# Patient Record
Sex: FEMALE | Race: WHITE | NOT HISPANIC OR LATINO | Employment: OTHER | URBAN - METROPOLITAN AREA
[De-identification: names, ages, dates, MRNs, and addresses within clinical notes are randomized per-mention and may not be internally consistent; named-entity substitution may affect disease eponyms.]

---

## 2017-01-10 RX ORDER — CLONIDINE HYDROCHLORIDE 0.3 MG/1
TABLET ORAL
Qty: 30 TABLET | Refills: 0 | OUTPATIENT
Start: 2017-01-10

## 2017-01-10 RX ORDER — LISINOPRIL AND HYDROCHLOROTHIAZIDE 25; 20 MG/1; MG/1
TABLET ORAL
Qty: 30 TABLET | Refills: 0 | OUTPATIENT
Start: 2017-01-10

## 2017-01-12 RX ORDER — GABAPENTIN 400 MG/1
CAPSULE ORAL
Qty: 30 CAPSULE | Refills: 0 | OUTPATIENT
Start: 2017-01-12

## 2017-01-12 RX ORDER — CLONIDINE HYDROCHLORIDE 0.3 MG/1
TABLET ORAL
Qty: 30 TABLET | Refills: 0 | OUTPATIENT
Start: 2017-01-12

## 2017-01-16 ENCOUNTER — OFFICE VISIT (OUTPATIENT)
Dept: FAMILY MEDICINE CLINIC | Facility: CLINIC | Age: 77
End: 2017-01-16

## 2017-01-16 VITALS
TEMPERATURE: 98.3 F | HEART RATE: 72 BPM | DIASTOLIC BLOOD PRESSURE: 62 MMHG | BODY MASS INDEX: 25.13 KG/M2 | SYSTOLIC BLOOD PRESSURE: 144 MMHG | OXYGEN SATURATION: 95 % | HEIGHT: 64 IN | WEIGHT: 147.2 LBS

## 2017-01-16 DIAGNOSIS — IMO0002 UNCONTROLLED TYPE 2 DIABETES MELLITUS WITH COMPLICATION, WITH LONG-TERM CURRENT USE OF INSULIN: Primary | ICD-10-CM

## 2017-01-16 DIAGNOSIS — E78.5 HYPERLIPIDEMIA, UNSPECIFIED HYPERLIPIDEMIA TYPE: ICD-10-CM

## 2017-01-16 DIAGNOSIS — E11.49 OTHER DIABETIC NEUROLOGICAL COMPLICATION ASSOCIATED WITH TYPE 2 DIABETES MELLITUS (HCC): ICD-10-CM

## 2017-01-16 DIAGNOSIS — I10 ESSENTIAL HYPERTENSION: ICD-10-CM

## 2017-01-16 DIAGNOSIS — K57.92 DIVERTICULITIS OF INTESTINE WITHOUT PERFORATION OR ABSCESS WITHOUT BLEEDING, UNSPECIFIED PART OF INTESTINAL TRACT: ICD-10-CM

## 2017-01-16 PROBLEM — I77.9 PERIPHERAL ARTERIAL OCCLUSIVE DISEASE (HCC): Status: ACTIVE | Noted: 2017-01-16

## 2017-01-16 PROBLEM — J18.9 PNEUMONIA: Status: RESOLVED | Noted: 2017-01-16 | Resolved: 2017-01-16

## 2017-01-16 PROBLEM — M54.6 THORACIC BACK PAIN: Status: ACTIVE | Noted: 2017-01-16

## 2017-01-16 PROBLEM — J18.9 PNEUMONIA: Status: ACTIVE | Noted: 2017-01-16

## 2017-01-16 PROBLEM — K57.32 DIVERTICULITIS OF COLON: Status: RESOLVED | Noted: 2017-01-16 | Resolved: 2017-01-16

## 2017-01-16 PROBLEM — H91.90 HEARING LOSS: Status: ACTIVE | Noted: 2017-01-16

## 2017-01-16 PROBLEM — K57.32 DIVERTICULITIS OF COLON: Status: ACTIVE | Noted: 2017-01-16

## 2017-01-16 PROBLEM — K21.9 GASTROESOPHAGEAL REFLUX DISEASE: Status: ACTIVE | Noted: 2017-01-16

## 2017-01-16 PROBLEM — K29.70 GASTRITIS: Status: ACTIVE | Noted: 2017-01-16

## 2017-01-16 PROBLEM — E11.40 DIABETIC NEUROPATHY (HCC): Status: ACTIVE | Noted: 2017-01-16

## 2017-01-16 PROBLEM — J30.2 SEASONAL ALLERGIC RHINITIS: Status: ACTIVE | Noted: 2017-01-16

## 2017-01-16 PROBLEM — E53.8 COBALAMIN DEFICIENCY: Status: ACTIVE | Noted: 2017-01-16

## 2017-01-16 LAB — HBA1C MFR BLD: 7.6 %

## 2017-01-16 PROCEDURE — 99214 OFFICE O/P EST MOD 30 MIN: CPT | Performed by: NURSE PRACTITIONER

## 2017-01-16 PROCEDURE — 83036 HEMOGLOBIN GLYCOSYLATED A1C: CPT | Performed by: NURSE PRACTITIONER

## 2017-01-16 RX ORDER — GABAPENTIN 400 MG/1
400 CAPSULE ORAL 2 TIMES DAILY
Qty: 180 CAPSULE | Refills: 3 | Status: SHIPPED | OUTPATIENT
Start: 2017-01-16 | End: 2018-03-15 | Stop reason: SDUPTHER

## 2017-01-16 RX ORDER — AMLODIPINE BESYLATE 10 MG/1
10 TABLET ORAL DAILY
Qty: 90 TABLET | Refills: 3 | Status: SHIPPED | OUTPATIENT
Start: 2017-01-16 | End: 2018-03-15 | Stop reason: SDUPTHER

## 2017-01-16 RX ORDER — CIPROFLOXACIN 500 MG/1
500 TABLET, FILM COATED ORAL 2 TIMES DAILY
Qty: 20 TABLET | Refills: 0 | Status: SHIPPED | OUTPATIENT
Start: 2017-01-16 | End: 2017-02-22

## 2017-01-16 RX ORDER — CLONIDINE HYDROCHLORIDE 0.3 MG/1
0.3 TABLET ORAL 2 TIMES DAILY
Qty: 180 TABLET | Refills: 3 | Status: SHIPPED | OUTPATIENT
Start: 2017-01-16 | End: 2018-01-03 | Stop reason: SDUPTHER

## 2017-01-16 RX ORDER — ASPIRIN 325 MG
325 TABLET ORAL DAILY
COMMUNITY
End: 2019-02-15

## 2017-01-16 RX ORDER — METRONIDAZOLE 500 MG/1
500 TABLET ORAL 3 TIMES DAILY
Qty: 14 TABLET | Refills: 0 | Status: SHIPPED | OUTPATIENT
Start: 2017-01-16 | End: 2017-02-28 | Stop reason: SDUPTHER

## 2017-01-16 RX ORDER — LISINOPRIL AND HYDROCHLOROTHIAZIDE 25; 20 MG/1; MG/1
1 TABLET ORAL DAILY
Qty: 90 TABLET | Refills: 3 | Status: SHIPPED | OUTPATIENT
Start: 2017-01-16 | End: 2018-02-01 | Stop reason: SDUPTHER

## 2017-01-16 NOTE — MR AVS SNAPSHOT
Mar Burgos   1/16/2017 2:00 PM   Office Visit    Dept Phone:  [phone]   Encounter #:  [id number]    Provider:  JEFFREY Rushing   Department:  [facility]                Your Full Care Plan              Today's Medication Changes          These changes are accurate as of: 1/16/17  2:23 PM.  If you have any questions, ask your nurse or doctor.               New Medication(s)Ordered:     ciprofloxacin 500 MG tablet   Commonly known as:  CIPRO   Take 1 tablet by mouth 2 (Two) Times a Day.   Started by:  JEFFREY Rushing       metroNIDAZOLE 500 MG tablet   Commonly known as:  FLAGYL   Take 1 tablet by mouth 3 (Three) Times a Day.   Started by:  JEFFREY Rushing         Medication(s)that have changed:     amLODIPine 10 MG tablet   Commonly known as:  NORVASC   Take 1 tablet by mouth Daily.   What changed:  See the new instructions.   Changed by:  JEFFREY Rushing       CloNIDine 0.3 MG tablet   Commonly known as:  CATAPRES   Take 1 tablet by mouth 2 (Two) Times a Day.   What changed:  See the new instructions.   Changed by:  JEFFREY Rushing       gabapentin 400 MG capsule   Commonly known as:  NEURONTIN   Take 1 capsule by mouth 2 (Two) Times a Day.   What changed:  See the new instructions.   Changed by:  JEFFREY Rushing       lisinopril-hydrochlorothiazide 20-25 MG per tablet   Commonly known as:  PRINZIDE,ZESTORETIC   Take 1 tablet by mouth Daily.   What changed:  See the new instructions.   Changed by:  JEFFREY Rushing       metFORMIN 1000 MG tablet   Commonly known as:  GLUCOPHAGE   Take 1 tablet by mouth 2 (Two) Times a Day With Meals.   What changed:  See the new instructions.   Changed by:  JEFFREY Rushing       metoprolol succinate  MG 24 hr tablet   Commonly known as:  TOPROL-XL   TAKE 1 TABLET BY MOUTH EVERY DAY   What changed:  Another medication with the same name was removed. Continue taking this medication, and  follow the directions you see here.   Changed by:  JEFFREY Rushing         Stop taking medication(s)listed here:     doxycycline 100 MG capsule   Commonly known as:  MONODOX   Stopped by:  JEFFREY Rushing                Where to Get Your Medications      These medications were sent to [id number] - [place], [place] - [address] AT [facility] of [address] - [phone]  - [phone] FX  [address], [place] [address]     Phone:  [phone]     amLODIPine 10 MG tablet    ciprofloxacin 500 MG tablet    CloNIDine 0.3 MG tablet    gabapentin 400 MG capsule    lisinopril-hydrochlorothiazide 20-25 MG per tablet    metFORMIN 1000 MG tablet    metroNIDAZOLE 500 MG tablet                  Your Updated Medication List          This list is accurate as of: 1/16/17  2:23 PM.  Always use your most recent med list.                amLODIPine 10 MG tablet   Commonly known as:  NORVASC   Take 1 tablet by mouth Daily.       aspirin 325 MG tablet       ciprofloxacin 500 MG tablet   Commonly known as:  CIPRO   Take 1 tablet by mouth 2 (Two) Times a Day.       CloNIDine 0.3 MG tablet   Commonly known as:  CATAPRES   Take 1 tablet by mouth 2 (Two) Times a Day.       gabapentin 400 MG capsule   Commonly known as:  NEURONTIN   Take 1 capsule by mouth 2 (Two) Times a Day.       lisinopril-hydrochlorothiazide 20-25 MG per tablet   Commonly known as:  PRINZIDE,ZESTORETIC   Take 1 tablet by mouth Daily.       metFORMIN 1000 MG tablet   Commonly known as:  GLUCOPHAGE   Take 1 tablet by mouth 2 (Two) Times a Day With Meals.       metoprolol succinate  MG 24 hr tablet   Commonly known as:  TOPROL-XL   TAKE 1 TABLET BY MOUTH EVERY DAY       metroNIDAZOLE 500 MG tablet   Commonly known as:  FLAGYL   Take 1 tablet by mouth 3 (Three) Times a Day.               We Performed the Following     CBC & Differential     Comprehensive Metabolic Panel     Lipid Panel With LDL / HDL Ratio     MicroAlbumin, Urine,  Random     POC Glycosylated Hemoglobin (Hb A1C)       You Were Diagnosed With        Codes Comments    Uncontrolled type 2 diabetes mellitus with complication, with long-term current use of insulin    -  Primary ICD-10-CM: E11.8, E11.65, Z79.4  ICD-9-CM: 250.82, V58.67     Essential hypertension     ICD-10-CM: I10  ICD-9-CM: 401.9     Hyperlipidemia, unspecified hyperlipidemia type     ICD-10-CM: E78.5  ICD-9-CM: 272.4     Other diabetic neurological complication associated with type 2 diabetes mellitus     ICD-10-CM: E11.49     Diverticulitis of intestine without perforation or abscess without bleeding, unspecified part of intestinal tract     ICD-10-CM: K57.92  ICD-9-CM: 562.11       Instructions     None    Patient Instructions History      Upcoming Appointments     Visit Type Date Time Department    OFFICE VISIT 2017  2:00 PM [facility]    INIT MEDICARE WELLNESS VISIT 2017  9:00 AM [facility]      [employer] Signup     [employer] allows you to send messages to your doctor, view your test results, renew your prescriptions, schedule appointments, and more. To sign up, go to [employer] and click on the Sign Up Now link in the New User? box. Enter your [employer] Activation Code exactly as it appears below along with the last four digits of your Social Security Number and your Date of Birth () to complete the sign-up process. If you do not sign up before the expiration date, you must request a new code.    [employer] Activation Code: [email or web address]  Expires: 2017  5:38 AM    If you have questions, you can email [email or web address] or call [phone] to talk to our [employer] staff. Remember, [employer] is NOT to be used for urgent needs. For medical emergencies, dial 911.               Other Info from Your Visit           Your Appointments     2017  9:00 AM EST   Initial Medicare Wellness Visit with JEFFREY Rushing   [facility]  [facility] (--)    [address]  [facility] [address] [phone]              Allergies     Lipitor [Atorvastatin]        Reason for Visit     Med Refill Needing clonidine, amlodipine, gabapentin, metformin, and lisinopril refilled     Immunizations Patient is unsure of last tdap vaccine. Patient has not had a shingles vaccine       Vital Signs     Blood Pressure Pulse Temperature Height Weight Oxygen Saturation    144/62 (BP Location: Right arm, Patient Position: Sitting) 72 98.3 °F (36.8 °C) 64\" (162.6 cm) 147 lb 3.2 oz (66.8 kg) 95%    Body Mass Index Smoking Status                25.27 kg/m2 Former Smoker          Problems and Diagnoses Noted     Anemia    Vitamin B12 deficiency    Diabetes    Diabetic nerve disorder    Inflammation of the stomach lining    Acid reflux disease    Hearing loss    History of malignant neoplasm of breast    History of malignant neoplasm of skin    History of tobacco use    High cholesterol or triglycerides    High blood pressure    Peripheral arterial occlusive disease    Seasonal allergic reaction    Back pain    Type II diabetes mellitus without control    Diverticulitis of intestine without perforation or abscess without bleeding, unspecified part of intestinal tract          No Longer an Issue     Diverticulitis of colon    Pneumonia    Vitamin B-complex deficiency      Results     POC Glycosylated Hemoglobin (Hb A1C)      Component Value Standard Range & Units    Hemoglobin A1C 7.6 %                    "

## 2017-01-16 NOTE — PROGRESS NOTES
"Subjective   Mar Burgos is a 76 y.o. female.     History of Present Illness   Mar Burgos 76 y.o. female who presents today for routine follow up check and medication refills.  she has a history of   Patient Active Problem List   Diagnosis   • Anemia   • Uncontrolled type 2 diabetes mellitus   • Diabetic neuropathy   • Gastroesophageal reflux disease   • Gastritis   • Hearing loss   • Hyperlipidemia   • Hypertension   • Thoracic back pain   • Peripheral arterial occlusive disease   • History of malignant neoplasm of breast   • History of malignant neoplasm of skin   • History of tobacco use   • Seasonal allergic rhinitis   • Diabetes mellitus   • Cobalamin deficiency   .  Since the last visit, she has overall felt well.  She has Hypertenision and is well controlled on medication, DMII and is well controlled on medication and neuropathy is well controlled .  she has been compliant with current medications have reviewed them.  The patient denies medication side effects.    Needing several meds refilled. Doing well with all.  Would like Cipro and Flagyl for a possible future diverticulitis flare.  Ok today without issue     The following portions of the patient's history were reviewed and updated as appropriate: allergies, current medications, past social history and problem list.    Review of Systems   All other systems reviewed and are negative.      Objective   Visit Vitals   • /62 (BP Location: Right arm, Patient Position: Sitting)   • Pulse 72   • Temp 98.3 °F (36.8 °C)   • Ht 64\" (162.6 cm)   • Wt 147 lb 3.2 oz (66.8 kg)   • SpO2 95%   • BMI 25.27 kg/m2     Physical Exam   Constitutional: She is oriented to person, place, and time. Vital signs are normal. She appears well-developed and well-nourished. No distress.   HENT:   Head: Normocephalic.   Cardiovascular: Normal rate, regular rhythm and normal heart sounds.    Pulmonary/Chest: Effort normal and breath sounds normal.    Mar had a " diabetic foot exam performed today.  Neurological: She is alert and oriented to person, place, and time. Gait normal.   Psychiatric: She has a normal mood and affect. Her behavior is normal. Judgment and thought content normal.   Vitals reviewed.      Assessment/Plan   Problem List Items Addressed This Visit        Cardiovascular and Mediastinum    Hyperlipidemia    Relevant Orders    Comprehensive Metabolic Panel    CBC & Differential    Lipid Panel With LDL / HDL Ratio    Hypertension    Relevant Medications    CloNIDine (CATAPRES) 0.3 MG tablet    amLODIPine (NORVASC) 10 MG tablet    lisinopril-hydrochlorothiazide (PRINZIDE,ZESTORETIC) 20-25 MG per tablet    Other Relevant Orders    MicroAlbumin, Urine, Random       Endocrine    Uncontrolled type 2 diabetes mellitus - Primary    Relevant Medications    metFORMIN (GLUCOPHAGE) 1000 MG tablet    Other Relevant Orders    POC Glycosylated Hemoglobin (Hb A1C)       Nervous and Auditory    Diabetic neuropathy    Relevant Medications    gabapentin (NEURONTIN) 400 MG capsule      Other Visit Diagnoses     Diverticulitis of intestine without perforation or abscess without bleeding, unspecified part of intestinal tract        Relevant Medications    ciprofloxacin (CIPRO) 500 MG tablet    metroNIDAZOLE (FLAGYL) 500 MG tablet           rto in 6 mons

## 2017-01-17 LAB
ALBUMIN SERPL-MCNC: 4.6 G/DL (ref 3.5–4.8)
ALBUMIN/GLOB SERPL: 1.4 {RATIO} (ref 1.1–2.5)
ALP SERPL-CCNC: 138 IU/L (ref 39–117)
ALT SERPL-CCNC: 14 IU/L (ref 0–32)
AST SERPL-CCNC: 18 IU/L (ref 0–40)
BASOPHILS # BLD AUTO: 0.1 X10E3/UL (ref 0–0.2)
BASOPHILS NFR BLD AUTO: 1 %
BILIRUB SERPL-MCNC: <0.2 MG/DL (ref 0–1.2)
BUN SERPL-MCNC: 23 MG/DL (ref 8–27)
BUN/CREAT SERPL: 17 (ref 11–26)
CALCIUM SERPL-MCNC: 10 MG/DL (ref 8.7–10.3)
CHLORIDE SERPL-SCNC: 102 MMOL/L (ref 96–106)
CHOLEST SERPL-MCNC: 234 MG/DL (ref 100–199)
CO2 SERPL-SCNC: 19 MMOL/L (ref 18–29)
CREAT SERPL-MCNC: 1.39 MG/DL (ref 0.57–1)
EOSINOPHIL # BLD AUTO: 0.2 X10E3/UL (ref 0–0.4)
EOSINOPHIL NFR BLD AUTO: 3 %
ERYTHROCYTE [DISTWIDTH] IN BLOOD BY AUTOMATED COUNT: 14.7 % (ref 12.3–15.4)
GLOBULIN SER CALC-MCNC: 3.2 G/DL (ref 1.5–4.5)
GLUCOSE SERPL-MCNC: 172 MG/DL (ref 65–99)
GLUCOSE UR QL: NORMAL
HCT VFR BLD AUTO: 37.8 % (ref 34–46.6)
HDLC SERPL-MCNC: 37 MG/DL
HGB BLD-MCNC: 12.4 G/DL (ref 11.1–15.9)
IMM GRANULOCYTES # BLD: 0 X10E3/UL (ref 0–0.1)
IMM GRANULOCYTES NFR BLD: 0 %
KETONES UR QL STRIP: NORMAL
LDLC SERPL CALC-MCNC: 140 MG/DL (ref 0–99)
LDLC/HDLC SERPL: 3.8 RATIO UNITS (ref 0–3.2)
LYMPHOCYTES # BLD AUTO: 1.6 X10E3/UL (ref 0.7–3.1)
LYMPHOCYTES NFR BLD AUTO: 21 %
MCH RBC QN AUTO: 28.1 PG (ref 26.6–33)
MCHC RBC AUTO-ENTMCNC: 32.8 G/DL (ref 31.5–35.7)
MCV RBC AUTO: 86 FL (ref 79–97)
MONOCYTES # BLD AUTO: 0.9 X10E3/UL (ref 0.1–0.9)
MONOCYTES NFR BLD AUTO: 12 %
NEUTROPHILS # BLD AUTO: 4.7 X10E3/UL (ref 1.4–7)
NEUTROPHILS NFR BLD AUTO: 63 %
PH UR STRIP: NORMAL [PH]
PLATELET # BLD AUTO: 342 X10E3/UL (ref 150–379)
POTASSIUM SERPL-SCNC: 4.5 MMOL/L (ref 3.5–5.2)
PROT SERPL-MCNC: 7.8 G/DL (ref 6–8.5)
PROT UR QL STRIP: NORMAL
RBC # BLD AUTO: 4.42 X10E6/UL (ref 3.77–5.28)
SODIUM SERPL-SCNC: 139 MMOL/L (ref 134–144)
SP GR UR: NORMAL
SPECIMEN STATUS: NORMAL
TRIGL SERPL-MCNC: 287 MG/DL (ref 0–149)
UNABLE TO VOID: NORMAL
VLDLC SERPL CALC-MCNC: 57 MG/DL (ref 5–40)
WBC # BLD AUTO: 7.5 X10E3/UL (ref 3.4–10.8)

## 2017-01-18 RX ORDER — EZETIMIBE 10 MG/1
10 TABLET ORAL DAILY
Qty: 30 TABLET | Refills: 3 | Status: SHIPPED | OUTPATIENT
Start: 2017-01-18 | End: 2018-03-15

## 2017-02-22 ENCOUNTER — OFFICE VISIT (OUTPATIENT)
Dept: FAMILY MEDICINE CLINIC | Facility: CLINIC | Age: 77
End: 2017-02-22

## 2017-02-22 VITALS
HEART RATE: 65 BPM | HEIGHT: 64 IN | SYSTOLIC BLOOD PRESSURE: 122 MMHG | OXYGEN SATURATION: 98 % | BODY MASS INDEX: 25.3 KG/M2 | DIASTOLIC BLOOD PRESSURE: 58 MMHG | WEIGHT: 148.2 LBS | TEMPERATURE: 98.4 F

## 2017-02-22 DIAGNOSIS — Z12.31 ENCOUNTER FOR SCREENING MAMMOGRAM FOR MALIGNANT NEOPLASM OF BREAST: ICD-10-CM

## 2017-02-22 DIAGNOSIS — Z13.820 OSTEOPOROSIS SCREENING: Primary | ICD-10-CM

## 2017-02-22 DIAGNOSIS — Z12.39 BREAST CANCER SCREENING: ICD-10-CM

## 2017-02-22 PROCEDURE — G0438 PPPS, INITIAL VISIT: HCPCS | Performed by: NURSE PRACTITIONER

## 2017-02-22 NOTE — PROGRESS NOTES
QUICK REFERENCE INFORMATION:  The ABCs of the Annual Wellness Visit    Initial Medicare Wellness Visit    HEALTH RISK ASSESSMENT    Recent Hospitalizations:  No recent hospitalization(s)..        Current Medical Providers:  Patient Care Team:  JEFFREY Rushing as PCP - General  JEFFREY Rushing as PCP - Family Medicine        Smoking Status:  History   Smoking Status   • Former Smoker   • Years: 10.00   Smokeless Tobacco   • Former User       Alcohol Consumption:  History   Alcohol Use No       Depression Screen:   No flowsheet data found.    Health Habits and Functional and Cognitive Screening:  No flowsheet data found.               Does the patient have evidence of cognitive impairment? No    Asprin use counseling:yes    Finger Rub Hearing Test (right ear):passed  Finger Rub Hearing Test (left ear):passed    Recent Lab Results:    Visual Acuity:  No exam data present    Age-appropriate Screening Schedule:  Refer to the list below for future screening recommendations based on patient's age, sex and/or medical conditions. Orders for these recommended tests are listed in the plan section. The patient has been provided with a written plan.    Health Maintenance   Topic Date Due   • TDAP/TD VACCINES (1 - Tdap) 09/25/1959   • DIABETIC EYE EXAM  01/16/2017   • URINE MICROALBUMIN  01/16/2017   • ZOSTER VACCINE  01/16/2017   • HEMOGLOBIN A1C  07/16/2017   • PNEUMOCOCCAL VACCINES (65+ LOW/MEDIUM RISK) (2 of 2 - PPSV23) 10/24/2017   • DIABETIC FOOT EXAM  01/16/2018   • LIPID PANEL  01/16/2018   • INFLUENZA VACCINE  Completed        Subjective   History of Present Illness    Mar Burgos is a 76 y.o. female who presents for an Annual Wellness Visit. In addition, we addressed the following health issues:none    The following portions of the patient's history were reviewed and updated as appropriate: allergies, current medications, past family history, past medical history, past social history, past surgical history  and problem list.    Outpatient Medications Prior to Visit   Medication Sig Dispense Refill   • amLODIPine (NORVASC) 10 MG tablet Take 1 tablet by mouth Daily. 90 tablet 3   • aspirin 325 MG tablet Take 325 mg by mouth Daily.     • CloNIDine (CATAPRES) 0.3 MG tablet Take 1 tablet by mouth 2 (Two) Times a Day. 180 tablet 3   • ezetimibe (ZETIA) 10 MG tablet Take 1 tablet by mouth Daily. 30 tablet 3   • gabapentin (NEURONTIN) 400 MG capsule Take 1 capsule by mouth 2 (Two) Times a Day. 180 capsule 3   • lisinopril-hydrochlorothiazide (PRINZIDE,ZESTORETIC) 20-25 MG per tablet Take 1 tablet by mouth Daily. 90 tablet 3   • metFORMIN (GLUCOPHAGE) 1000 MG tablet Take 1 tablet by mouth 2 (Two) Times a Day With Meals. 180 tablet 3   • metoprolol succinate XL (TOPROL-XL) 200 MG 24 hr tablet TAKE 1 TABLET BY MOUTH EVERY DAY 30 tablet 0   • metroNIDAZOLE (FLAGYL) 500 MG tablet Take 1 tablet by mouth 3 (Three) Times a Day. 14 tablet 0   • ciprofloxacin (CIPRO) 500 MG tablet Take 1 tablet by mouth 2 (Two) Times a Day. 20 tablet 0     No facility-administered medications prior to visit.        Patient Active Problem List   Diagnosis   • Anemia   • Uncontrolled type 2 diabetes mellitus   • Diabetic neuropathy   • Gastroesophageal reflux disease   • Gastritis   • Hearing loss   • Hyperlipidemia   • Hypertension   • Thoracic back pain   • Peripheral arterial occlusive disease   • History of malignant neoplasm of breast   • History of malignant neoplasm of skin   • History of tobacco use   • Seasonal allergic rhinitis   • Diabetes mellitus   • Cobalamin deficiency   • Osteoporosis screening   • Breast cancer screening       Advanced Care Planning:  has NO advanced directive - not interested in additional information    Identification of Risk Factors:  Risk factors include: increased fall risk.    Review of Systems   All other systems reviewed and are negative.      Compared to one year ago, the patient feels her physical health is the  "same.  Compared to one year ago, the patient feels her mental health is the same.    Objective     Physical Exam   Constitutional: She is oriented to person, place, and time. Vital signs are normal. She appears well-developed and well-nourished. No distress.   HENT:   Head: Normocephalic.   Cardiovascular: Normal rate, regular rhythm and normal heart sounds.    Pulmonary/Chest: Effort normal and breath sounds normal.   Neurological: She is alert and oriented to person, place, and time. Gait normal.   Psychiatric: She has a normal mood and affect. Her behavior is normal. Judgment and thought content normal.   Vitals reviewed.      Vitals:    02/22/17 0853   BP: 122/58   BP Location: Left arm   Patient Position: Sitting   Pulse: 65   Temp: 98.4 °F (36.9 °C)   SpO2: 98%   Weight: 148 lb 3.2 oz (67.2 kg)   Height: 64\" (162.6 cm)       Body mass index is 25.44 kg/(m^2).  Discussed the patient's BMI with her. The BMI is in the acceptable range.    Assessment/Plan   Patient Self-Management and Personalized Health Advice  The patient has been provided with information about: exercise and fall prevention and preventive services including:   · Bone densitometry screening, Fall Risk assessment done, Glaucoma screening recommended, Screening mammography, referral placed.    Visit Diagnoses:    ICD-10-CM ICD-9-CM   1. Osteoporosis screening Z13.820 V82.81   2. Breast cancer screening Z12.39 V76.10   3. Encounter for screening mammogram for malignant neoplasm of breast  Z12.31 V76.12       Orders Placed This Encounter   Procedures   • DEXA Bone Density Axial     Standing Status:   Future     Standing Expiration Date:   2/22/2018     Order Specific Question:   Reason for Exam:     Answer:   screening   • Mammo Screening Bilateral With CAD     Standing Status:   Future     Standing Expiration Date:   2/23/2018     Order Specific Question:   Reason for Exam:     Answer:   screening       Outpatient Encounter Prescriptions as of " 2/22/2017   Medication Sig Dispense Refill   • amLODIPine (NORVASC) 10 MG tablet Take 1 tablet by mouth Daily. 90 tablet 3   • aspirin 325 MG tablet Take 325 mg by mouth Daily.     • CloNIDine (CATAPRES) 0.3 MG tablet Take 1 tablet by mouth 2 (Two) Times a Day. 180 tablet 3   • ezetimibe (ZETIA) 10 MG tablet Take 1 tablet by mouth Daily. 30 tablet 3   • gabapentin (NEURONTIN) 400 MG capsule Take 1 capsule by mouth 2 (Two) Times a Day. 180 capsule 3   • lisinopril-hydrochlorothiazide (PRINZIDE,ZESTORETIC) 20-25 MG per tablet Take 1 tablet by mouth Daily. 90 tablet 3   • metFORMIN (GLUCOPHAGE) 1000 MG tablet Take 1 tablet by mouth 2 (Two) Times a Day With Meals. 180 tablet 3   • metoprolol succinate XL (TOPROL-XL) 200 MG 24 hr tablet TAKE 1 TABLET BY MOUTH EVERY DAY 30 tablet 0   • metroNIDAZOLE (FLAGYL) 500 MG tablet Take 1 tablet by mouth 3 (Three) Times a Day. 14 tablet 0   • [DISCONTINUED] ciprofloxacin (CIPRO) 500 MG tablet Take 1 tablet by mouth 2 (Two) Times a Day. 20 tablet 0     No facility-administered encounter medications on file as of 2/22/2017.        Reviewed use of high risk medication in the elderly: yes  Reviewed for potential of harmful drug interactions in the elderly: yes    Follow Up:  Return in about 3 months (around 5/22/2017) for a1c.     An After Visit Summary and PPPS with all of these plans were given to the patient.

## 2017-02-28 DIAGNOSIS — K57.92 DIVERTICULITIS OF INTESTINE WITHOUT PERFORATION OR ABSCESS WITHOUT BLEEDING, UNSPECIFIED PART OF INTESTINAL TRACT: ICD-10-CM

## 2017-02-28 RX ORDER — CIPROFLOXACIN 500 MG/1
TABLET, FILM COATED ORAL
Qty: 20 TABLET | Refills: 0 | Status: SHIPPED | OUTPATIENT
Start: 2017-02-28 | End: 2018-03-15

## 2017-02-28 RX ORDER — METRONIDAZOLE 500 MG/1
TABLET ORAL
Qty: 14 TABLET | Refills: 0 | Status: SHIPPED | OUTPATIENT
Start: 2017-02-28 | End: 2018-03-15

## 2017-04-26 RX ORDER — METOPROLOL SUCCINATE 200 MG/1
TABLET, EXTENDED RELEASE ORAL
Qty: 30 TABLET | Refills: 0 | Status: SHIPPED | OUTPATIENT
Start: 2017-04-26 | End: 2017-06-09 | Stop reason: SDUPTHER

## 2017-06-09 RX ORDER — METOPROLOL SUCCINATE 200 MG/1
TABLET, EXTENDED RELEASE ORAL
Qty: 30 TABLET | Refills: 0 | Status: SHIPPED | OUTPATIENT
Start: 2017-06-09 | End: 2017-07-16 | Stop reason: SDUPTHER

## 2017-07-17 RX ORDER — METOPROLOL SUCCINATE 200 MG/1
TABLET, EXTENDED RELEASE ORAL
Qty: 30 TABLET | Refills: 0 | Status: SHIPPED | OUTPATIENT
Start: 2017-07-17 | End: 2017-08-20 | Stop reason: SDUPTHER

## 2017-07-25 RX ORDER — GABAPENTIN 400 MG/1
CAPSULE ORAL
Qty: 30 CAPSULE | Refills: 0 | OUTPATIENT
Start: 2017-07-25

## 2017-08-09 ENCOUNTER — TELEPHONE (OUTPATIENT)
Dept: URGENT CARE | Facility: CLINIC | Age: 77
End: 2017-08-09

## 2017-08-09 NOTE — TELEPHONE ENCOUNTER
----- Message from Kobe Smith MD sent at 8/9/2017  1:40 PM EDT -----  Urine culture was positive with an organism resistant to the antibiotic patient is using. We will send in a new antibiotic to their pharmacy. If they have continued symptoms, they need to follow up with their PMD.

## 2017-08-21 RX ORDER — METOPROLOL SUCCINATE 200 MG/1
TABLET, EXTENDED RELEASE ORAL
Qty: 30 TABLET | Refills: 0 | Status: SHIPPED | OUTPATIENT
Start: 2017-08-21 | End: 2017-09-19 | Stop reason: SDUPTHER

## 2017-09-19 RX ORDER — METOPROLOL SUCCINATE 200 MG/1
TABLET, EXTENDED RELEASE ORAL
Qty: 30 TABLET | Refills: 0 | Status: SHIPPED | OUTPATIENT
Start: 2017-09-19 | End: 2017-10-17 | Stop reason: SDUPTHER

## 2017-10-17 RX ORDER — METOPROLOL SUCCINATE 200 MG/1
TABLET, EXTENDED RELEASE ORAL
Qty: 30 TABLET | Refills: 3 | Status: SHIPPED | OUTPATIENT
Start: 2017-10-17 | End: 2018-03-12 | Stop reason: SDUPTHER

## 2018-01-03 DIAGNOSIS — IMO0002 UNCONTROLLED TYPE 2 DIABETES MELLITUS WITH COMPLICATION, WITH LONG-TERM CURRENT USE OF INSULIN: ICD-10-CM

## 2018-01-03 DIAGNOSIS — I10 ESSENTIAL HYPERTENSION: ICD-10-CM

## 2018-01-03 RX ORDER — CLONIDINE HYDROCHLORIDE 0.3 MG/1
TABLET ORAL
Qty: 90 TABLET | Refills: 0 | Status: SHIPPED | OUTPATIENT
Start: 2018-01-03 | End: 2018-03-15 | Stop reason: SDUPTHER

## 2018-02-01 DIAGNOSIS — I10 ESSENTIAL HYPERTENSION: ICD-10-CM

## 2018-02-01 RX ORDER — LISINOPRIL AND HYDROCHLOROTHIAZIDE 25; 20 MG/1; MG/1
TABLET ORAL
Qty: 90 TABLET | Refills: 0 | Status: SHIPPED | OUTPATIENT
Start: 2018-02-01 | End: 2018-03-15 | Stop reason: SDUPTHER

## 2018-02-15 RX ORDER — METOPROLOL SUCCINATE 200 MG/1
TABLET, EXTENDED RELEASE ORAL
Qty: 30 TABLET | Refills: 0 | OUTPATIENT
Start: 2018-02-15

## 2018-03-09 RX ORDER — METOPROLOL SUCCINATE 200 MG/1
TABLET, EXTENDED RELEASE ORAL
Qty: 30 TABLET | Refills: 0 | OUTPATIENT
Start: 2018-03-09

## 2018-03-12 RX ORDER — METOPROLOL SUCCINATE 200 MG/1
200 TABLET, EXTENDED RELEASE ORAL DAILY
Qty: 30 TABLET | Refills: 3 | Status: SHIPPED | OUTPATIENT
Start: 2018-03-12 | End: 2018-06-24 | Stop reason: SDUPTHER

## 2018-03-15 ENCOUNTER — OFFICE VISIT (OUTPATIENT)
Dept: FAMILY MEDICINE CLINIC | Facility: CLINIC | Age: 78
End: 2018-03-15

## 2018-03-15 VITALS
SYSTOLIC BLOOD PRESSURE: 158 MMHG | DIASTOLIC BLOOD PRESSURE: 66 MMHG | HEIGHT: 64 IN | WEIGHT: 146 LBS | HEART RATE: 76 BPM | OXYGEN SATURATION: 98 % | TEMPERATURE: 98.2 F | BODY MASS INDEX: 24.92 KG/M2

## 2018-03-15 DIAGNOSIS — I10 ESSENTIAL HYPERTENSION: ICD-10-CM

## 2018-03-15 DIAGNOSIS — IMO0002 UNCONTROLLED TYPE 2 DIABETES MELLITUS WITH COMPLICATION, WITH LONG-TERM CURRENT USE OF INSULIN: Primary | ICD-10-CM

## 2018-03-15 DIAGNOSIS — J32.9 SINUSITIS, UNSPECIFIED CHRONICITY, UNSPECIFIED LOCATION: ICD-10-CM

## 2018-03-15 DIAGNOSIS — E11.49 OTHER DIABETIC NEUROLOGICAL COMPLICATION ASSOCIATED WITH TYPE 2 DIABETES MELLITUS (HCC): ICD-10-CM

## 2018-03-15 LAB — HBA1C MFR BLD: 7.5 %

## 2018-03-15 PROCEDURE — 83036 HEMOGLOBIN GLYCOSYLATED A1C: CPT | Performed by: NURSE PRACTITIONER

## 2018-03-15 PROCEDURE — 99214 OFFICE O/P EST MOD 30 MIN: CPT | Performed by: NURSE PRACTITIONER

## 2018-03-15 RX ORDER — LISINOPRIL AND HYDROCHLOROTHIAZIDE 25; 20 MG/1; MG/1
1 TABLET ORAL DAILY
Qty: 90 TABLET | Refills: 3 | Status: SHIPPED | OUTPATIENT
Start: 2018-03-15 | End: 2019-02-18 | Stop reason: HOSPADM

## 2018-03-15 RX ORDER — CLONIDINE HYDROCHLORIDE 0.3 MG/1
0.3 TABLET ORAL DAILY
Qty: 90 TABLET | Refills: 3 | Status: SHIPPED | OUTPATIENT
Start: 2018-03-15 | End: 2019-02-18 | Stop reason: HOSPADM

## 2018-03-15 RX ORDER — GABAPENTIN 400 MG/1
400 CAPSULE ORAL DAILY
Qty: 90 CAPSULE | Refills: 0 | Status: SHIPPED | OUTPATIENT
Start: 2018-03-15 | End: 2019-02-15

## 2018-03-15 RX ORDER — AMOXICILLIN 875 MG/1
875 TABLET, COATED ORAL 2 TIMES DAILY
Qty: 20 TABLET | Refills: 0 | Status: SHIPPED | OUTPATIENT
Start: 2018-03-15 | End: 2019-02-15

## 2018-03-15 RX ORDER — AMLODIPINE BESYLATE 10 MG/1
10 TABLET ORAL DAILY
Qty: 90 TABLET | Refills: 3 | Status: SHIPPED | OUTPATIENT
Start: 2018-03-15 | End: 2019-02-22 | Stop reason: SDUPTHER

## 2018-03-15 NOTE — PROGRESS NOTES
Subjective   Mar Burgos is a 77 y.o. female.     History of Present Illness   Mar Burgos 77 y.o. female who presents today for routine follow up check and medication refills.  she has a history of   Patient Active Problem List   Diagnosis   • Anemia   • Uncontrolled type 2 diabetes mellitus   • Diabetic neuropathy   • Gastroesophageal reflux disease   • Gastritis   • Hearing loss   • Hyperlipidemia   • Hypertension   • Thoracic back pain   • Peripheral arterial occlusive disease   • History of malignant neoplasm of breast   • History of malignant neoplasm of skin   • History of tobacco use   • Seasonal allergic rhinitis   • Diabetes mellitus   • Cobalamin deficiency   • Osteoporosis screening   • Breast cancer screening   • Sinusitis   .  Since the last visit, she has overall felt tired.  She has Hypertenision and is well controlled on medication, DMII and is well controlled on medication and neuropathy is well controlled, .  she has been compliant with current medications have reviewed them.  The patient denies medication side effects.    Upper Respiratory Infection: Patient complains of symptoms of a URI, possible sinusitis. Symptoms include congestion, plugged sensation in the left ear, sore throat and swollen glands. Onset of symptoms was 6 days ago, unchanged since that time. She also c/o facial pain for the past 3 days .  She is drinking plenty of fluids. Evaluation to date: none. Treatment to date: none.        Results for orders placed or performed in visit on 03/15/18   POC Glycosylated Hemoglobin (Hb A1C)   Result Value Ref Range    Hemoglobin A1C 7.5 %         The following portions of the patient's history were reviewed and updated as appropriate: allergies, current medications, past social history and problem list.    Review of Systems   HENT: Positive for congestion and sinus pressure.    All other systems reviewed and are negative.      Objective   /66 (BP Location: Right arm,  "Patient Position: Sitting)   Pulse 76   Temp 98.2 °F (36.8 °C)   Ht 162.6 cm (64.02\")   Wt 66.2 kg (146 lb)   SpO2 98%   BMI 25.05 kg/m²   Physical Exam   Constitutional: She is oriented to person, place, and time. Vital signs are normal. She appears well-developed and well-nourished. No distress.   HENT:   Head: Normocephalic.   Cardiovascular: Normal rate, regular rhythm and normal heart sounds.    Pulmonary/Chest: Effort normal and breath sounds normal.   Neurological: She is alert and oriented to person, place, and time. Gait normal.   Psychiatric: She has a normal mood and affect. Her behavior is normal. Judgment and thought content normal.   Vitals reviewed.    The patient has read and signed the [facility] Controlled Substance Contract.  I will continue to see patient for regular follow up appointments.  They are well controlled on their medication.  CHRIS has been reviewed by me and is updated every 3 months. The patient is aware of the potential for addiction and dependence.    Assessment/Plan   Problem List Items Addressed This Visit        Cardiovascular and Mediastinum    Hypertension    Relevant Medications    CloNIDine (CATAPRES) 0.3 MG tablet    lisinopril-hydrochlorothiazide (PRINZIDE,ZESTORETIC) 20-25 MG per tablet    amLODIPine (NORVASC) 10 MG tablet       Respiratory    Sinusitis    Relevant Medications    amoxicillin (AMOXIL) 875 MG tablet       Endocrine    Uncontrolled type 2 diabetes mellitus - Primary    Relevant Medications    metFORMIN (GLUCOPHAGE) 1000 MG tablet    Other Relevant Orders    POC Glycosylated Hemoglobin (Hb A1C) (Completed)    Diabetic neuropathy    Relevant Medications    metFORMIN (GLUCOPHAGE) 1000 MG tablet    gabapentin (NEURONTIN) 400 MG capsule      Other Visit Diagnoses    None.       rto in 3 mons        "

## 2018-03-19 DIAGNOSIS — IMO0002 UNCONTROLLED TYPE 2 DIABETES MELLITUS WITH COMPLICATION, WITH LONG-TERM CURRENT USE OF INSULIN: ICD-10-CM

## 2018-04-17 DIAGNOSIS — I10 ESSENTIAL HYPERTENSION: ICD-10-CM

## 2018-04-17 RX ORDER — AMLODIPINE BESYLATE 10 MG/1
10 TABLET ORAL DAILY
Qty: 90 TABLET | Refills: 0 | Status: SHIPPED | OUTPATIENT
Start: 2018-04-17 | End: 2019-02-15

## 2018-05-04 DIAGNOSIS — I10 ESSENTIAL HYPERTENSION: ICD-10-CM

## 2018-05-04 RX ORDER — LISINOPRIL AND HYDROCHLOROTHIAZIDE 25; 20 MG/1; MG/1
TABLET ORAL
Qty: 90 TABLET | Refills: 0 | Status: SHIPPED | OUTPATIENT
Start: 2018-05-04 | End: 2019-02-15

## 2018-06-25 RX ORDER — METOPROLOL SUCCINATE 200 MG/1
200 TABLET, EXTENDED RELEASE ORAL DAILY
Qty: 90 TABLET | Refills: 0 | Status: SHIPPED | OUTPATIENT
Start: 2018-06-25 | End: 2018-09-27 | Stop reason: SDUPTHER

## 2018-09-27 RX ORDER — METOPROLOL SUCCINATE 200 MG/1
200 TABLET, EXTENDED RELEASE ORAL DAILY
Qty: 90 TABLET | Refills: 0 | Status: SHIPPED | OUTPATIENT
Start: 2018-09-27 | End: 2019-01-26 | Stop reason: SDUPTHER

## 2019-01-28 RX ORDER — METOPROLOL SUCCINATE 200 MG/1
200 TABLET, EXTENDED RELEASE ORAL DAILY
Qty: 90 TABLET | Refills: 0 | Status: SHIPPED | OUTPATIENT
Start: 2019-01-28 | End: 2019-02-15

## 2019-02-15 ENCOUNTER — APPOINTMENT (OUTPATIENT)
Dept: CT IMAGING | Facility: HOSPITAL | Age: 79
End: 2019-02-15

## 2019-02-15 ENCOUNTER — APPOINTMENT (OUTPATIENT)
Dept: GENERAL RADIOLOGY | Facility: HOSPITAL | Age: 79
End: 2019-02-15

## 2019-02-15 ENCOUNTER — APPOINTMENT (OUTPATIENT)
Dept: MRI IMAGING | Facility: HOSPITAL | Age: 79
End: 2019-02-15

## 2019-02-15 ENCOUNTER — HOSPITAL ENCOUNTER (INPATIENT)
Facility: HOSPITAL | Age: 79
LOS: 3 days | Discharge: HOME HEALTH | End: 2019-02-18
Attending: EMERGENCY MEDICINE | Admitting: HOSPITALIST

## 2019-02-15 DIAGNOSIS — Z74.09 DECREASED MOBILITY AND ENDURANCE: ICD-10-CM

## 2019-02-15 DIAGNOSIS — I63.9 ACUTE CVA (CEREBROVASCULAR ACCIDENT) (HCC): ICD-10-CM

## 2019-02-15 DIAGNOSIS — R13.12 OROPHARYNGEAL DYSPHAGIA: ICD-10-CM

## 2019-02-15 DIAGNOSIS — I63.9 CEREBROVASCULAR ACCIDENT (CVA), UNSPECIFIED MECHANISM (HCC): Primary | ICD-10-CM

## 2019-02-15 PROBLEM — R53.1 LEFT-SIDED WEAKNESS: Status: ACTIVE | Noted: 2019-02-15

## 2019-02-15 LAB
ABO GROUP BLD: NORMAL
ALBUMIN SERPL-MCNC: 4.2 G/DL (ref 3.5–5.2)
ALBUMIN/GLOB SERPL: 1.3 G/DL
ALP SERPL-CCNC: 81 U/L (ref 39–117)
ALT SERPL W P-5'-P-CCNC: 11 U/L (ref 1–33)
ANION GAP SERPL CALCULATED.3IONS-SCNC: 15.9 MMOL/L
APTT PPP: 30.3 SECONDS (ref 22.7–35.4)
AST SERPL-CCNC: 14 U/L (ref 1–32)
BASOPHILS # BLD AUTO: 0.04 10*3/MM3 (ref 0–0.2)
BASOPHILS NFR BLD AUTO: 0.7 % (ref 0–1.5)
BILIRUB SERPL-MCNC: 0.2 MG/DL (ref 0.1–1.2)
BLD GP AB SCN SERPL QL: NEGATIVE
BUN BLD-MCNC: 25 MG/DL (ref 8–23)
BUN/CREAT SERPL: 19.2 (ref 7–25)
CALCIUM SPEC-SCNC: 9.5 MG/DL (ref 8.6–10.5)
CHLORIDE SERPL-SCNC: 101 MMOL/L (ref 98–107)
CO2 SERPL-SCNC: 22.1 MMOL/L (ref 22–29)
CREAT BLD-MCNC: 1.3 MG/DL (ref 0.57–1)
DEPRECATED RDW RBC AUTO: 47.9 FL (ref 37–54)
EOSINOPHIL # BLD AUTO: 0.11 10*3/MM3 (ref 0–0.4)
EOSINOPHIL NFR BLD AUTO: 1.9 % (ref 0.3–6.2)
ERYTHROCYTE [DISTWIDTH] IN BLOOD BY AUTOMATED COUNT: 14.1 % (ref 12.3–15.4)
GFR SERPL CREATININE-BSD FRML MDRD: 39 ML/MIN/1.73
GLOBULIN UR ELPH-MCNC: 3.2 GM/DL
GLUCOSE BLD-MCNC: 257 MG/DL (ref 65–99)
GLUCOSE BLDC GLUCOMTR-MCNC: 107 MG/DL (ref 70–130)
HCT VFR BLD AUTO: 36.5 % (ref 34–46.6)
HGB BLD-MCNC: 11.4 G/DL (ref 12–15.9)
HOLD SPECIMEN: NORMAL
IMM GRANULOCYTES # BLD AUTO: 0.02 10*3/MM3 (ref 0–0.05)
IMM GRANULOCYTES NFR BLD AUTO: 0.3 % (ref 0–0.5)
INR PPP: 0.98 (ref 0.9–1.1)
LYMPHOCYTES # BLD AUTO: 1.64 10*3/MM3 (ref 0.7–3.1)
LYMPHOCYTES NFR BLD AUTO: 27.7 % (ref 19.6–45.3)
MCH RBC QN AUTO: 29.3 PG (ref 26.6–33)
MCHC RBC AUTO-ENTMCNC: 31.2 G/DL (ref 31.5–35.7)
MCV RBC AUTO: 93.8 FL (ref 79–97)
MONOCYTES # BLD AUTO: 0.66 10*3/MM3 (ref 0.1–0.9)
MONOCYTES NFR BLD AUTO: 11.2 % (ref 5–12)
NEUTROPHILS # BLD AUTO: 3.44 10*3/MM3 (ref 1.4–7)
NEUTROPHILS NFR BLD AUTO: 58.2 % (ref 42.7–76)
NRBC BLD AUTO-RTO: 0 /100 WBC (ref 0–0)
PLATELET # BLD AUTO: 285 10*3/MM3 (ref 140–450)
PMV BLD AUTO: 10.1 FL (ref 6–12)
POTASSIUM BLD-SCNC: 3.8 MMOL/L (ref 3.5–5.2)
PROT SERPL-MCNC: 7.4 G/DL (ref 6–8.5)
PROTHROMBIN TIME: 12.8 SECONDS (ref 11.7–14.2)
RBC # BLD AUTO: 3.89 10*6/MM3 (ref 3.77–5.28)
RH BLD: POSITIVE
SODIUM BLD-SCNC: 139 MMOL/L (ref 136–145)
T&S EXPIRATION DATE: NORMAL
TROPONIN T SERPL-MCNC: <0.01 NG/ML (ref 0–0.03)
WBC NRBC COR # BLD: 5.91 10*3/MM3 (ref 3.4–10.8)
WHOLE BLOOD HOLD SPECIMEN: NORMAL

## 2019-02-15 PROCEDURE — 86901 BLOOD TYPING SEROLOGIC RH(D): CPT | Performed by: EMERGENCY MEDICINE

## 2019-02-15 PROCEDURE — 84484 ASSAY OF TROPONIN QUANT: CPT | Performed by: EMERGENCY MEDICINE

## 2019-02-15 PROCEDURE — 70450 CT HEAD/BRAIN W/O DYE: CPT

## 2019-02-15 PROCEDURE — 71045 X-RAY EXAM CHEST 1 VIEW: CPT

## 2019-02-15 PROCEDURE — 0 GADOBENATE DIMEGLUMINE 529 MG/ML SOLUTION: Performed by: INTERNAL MEDICINE

## 2019-02-15 PROCEDURE — 82962 GLUCOSE BLOOD TEST: CPT

## 2019-02-15 PROCEDURE — 86850 RBC ANTIBODY SCREEN: CPT | Performed by: EMERGENCY MEDICINE

## 2019-02-15 PROCEDURE — 70544 MR ANGIOGRAPHY HEAD W/O DYE: CPT

## 2019-02-15 PROCEDURE — 36415 COLL VENOUS BLD VENIPUNCTURE: CPT | Performed by: EMERGENCY MEDICINE

## 2019-02-15 PROCEDURE — A9577 INJ MULTIHANCE: HCPCS | Performed by: INTERNAL MEDICINE

## 2019-02-15 PROCEDURE — 93010 ELECTROCARDIOGRAM REPORT: CPT | Performed by: INTERNAL MEDICINE

## 2019-02-15 PROCEDURE — 99285 EMERGENCY DEPT VISIT HI MDM: CPT

## 2019-02-15 PROCEDURE — 70549 MR ANGIOGRAPH NECK W/O&W/DYE: CPT

## 2019-02-15 PROCEDURE — 70553 MRI BRAIN STEM W/O & W/DYE: CPT

## 2019-02-15 PROCEDURE — 93005 ELECTROCARDIOGRAM TRACING: CPT | Performed by: EMERGENCY MEDICINE

## 2019-02-15 PROCEDURE — 80053 COMPREHEN METABOLIC PANEL: CPT | Performed by: EMERGENCY MEDICINE

## 2019-02-15 PROCEDURE — 85025 COMPLETE CBC W/AUTO DIFF WBC: CPT | Performed by: EMERGENCY MEDICINE

## 2019-02-15 PROCEDURE — 86900 BLOOD TYPING SEROLOGIC ABO: CPT | Performed by: EMERGENCY MEDICINE

## 2019-02-15 PROCEDURE — 85610 PROTHROMBIN TIME: CPT | Performed by: EMERGENCY MEDICINE

## 2019-02-15 PROCEDURE — 85730 THROMBOPLASTIN TIME PARTIAL: CPT | Performed by: EMERGENCY MEDICINE

## 2019-02-15 RX ORDER — ONDANSETRON 2 MG/ML
4 INJECTION INTRAMUSCULAR; INTRAVENOUS EVERY 6 HOURS PRN
Status: DISCONTINUED | OUTPATIENT
Start: 2019-02-15 | End: 2019-02-18 | Stop reason: HOSPADM

## 2019-02-15 RX ORDER — ASPIRIN 325 MG
325 TABLET ORAL DAILY
Status: DISCONTINUED | OUTPATIENT
Start: 2019-02-15 | End: 2019-02-15

## 2019-02-15 RX ORDER — SODIUM CHLORIDE 0.9 % (FLUSH) 0.9 %
3 SYRINGE (ML) INJECTION EVERY 12 HOURS SCHEDULED
Status: DISCONTINUED | OUTPATIENT
Start: 2019-02-15 | End: 2019-02-15

## 2019-02-15 RX ORDER — ACETAMINOPHEN 325 MG/1
650 TABLET ORAL EVERY 4 HOURS PRN
Status: DISCONTINUED | OUTPATIENT
Start: 2019-02-15 | End: 2019-02-18 | Stop reason: HOSPADM

## 2019-02-15 RX ORDER — ASPIRIN 325 MG
325 TABLET ORAL DAILY
Status: DISCONTINUED | OUTPATIENT
Start: 2019-02-16 | End: 2019-02-17

## 2019-02-15 RX ORDER — METOPROLOL SUCCINATE 100 MG/1
200 TABLET, EXTENDED RELEASE ORAL DAILY
Status: DISCONTINUED | OUTPATIENT
Start: 2019-02-15 | End: 2019-02-15

## 2019-02-15 RX ORDER — AMLODIPINE BESYLATE 5 MG/1
10 TABLET ORAL DAILY
Status: DISCONTINUED | OUTPATIENT
Start: 2019-02-15 | End: 2019-02-15

## 2019-02-15 RX ORDER — NITROGLYCERIN 0.4 MG/1
0.4 TABLET SUBLINGUAL
Status: DISCONTINUED | OUTPATIENT
Start: 2019-02-15 | End: 2019-02-18 | Stop reason: HOSPADM

## 2019-02-15 RX ORDER — SODIUM CHLORIDE 9 MG/ML
75 INJECTION, SOLUTION INTRAVENOUS CONTINUOUS
Status: DISCONTINUED | OUTPATIENT
Start: 2019-02-15 | End: 2019-02-16

## 2019-02-15 RX ORDER — METOPROLOL SUCCINATE 100 MG/1
100 TABLET, EXTENDED RELEASE ORAL DAILY
Status: DISCONTINUED | OUTPATIENT
Start: 2019-02-15 | End: 2019-02-15

## 2019-02-15 RX ORDER — SODIUM CHLORIDE 9 MG/ML
100 INJECTION, SOLUTION INTRAVENOUS CONTINUOUS
Status: DISCONTINUED | OUTPATIENT
Start: 2019-02-15 | End: 2019-02-15

## 2019-02-15 RX ORDER — NICOTINE POLACRILEX 4 MG
15 LOZENGE BUCCAL
Status: DISCONTINUED | OUTPATIENT
Start: 2019-02-15 | End: 2019-02-18 | Stop reason: HOSPADM

## 2019-02-15 RX ORDER — ASPIRIN 300 MG/1
300 SUPPOSITORY RECTAL ONCE
Status: COMPLETED | OUTPATIENT
Start: 2019-02-15 | End: 2019-02-15

## 2019-02-15 RX ORDER — SODIUM CHLORIDE 0.9 % (FLUSH) 0.9 %
3-10 SYRINGE (ML) INJECTION AS NEEDED
Status: DISCONTINUED | OUTPATIENT
Start: 2019-02-15 | End: 2019-02-18 | Stop reason: HOSPADM

## 2019-02-15 RX ORDER — SODIUM CHLORIDE 0.9 % (FLUSH) 0.9 %
10 SYRINGE (ML) INJECTION AS NEEDED
Status: DISCONTINUED | OUTPATIENT
Start: 2019-02-15 | End: 2019-02-18 | Stop reason: HOSPADM

## 2019-02-15 RX ORDER — METOPROLOL SUCCINATE 200 MG/1
200 TABLET, EXTENDED RELEASE ORAL DAILY
COMMUNITY
End: 2019-02-18 | Stop reason: HOSPADM

## 2019-02-15 RX ORDER — ASPIRIN 81 MG/1
81 TABLET ORAL DAILY
Status: ON HOLD | COMMUNITY
End: 2019-09-29 | Stop reason: SDUPTHER

## 2019-02-15 RX ORDER — DEXTROSE MONOHYDRATE 25 G/50ML
25 INJECTION, SOLUTION INTRAVENOUS
Status: DISCONTINUED | OUTPATIENT
Start: 2019-02-15 | End: 2019-02-18 | Stop reason: HOSPADM

## 2019-02-15 RX ORDER — SODIUM CHLORIDE 0.9 % (FLUSH) 0.9 %
3 SYRINGE (ML) INJECTION EVERY 12 HOURS SCHEDULED
Status: DISCONTINUED | OUTPATIENT
Start: 2019-02-15 | End: 2019-02-18 | Stop reason: HOSPADM

## 2019-02-15 RX ORDER — ASPIRIN 300 MG/1
300 SUPPOSITORY RECTAL DAILY
Status: DISCONTINUED | OUTPATIENT
Start: 2019-02-16 | End: 2019-02-17

## 2019-02-15 RX ORDER — CLONIDINE 0.1 MG/24H
1 PATCH, EXTENDED RELEASE TRANSDERMAL WEEKLY
Status: DISCONTINUED | OUTPATIENT
Start: 2019-02-15 | End: 2019-02-16

## 2019-02-15 RX ORDER — ASPIRIN 300 MG/1
300 SUPPOSITORY RECTAL DAILY
Status: DISCONTINUED | OUTPATIENT
Start: 2019-02-15 | End: 2019-02-15

## 2019-02-15 RX ORDER — CETIRIZINE HYDROCHLORIDE 10 MG/1
10 TABLET ORAL NIGHTLY
COMMUNITY

## 2019-02-15 RX ORDER — PANTOPRAZOLE SODIUM 40 MG/1
40 TABLET, DELAYED RELEASE ORAL
Status: DISCONTINUED | OUTPATIENT
Start: 2019-02-16 | End: 2019-02-18 | Stop reason: HOSPADM

## 2019-02-15 RX ORDER — FLUTICASONE PROPIONATE 50 MCG
2 SPRAY, SUSPENSION (ML) NASAL NIGHTLY
COMMUNITY

## 2019-02-15 RX ORDER — ACETAMINOPHEN 650 MG/1
650 SUPPOSITORY RECTAL EVERY 4 HOURS PRN
Status: DISCONTINUED | OUTPATIENT
Start: 2019-02-15 | End: 2019-02-18 | Stop reason: HOSPADM

## 2019-02-15 RX ADMIN — SODIUM CHLORIDE, PRESERVATIVE FREE 3 ML: 5 INJECTION INTRAVENOUS at 21:00

## 2019-02-15 RX ADMIN — SODIUM CHLORIDE 125 ML/HR: 9 INJECTION, SOLUTION INTRAVENOUS at 13:27

## 2019-02-15 RX ADMIN — GADOBENATE DIMEGLUMINE 11 ML: 529 INJECTION, SOLUTION INTRAVENOUS at 21:45

## 2019-02-15 RX ADMIN — ASPIRIN 300 MG: 300 SUPPOSITORY RECTAL at 14:13

## 2019-02-15 RX ADMIN — SODIUM CHLORIDE 500 ML: 9 INJECTION, SOLUTION INTRAVENOUS at 12:16

## 2019-02-15 NOTE — PROGRESS NOTES
Clinical Pharmacy Services: Medication History    Mar Burgos is a 78 y.o. female presenting to [facility] for   Chief Complaint   Patient presents with   • Altered Mental Status     per daughter, pt developed garbled speech and confused conversation last night.        She  has a past medical history of Arthritis, Cancer (CMS/HCC), Diabetes mellitus (CMS/HCC), History of malignant neoplasm of breast (7/1/2013), History of malignant neoplasm of skin (7/1/2013), History of tobacco use (7/1/2013), Hyperlipidemia, and Hypertension.    Allergies as of 02/15/2019 - Reviewed 02/15/2019   Allergen Reaction Noted   • Lipitor [atorvastatin] Other (See Comments) 08/15/2014       Medication information was obtained from: Family, patient, pharmacy  Pharmacy and Phone Number: [facility] [phone]    Prior to Admission Medications     Prescriptions Last Dose Informant Patient Reported? Taking?    amLODIPine (NORVASC) 10 MG tablet  Family Member No Yes    Take 1 tablet by mouth Daily.    aspirin 81 MG EC tablet  Family Member Yes Yes    Take 81 mg by mouth Daily.    CloNIDine (CATAPRES) 0.3 MG tablet  Family Member No Yes    Take 1 tablet by mouth Daily.    lisinopril-hydrochlorothiazide (PRINZIDE,ZESTORETIC) 20-25 MG per tablet  Family Member No Yes    Take 1 tablet by mouth Daily.    metFORMIN (GLUCOPHAGE) 1000 MG tablet  Family Member No Yes    Take 1 tablet by mouth 2 (Two) Times a Day With Meals.    Patient taking differently:  Take 1,000 mg by mouth Daily.    metoprolol succinate XL (TOPROL XL) 200 MG 24 hr tablet  Family Member Yes Yes    Take 200 mg by mouth Daily.            Medication notes: Amoxicillin and Gabapentin removed per patient, therapy complete.    This medication list is complete to the best of my knowledge as of 2/15/2019    Please call if questions.    Clarissa Estrella, Medication History Technician  2/15/2019 2:58 PM

## 2019-02-15 NOTE — ED PROVIDER NOTES
EMERGENCY DEPARTMENT ENCOUNTER    CHIEF COMPLAINT  Chief Complaint: trouble speaking  History given by: patient, pt's daughter  History limited by: nothing  Room Number: 22/22  PMD: Vladimir Hernandez APRN      HPI:  Pt is a 78 y.o. female who presents complaining of trouble speaking. The last time the speech sounded normal per the grandson was about 2100 last night. Pt's daughter states that she noticed the slurred speech over te phone this morning. Pt denies CP, abd pain, and headaches but reports weakness in the left leg and left arm and difficulty swallowing. Pt is currently able to ambulate. Pt has no other complaints at this time.    Duration:  1 night  Onset: gradual  Timing: consant  Location: n/a  Radiation: n/a  Quality: slurred  Intensity/Severity: moderate  Progression: impoved  Associated Symptoms: tremors, weakness in L leg, weakness in L arm, difficulty swallowing  Aggravating Factors: none  Alleviating Factors: none  Previous Episodes: none  Treatment before arrival: none    PAST MEDICAL HISTORY  Active Ambulatory Problems     Diagnosis Date Noted   • Anemia 07/01/2013   • Uncontrolled type 2 diabetes mellitus (CMS/[facility]) 01/16/2017   • Diabetic neuropathy (CMS/[facility]) 01/16/2017   • Gastroesophageal reflux disease 01/16/2017   • Gastritis 01/16/2017   • Hearing loss 01/16/2017   • Hyperlipidemia 01/16/2017   • Hypertension 01/16/2017   • Thoracic back pain 01/16/2017   • Peripheral arterial occlusive disease (CMS/[facility]) 01/16/2017   • History of malignant neoplasm of breast 07/01/2013   • History of malignant neoplasm of skin 07/01/2013   • History of tobacco use 07/01/2013   • Seasonal allergic rhinitis 01/16/2017   • Diabetes mellitus (CMS/HCC) 07/01/2013   • Cobalamin deficiency 01/16/2017   • Osteoporosis screening 02/22/2017   • Breast cancer screening 02/22/2017   • Sinusitis 03/15/2018     Resolved Ambulatory Problems     Diagnosis Date Noted   • Diverticulitis of colon 01/16/2017   • Vitamin B-complex  deficiency 07/01/2013   • Pneumonia 01/16/2017     Past Medical History:   Diagnosis Date   • Arthritis    • Cancer (CMS/HCC)    • Diabetes mellitus (CMS/HCC)    • History of malignant neoplasm of breast 7/1/2013   • History of malignant neoplasm of skin 7/1/2013   • History of tobacco use 7/1/2013   • Hyperlipidemia    • Hypertension        PAST SURGICAL HISTORY  Past Surgical History:   Procedure Laterality Date   • MASTECTOMY         FAMILY HISTORY  No family history on file.    SOCIAL HISTORY  Social History     Socioeconomic History   • Marital status:      Spouse name: Not on file   • Number of children: Not on file   • Years of education: Not on file   • Highest education level: Not on file   Social Needs   • Financial resource strain: Not on file   • Food insecurity - worry: Not on file   • Food insecurity - inability: Not on file   • Transportation needs - medical: Not on file   • Transportation needs - non-medical: Not on file   Occupational History   • Not on file   Tobacco Use   • Smoking status: Former Smoker     Years: 10.00   • Smokeless tobacco: Former User   Substance and Sexual Activity   • Alcohol use: No   • Drug use: Not on file   • Sexual activity: Not on file   Other Topics Concern   • Not on file   Social History Narrative   • Not on file       ALLERGIES  Lipitor [atorvastatin]    REVIEW OF SYSTEMS  Review of Systems   Constitutional: Negative for fever.   HENT: Negative for sore throat.    Eyes: Negative.    Respiratory: Negative for cough and shortness of breath.    Cardiovascular: Negative for chest pain.   Gastrointestinal: Negative for abdominal pain, diarrhea and vomiting.   Genitourinary: Negative for dysuria.   Musculoskeletal: Negative for neck pain.   Skin: Negative for rash.   Neurological: Positive for weakness (left leg and left arm). Negative for numbness and headaches.   Hematological: Negative.    Psychiatric/Behavioral: Negative.    All other systems reviewed and are  negative.      PHYSICAL EXAM  ED Triage Vitals [02/15/19 1042]   Temp Heart Rate Resp BP SpO2   97.6 °F (36.4 °C) 76 18 159/76 99 %      Temp src Heart Rate Source Patient Position BP Location FiO2 (%)   Tympanic Monitor -- -- --       Physical Exam   Constitutional: She is oriented to person, place, and time. No distress.   HENT:   Head: Normocephalic and atraumatic.   Eyes: EOM are normal. Pupils are equal, round, and reactive to light.   Neck: Normal range of motion. Neck supple.   Cardiovascular: Normal rate and regular rhythm.   Murmur heard.   Systolic murmur is present with a grade of 2/6.  Pulmonary/Chest: Effort normal and breath sounds normal. No respiratory distress.   Abdominal: Soft. There is no tenderness. There is no rebound and no guarding.   Musculoskeletal: Normal range of motion. She exhibits no edema.   Neurological: She is alert and oriented to person, place, and time. She has normal sensation and normal strength. She displays weakness (left arm and left leg), facial asymmetry (left) and abnormal speech (no aphasia but mild dysarthria).   Skin: Skin is warm and dry. No rash noted.   Psychiatric: Mood and affect normal.   Nursing note and vitals reviewed.      LAB RESULTS  Lab Results (last 24 hours)     Procedure Component Value Units Date/Time    CBC & Differential [[id number]] Collected:  02/15/19 1059    Specimen:  Blood Updated:  02/15/19 1710    Narrative:       The following orders were created for panel order CBC & Differential.  Procedure                               Abnormality         Status                     ---------                               -----------         ------                     CBC Auto Differential[[id number]]        Abnormal            Final result                 Please view results for these tests on the individual orders.    Comprehensive Metabolic Panel [[id number]]  (Abnormal) Collected:  02/15/19 1059    Specimen:  Blood Updated:  02/15/19 1151     Glucose 257  mg/dL      BUN 25 mg/dL      Creatinine 1.30 mg/dL      Sodium 139 mmol/L      Potassium 3.8 mmol/L      Chloride 101 mmol/L      CO2 22.1 mmol/L      Calcium 9.5 mg/dL      Total Protein 7.4 g/dL      Albumin 4.20 g/dL      ALT (SGPT) 11 U/L      AST (SGOT) 14 U/L      Alkaline Phosphatase 81 U/L      Total Bilirubin 0.2 mg/dL      eGFR Non African Amer 39 mL/min/1.73      Globulin 3.2 gm/dL      A/G Ratio 1.3 g/dL      BUN/Creatinine Ratio 19.2     Anion Gap 15.9 mmol/L     Narrative:       The MDRD GFR formula is only valid for adults with stable renal function between ages 18 and 70.    Troponin [[id number]]  (Normal) Collected:  02/15/19 1059    Specimen:  Blood Updated:  02/15/19 1154     Troponin T <0.010 ng/mL     Narrative:       Troponin T Reference Range:  <= 0.03 ng/mL-   Negative for AMI  >0.03 ng/mL-     Abnormal for myocardial necrosis.  Clinicians would have to utilize clinical acumen, EKG, Troponin and serial changes to determine if it is an Acute Myocardial Infarction or myocardial injury due to an underlying chronic condition.     CBC Auto Differential [[id number]]  (Abnormal) Collected:  02/15/19 1059    Specimen:  Blood Updated:  02/15/19 1158     WBC 5.91 10*3/mm3      RBC 3.89 10*6/mm3      Hemoglobin 11.4 g/dL      Hematocrit 36.5 %      MCV 93.8 fL      MCH 29.3 pg      MCHC 31.2 g/dL      RDW 14.1 %      RDW-SD 47.9 fl      MPV 10.1 fL      Platelets 285 10*3/mm3      Neutrophil % 58.2 %      Lymphocyte % 27.7 %      Monocyte % 11.2 %      Eosinophil % 1.9 %      Basophil % 0.7 %      Immature Grans % 0.3 %      Neutrophils, Absolute 3.44 10*3/mm3      Lymphocytes, Absolute 1.64 10*3/mm3      Monocytes, Absolute 0.66 10*3/mm3      Eosinophils, Absolute 0.11 10*3/mm3      Basophils, Absolute 0.04 10*3/mm3      Immature Grans, Absolute 0.02 10*3/mm3      nRBC 0.0 /100 WBC           I ordered the above labs and reviewed the results    RADIOLOGY  XR Chest 1 View   Final Result   No focal  pulmonary consolidation. Follow-up as clinical   indications persist.       This report was finalized on 2/15/2019 12:32 PM by Dr. Demarco Malik M.D.          CT Head Without Contrast Stroke Protocol    (Results Pending)    Reviewed CT Head which shows acute infart in the right internal capsule. Independently viewed by me. Interpreted by radiologist. Discussed with Dr. Samano.     I ordered the above noted radiological studies. Interpreted by radiologist. Discussed with radiologist (Dr. Samano). Reviewed by me in PACS.       PROCEDURES  Procedures  EKG          EKG time: 1208  Rhythm/Rate: 69  P waves and ND: snr pv   QRS, axis: nml   ST and T waves: diffuse nonspecific, normal qt     Interpreted Contemporaneously by me, independently viewed  unchanged compared to prior to 11/18        Interval: baseline  1a. Level of Consciousness: 0-->Alert, keenly responsive  1b. LOC Questions: 0-->Answers both questions correctly  1c. LOC Commands: 0-->Performs both tasks correctly  2. Best Gaze: 0-->Normal  3. Visual: 0-->No visual loss  4. Facial Palsy: 1-->Minor paralysis (flattened nasolabial fold, asymmetry on smiling)  5a. Motor Arm, Left: 1-->Drift, limb holds 90 (or 45) degrees, but drifts down before full 10 seconds, does not hit bed or other support  5b. Motor Arm, Right: 0-->No drift, limb holds 90 (or 45) degrees for full 10 secs  6a. Motor Leg, Left: 1-->Drift, leg falls by the end of the 5-sec period but does not hit bed  6b. Motor Leg, Right: 0-->No drift, leg holds 30 degree position for full 5 secs  7. Limb Ataxia: 0-->Absent  8. Sensory: 0-->Normal, no sensory loss  9. Best Language: 0-->No aphasia, normal  10. Dysarthria: 1-->Mild-to-moderate dysarthria, patient slurs at least some words and, at worst, can be understood with some difficulty  11. Extinction and Inattention (formerly Neglect): 0-->No abnormality    Total (NIH Stroke Scale): 4  PROGRESS AND CONSULTS    1139 Ordered Labs, EKG, CXR and CT Head  for further evaluation. Ordered IV fluids for hydration. Ordered ASA for stroked protocol.    1140 Discussed with Dr. Quiroz, stroke neurology, who agrees with plan to order a CT of the head. He does not want a further workup at this time. Pt is not a tPA candidate because the onset of her symptoms was greater than four hours.    1259 Placed call to [facility] for admission.     1308 Rechecked with patient who is resting comfortably. Informed pt the results of the  imaging and plan to admit. Pt understands and agrees with the plan, all questions answered.    1318 Discussed patient's case with Dr. Sullivan [facility] who agrees to admit pt to telemetry for further evaluation and management.        MEDICAL DECISION MAKING  Results were reviewed/discussed with the patient and they were also made aware of online access. Pt also made aware that some labs, such as cultures, will not be resulted during ER visit and follow up with PMD is necessary.     MDM  Number of Diagnoses or Management Options  Cerebrovascular accident (CVA), unspecified mechanism (CMS/HCC):      Amount and/or Complexity of Data Reviewed  Clinical lab tests: ordered and reviewed (Hemoglobin 11.4)  Tests in the radiology section of CPT®: ordered and reviewed (CXR, no focal pulmonary consolidtion  CT Head, acute infarct in the right internal capsule)  Tests in the medicine section of CPT®: reviewed and ordered (See EKG note)  Discussion of test results with the performing providers: yes  Obtain history from someone other than the patient: yes (Patient's daughter)  Discuss the patient with other providers: (Dr. Quiroz, stroke neurology  Dr. Sullivan, [facility])           DIAGNOSIS  Final diagnoses:   Cerebrovascular accident (CVA), unspecified mechanism (CMS/HCC)       DISPOSITION  ADMISSION    Discussed treatment plan and reason for admission with pt/family and admitting physician.  Pt/family voiced understanding of the plan for admission for further testing/treatment as  needed.         Latest Documented Vital Signs:  As of 1:03 PM  BP- 159/76 HR- 76 Temp- 97.6 °F (36.4 °C) (Tympanic) O2 sat- 99%    --  Documentation assistance provided by mayda Drake  for Dr. Melendez.  Information recorded by the scribe was done at my direction and has been verified and validated by me.          Osmin Drake  02/15/19 1448       Petty Thomas  02/15/19 1452       Rufino Melendez MD  02/15/19 2610

## 2019-02-15 NOTE — H&P
History and Physical    Patient Name: Mar Burgos  Age/Sex: 78 y.o. female  : 1940  MRN: [id number]    Date of Admission: 2/15/2019  Date of Encounter Visit: 02/15/19  Encounter Provider: Girish Sullivan MD  Place of Service: [facility]  Patient Care Team:  Vladimir Hernandez APRN as PCP - General  Vladimir Hernandez APRN as PCP - Family Medicine  Martita Simpson APRN as PCP - Claims Attributed    Subjective:     Chief Complaint:   Chief Complaint   Patient presents with   • Altered Mental Status     per daughter, pt developed garbled speech and confused conversation last night.        History of Present Illness  Mar Burgos is a 78 y.o. female with a history of  COPD, DM, PAD, HTN, HLD, thrombophlebitis (4 years ago treated with aspirin), former tobacco use and breast cancer s/p mastectomy. Patient presented with complaints of altered mental status with associated garbled speech, left-sided arm and leg weakness as well as left facial droop that started this morning.  She was last known to have normal speech around 2200 last night per reports of family. She reports that she started to have difficulty with left arm weakness and changes in speech. She took her aspirin and went to bed. When she woke up this morning she had worsening of left sided weakness     Denies any associated confusion, chest pain, shortness of breath, palpitations, urinary incontinence, visual changes, dizziness, seizure, sensory loss or hyperreflexia.    Patient is right handed former smoker.   Stroke risk factors: diabetes mellitus, hypertension and smoking.    Prior stroke history: none.   The patient is not on chronic anticoagulation, except aspirin.     Review of Systems   Constitutional: Negative for chills, fatigue and fever.   HENT: Positive for hearing loss (chronic not on hearing aids). Negative for congestion.    Eyes: Negative for photophobia and visual disturbance.   Respiratory: Negative for cough,  chest tightness and shortness of breath.    Cardiovascular: Negative for chest pain, palpitations and leg swelling.        Hx of tachycardia, but denies any recent palpitaitons   Gastrointestinal: Negative for blood in stool, constipation, diarrhea, nausea and vomiting.   Genitourinary: Negative for difficulty urinating.   Musculoskeletal: Positive for back pain and myalgias.   Skin: Negative for pallor.   Neurological: Positive for dizziness, speech difficulty and weakness (left arm and leg). Negative for seizures and headaches.   Psychiatric/Behavioral: Negative for agitation and confusion. The patient is not nervous/anxious.        Past Medical and Surgical History:  Past Medical History:   Diagnosis Date   • Anemia     required prior blood transfusions   • Arthritis    • Cancer (CMS/HCC)     breast cancer    • Clostridium difficile colitis    • Diabetes mellitus (CMS/HCC)    • Diverticulitis    • History of malignant neoplasm of breast 07/01/2013    denies any chemo or radiation   • History of malignant neoplasm of skin 07/01/2013    possibly basal   • History of tobacco use 7/1/2013   • Hyperlipidemia    • Hypertension        Past Surgical History:   Procedure Laterality Date   • CATARACT EXTRACTION, BILATERAL     • EAR TUBES     • MASTECTOMY Bilateral     radical   • TUMOR REMOVAL Left     large lipoma removed from hip       Home Medications:     (Not in a hospital admission)  Home meds reviewed and documented in [facility]  Inpatient Medications:  Scheduled Meds:    [START ON 2/16/2019] aspirin 325 mg Oral Daily   Or      [START ON 2/16/2019] aspirin 300 mg Rectal Daily   CloNIDine 1 patch Transdermal Weekly   insulin lispro 0-7 Units Subcutaneous 4x Daily With Meals & Nightly   [START ON 2/16/2019] pantoprazole 40 mg Oral Q AM   sodium chloride 3 mL Intravenous Q12H     Continuous Infusions:    sodium chloride 75 mL/hr     PRN Meds:.•  acetaminophen **OR** acetaminophen  •  dextrose  •  dextrose  •  glucagon  (human recombinant)  •  nitroglycerin  •  ondansetron  •  sodium chloride  •  sodium chloride  •  sodium chloride    Allergies:  Allergies   Allergen Reactions   • Lipitor [Atorvastatin] Other (See Comments)     Leg weakness        Past Social History:  Social History     Socioeconomic History   • Marital status:      Spouse name: Not on file   • Number of children: Not on file   • Years of education: Not on file   • Highest education level: Not on file   Tobacco Use   • Smoking status: Current Every Day Smoker     Packs/day: 0.25     Years: 10.00     Pack years: 2.50     Types: Cigarettes   • Smokeless tobacco: Former User   • Tobacco comment: she quit for 8 years and started back about a year ago   Substance and Sexual Activity   • Alcohol use: No   • Drug use: No       Past Family History:  Family History   Problem Relation Age of Onset   • Stroke Mother 76   • Diabetes Mother    • Hypertension Mother    • Cancer Father         metastatic unknown cause   • Heart attack Father 70   • Cancer Brother         bladder       Objective:   Temp:  [97.6 °F (36.4 °C)] 97.6 °F (36.4 °C)  Heart Rate:  [56-76] 61  Resp:  [18] 18  BP: (126-159)/(55-83) 155/81   SpO2:  [93 %-99 %] 93 %  on         Intake/Output Summary (Last 24 hours) at 2/15/2019 1701  Last data filed at 2/15/2019 1327  Gross per 24 hour   Intake 500 ml   Output --   Net 500 ml     Body mass index is 20.6 kg/m².      02/15/19  1042   Weight: 54.4 kg (120 lb)     Weight change:   Ventilator/Non-Invasive Ventilation Settings (From admission, onward)    None          Physical Exam   Constitutional: She is oriented to person, place, and time. She appears well-developed and well-nourished. No distress.   HENT:   Head: Normocephalic.   Right Ear: External ear normal.   Left Ear: External ear normal.   Eyes: Conjunctivae and EOM are normal. Pupils are equal, round, and reactive to light. Right eye exhibits no discharge. Left eye exhibits no discharge. No  scleral icterus.   Neck: Neck supple. No JVD present. No tracheal deviation present. No thyromegaly present.   Cardiovascular: Normal rate, regular rhythm and normal heart sounds. Exam reveals no friction rub.   No murmur heard.  Pulmonary/Chest: Effort normal and breath sounds normal. No respiratory distress. She has no wheezes. She has no rales.   Abdominal: Soft. Bowel sounds are normal.   Musculoskeletal: She exhibits no edema or tenderness.   Neurological: She is alert and oriented to person, place, and time. No sensory deficit.   Left arm and leg weakness 4/5  Slight facial droop and slurred speech   Skin: Skin is warm and dry. Capillary refill takes 2 to 3 seconds. No rash noted. She is not diaphoretic. There is pallor.   Psychiatric: She has a normal mood and affect.        Lab Review:     Results from last 7 days   Lab Units 02/15/19  1059   SODIUM mmol/L 139   POTASSIUM mmol/L 3.8   CHLORIDE mmol/L 101   CO2 mmol/L 22.1   BUN mg/dL 25*   CREATININE mg/dL 1.30*   GLUCOSE mg/dL 257*   CALCIUM mg/dL 9.5   AST (SGOT) U/L 14   ALT (SGPT) U/L 11     Estimated Creatinine Clearance: 29.6 mL/min (A) (by C-G formula based on SCr of 1.3 mg/dL (H)).  Results from last 7 days   Lab Units 02/15/19  1059   TROPONIN T ng/mL <0.010     Results from last 7 days   Lab Units 02/15/19  1059   WBC 10*3/mm3 5.91   HEMOGLOBIN g/dL 11.4*   HEMATOCRIT % 36.5   PLATELETS 10*3/mm3 285   MCV fL 93.8   MCH pg 29.3   MCHC g/dL 31.2*   RDW % 14.1   RDW-SD fl 47.9   MPV fL 10.1   NEUTROPHIL % % 58.2   LYMPHOCYTE % % 27.7   MONOCYTES % % 11.2   EOSINOPHIL % % 1.9   BASOPHIL % % 0.7   IMM GRAN % % 0.3   NEUTROS ABS 10*3/mm3 3.44   LYMPHS ABS 10*3/mm3 1.64   MONOS ABS 10*3/mm3 0.66   EOS ABS 10*3/mm3 0.11   BASOS ABS 10*3/mm3 0.04   IMMATURE GRANS (ABS) 10*3/mm3 0.02   NRBC /100 WBC 0.0     Results from last 7 days   Lab Units 02/15/19  1059   INR  0.98   APTT seconds 30.3   Results for GUNNAR OSEI (MRN [id number]) as of 2/15/2019  13:59   Ref. Range 12/17/2015 03:58 12/18/2015 05:40 11/18/2016 18:56 1/16/2017 14:22   Hemoglobin Latest Ref Range: 11.1 - 15.9 g/dL 9.1 (L) 9.5 (L) 11.5 (L) 12.4               Invalid input(s): LDLCALC                                            Imaging:  Imaging Results (last 24 hours)     Procedure Component Value Units Date/Time    CT Head Without Contrast Stroke Protocol [[id number]] Collected:  02/15/19 1414     Updated:  02/15/19 1414    Narrative:       EMERGENCY NONCONTRAST HEAD CT 02/15/2019     CLINICAL HISTORY: Slurred speech, left arm and leg weakness.      TECHNIQUE: Spiral CT images were obtained from the base of the skull to  the vertex without intravenous contrast and images were reformatted and  submitted in 3 mm thick axial CT sections with brain algorithm and 2 mm  thick sagittal and coronal reconstructions were performed.     There are no prior studies from [facility] for  comparison.     FINDINGS: There are patchy and confluent areas of low density throughout  the periventricular and subcortical white matter of the cerebral  hemispheres consistent with moderate-to-severe small vessel disease.  There is an ovoid area of low density extending through the genu of the  right internal capsule into the central to posterior right putamen.  It  measures 22 x 12 x 24 mm in size and is most consistent with an acute  infarct. The ventricles are normal in size.  I see no mass effect and no  midline shift and no extra-axial fluid collections are identified.   There is no evidence of acute intracranial hemorrhage. There is nodular  mucosal thickening in the posterior medial right sphenoid sinus.  The  remainder of the paranasal sinus, mastoid air cells and middle ear  cavities are clear.        Impression:       1. Extensive confluent low density throughout the cerebral white matter  consistent with moderate-to-severe small vessel disease.   2. There is a 22 x 12 x 24 mm ovoid area of low  density extending from  the mid right corona radiata region through the genu of the right  internal capsule and into the central to posterior right putamen most  consistent with an acute infarct, may be a large acute lacunar type  infarct.  I recommend an MRI of the head to further evaluate.  The  results were communicated to Rufino Melendez MD in the emergency room by  telephone on 02/15/2019 at 12:45 PM.      Radiation dose reduction techniques were utilized, including automated  exposure control and exposure modulation based on body size.          XR Chest 1 View [[id number]] Collected:  02/15/19 1230     Updated:  02/15/19 1235    Narrative:       XR CHEST 1 VW-     HISTORY: Female who is 78 years-old,  stroke     TECHNIQUE: Frontal view of the chest     COMPARISON: 11/18/2016     FINDINGS: Heart size is normal. Pulmonary vasculature is unremarkable.  Mediastinal contour appears stable. No focal pulmonary consolidation,  pleural effusion, or pneumothorax. No acute osseous process.       Impression:       No focal pulmonary consolidation. Follow-up as clinical  indications persist.     This report was finalized on 2/15/2019 12:32 PM by Dr. Demarco Malik M.D.             EKG:  ECG 12 Lead   Preliminary Result   HEART RATE= 69  bpm   RR Interval= 868  ms   RI Interval= 186  ms   P Horizontal Axis= 8  deg   P Front Axis= 55  deg   QRSD Interval= 79  ms   QT Interval= 397  ms   QRS Axis= 69  deg   T Wave Axis= 59  deg   - OTHERWISE NORMAL ECG -   Sinus rhythm   Ventricular premature complex   Electronically Signed By:    Date and Time of Study: 2019-02-15 12:08:33          I reviewed the patient's new clinical results.  I reviewed the patient's new imaging results and agree with the interpretation.  I reviewed the patient's other test results and agree with the interpretation.  I personally viewed and interpreted the patient's EKG/Telemetry data.    Problem List:       Acute CVA (cerebrovascular accident)  (CMS/[facility])    Anemia    Hearing loss    Hyperlipidemia    Hypertension    Personal history of breast cancer    History of tobacco use    Type 2 diabetes mellitus, without long-term current use of insulin (CMS/[facility])      Active Hospital Problems    Diagnosis Date Noted   • **Acute CVA (cerebrovascular accident) (CMS/[facility]) [I63.9] 02/15/2019   • Hearing loss [H91.90] 01/16/2017   • Hyperlipidemia [E78.5] 01/16/2017   • Hypertension [I10] 01/16/2017   • History of tobacco use [Z87.891] 07/01/2013   • Type 2 diabetes mellitus, without long-term current use of insulin (CMS/[facility]) [E11.9] 07/01/2013   • Personal history of breast cancer [Z85.3] 07/01/2013   • Anemia [D64.9] 07/01/2013      Resolved Hospital Problems   No resolved problems to display.       Assessment and Plan:     1. Acute right sided CVA. Possible large lacunar infarct noted on CT of head measuring 22 x 12 x 24 in central to posterior right internal capsule region without evidence of hemorrhage. Not a candidate for TPa due to timing and no intervention needed.     · Admit to a neuromonitored bed.  · Neuro checks per stroke protocol. NIHSS every shift or as otherwise specified by neurology/neurosurgery.  · Medications: Aspirin 325 mg daily, Plavix 75 mg daily if ok with neurology and consider statin if able to tolerate  · Consider adding PRN BP medications if SBP goes above 160 or as otherwise indicated by neurology- will hold for now.   · Hydrate with IVF:   · Consult neurology, ST, OT and PT.  · Check Lipid panel, Hgb A1C, CBC, BMP, B12 and TSH in am   · Non-pharmacological DVT prophylaxis with SCD's/JAZMYN hose  · Stress ulcer prophylaxis- protonix 20  · Diagnostics: EKG, (MRI/MRA per neurology) and 2D echocardiogram w/ bubble study. Consider prolonged holter monitoring at discharge for further evaluation of any arrhthymias.   · NPO unless able to pass bedside swallow.   · If passes beside swallow can resume home diet.   · If fails, speech therapy to evaluate  and consider cortrak if fails evaluation  · Home medications addressed  · Stroke Education  · Monitor for any seizure activity    2. Anemia. Acute on chronic normocytic. Required transfusions in the past and denies any recent bleeding.  - monitor CBC     3. Left sided weakness  -PT/OT    4. DM2 with neuropathy  - ACCU check ACHS  - low dose SS insulin  - hold metformin incase any dye studies needed  - hold neurontin for now    5. HTN  -clonidine patch to avoid rebound hypertension while NPO as failed swallow  - consider starting amlodipine, lisinopril-hctz if able to take orals on 2/16      6. HLD. Previously intolerant to atorvastatin with muscle pain  - check lipids and consider other statin if able to tolerate    7. Hx of breast cancer s/p bilateral radical mastectomy    8. Former tobacco user  - encouraged to refrain from smoking.     9. Dysphagia. Had issues previously, but now worse  - eval with swallow study  - ST    10. PVC with hx of tachycardia.   - continue home metoprolol  - consider ZIO at d/c to eval for any arrhythmia     Goals  · SBP >140 but <160, DBP <100   · LDL <70   · Serum glucose < 140    I discussed the patients findings and my recommendations with patient and family.    Lela Patterson APRNNurse Practitioner  2/15/19  01:55PM    I supervised care provided by the APRN. We have discussed the case. I have reviewed  the note and agree with the plan of treatment. I personally interviewed the patient and examined the patient.      Girish Sullivan MD  [facility]  02/15/19  5:01 PM    Dictated utilizing Dragon dictation

## 2019-02-15 NOTE — PLAN OF CARE
Problem: Fall Risk (Adult)  Goal: Identify Related Risk Factors and Signs and Symptoms  Outcome: Ongoing (interventions implemented as appropriate)   02/15/19 1833   Fall Risk (Adult)   Related Risk Factors (Fall Risk) history of falls;sensory deficits;environment unfamiliar   Signs and Symptoms (Fall Risk) presence of risk factors     Goal: Absence of Fall  Outcome: Ongoing (interventions implemented as appropriate)   02/15/19 1833   Fall Risk (Adult)   Absence of Fall making progress toward outcome       Problem: Stroke (Ischemic) (Adult)  Goal: Signs and Symptoms of Listed Potential Problems Will be Absent, Minimized or Managed (Stroke)  Outcome: Ongoing (interventions implemented as appropriate)   02/15/19 1833   Goal/Outcome Evaluation   Problems Assessed (Stroke (Ischemic)) all   Problems Assessed (Stroke (Ischemic)) communication impairment;eating/swallowing impairment       Problem: Patient Care Overview  Goal: Plan of Care Review  Outcome: Ongoing (interventions implemented as appropriate)   02/15/19 1833   Coping/Psychosocial   Plan of Care Reviewed With patient   Plan of Care Review   Progress improving   OTHER   Outcome Summary Patient admit from ER, VSS, NIH 4, neuro checks q2 hours, a/o, ct shows acute infarct, mri pending, echo ordered, medication reviewed, npo for speech eval, will monitor.      Goal: Individualization and Mutuality  Outcome: Ongoing (interventions implemented as appropriate)

## 2019-02-16 ENCOUNTER — APPOINTMENT (OUTPATIENT)
Dept: CARDIOLOGY | Facility: HOSPITAL | Age: 79
End: 2019-02-16

## 2019-02-16 PROBLEM — N17.9 AKI (ACUTE KIDNEY INJURY) (HCC): Status: ACTIVE | Noted: 2019-02-16

## 2019-02-16 PROBLEM — I69.391 DYSPHAGIA DUE TO RECENT CEREBRAL INFARCTION: Status: ACTIVE | Noted: 2019-02-16

## 2019-02-16 LAB
ANION GAP SERPL CALCULATED.3IONS-SCNC: 16.6 MMOL/L
AORTIC DIMENSIONLESS INDEX: 0.8 (DI)
BH CV ECHO MEAS - ACS: 1.4 CM
BH CV ECHO MEAS - AO MAX PG (FULL): 5.5 MMHG
BH CV ECHO MEAS - AO MAX PG: 12 MMHG
BH CV ECHO MEAS - AO MEAN PG (FULL): 2 MMHG
BH CV ECHO MEAS - AO MEAN PG: 5 MMHG
BH CV ECHO MEAS - AO ROOT AREA (BSA CORRECTED): 1.8
BH CV ECHO MEAS - AO ROOT AREA: 6.6 CM^2
BH CV ECHO MEAS - AO ROOT DIAM: 2.9 CM
BH CV ECHO MEAS - AO V2 MAX: 173 CM/SEC
BH CV ECHO MEAS - AO V2 MEAN: 108 CM/SEC
BH CV ECHO MEAS - AO V2 VTI: 34.8 CM
BH CV ECHO MEAS - AVA(I,A): 1.8 CM^2
BH CV ECHO MEAS - AVA(I,D): 1.8 CM^2
BH CV ECHO MEAS - AVA(V,A): 1.7 CM^2
BH CV ECHO MEAS - AVA(V,D): 1.7 CM^2
BH CV ECHO MEAS - BSA(HAYCOCK): 1.6 M^2
BH CV ECHO MEAS - BSA: 1.6 M^2
BH CV ECHO MEAS - BZI_BMI: 21.6 KILOGRAMS/M^2
BH CV ECHO MEAS - BZI_METRIC_HEIGHT: 165.1 CM
BH CV ECHO MEAS - BZI_METRIC_WEIGHT: 59 KG
BH CV ECHO MEAS - EDV(CUBED): 85.2 ML
BH CV ECHO MEAS - EDV(MOD-SP2): 59 ML
BH CV ECHO MEAS - EDV(MOD-SP4): 77 ML
BH CV ECHO MEAS - EDV(TEICH): 87.7 ML
BH CV ECHO MEAS - EF(CUBED): 76.9 %
BH CV ECHO MEAS - EF(MOD-BP): 57 %
BH CV ECHO MEAS - EF(MOD-SP2): 57.6 %
BH CV ECHO MEAS - EF(MOD-SP4): 57.1 %
BH CV ECHO MEAS - EF(TEICH): 69.2 %
BH CV ECHO MEAS - ESV(CUBED): 19.7 ML
BH CV ECHO MEAS - ESV(MOD-SP2): 25 ML
BH CV ECHO MEAS - ESV(MOD-SP4): 33 ML
BH CV ECHO MEAS - ESV(TEICH): 27 ML
BH CV ECHO MEAS - FS: 38.6 %
BH CV ECHO MEAS - IVS/LVPW: 1
BH CV ECHO MEAS - IVSD: 1 CM
BH CV ECHO MEAS - LAT PEAK E' VEL: 8 CM/SEC
BH CV ECHO MEAS - LV DIASTOLIC VOL/BSA (35-75): 46.7 ML/M^2
BH CV ECHO MEAS - LV MASS(C)D: 147.8 GRAMS
BH CV ECHO MEAS - LV MASS(C)DI: 89.7 GRAMS/M^2
BH CV ECHO MEAS - LV MAX PG: 6.5 MMHG
BH CV ECHO MEAS - LV MEAN PG: 3 MMHG
BH CV ECHO MEAS - LV SYSTOLIC VOL/BSA (12-30): 20 ML/M^2
BH CV ECHO MEAS - LV V1 MAX: 127 CM/SEC
BH CV ECHO MEAS - LV V1 MEAN: 78.6 CM/SEC
BH CV ECHO MEAS - LV V1 VTI: 27.8 CM
BH CV ECHO MEAS - LVIDD: 4.4 CM
BH CV ECHO MEAS - LVIDS: 2.7 CM
BH CV ECHO MEAS - LVLD AP2: 7.5 CM
BH CV ECHO MEAS - LVLD AP4: 7.8 CM
BH CV ECHO MEAS - LVLS AP2: 6.4 CM
BH CV ECHO MEAS - LVLS AP4: 6.7 CM
BH CV ECHO MEAS - LVOT AREA (M): 2.3 CM^2
BH CV ECHO MEAS - LVOT AREA: 2.3 CM^2
BH CV ECHO MEAS - LVOT DIAM: 1.7 CM
BH CV ECHO MEAS - LVPWD: 1 CM
BH CV ECHO MEAS - MED PEAK E' VEL: 7 CM/SEC
BH CV ECHO MEAS - MV A DUR: 0.17 SEC
BH CV ECHO MEAS - MV A MAX VEL: 144 CM/SEC
BH CV ECHO MEAS - MV DEC SLOPE: 426 CM/SEC^2
BH CV ECHO MEAS - MV DEC TIME: 0.2 SEC
BH CV ECHO MEAS - MV E MAX VEL: 88.8 CM/SEC
BH CV ECHO MEAS - MV E/A: 0.62
BH CV ECHO MEAS - MV MAX PG: 8.3 MMHG
BH CV ECHO MEAS - MV MEAN PG: 3 MMHG
BH CV ECHO MEAS - MV P1/2T MAX VEL: 98.9 CM/SEC
BH CV ECHO MEAS - MV P1/2T: 68 MSEC
BH CV ECHO MEAS - MV V2 MAX: 144 CM/SEC
BH CV ECHO MEAS - MV V2 MEAN: 77.7 CM/SEC
BH CV ECHO MEAS - MV V2 VTI: 38.7 CM
BH CV ECHO MEAS - MVA P1/2T LCG: 2.2 CM^2
BH CV ECHO MEAS - MVA(P1/2T): 3.2 CM^2
BH CV ECHO MEAS - MVA(VTI): 1.6 CM^2
BH CV ECHO MEAS - PA ACC TIME: 0.07 SEC
BH CV ECHO MEAS - PA MAX PG (FULL): 2.9 MMHG
BH CV ECHO MEAS - PA MAX PG: 5 MMHG
BH CV ECHO MEAS - PA PR(ACCEL): 45.7 MMHG
BH CV ECHO MEAS - PA V2 MAX: 112 CM/SEC
BH CV ECHO MEAS - PULM A REVS DUR: 0.16 SEC
BH CV ECHO MEAS - PULM A REVS VEL: 41.6 CM/SEC
BH CV ECHO MEAS - PULM DIAS VEL: 46.3 CM/SEC
BH CV ECHO MEAS - PULM S/D: 0.68
BH CV ECHO MEAS - PULM SYS VEL: 31.7 CM/SEC
BH CV ECHO MEAS - PVA(V,A): 1.7 CM^2
BH CV ECHO MEAS - PVA(V,D): 1.7 CM^2
BH CV ECHO MEAS - QP/QS: 0.65
BH CV ECHO MEAS - RV MAX PG: 2.1 MMHG
BH CV ECHO MEAS - RV MEAN PG: 1 MMHG
BH CV ECHO MEAS - RV V1 MAX: 73.2 CM/SEC
BH CV ECHO MEAS - RV V1 MEAN: 46.5 CM/SEC
BH CV ECHO MEAS - RV V1 VTI: 16 CM
BH CV ECHO MEAS - RVOT AREA: 2.5 CM^2
BH CV ECHO MEAS - RVOT DIAM: 1.8 CM
BH CV ECHO MEAS - SI(AO): 139.5 ML/M^2
BH CV ECHO MEAS - SI(CUBED): 39.8 ML/M^2
BH CV ECHO MEAS - SI(LVOT): 38.3 ML/M^2
BH CV ECHO MEAS - SI(MOD-SP2): 20.6 ML/M^2
BH CV ECHO MEAS - SI(MOD-SP4): 26.7 ML/M^2
BH CV ECHO MEAS - SI(TEICH): 36.8 ML/M^2
BH CV ECHO MEAS - SV(AO): 229.9 ML
BH CV ECHO MEAS - SV(CUBED): 65.5 ML
BH CV ECHO MEAS - SV(LVOT): 63.1 ML
BH CV ECHO MEAS - SV(MOD-SP2): 34 ML
BH CV ECHO MEAS - SV(MOD-SP4): 44 ML
BH CV ECHO MEAS - SV(RVOT): 40.7 ML
BH CV ECHO MEAS - SV(TEICH): 60.7 ML
BH CV ECHO MEAS - TAPSE (>1.6): 2.2 CM2
BH CV ECHO MEASUREMENTS AVERAGE E/E' RATIO: 11.84
BH CV VAS BP RIGHT ARM: NORMAL MMHG
BH CV XLRA - RV BASE: 3.3 CM
BH CV XLRA - TDI S': 17 CM/SEC
BUN BLD-MCNC: 16 MG/DL (ref 8–23)
BUN/CREAT SERPL: 17.2 (ref 7–25)
CALCIUM SPEC-SCNC: 9.5 MG/DL (ref 8.6–10.5)
CHLORIDE SERPL-SCNC: 107 MMOL/L (ref 98–107)
CHOLEST SERPL-MCNC: 226 MG/DL (ref 0–200)
CO2 SERPL-SCNC: 20.4 MMOL/L (ref 22–29)
CREAT BLD-MCNC: 0.93 MG/DL (ref 0.57–1)
DEPRECATED RDW RBC AUTO: 47.8 FL (ref 37–54)
ERYTHROCYTE [DISTWIDTH] IN BLOOD BY AUTOMATED COUNT: 14 % (ref 12.3–15.4)
GFR SERPL CREATININE-BSD FRML MDRD: 58 ML/MIN/1.73
GLUCOSE BLD-MCNC: 134 MG/DL (ref 65–99)
GLUCOSE BLDC GLUCOMTR-MCNC: 120 MG/DL (ref 70–130)
GLUCOSE BLDC GLUCOMTR-MCNC: 132 MG/DL (ref 70–130)
HBA1C MFR BLD: 6.8 % (ref 4.8–5.6)
HCT VFR BLD AUTO: 36.3 % (ref 34–46.6)
HDLC SERPL-MCNC: 40 MG/DL (ref 40–60)
HGB BLD-MCNC: 11.8 G/DL (ref 12–15.9)
LDLC SERPL CALC-MCNC: 137 MG/DL (ref 0–100)
LDLC/HDLC SERPL: 3.42 {RATIO}
LEFT ATRIUM VOLUME INDEX: 18 ML/M2
LEFT ATRIUM VOLUME: 30 CM3
LV EF 2D ECHO EST: 57 %
MAXIMAL PREDICTED HEART RATE: 140 BPM
MCH RBC QN AUTO: 30 PG (ref 26.6–33)
MCHC RBC AUTO-ENTMCNC: 32.5 G/DL (ref 31.5–35.7)
MCV RBC AUTO: 92.4 FL (ref 79–97)
PLATELET # BLD AUTO: 264 10*3/MM3 (ref 140–450)
PMV BLD AUTO: 9.2 FL (ref 6–12)
POTASSIUM BLD-SCNC: 3.8 MMOL/L (ref 3.5–5.2)
RBC # BLD AUTO: 3.93 10*6/MM3 (ref 3.77–5.28)
SODIUM BLD-SCNC: 144 MMOL/L (ref 136–145)
STRESS TARGET HR: 119 BPM
TRIGL SERPL-MCNC: 247 MG/DL (ref 0–150)
TSH SERPL DL<=0.05 MIU/L-ACNC: 2.72 MIU/ML (ref 0.27–4.2)
VLDLC SERPL-MCNC: 49.4 MG/DL (ref 5–40)
WBC NRBC COR # BLD: 5.61 10*3/MM3 (ref 3.4–10.8)

## 2019-02-16 PROCEDURE — 80061 LIPID PANEL: CPT | Performed by: INTERNAL MEDICINE

## 2019-02-16 PROCEDURE — 82962 GLUCOSE BLOOD TEST: CPT

## 2019-02-16 PROCEDURE — 80048 BASIC METABOLIC PNL TOTAL CA: CPT | Performed by: NURSE PRACTITIONER

## 2019-02-16 PROCEDURE — 85027 COMPLETE CBC AUTOMATED: CPT | Performed by: NURSE PRACTITIONER

## 2019-02-16 PROCEDURE — 83036 HEMOGLOBIN GLYCOSYLATED A1C: CPT | Performed by: INTERNAL MEDICINE

## 2019-02-16 PROCEDURE — 93306 TTE W/DOPPLER COMPLETE: CPT | Performed by: INTERNAL MEDICINE

## 2019-02-16 PROCEDURE — 84443 ASSAY THYROID STIM HORMONE: CPT | Performed by: NURSE PRACTITIONER

## 2019-02-16 PROCEDURE — 99221 1ST HOSP IP/OBS SF/LOW 40: CPT | Performed by: PSYCHIATRY & NEUROLOGY

## 2019-02-16 PROCEDURE — 97161 PT EVAL LOW COMPLEX 20 MIN: CPT

## 2019-02-16 PROCEDURE — 97165 OT EVAL LOW COMPLEX 30 MIN: CPT

## 2019-02-16 PROCEDURE — 93306 TTE W/DOPPLER COMPLETE: CPT

## 2019-02-16 PROCEDURE — 92610 EVALUATE SWALLOWING FUNCTION: CPT

## 2019-02-16 RX ORDER — AMLODIPINE BESYLATE 10 MG/1
10 TABLET ORAL DAILY
Status: DISCONTINUED | OUTPATIENT
Start: 2019-02-16 | End: 2019-02-18 | Stop reason: HOSPADM

## 2019-02-16 RX ORDER — CETIRIZINE HYDROCHLORIDE 10 MG/1
5 TABLET ORAL DAILY
Status: DISCONTINUED | OUTPATIENT
Start: 2019-02-16 | End: 2019-02-18 | Stop reason: HOSPADM

## 2019-02-16 RX ORDER — CLONIDINE HYDROCHLORIDE 0.1 MG/1
0.2 TABLET ORAL DAILY
Status: DISCONTINUED | OUTPATIENT
Start: 2019-02-17 | End: 2019-02-18

## 2019-02-16 RX ORDER — CLOPIDOGREL BISULFATE 75 MG/1
75 TABLET ORAL DAILY
Status: DISCONTINUED | OUTPATIENT
Start: 2019-02-16 | End: 2019-02-18 | Stop reason: HOSPADM

## 2019-02-16 RX ORDER — PRAVASTATIN SODIUM 10 MG
20 TABLET ORAL NIGHTLY
Status: DISCONTINUED | OUTPATIENT
Start: 2019-02-16 | End: 2019-02-18 | Stop reason: HOSPADM

## 2019-02-16 RX ORDER — FLUTICASONE PROPIONATE 50 MCG
2 SPRAY, SUSPENSION (ML) NASAL NIGHTLY
Status: DISCONTINUED | OUTPATIENT
Start: 2019-02-16 | End: 2019-02-18 | Stop reason: HOSPADM

## 2019-02-16 RX ADMIN — PRAVASTATIN SODIUM 20 MG: 10 TABLET ORAL at 21:59

## 2019-02-16 RX ADMIN — AMLODIPINE BESYLATE 10 MG: 10 TABLET ORAL at 18:24

## 2019-02-16 RX ADMIN — CLOPIDOGREL 75 MG: 75 TABLET, FILM COATED ORAL at 18:24

## 2019-02-16 RX ADMIN — SODIUM CHLORIDE, PRESERVATIVE FREE 3 ML: 5 INJECTION INTRAVENOUS at 22:01

## 2019-02-16 RX ADMIN — SODIUM CHLORIDE, PRESERVATIVE FREE 3 ML: 5 INJECTION INTRAVENOUS at 10:28

## 2019-02-16 RX ADMIN — FLUTICASONE PROPIONATE 2 SPRAY: 50 SPRAY, METERED NASAL at 21:59

## 2019-02-16 RX ADMIN — ASPIRIN 325 MG: 325 TABLET ORAL at 10:34

## 2019-02-16 RX ADMIN — CETIRIZINE HYDROCHLORIDE 5 MG: 10 TABLET, FILM COATED ORAL at 18:26

## 2019-02-16 RX ADMIN — SODIUM CHLORIDE 75 ML/HR: 9 INJECTION, SOLUTION INTRAVENOUS at 10:28

## 2019-02-16 NOTE — PLAN OF CARE
Problem: Patient Care Overview  Goal: Plan of Care Review   02/16/19 1018   Coping/Psychosocial   Plan of Care Reviewed With patient;daughter   OTHER   Outcome Summary Pt exhibits aan oropharyngeal dysphagia characterized by decreased rotary chew with regular consistency, cough and throat clearing with thin and nectar thick liquids respectively. SLP recommends a mechanical soft diet with no mixed consistencies and honey thick liquids.

## 2019-02-16 NOTE — THERAPY EVALUATION
Acute Care - Speech Language Pathology   Swallow Initial Evaluation [facility]     Patient Name: Mar Burgos  : 1940  MRN: [id number]  Today's Date: 2019               Admit Date: 2/15/2019    Visit Dx:     ICD-10-CM ICD-9-CM   1. Cerebrovascular accident (CVA), unspecified mechanism (CMS/HCC) I63.9 434.91   2. Oropharyngeal dysphagia R13.12 787.22     Patient Active Problem List   Diagnosis   • Anemia   • Uncontrolled type 2 diabetes mellitus (CMS/HCC)   • Diabetic neuropathy (CMS/HCC)   • Gastroesophageal reflux disease   • Gastritis   • Hearing loss   • Hyperlipidemia   • Hypertension   • Thoracic back pain   • Peripheral arterial occlusive disease (CMS/HCC)   • Personal history of breast cancer   • Personal history of other malignant neoplasm of skin   • History of tobacco use   • Seasonal allergic rhinitis   • Type 2 diabetes mellitus, without long-term current use of insulin (CMS/HCC)   • Cobalamin deficiency   • Osteoporosis screening   • Breast cancer screening   • Sinusitis   • Acute CVA (cerebrovascular accident) (CMS/HCC)   • Left-sided weakness     Past Medical History:   Diagnosis Date   • Anemia     required prior blood transfusions   • Arthritis    • Cancer (CMS/HCC)     breast cancer    • Clostridium difficile colitis    • Diabetes mellitus (CMS/HCC)    • Diverticulitis    • History of malignant neoplasm of breast 2013    denies any chemo or radiation   • History of malignant neoplasm of skin 2013    possibly basal   • History of tobacco use 2013   • Hyperlipidemia    • Hypertension      Past Surgical History:   Procedure Laterality Date   • CATARACT EXTRACTION, BILATERAL     • EAR TUBES     • MASTECTOMY Bilateral     radical   • TUMOR REMOVAL Left     large lipoma removed from hip        SWALLOW EVALUATION (last 72 hours)      SLP Adult Swallow Evaluation     Row Name 19 0900                   Rehab Evaluation    Document Type  evaluation  -         Subjective Information  no complaints  -LS        Patient Effort  good  -LS           General Information    Patient Profile Reviewed  yes  -LS        Pertinent History Of Current Problem  78 y.o. with hx of COPD, DM, PAD, HTN, HLD thrombophlebitis .PMH also includes tobacco use, breast cancer, s/p mastectomy. Pt presented to ER 2/15 with complaints of altered mental status with associated garbled speech, left sided arm and leg weakness as well as left facial droo.  Pt reported she experienced difficulty with left sided weakness the night before as well changes in  her speech. She reported she took an asprin and went to bed. Upon awakening 2/15/19  left side weakness was worse.  -LS        Current Method of Nutrition  NPO  -LS        Precautions/Limitations, Vision  WFL;for purposes of eval  -LS        Precautions/Limitations, Hearing  WFL;for purposes of eval  -LS        Prior Level of Function-Communication  WFL  -LS        Prior Level of Function-Swallowing  no diet consistency restrictions  -LS        Plans/Goals Discussed with  patient;family  -LS        Barriers to Rehab  medically complex  -LS           Oral Musculature and Cranial Nerve Assessment    Oral Motor General Assessment  generalized oral motor weakness  -LS           General Eating/Swallowing Observations    Respiratory Support Currently in Use  room air  -LS        Eating/Swallowing Skills  fed by SLP  -LS        Positioning During Eating  upright 90 degree;upright in bed  -LS        Utensils Used  spoon;cup  -LS        Consistencies Trialed  regular textures;chopped;pureed;thin liquids;nectar/syrup-thick liquids;honey-thick liquids  -LS           Respiratory    Respiratory Status  room air  -LS           Clinical Swallow Eval    Oral Prep Phase  impaired  -LS        Oral Transit  WFL  -LS        Oral Residue  impaired  -LS        Pharyngeal Phase  suspected pharyngeal impairment  -LS        Clinical Swallow Evaluation Summary  Pt exhibited  decreased rotary chew with regular consistency. Coughing and audible swallow noted with thin and nectar thick liquids respectively. SLP recommends a mechanical soft diet with honey thick liquids.  -LS           Oral Prep Concerns    Oral Prep Concerns  prolonged mastication  -LS        Prolonged Mastication  regular consistencies  -LS        Oral Prep Concerns, Comment  -- pt does not have molars difficult to break down food.  -LS           Oral Residue Concerns    Oral Residue Concerns  -- lingual residue with reg. consistency.  -LS           Pharyngeal Phase Concerns    Pharyngeal Phase Concerns  cough;throat clear  -LS        Cough  thin  -LS        Throat Clear  nectar  -LS           Recommendations    Therapy Frequency (Swallow)  PRN  -LS        Predicted Duration Therapy Intervention (Days)  until discharge  -LS        SLP Diet Recommendation  mechanical soft with no mixed consistencies;honey thick liquids  -LS        Recommended Diagnostics  VFSS (MBS)  -LS        Recommended Precautions and Strategies  upright posture during/after eating;small bites of food and sips of liquid  -LS        SLP Rec. for Method of Medication Administration  meds crushed;with pudding or applesauce  -LS        Monitor for Signs of Aspiration  yes;no;cough;gurgly voice;throat clearing;fever  -LS        Anticipated Dischage Disposition  home with assist  -LS          User Key  (r) = Recorded By, (t) = Taken By, (c) = Cosigned By    Initials Name Effective Dates    Hope Mcconnell, MS [facility]-SLP 06/08/18 -           EDUCATION  The patient has been educated in the following areas:   Dysphagia (Swallowing Impairment).    SLP Recommendation and Plan     SLP Diet Recommendation: mechanical soft with no mixed consistencies, honey thick liquids  Recommended Precautions and Strategies: upright posture during/after eating, small bites of food and sips of liquid     Monitor for Signs of Aspiration: yes, no, cough, gurgly voice, throat  clearing, fever  Recommended Diagnostics: VFSS (MBS)     Anticipated Dischage Disposition: home with assist     Therapy Frequency (Swallow): PRN  Predicted Duration Therapy Intervention (Days): until discharge       Plan of Care Reviewed With: patient, daughter  Plan of Care Review  Plan of Care Reviewed With: patient, daughter  Outcome Summary: Pt exhibits aan oropharyngeal dysphagia characterized by decreased rotary chew with regular consistency, cough and throat clearing with thin and nectar thick liquids respectively. SLP recommends a mechanical soft diet with no mixed consistencies and honey thick liquids.         SLP Outcome Measures (last 72 hours)      SLP Outcome Measures     Row Name 02/16/19 0900             SLP Outcome Measures    Outcome Measure Used?  Adult NOMS  -LS         Adult FCM Scores    FCM Chosen  Swallowing  -      Swallowing FCM Score  3  -        User Key  (r) = Recorded By, (t) = Taken By, (c) = Cosigned By    Initials Name Effective Dates    Hope Mcconnell MS [facility] 06/08/18 -            Time Calculation:   Time Calculation- SLP     Row Name 02/16/19 1023             Time Calculation- SLP    SLP Received On  02/15/19  -        User Key  (r) = Recorded By, (t) = Taken By, (c) = Cosigned By    Initials Name Provider Type    Hope Mcconnell MS CCC-SLP Speech and Language Pathologist          Therapy Charges for Today     Code Description Service Date Service Provider Modifiers Qty    98252854814  ST EVAL ORAL PHARYNG SWALLOW 5 2/16/2019 Hope Garcia MS [facility] GN 1               MS JULITA Nguyen  2/16/2019

## 2019-02-16 NOTE — THERAPY EVALUATION
Acute Care - Physical Therapy Initial Evaluation  [facility]     Patient Name: Mar Burgos  : 1940  MRN: [id number]  Today's Date: 2019   Onset of Illness/Injury or Date of Surgery: 02/15/19  Date of Referral to PT: 02/15/19  Referring Physician: RAMON      Admit Date: 2/15/2019    Visit Dx:     ICD-10-CM ICD-9-CM   1. Cerebrovascular accident (CVA), unspecified mechanism (CMS/HCC) I63.9 434.91   2. Oropharyngeal dysphagia R13.12 787.22   3. Decreased mobility and endurance Z74.09 780.99     Patient Active Problem List   Diagnosis   • Anemia   • Uncontrolled type 2 diabetes mellitus (CMS/HCC)   • Diabetic neuropathy (CMS/HCC)   • Gastroesophageal reflux disease   • Gastritis   • Hearing loss   • Hyperlipidemia   • Hypertension   • Thoracic back pain   • Peripheral arterial occlusive disease (CMS/HCC)   • Personal history of breast cancer   • Personal history of other malignant neoplasm of skin   • History of tobacco use   • Seasonal allergic rhinitis   • Type 2 diabetes mellitus, without long-term current use of insulin (CMS/HCC)   • Cobalamin deficiency   • Osteoporosis screening   • Breast cancer screening   • Sinusitis   • Acute CVA (cerebrovascular accident) (CMS/HCC)   • Left-sided weakness     Past Medical History:   Diagnosis Date   • Anemia     required prior blood transfusions   • Arthritis    • Cancer (CMS/HCC)     breast cancer    • Clostridium difficile colitis    • Diabetes mellitus (CMS/HCC)    • Diverticulitis    • History of malignant neoplasm of breast 2013    denies any chemo or radiation   • History of malignant neoplasm of skin 2013    possibly basal   • History of tobacco use 2013   • Hyperlipidemia    • Hypertension      Past Surgical History:   Procedure Laterality Date   • CATARACT EXTRACTION, BILATERAL     • EAR TUBES     • MASTECTOMY Bilateral     radical   • TUMOR REMOVAL Left     large lipoma removed from hip        PT ASSESSMENT (last 12 hours)       Physical Therapy Evaluation     Row Name 02/16/19 1328          PT Evaluation Time/Intention    Subjective Information  no complaints  -JR     Document Type  evaluation  -JR     Mode of Treatment  physical therapy  -JR     Total Evaluation Minutes, Physical Therapy  25  -JR     Patient Effort  good  -JR     Symptoms Noted During/After Treatment  fatigue  -JR     Row Name 02/16/19 1328          General Information    Patient Profile Reviewed?  yes  -JR     Onset of Illness/Injury or Date of Surgery  02/15/19  -JR     Referring Physician  RAMON  -JR     Patient Observations  alert;cooperative;agree to therapy  -JR     Patient/Family Observations  SUPINE IN BED IN NO ACUTE DISTRESS.  DAUGHTER IS PRESENT.   -JR     Prior Level of Function  independent:;gait;community mobility;ADL's;bed mobility WAS WORKING AT A [employer] 16 HOURS PER WEEK.    -JR     Equipment Currently Used at Home  none  -JR     Pertinent History of Current Functional Problem  CVA.  LIVES WITH GRANDSON.    -JR     Existing Precautions/Restrictions  no known precautions/restrictions  -JR     Limitations/Impairments  other (see comments) HAS SPEECH EVAL ORDERED.  SOME DYSARTHRIA IS PRESENT.   -JR     Equipment Ordered for Patient  gait belt  -JR     Risks Reviewed  patient and family:;nausea/vomiting;LOB;dizziness;increased discomfort;change in vital signs;increased drainage;lines disloged  -JR     Benefits Reviewed  patient and family:;improve function;increase independence;increase strength;increase balance;decrease pain;decrease risk of DVT;improve skin integrity;increase knowledge  -JR     Barriers to Rehab  none identified  -JR     Row Name 02/16/19 1328          Relationship/Environment    Primary Source of Support/Comfort  child(morro)  -JR     Lives With  child(morro), adult;other (see comments) GRANDSON LIVES WITH HER.    -JR     Row Name 02/16/19 1323          Cognitive Assessment/Intervention- PT/OT    Orientation Status (Cognition)   oriented x 4  -JR     Follows Commands (Cognition)  WNL  -     Row Name 02/16/19 1328          Bed Mobility Assessment/Treatment    Bed Mobility Assessment/Treatment  bed mobility (all) activities;scooting/bridging  -     [place] Level (Bed Mobility)  contact guard assist;nonverbal cues (demo/gesture);verbal cues;1 person assist  -JR     Scooting/Bridging [place] (Bed Mobility)  contact guard;verbal cues;1 person assist  -JR     Bed Mobility, Safety Issues  decreased use of arms for pushing/pulling;decreased use of legs for bridging/pushing  -     Comment (Bed Mobility)  Pt ABLE TO POSITION HERSELF IN BED THROUGH BRIDGING AND USING BED RAILS.    -     Row Name 02/16/19 1328          Transfer Assessment/Treatment    Transfer Assessment/Treatment  sit-stand transfer;stand-sit transfer  -     Sit-Stand [place] (Transfers)  contact guard;verbal cues;nonverbal cues (demo/gesture);1 person assist  -     Stand-Sit [place] (Transfers)  nonverbal cues (demo/gesture);verbal cues;contact guard;1 person assist  -     Row Name 02/16/19 1328          Sit-Stand Transfer    Assistive Device (Sit-Stand Transfers)  walker, front-wheeled  -     Row Name 02/16/19 1328          Stand-Sit Transfer    Assistive Device (Stand-Sit Transfers)  walker, front-wheeled  -[facility] Name 02/16/19 1328          Gait/Stairs Assessment/Training    Gait/Stairs Assessment/Training  gait/ambulation independence;gait/ambulation assistive device;distance ambulated;gait pattern  -     [place] Level (Gait)  verbal cues;nonverbal cues (demo/gesture);contact guard  -     Distance in Feet (Gait)  170  -     Deviations/Abnormal Patterns (Gait)  left sided deviations;gait speed decreased;stride length decreased  -     Row Name 02/16/19 1328          General ROM    GENERAL ROM COMMENTS  WFLs.    -     Row Name 02/16/19 1328          MMT (Manual Muscle Testing)    General MMT Comments  L UE/LE IS 3/5.  R LE/UE IS  5/5.   -JR     Row Name 02/16/19 1328          Dynamic Standing Balance    Level of A[place], Reaches Outside Midline (Standing, Dynamic Balance)  contact guard assist  -JR     Time Able to Maintain Position, Reaches Outside Midline (Standing, Dynamic Balance)  more than 5 minutes  -JR     Assistive Device Utilized (Supported Standing, Dynamic Balance)  other (see comments) SINK.    -JR     Comment, Reaches Outside Midline (Standing, Dynamic Balance)  PERFORMED 15 MINISQUATS AND 15 HIP ABUDUCTION EACH LEG.    -JR     Row Name 02/16/19 1328          Plan of Care Review    Plan of Care Reviewed With  patient;daughter  -JR     Row Name 02/16/19 1328          Physical Therapy Clinical Impression    Date of Referral to PT  02/15/19  -JR     PT Diagnosis (PT Clinical Impression)  DECREASED MOBILITY AND ENDURANCE.    -JR     Prognosis (PT Clinical Impression)  GOOD  -JR     Functional Level at Time of Evaluation (PT Clinical Impression)  CGA.    -JR     Patient/Family Goals Statement (PT Clinical Impression)  GO HOME.   -JR     Criteria for Skilled Interventions Met (PT Clinical Impression)  yes;treatment indicated  -JR     Pathology/Pathophysiology Noted (Describe Specifically for Each System)  musculoskeletal;neuromuscular  -JR     Impairments Found (describe specific impairments)  aerobic capacity/endurance;gait, locomotion, and balance;muscle performance  -JR     Functional Limitations in Following Categories (Describe Specific Limitations)  self-care;home management  -JR     Rehab Potential (PT Clinical Summary)  good, to achieve stated therapy goals  -JR     Predicted Duration of Therapy (PT)  1-2 MONTHS.    -JR     Care Plan Review (PT)  evaluation/treatment results reviewed;care plan/treatment goals reviewed;risks/benefits reviewed;current/potential barriers reviewed;patient/other agree to care plan  -     Row Name 02/16/19 1328          Physical Therapy Goals    Bed Mobility Goal Selection (PT)  bed  mobility, PT goal 1  -JR     Transfer Goal Selection (PT)  transfer, PT goal 1  -JR     Gait Training Goal Selection (PT)  gait training, PT goal 1  -     Row Name 02/16/19 1328          Bed Mobility Goal 1 (PT)    Activity/Assistive Device (Bed Mobility Goal 1, PT)  bed mobility activities, all  -JR     [place] Level/Cues Needed (Bed Mobility Goal 1, PT)  independent  -JR     Time Frame (Bed Mobility Goal 1, PT)  2 weeks  -JR     Row Name 02/16/19 1328          Transfer Goal 1 (PT)    Activity/Assistive Device (Transfer Goal 1, PT)  transfers, all  -JR     [place] Level/Cues Needed (Transfer Goal 1, PT)  independent  -JR     Time Frame (Transfer Goal 1, PT)  2 weeks  -JR     Row Name 02/16/19 1328          Gait Training Goal 1 (PT)    Activity/Assistive Device (Gait Training Goal 1, PT)  gait (walking locomotion);improve balance and speed;increase endurance/gait distance;increase energy conservation  -JR     [place] Level (Gait Training Goal 1, PT)  independent  -JR     Distance (Gait Goal 1, PT)  400  -JR     Time Frame (Gait Training Goal 1, PT)  2 weeks  -JR     Row Name 02/16/19 1328          Positioning and Restraints    Pre-Treatment Position  in bed  -JR     Post Treatment Position  bed  -JR     In Bed  notified nsg;supine;call light within reach;encouraged to call for assist;with family/caregiver  -JR       User Key  (r) = Recorded By, (t) = Taken By, (c) = Cosigned By    Initials Name Provider Type    Osmin Quiroz PT Physical Therapist        Physical Therapy Education     Title: PT OT SLP Therapies (Done)     Topic: Physical Therapy (Done)     Point: Mobility training (Done)     Learning Progress Summary           Patient Acceptance, E,LIV, GINA ROLAND by  at 2/16/2019  1:42 PM                   Point: Home exercise program (Done)     Learning Progress Summary           Patient Acceptance, E,LIV, GINA ROLAND by  at 2/16/2019  1:42 PM                   Point: Body mechanics (Done)     Learning  Progress Summary           Patient Acceptance, LIV RODRIGUEZ, GINA ROLAND by  at 2/16/2019  1:42 PM                   Point: Precautions (Done)     Learning Progress Summary           Patient Acceptance, MICHAEL,LIV, GINA ROLAND by  at 2/16/2019  1:42 PM                               User Key     Initials Effective Dates Name Provider Type Melyssa ROSARIO 04/03/18 -  Osmin Rodrigez, PT Physical Therapist PT              PT Recommendation and Plan  Anticipated Discharge Disposition (PT): home with home health  Planned Therapy Interventions (PT Eval): balance training, bed mobility training, gait training, home exercise program, strengthening  Therapy Frequency (PT Clinical Impression): daily  Outcome Summary/Treatment Plan (PT)  Anticipated Discharge Disposition (PT): home with home health  Plan of Care Reviewed With: patient, daughter  Progress: improving  Outcome Measures     Row Name 02/16/19 1344             How much help from another person do you currently need...    Turning from your back to your side while in flat bed without using bedrails?  4  -JR      Moving from lying on back to sitting on the side of a flat bed without bedrails?  3  -JR      Moving to and from a bed to a chair (including a wheelchair)?  3  -JR      Standing up from a chair using your arms (e.g., wheelchair, bedside chair)?  3  -JR      Climbing 3-5 steps with a railing?  2  -JR      To walk in hospital room?  3  -JR      AM-PAC 6 Clicks Score  18  -         Functional Assessment    Outcome Measure Options  AM-PAC 6 Clicks Basic Mobility (PT)  (Significant)   -        User Key  (r) = Recorded By, (t) = Taken By, (c) = Cosigned By    Initials Name Provider Type    Osmin Quiroz, PT Physical Therapist         Time Calculation:   PT Charges     Row Name 02/16/19 1344             Time Calculation    Start Time  1300  -      Stop Time  1344  -      Time Calculation (min)  44 min  -      PT Received On  02/16/19  -      PT - Next Appointment   02/17/19  -      PT Goal Re-Cert Due Date  03/02/19  -        User Key  (r) = Recorded By, (t) = Taken By, (c) = Cosigned By    Initials Name Provider Type    Osmin Quiroz, PT Physical Therapist        Therapy Suggested Charges     Code   Minutes Charges    None           Therapy Charges for Today     Code Description Service Date Service Provider Modifiers Qty    76334028444 HC PT EVAL LOW COMPLEXITY 2 2/16/2019 Osmin Rodrigez, PT GP 1    69166031209 HC PT THER SUPP EA 15 MIN 2/16/2019 Osmin Rodrigez, PT GP 1          PT G-Codes  Outcome Measure Options: (S) AM-PAC 6 Clicks Basic Mobility (PT)  AM-PAC 6 Clicks Score: 18      Osmin Rodrigez PT  2/16/2019

## 2019-02-16 NOTE — PLAN OF CARE
Problem: Patient Care Overview  Goal: Plan of Care Review   02/16/19 1845   Coping/Psychosocial   Plan of Care Reviewed With patient;daughter   Plan of Care Review   Progress improving   Pt WILL NEED PT TO ADDRESS HER STRENGTH AND ENDURANCE SO THAT SHE CAN RETURN TO SAFE AND INDEPENDENT MOBILITY.

## 2019-02-16 NOTE — THERAPY EVALUATION
Acute Care - Occupational Therapy Initial Evaluation  [facility]     Patient Name: Mar Burgos  : 1940  MRN: [id number]  Today's Date: 2019  Onset of Illness/Injury or Date of Surgery: 02/15/19     Referring Physician: RAMON    Admit Date: 2/15/2019       ICD-10-CM ICD-9-CM   1. Cerebrovascular accident (CVA), unspecified mechanism (CMS/HCC) I63.9 434.91   2. Oropharyngeal dysphagia R13.12 787.22   3. Decreased mobility and endurance Z74.09 780.99     Patient Active Problem List   Diagnosis   • Anemia   • Uncontrolled type 2 diabetes mellitus (CMS/HCC)   • Diabetic neuropathy (CMS/HCC)   • Gastroesophageal reflux disease   • Gastritis   • Hearing loss   • Hyperlipidemia   • Hypertension   • Thoracic back pain   • Peripheral arterial occlusive disease (CMS/HCC)   • Personal history of breast cancer   • Personal history of other malignant neoplasm of skin   • History of tobacco use   • Seasonal allergic rhinitis   • Type 2 diabetes mellitus, without long-term current use of insulin (CMS/HCC)   • Cobalamin deficiency   • Osteoporosis screening   • Breast cancer screening   • Sinusitis   • Acute CVA (cerebrovascular accident) (CMS/HCC)   • Left-sided weakness     Past Medical History:   Diagnosis Date   • Anemia     required prior blood transfusions   • Arthritis    • Cancer (CMS/HCC)     breast cancer    • Clostridium difficile colitis    • Diabetes mellitus (CMS/HCC)    • Diverticulitis    • History of malignant neoplasm of breast 2013    denies any chemo or radiation   • History of malignant neoplasm of skin 2013    possibly basal   • History of tobacco use 2013   • Hyperlipidemia    • Hypertension      Past Surgical History:   Procedure Laterality Date   • CATARACT EXTRACTION, BILATERAL     • EAR TUBES     • MASTECTOMY Bilateral     radical   • TUMOR REMOVAL Left     large lipoma removed from hip          OT ASSESSMENT FLOWSHEET (last 72 hours)      Occupational Therapy  Evaluation     Row Name 02/16/19 1351                   OT Evaluation Time/Intention    Subjective Information  complains of;weakness  -SG        Document Type  evaluation  -SG        Mode of Treatment  occupational therapy  -SG        Patient Effort  good  -SG           General Information    Patient Profile Reviewed?  yes  -SG        Patient Observations  alert;cooperative;agree to therapy  -SG        Prior Level of Function  independent:;ADL's  -SG        Existing Precautions/Restrictions  fall  -SG           Cognitive Assessment/Intervention- PT/OT    Orientation Status (Cognition)  oriented x 4  -SG        Follows Commands (Cognition)  WFL  -SG           ADL Assessment/Intervention    BADL Assessment/Intervention  feeding  -SG           Self-Feeding Assessment/Training    Comment (Feeding)  pt able to hold cup with LUE , difficulty with placement and manipulation using LUE  -SG           BADL Safety/Performance    Impairments, BADL Safety/Performance  grasp/prehension;coordination;range of motion  -SG           General ROM    GENERAL ROM COMMENTS  LUE shld 1/2 AROM  -SG           Sensory Assessment/Intervention    Sensory General Assessment  no sensation deficits identified BUE's   -SG           Positioning and Restraints    Pre-Treatment Position  in bed  -SG        Post Treatment Position  bed  -SG        In Bed  call light within reach;encouraged to call for assist;exit alarm on;with family/caregiver  -           Clinical Impression (OT)    OT Diagnosis  need for assist with personal care  -        Criteria for Skilled Therapeutic Interventions Met (OT Eval)  yes;treatment indicated  -SG        Therapy Frequency (OT Eval)  5 times/wk  -SG           Planned OT Interventions    Planned Therapy Interventions (OT Eval)  BADL retraining;neuromuscular control/coordination retraining  -SG           OT Goals    Dressing Goal Selection (OT)  dressing, OT goal 1  -SG        Coordination Goal Selection (OT)   coordination, OT goal 1  -SG        Additional Documentation  Grooming Goal Selection (OT) (Row);Coordination Goal Selection (OT) (Row)  -SG           Dressing Goal 1 (OT)    Activity/Assistive Device (Dressing Goal 1, OT)  upper body dressing  -SG        [place]/Cues Needed (Dressing Goal 1, OT)  minimum assist (75% or more patient effort)  -SG        Time Frame (Dressing Goal 1, OT)  1 week  -SG        Progress/Outcome (Dressing Goal 1, OT)  goal ongoing  -SG           Coordination Goal 1 (OT)    Activity/Assistive Device (Coordination Goal 1, OT)  GM task;FM task  -SG        [place] Level/Cues Needed (Coordination Goal 1, OT)  minimum assist (75% or more patient effort);moderate assist (50-74% patient effort)  -SG        Time Frame (Coordination Goal 1, OT)  1 week  -SG          User Key  (r) = Recorded By, (t) = Taken By, (c) = Cosigned By    Initials Name Effective Dates     Millicent Carson OTR 06/08/18 -          Occupational Therapy Education     Title: PT OT SLP Therapies (In Progress)     Topic: Occupational Therapy (In Progress)     Point: ADL training (Done)     Description: Instruct learner(s) on proper safety adaptation and remediation techniques during self care or transfers.   Instruct in proper use of assistive devices.    Learning Progress Summary           Patient Acceptance, E,TB, VU,NR by  at 2/16/2019  2:03 PM    Comment:  Role of OT and POC.   Family Acceptance, E,TB, VU,NR by  at 2/16/2019  2:03 PM    Comment:  Role of OT and POC.                               User Key     Initials Effective Dates Name Provider Type Discipline     06/08/18 -  Millicent Carson OTR Occupational Therapist OT                  OT Recommendation and Plan  Planned Therapy Interventions (OT Eval): BADL retraining, neuromuscular control/coordination retraining  Therapy Frequency (OT Eval): 5 times/wk  Plan of Care Review  Plan of Care Reviewed With: patient  Plan of Care Reviewed With:  patient  Outcome Summary: Pt will continue to benefit from OT for LUE function to assist with increasing coordination and strength for adl tasks    Outcome Measures     Row Name 02/16/19 1405 02/16/19 1344          How much help from another person do you currently need...    Turning from your back to your side while in flat bed without using bedrails?  --  4  -JR     Moving from lying on back to sitting on the side of a flat bed without bedrails?  --  3  -JR     Moving to and from a bed to a chair (including a wheelchair)?  --  3  -JR     Standing up from a chair using your arms (e.g., wheelchair, bedside chair)?  --  3  -JR     Climbing 3-5 steps with a railing?  --  2  -JR     To walk in hospital room?  --  3  -JR     AM-[facility] 6 Clicks Score  --  18  -JR        How much help from another is currently needed...    Putting on and taking off regular lower body clothing?  2  -SG  --     Bathing (including washing, rinsing, and drying)  2  -SG  --     Toileting (which includes using toilet bed pan or urinal)  2  -SG  --     Putting on and taking off regular upper body clothing  2  -SG  --     Taking care of personal grooming (such as brushing teeth)  2  -SG  --     Eating meals  3  -SG  --     Score  13  -SG  --        Modified [place] Scale    Modified [place] Scale  4 - Moderately severe disability.  Unable to walk without assistance, and unable to attend to own bodily needs without assistance.  -SG  --        Functional Assessment    Outcome Measure Options  AM-PAC 6 Clicks Daily Activity (OT);[place]  -SG  AM-PAC 6 Clicks Basic Mobility (PT)  (Significant)   -JR       User Key  (r) = Recorded By, (t) = Taken By, (c) = Cosigned By    Initials Name Provider Type    Millicent Brantley OTR Occupational Therapist    Osmin Quiroz PT Physical Therapist          Time Calculation:   Time Calculation- OT     Row Name 02/16/19 1406 02/16/19 1056          Time Calculation- OT    OT Start Time  1334  -SG  --      OT Stop Time  1344  -  --     OT Time Calculation (min)  10 min  -  --     OT Received On  02/16/19  -  --     OT - Next Appointment  --  02/18/19 -     OT Goal Re-Cert Due Date  02/22/19  -  --       User Key  (r) = Recorded By, (t) = Taken By, (c) = Cosigned By    Initials Name Provider Type     Millicent Carson OTR Occupational Therapist        Therapy Suggested Charges     Code   Minutes Charges    None           Therapy Charges for Today     Code Description Service Date Service Provider Modifiers Qty    [id number]  OT EVAL LOW COMPLEXITY 2 2/16/2019 Millicent Carson OTR GO 1               REBECCA Kuo  2/16/2019

## 2019-02-16 NOTE — PROGRESS NOTES
Name: Mar Burgos ADMIT: 2/15/2019   : 1940  PCP: Vladimir Hernandez APRN    MRN: [id number] LOS: 1 days   AGE/SEX: 78 y.o. female  ROOM: [facility]   Subjective     Left leg is getting stronger.  Left arm is also a bit stronger and daughter at the bedside reports better speech and facial droop.  She denies any shortness of breath or cough.  No chest pain or palpitations.  No nausea or vomiting.  She reports smoking 5-6 cigarettes/day and does want to quit.  Objective   Vital Signs  Temp:  [97.8 °F (36.6 °C)-98.2 °F (36.8 °C)] 97.8 °F (36.6 °C)  Heart Rate:  [61-68] 67  Resp:  [18] 18  BP: (141-172)/(74-99) 165/84  SpO2:  [92 %-96 %] 92 %  on   ;   Device (Oxygen Therapy): room air  Body mass index is 21.63 kg/m².    Physical Exam   Constitutional: She is oriented to person, place, and time. No distress.   HENT:   Head: Normocephalic and atraumatic.   Mouth/Throat: No oropharyngeal exudate.   Neck: Normal range of motion. Neck supple.   Cardiovascular: Normal rate and regular rhythm.   No murmur heard.  Pulmonary/Chest: Effort normal and breath sounds normal. No stridor. No respiratory distress. She has no wheezes. She has no rales.   Abdominal: Soft. Bowel sounds are normal. She exhibits no distension. There is no tenderness.   Musculoskeletal: She exhibits no edema or tenderness.   Neurological: She is alert and oriented to person, place, and time. She is not disoriented. A cranial nerve deficit is present. GCS eye subscore is 4. GCS verbal subscore is 5. GCS motor subscore is 6.   4/5 left upper extremity strength  5 out of 5 upper and lower extremity strength on the right  Left sided facial droop  Mild dysarthria   Skin: She is not diaphoretic.   Psychiatric: She has a normal mood and affect. Her behavior is normal.   Vitals reviewed.      Results Review:       I reviewed the patient's new clinical results.  Results from last 7 days   Lab Units 19  0529 02/15/19  1059   WBC 10*3/mm3 5.61 5.91    HEMOGLOBIN g/dL 11.8* 11.4*   PLATELETS 10*3/mm3 264 285     Results from last 7 days   Lab Units 02/16/19  0529 02/15/19  1059   SODIUM mmol/L 144 139   POTASSIUM mmol/L 3.8 3.8   CHLORIDE mmol/L 107 101   CO2 mmol/L 20.4* 22.1   BUN mg/dL 16 25*   CREATININE mg/dL 0.93 1.30*   GLUCOSE mg/dL 134* 257*   Estimated Creatinine Clearance: 44.9 mL/min (by C-G formula based on SCr of 0.93 mg/dL).  Results from last 7 days   Lab Units 02/15/19  1059   ALBUMIN g/dL 4.20   BILIRUBIN mg/dL 0.2   ALK PHOS U/L 81   AST (SGOT) U/L 14   ALT (SGPT) U/L 11     Results from last 7 days   Lab Units 02/16/19  0529 02/15/19  1059   CALCIUM mg/dL 9.5 9.5   ALBUMIN g/dL  --  4.20       Lab Results   Component Value Date    HGBA1C 6.80 (H) 02/16/2019    HGBA1C 7.5 03/15/2018    HGBA1C 7.6 01/16/2017     Glucose   Date/Time Value Ref Range Status   02/16/2019 0624 132 (H) 70 - 130 mg/dL Final   02/15/2019 1857 107 70 - 130 mg/dL Final         aspirin 325 mg Oral Daily   Or      aspirin 300 mg Rectal Daily   CloNIDine 1 patch Transdermal Weekly   clopidogrel 75 mg Oral Daily   insulin lispro 0-7 Units Subcutaneous 4x Daily With Meals & Nightly   pantoprazole 40 mg Oral Q AM   pravastatin 20 mg Oral Nightly   sodium chloride 3 mL Intravenous Q12H      Diet Dysphagia; IV - Mechanical Soft No Mixed Consistencies; Honey Thick      Assessment/Plan      Active Hospital Problems    Diagnosis Date Noted   • **Acute CVA (cerebrovascular accident) (CMS/[facility]) [I63.9] 02/15/2019   • Hearing loss [H91.90] 01/16/2017   • Hyperlipidemia [E78.5] 01/16/2017   • Hypertension [I10] 01/16/2017   • History of tobacco use [Z87.891] 07/01/2013   • Type 2 diabetes mellitus, without long-term current use of insulin (CMS/[facility]) [E11.9] 07/01/2013   • Personal history of breast cancer [Z85.3] 07/01/2013   • Anemia [D64.9] 07/01/2013      Resolved Hospital Problems   No resolved problems to display.       Acute right sided CVA.    Not a candidate for TPa due to timing  and no intervention needed.   · ASA 325mg, add plavix. Was on ASA 81mg PTA. Needs DAPT x 3 months  · Neuro checks per stroke protocol.   · Didn't tolerate lipitor or simvastatin in the past, I'll try pravastatin  · Consulted neurology, ST, OT and PT.  · Non-pharmacological DVT prophylaxis with SCD's/JAZMYN hose  · 2D ECHO pending  · Cleared for dysphagia IV diet  · Home medications addressed  · Stroke Education  · Monitor for any seizure activity     Anemia. Acute on chronic normocytic. Required transfusions in the past and denies any recent bleeding.  - monitor CBC         DM2 with neuropathy  - ACCU check ACHS  - low dose SS insulin  - hold metformin   - hold neurontin for now     HTN  -DC clonidine patch since she now has a diet  -start back clonidine but lower dose at 0.2mg  - restart  amlodipine  -consider restarting lisinopril-hctz tomorrow if renal function ok.  Had mild СВЕТЛАНА on admission        HLD. Previously intolerant to atorvastatin and simvastatin with muscle pain  -trying pravastatin     Hx of breast cancer s/p bilateral radical mastectomy     tobacco user  -5-6 cigarettes/day  - encouraged to refrain from smoking.      PVC with hx of tachycardia.   - continue home metoprolol  - consider ZIO at d/c to eval for any arrhythmia      Goals  · SBP >140 but <160, DBP <100   · LDL <70            · Serum glucose < 140    · DISPO: likely home Monday        Derrek Elizondo MD  [facility]  02/16/19  3:15 PM

## 2019-02-16 NOTE — PLAN OF CARE
Problem: Patient Care Overview  Goal: Plan of Care Review  Outcome: Ongoing (interventions implemented as appropriate)   02/16/19 5608   Coping/Psychosocial   Plan of Care Reviewed With patient   OTHER   Outcome Summary Pt will continue to benefit from OT for LUE function to assist with increasing coordination and strength for adl tasks

## 2019-02-16 NOTE — CONSULTS
Neurology Note    Patient:  Mar Burgos    YOB: 1940    REFERRING PHYSICIAN:  Girish Sullivan MD    CHIEF COMPLAINT:    Left sided weakness    HISTORY OF PRESENT ILLNESS:   The patient is a 78 y.o. female with DM, HTN, HLP on low dose aspirin, presented to ED last morning with slurred speech and left sided weakness, NIHSS 4, last well the night before, CTH with evolving right BG stroke, no bleed, /76, NSR, received pr ASA. Doing better this am, passed dysphagia, has gotten up to the BR with help.    Past Medical History:  Past Medical History:   Diagnosis Date   • Anemia     required prior blood transfusions   • Arthritis    • Cancer (CMS/HCC)     breast cancer    • Clostridium difficile colitis    • Diabetes mellitus (CMS/HCC)    • Diverticulitis    • History of malignant neoplasm of breast 07/01/2013    denies any chemo or radiation   • History of malignant neoplasm of skin 07/01/2013    possibly basal   • History of tobacco use 7/1/2013   • Hyperlipidemia    • Hypertension        Past Surgical History:  Past Surgical History:   Procedure Laterality Date   • CATARACT EXTRACTION, BILATERAL     • EAR TUBES     • MASTECTOMY Bilateral     radical   • TUMOR REMOVAL Left     large lipoma removed from hip       Social History:   Social History     Socioeconomic History   • Marital status:      Spouse name: Not on file   • Number of children: Not on file   • Years of education: Not on file   • Highest education level: Not on file   Tobacco Use   • Smoking status: Current Every Day Smoker     Packs/day: 0.25     Years: 10.00     Pack years: 2.50     Types: Cigarettes   • Smokeless tobacco: Former User   • Tobacco comment: she quit for 8 years and started back about a year ago   Substance and Sexual Activity   • Alcohol use: No   • Drug use: No        Family History:   Family History   Problem Relation Age of Onset   • Stroke Mother 76   • Diabetes Mother    • Hypertension Mother     • Cancer Father         metastatic unknown cause   • Heart attack Father 70   • Cancer Brother         bladder       Medications Prior to Admission:    Prior to Admission medications    Medication Sig Start Date End Date Taking? Authorizing Provider   amLODIPine (NORVASC) 10 MG tablet Take 1 tablet by mouth Daily. 3/15/18  Yes Vladimir Hernandez APRN   aspirin 81 MG EC tablet Take 81 mg by mouth Daily.   Yes Barbara Jason MD   cetirizine (zyrTEC) 10 MG tablet Take 10 mg by mouth Daily.   Yes Barbara Jason MD   CloNIDine (CATAPRES) 0.3 MG tablet Take 1 tablet by mouth Daily. 3/15/18  Yes Vladimir Hernandez APRN   fluticasone (FLONASE) 50 MCG/ACT nasal spray 2 sprays into the nostril(s) as directed by provider Every Night.   Yes Barbara Jason MD   lisinopril-hydrochlorothiazide (PRINZIDE,ZESTORETIC) 20-25 MG per tablet Take 1 tablet by mouth Daily. 3/15/18  Yes Vladimir Hernandez APRN   metFORMIN (GLUCOPHAGE) 1000 MG tablet Take 1 tablet by mouth 2 (Two) Times a Day With Meals.  Patient taking differently: Take 1,000 mg by mouth Daily. 3/15/18  Yes Vladimir Hernandez APRN   metoprolol succinate XL (TOPROL XL) 200 MG 24 hr tablet Take 200 mg by mouth Daily.   Yes Barbara Jason MD       Allergies:  Lipitor [atorvastatin]      Review of system  Review of Systems   Neurological: Positive for facial asymmetry, speech difficulty and weakness.   All other systems reviewed and are negative.      Vitals:    02/16/19 0739   BP: 165/84   Pulse:    Resp: 18   Temp: 97.8 °F (36.6 °C)   SpO2:        Physical exam  Physical Exam   Constitutional: She is oriented to person, place, and time. She appears well-developed and well-nourished.   Eyes: EOM are normal. Pupils are equal, round, and reactive to light.   Cardiovascular: Normal rate and regular rhythm.   Pulmonary/Chest: Effort normal.   Neurological: She is alert and oriented to person, place, and time. She displays abnormal reflex. A cranial nerve deficit and  sensory deficit is present. She exhibits normal muscle tone. Coordination abnormal.   Moderate left facial droop and mild dysarthria, moderate left arm drift, left hand  weaker, mild left leg weakness to resistance. DTRs hypoactive, positive left Babinski, sensation equal to cold, question of decreased vibration in left leg.   Psychiatric: She has a normal mood and affect. Her behavior is normal. Thought content normal.         Lab Results   Component Value Date    WBC 5.61 02/16/2019    HGB 11.8 (L) 02/16/2019    HCT 36.3 02/16/2019    MCV 92.4 02/16/2019     02/16/2019     Lab Results   Component Value Date    GLUCOSE 134 (H) 02/16/2019    BUN 16 02/16/2019    CREATININE 0.93 02/16/2019    EGFRIFNONA 58 (L) 02/16/2019    EGFRIFAFRI 42 (L) 01/16/2017    BCR 17.2 02/16/2019    CO2 20.4 (L) 02/16/2019    CALCIUM 9.5 02/16/2019    PROTENTOTREF 7.8 01/16/2017    ALBUMIN 4.20 02/15/2019    LABIL2 1.4 01/16/2017    AST 14 02/15/2019    ALT 11 02/15/2019     ECG 12 Lead   Order: [id number]   Status:  Preliminary result   Visible to patient:  No (Not Released)   Next appt:  None      Narrative     HEART RATE= 69  bpm  RR Interval= 868  ms  OH Interval= 186  ms  P Horizontal Axis= 8  deg  P Front Axis= 55  deg  QRSD Interval= 79  ms  QT Interval= 397  ms  QRS Axis= 69  deg  T Wave Axis= 59  deg  - OTHERWISE NORMAL ECG -  Sinus rhythm  Ventricular premature complex  Electronically Signed By:   Date and Time of Study: 2019-02-15 12:08:33      Specimen Collected: 02/15/19 12:08               Radiological Studies:    Mri Brain With & Without Contrast    Result Date: 2/15/2019  BRAIN MRI WITH AND WITHOUT CONTRAST; BRAIN MRA; MRA NECK WITH AND WITHOUT CONTRAST  HISTORY: Stroke; I63.9-Cerebral infarction, unspecified  COMPARISON: None.  Brain MRI: FINDINGS:  Multiplanar images of the head were obtained without and with gadolinium. The study confirms the presence of an acute infarct within the right corona radiata and  item were provided. The patient was educated and fit by a healthcare professional with expert knowledge and specialization in brace application while under the direct supervision of the treating physician. They were instructed to contact the office immediately should the equipment result in increased pain, decreased sensation, increased swelling or worsening of the condition. Thank you very much for the kind consultation and allowing me to participate in this patient's care. I will continue to keep you apprised of his progress. Dai Dyson MD        If you have questions, please do not hesitate to call me. I look forward to following Yaa Rivera along with you.     Sincerely,        Dai Dyson MD right basal ganglia. I suspect it is probably not significantly changed in size when compared with the earlier study. It is associated with some mass effect upon the right lateral ventricle. However, there is no midline shift. The area measures up to 2.3 x 1.2 cm. No additional areas of restricted diffusion are identified. There is diffuse cerebral atrophy, with compensatory ventricular dilatation, and kidneys with the age of 78. Extensive confluent periventricular and deep white matter FLAIR and T2 signal hyperintensity is noted, characteristic of microangiopathic disease. I see no convincing evidence of hemorrhagic transformation on the susceptibility weighted imaging. Postcontrast imaging does not show any evidence of venous occlusion or abnormal enhancement. Mucous retention cyst is seen within the right sphenoid sinus.  Brain MRA: FINDINGS: Axial 3-D time-of-flight images of the intracranial arterial circulation centered at the level of the [place] were obtained without contrast. Reconstruction images were supplemented. There may be some mild atherosclerotic involvement of the right M1. However, I see no evidence of stenosis or occlusion. Both middle cerebral arteries appear to be patent, as are the anterior cerebral arteries bilaterally. Anterior communicating artery is patent. There is a well-developed right posterior indicating artery. No posterior indicating artery is seen on the left. No aneurysm of the basilar artery is seen. The posterior cerebral arteries are patent.  Neck MRA: FINDINGS: Axial and coronal source imaging centered about the cervical carotid bifurcation regions performed with and without gadolinium. Multiprojection 3-D MIP reformatted images were supplemented and reviewed.  The innominate artery bifurcation is unremarkable. . The patient does have a separate origin of the left vertebral artery from the aortic arch.  The proximal cervical right common carotid artery is widely  patent.    The right internal carotid artery shows no significant plaque or narrowing about the carotid bulb or bifurcation region.  The left common carotid artery is widely patent throughout its course.  The left internal carotid artery shows mild narrowing involving the proximal artery, in the range of about 20-30% by NASCET criteria. There is some moderate narrowing of the patient's left subclavian artery, in the range of about 40%.   The vertebral arteries are patent, the right  is dominant in caliber. There is a narrowing of the proximal right vertebral artery. This is in the range of 35-40% by NASCET criteria.  NASCET criteria was utilized.        1.  Study confirms the presence of an acute infarct involving the right corona radiata and basal ganglia. There is no evidence of hemorrhagic transformation. 2. No intracranial stenosis or occlusion. 3. Mild stenosis of the proximal left internal carotid artery, in the range of 20-30% by NASCET criteria. 4. Mild to moderate stenosis of the occipital right vertebral artery, in the range of 35-40%.  This report was finalized on 2/15/2019 11:11 PM by Dr. Zora Alvarez M.D.      Xr Chest 1 View    Result Date: 2/15/2019  XR CHEST 1 VW-  HISTORY: Female who is 78 years-old,  stroke  TECHNIQUE: Frontal view of the chest  COMPARISON: 11/18/2016  FINDINGS: Heart size is normal. Pulmonary vasculature is unremarkable. Mediastinal contour appears stable. No focal pulmonary consolidation, pleural effusion, or pneumothorax. No acute osseous process.      No focal pulmonary consolidation. Follow-up as clinical indications persist.  This report was finalized on 2/15/2019 12:32 PM by Dr. Demarco Malik M.D.      Ct Head Without Contrast Stroke Protocol    Result Date: 2/15/2019  EMERGENCY NONCONTRAST HEAD CT 02/15/2019  CLINICAL HISTORY: Slurred speech, left arm and leg weakness.  TECHNIQUE: Spiral CT images were obtained from the base of the skull to the vertex without  intravenous contrast and images were reformatted and submitted in 3 mm thick axial CT sections with brain algorithm and 2 mm thick sagittal and coronal reconstructions were performed.  There are no prior studies from [facility] for comparison.  FINDINGS: There are patchy and confluent areas of low density throughout the periventricular and subcortical white matter of the cerebral hemispheres consistent with moderate-to-severe small vessel disease. There is an ovoid area of low density extending through the genu of the right internal capsule into the central to posterior right putamen.  It measures 22 x 12 x 24 mm in size and is most consistent with an acute infarct. The ventricles are normal in size.  I see no mass effect and no midline shift and no extra-axial fluid collections are identified. There is no evidence of acute intracranial hemorrhage. There is nodular mucosal thickening in the posterior medial right sphenoid sinus.  The remainder of the paranasal sinus, mastoid air cells and middle ear cavities are clear.      1. Extensive confluent low density throughout the cerebral white matter consistent with moderate-to-severe small vessel disease. 2. There is a 22 x 12 x 24 mm ovoid area of low density extending from the mid right corona radiata region through the genu of the right internal capsule and into the central to posterior right putamen most consistent with an acute infarct, may be a large acute lacunar type infarct.  I recommend an MRI of the head to further evaluate.  The results were communicated to Rufino Melendez MD in the emergency room by telephone on 02/15/2019 at 12:45 PM.  Radiation dose reduction techniques were utilized, including automated exposure control and exposure modulation based on body size.           During this visit the following were done:  Labs Reviewed [x]    Labs Ordered []    Radiology Reports Reviewed [x]    Radiology Ordered []    EKG, echo, and/or stress test  reviewed [x]    EEG results reviewed  []    EEG reviewed and interpreted per myself   []    Discussed case with neurointerventionalist or neuroradiologist []    Referring Provider Records Reviewed []    ER Records Reviewed [x]    Hospital Records Reviewed [x]    History Obtained From Family []    Radiological images view and Interpreted per myself [x]    Case Discussed with referring provider [x]     Decision to obtain and request outside records  []        Assessment and Plan     Moderate sized right BG ischemic stroke, suspect MCA branch thrombosis, r/o embolic source.      - Observation on telemetry for 24-48 hours.    - Add Plavix to aspirin for 3 months.    - F/U TTE results.    - Cardiac rhythm monitor on discharge.    - Fall precautions.    - ST/OT/PT.    - Maintain -180, DBP<110.    Thanks,        Electronically signed by Keegan Quiroz MD on 2/16/2019 at 10:00 AM

## 2019-02-17 LAB
ANION GAP SERPL CALCULATED.3IONS-SCNC: 17.9 MMOL/L
BUN BLD-MCNC: 13 MG/DL (ref 8–23)
BUN/CREAT SERPL: 14.6 (ref 7–25)
CALCIUM SPEC-SCNC: 9.5 MG/DL (ref 8.6–10.5)
CHLORIDE SERPL-SCNC: 107 MMOL/L (ref 98–107)
CO2 SERPL-SCNC: 19.1 MMOL/L (ref 22–29)
CREAT BLD-MCNC: 0.89 MG/DL (ref 0.57–1)
GFR SERPL CREATININE-BSD FRML MDRD: 61 ML/MIN/1.73
GLUCOSE BLD-MCNC: 148 MG/DL (ref 65–99)
GLUCOSE BLDC GLUCOMTR-MCNC: 126 MG/DL (ref 70–130)
GLUCOSE BLDC GLUCOMTR-MCNC: 152 MG/DL (ref 70–130)
GLUCOSE BLDC GLUCOMTR-MCNC: 164 MG/DL (ref 70–130)
GLUCOSE BLDC GLUCOMTR-MCNC: 178 MG/DL (ref 70–130)
POTASSIUM BLD-SCNC: 3.4 MMOL/L (ref 3.5–5.2)
SODIUM BLD-SCNC: 144 MMOL/L (ref 136–145)

## 2019-02-17 PROCEDURE — 80048 BASIC METABOLIC PNL TOTAL CA: CPT | Performed by: INTERNAL MEDICINE

## 2019-02-17 PROCEDURE — 82962 GLUCOSE BLOOD TEST: CPT

## 2019-02-17 PROCEDURE — 99231 SBSQ HOSP IP/OBS SF/LOW 25: CPT | Performed by: PSYCHIATRY & NEUROLOGY

## 2019-02-17 PROCEDURE — 25010000002 ONDANSETRON PER 1 MG: Performed by: INTERNAL MEDICINE

## 2019-02-17 RX ORDER — ASPIRIN 81 MG/1
81 TABLET, CHEWABLE ORAL DAILY
Status: DISCONTINUED | OUTPATIENT
Start: 2019-02-17 | End: 2019-02-18 | Stop reason: HOSPADM

## 2019-02-17 RX ORDER — ASPIRIN 81 MG/1
81 TABLET, CHEWABLE ORAL DAILY
Status: DISCONTINUED | OUTPATIENT
Start: 2019-02-17 | End: 2019-02-17

## 2019-02-17 RX ORDER — SODIUM CHLORIDE 0.9 % (FLUSH) 0.9 %
3 SYRINGE (ML) INJECTION EVERY 12 HOURS SCHEDULED
Status: DISCONTINUED | OUTPATIENT
Start: 2019-02-17 | End: 2019-02-18 | Stop reason: HOSPADM

## 2019-02-17 RX ORDER — ATORVASTATIN CALCIUM 80 MG/1
80 TABLET, FILM COATED ORAL NIGHTLY
Status: DISCONTINUED | OUTPATIENT
Start: 2019-02-17 | End: 2019-02-17

## 2019-02-17 RX ORDER — ASPIRIN 300 MG/1
300 SUPPOSITORY RECTAL DAILY
Status: DISCONTINUED | OUTPATIENT
Start: 2019-02-17 | End: 2019-02-17

## 2019-02-17 RX ORDER — SODIUM CHLORIDE 0.9 % (FLUSH) 0.9 %
3-10 SYRINGE (ML) INJECTION AS NEEDED
Status: DISCONTINUED | OUTPATIENT
Start: 2019-02-17 | End: 2019-02-18 | Stop reason: HOSPADM

## 2019-02-17 RX ORDER — LISINOPRIL 10 MG/1
10 TABLET ORAL
Status: DISCONTINUED | OUTPATIENT
Start: 2019-02-18 | End: 2019-02-18 | Stop reason: HOSPADM

## 2019-02-17 RX ADMIN — SODIUM CHLORIDE, PRESERVATIVE FREE 3 ML: 5 INJECTION INTRAVENOUS at 21:31

## 2019-02-17 RX ADMIN — ASPIRIN 81 MG: 81 TABLET, CHEWABLE ORAL at 15:31

## 2019-02-17 RX ADMIN — SODIUM CHLORIDE, PRESERVATIVE FREE 3 ML: 5 INJECTION INTRAVENOUS at 09:29

## 2019-02-17 RX ADMIN — Medication 3 ML: at 12:42

## 2019-02-17 RX ADMIN — FLUTICASONE PROPIONATE 2 SPRAY: 50 SPRAY, METERED NASAL at 21:30

## 2019-02-17 RX ADMIN — ACETAMINOPHEN 650 MG: 325 TABLET, FILM COATED ORAL at 06:29

## 2019-02-17 RX ADMIN — PANTOPRAZOLE SODIUM 40 MG: 40 TABLET, DELAYED RELEASE ORAL at 06:29

## 2019-02-17 RX ADMIN — Medication 3 ML: at 21:31

## 2019-02-17 RX ADMIN — AMLODIPINE BESYLATE 10 MG: 10 TABLET ORAL at 09:28

## 2019-02-17 RX ADMIN — CETIRIZINE HYDROCHLORIDE 5 MG: 10 TABLET, FILM COATED ORAL at 09:27

## 2019-02-17 RX ADMIN — ONDANSETRON HYDROCHLORIDE 4 MG: 2 SOLUTION INTRAMUSCULAR; INTRAVENOUS at 06:29

## 2019-02-17 RX ADMIN — ASPIRIN 325 MG: 325 TABLET ORAL at 09:28

## 2019-02-17 RX ADMIN — CLOPIDOGREL 75 MG: 75 TABLET, FILM COATED ORAL at 09:28

## 2019-02-17 RX ADMIN — CLONIDINE HYDROCHLORIDE 0.2 MG: 0.1 TABLET ORAL at 09:28

## 2019-02-17 RX ADMIN — PRAVASTATIN SODIUM 20 MG: 10 TABLET ORAL at 21:29

## 2019-02-17 NOTE — PROGRESS NOTES
Neurology Note    Patient:  Mar Burgos    YOB: 1940    REFERRING PHYSICIAN:  Girsih Sullivan MD    CHIEF COMPLAINT:    Left sided weakness    HISTORY OF PRESENT ILLNESS:   The patient reports that she is feeling better, has been cleared for po diet, has gotten up and walked with help.    Past Medical History:  Past Medical History:   Diagnosis Date   • Anemia     required prior blood transfusions   • Arthritis    • Cancer (CMS/HCC)     breast cancer    • Clostridium difficile colitis    • Diabetes mellitus (CMS/HCC)    • Diverticulitis    • History of malignant neoplasm of breast 07/01/2013    denies any chemo or radiation   • History of malignant neoplasm of skin 07/01/2013    possibly basal   • History of tobacco use 7/1/2013   • Hyperlipidemia    • Hypertension        Past Surgical History:  Past Surgical History:   Procedure Laterality Date   • CATARACT EXTRACTION, BILATERAL     • EAR TUBES     • MASTECTOMY Bilateral     radical   • TUMOR REMOVAL Left     large lipoma removed from hip       Social History:   Social History     Socioeconomic History   • Marital status:      Spouse name: Not on file   • Number of children: Not on file   • Years of education: Not on file   • Highest education level: Not on file   Tobacco Use   • Smoking status: Current Every Day Smoker     Packs/day: 0.25     Years: 10.00     Pack years: 2.50     Types: Cigarettes   • Smokeless tobacco: Former User   • Tobacco comment: she quit for 8 years and started back about a year ago   Substance and Sexual Activity   • Alcohol use: No   • Drug use: No        Family History:   Family History   Problem Relation Age of Onset   • Stroke Mother 76   • Diabetes Mother    • Hypertension Mother    • Cancer Father         metastatic unknown cause   • Heart attack Father 70   • Cancer Brother         bladder       Medications Prior to Admission:    Prior to Admission medications    Medication Sig Start Date End Date  Taking? Authorizing Provider   amLODIPine (NORVASC) 10 MG tablet Take 1 tablet by mouth Daily. 3/15/18  Yes Vladimir Hernandez APRN   aspirin 81 MG EC tablet Take 81 mg by mouth Daily.   Yes Barbara Jason MD   cetirizine (zyrTEC) 10 MG tablet Take 10 mg by mouth Daily.   Yes Barbara Jason MD   CloNIDine (CATAPRES) 0.3 MG tablet Take 1 tablet by mouth Daily. 3/15/18  Yes Vladimir Hernandez APRN   fluticasone (FLONASE) 50 MCG/ACT nasal spray 2 sprays into the nostril(s) as directed by provider Every Night.   Yes ProviderBarbara MD   lisinopril-hydrochlorothiazide (PRINZIDE,ZESTORETIC) 20-25 MG per tablet Take 1 tablet by mouth Daily. 3/15/18  Yes Vladimir Hernandez APRN   metFORMIN (GLUCOPHAGE) 1000 MG tablet Take 1 tablet by mouth 2 (Two) Times a Day With Meals.  Patient taking differently: Take 1,000 mg by mouth Daily. 3/15/18  Yes Vladimir Hernandez APRN   metoprolol succinate XL (TOPROL XL) 200 MG 24 hr tablet Take 200 mg by mouth Daily.   Yes Provider, MD Barbara       Allergies:  Lipitor [atorvastatin]      Review of system  Review of Systems   Neurological: Positive for facial asymmetry, speech difficulty and weakness.       Vitals:    02/17/19 0750   BP: 177/86   Pulse:    Resp: 18   Temp: 98.2 °F (36.8 °C)   SpO2: 92%       Physical exam  Physical Exam   Constitutional: She is oriented to person, place, and time. She appears well-developed and well-nourished.   Cardiovascular: Normal rate and regular rhythm.   Pulmonary/Chest: Effort normal.   Neurological: She is alert and oriented to person, place, and time. She has normal reflexes. A cranial nerve deficit is present.   Left facial droop less pronounced, moving left side better against gravity.   Psychiatric: She has a normal mood and affect. Her behavior is normal. Thought content normal.         Lab Results   Component Value Date    WBC 5.61 02/16/2019    HGB 11.8 (L) 02/16/2019    HCT 36.3 02/16/2019    MCV 92.4 02/16/2019     02/16/2019      Lab Results   Component Value Date    GLUCOSE 148 (H) 2019    BUN 13 2019    CREATININE 0.89 2019    EGFRIFNONA 61 2019    EGFRIFAFRI 42 (L) 2017    BCR 14.6 2019    CO2 19.1 (L) 2019    CALCIUM 9.5 2019    PROTENTOTREF 7.8 2017    ALBUMIN 4.20 02/15/2019    LABIL2 1.4 2017    AST 14 02/15/2019    ALT 11 02/15/2019     Echocardiogram   Order# [id number]   Reading physician: Erickson Matthews III, MD Ordering physician: Girish Sullivan MD Study date: 19   Patient Information     Patient Name  Mar Burgos MRN  [id number] Sex  Female  (Age)  1940 (78 y.o.)   Admission Information     Admission Date/Time Discharge Date/Time Room/Bed   02/15/19  1042  N542/1   Sedation Narrator Report     Sedation Narrator Report   Interpretation Summary     · Estimated EF = 57%.  · Left ventricular systolic function is normal.  · Left ventricular diastolic dysfunction (grade I) consistent with impaired relaxation.         Radiological Studies:    Mri Brain With & Without Contrast    Result Date: 2/15/2019  BRAIN MRI WITH AND WITHOUT CONTRAST; BRAIN MRA; MRA NECK WITH AND WITHOUT CONTRAST  HISTORY: Stroke; I63.9-Cerebral infarction, unspecified  COMPARISON: None.  Brain MRI: FINDINGS:  Multiplanar images of the head were obtained without and with gadolinium. The study confirms the presence of an acute infarct within the right corona radiata and right basal ganglia. I suspect it is probably not significantly changed in size when compared with the earlier study. It is associated with some mass effect upon the right lateral ventricle. However, there is no midline shift. The area measures up to 2.3 x 1.2 cm. No additional areas of restricted diffusion are identified. There is diffuse cerebral atrophy, with compensatory ventricular dilatation, and kidneys with the age of 78. Extensive confluent periventricular and deep white matter FLAIR and T2 signal  hyperintensity is noted, characteristic of microangiopathic disease. I see no convincing evidence of hemorrhagic transformation on the susceptibility weighted imaging. Postcontrast imaging does not show any evidence of venous occlusion or abnormal enhancement. Mucous retention cyst is seen within the right sphenoid sinus.  Brain MRA: FINDINGS: Axial 3-D time-of-flight images of the intracranial arterial circulation centered at the level of the [place] were obtained without contrast. Reconstruction images were supplemented. There may be some mild atherosclerotic involvement of the right M1. However, I see no evidence of stenosis or occlusion. Both middle cerebral arteries appear to be patent, as are the anterior cerebral arteries bilaterally. Anterior communicating artery is patent. There is a well-developed right posterior indicating artery. No posterior indicating artery is seen on the left. No aneurysm of the basilar artery is seen. The posterior cerebral arteries are patent.  Neck MRA: FINDINGS: Axial and coronal source imaging centered about the cervical carotid bifurcation regions performed with and without gadolinium. Multiprojection 3-D MIP reformatted images were supplemented and reviewed.  The innominate artery bifurcation is unremarkable. . The patient does have a separate origin of the left vertebral artery from the aortic arch.  The proximal cervical right common carotid artery is widely patent.    The right internal carotid artery shows no significant plaque or narrowing about the carotid bulb or bifurcation region.  The left common carotid artery is widely patent throughout its course.  The left internal carotid artery shows mild narrowing involving the proximal artery, in the range of about 20-30% by NASCET criteria. There is some moderate narrowing of the patient's left subclavian artery, in the range of about 40%.   The vertebral arteries are patent, the right  is dominant in caliber. There  is a narrowing of the proximal right vertebral artery. This is in the range of 35-40% by NASCET criteria.  NASCET criteria was utilized.        1.  Study confirms the presence of an acute infarct involving the right corona radiata and basal ganglia. There is no evidence of hemorrhagic transformation. 2. No intracranial stenosis or occlusion. 3. Mild stenosis of the proximal left internal carotid artery, in the range of 20-30% by NASCET criteria. 4. Mild to moderate stenosis of the occipital right vertebral artery, in the range of 35-40%.  This report was finalized on 2/15/2019 11:11 PM by Dr. Zora Alvarez M.D.      Xr Chest 1 View    Result Date: 2/15/2019  XR CHEST 1 VW-  HISTORY: Female who is 78 years-old,  stroke  TECHNIQUE: Frontal view of the chest  COMPARISON: 11/18/2016  FINDINGS: Heart size is normal. Pulmonary vasculature is unremarkable. Mediastinal contour appears stable. No focal pulmonary consolidation, pleural effusion, or pneumothorax. No acute osseous process.      No focal pulmonary consolidation. Follow-up as clinical indications persist.  This report was finalized on 2/15/2019 12:32 PM by Dr. Demarco Malik M.D.      Ct Head Without Contrast Stroke Protocol    Result Date: 2/16/2019  EMERGENCY NONCONTRAST HEAD CT 02/15/2019  CLINICAL HISTORY: Slurred speech, left arm and leg weakness.  TECHNIQUE: Spiral CT images were obtained from the base of the skull to the vertex without intravenous contrast and images were reformatted and submitted in 3 mm thick axial CT sections with brain algorithm and 2 mm thick sagittal and coronal reconstructions were performed.  There are no prior studies from [facility] for comparison.  FINDINGS: There are patchy and confluent areas of low density throughout the periventricular and subcortical white matter of the cerebral hemispheres consistent with moderate-to-severe small vessel disease. There is an ovoid area of low density extending through  the genu of the right internal capsule into the central to posterior right putamen.  It measures 22 x 12 x 24 mm in size and is most consistent with an acute infarct. The ventricles are normal in size.  I see no mass effect and no midline shift and no extra-axial fluid collections are identified. There is no evidence of acute intracranial hemorrhage. There is nodular mucosal thickening in the posterior medial right sphenoid sinus.  The remainder of the paranasal sinus, mastoid air cells and middle ear cavities are clear.      1. Extensive confluent low density throughout the cerebral white matter consistent with moderate-to-severe small vessel disease. 2. There is a 22 x 12 x 24 mm ovoid area of low density extending from the mid right corona radiata region through the genu of the right internal capsule and into the central to posterior right putamen most consistent with an acute infarct, may be a large acute lacunar type infarct.  I recommend an MRI of the head to further evaluate.  The results were communicated to Rufino Melendez MD in the emergency room by telephone on 02/15/2019 at 12:45 PM.  Radiation dose reduction techniques were utilized, including automated exposure control and exposure modulation based on body size.  This report was finalized on 2/16/2019 5:02 PM by Dr. Yusef Samano M.D.          During this visit the following were done:  Labs Reviewed [x]    Labs Ordered []    Radiology Reports Reviewed [x]    Radiology Ordered []    EKG, echo, and/or stress test reviewed [x]    EEG results reviewed  []    EEG reviewed and interpreted per myself   []    Discussed case with neurointerventionalist or neuroradiologist []    Referring Provider Records Reviewed []    ER Records Reviewed []    Hospital Records Reviewed []    History Obtained From Family []    Radiological images view and Interpreted per myself [x]    Case Discussed with referring provider []     Decision to obtain and request outside records   []        Assessment and Plan     Right basoganglia stroke, favor small vessel thrombosis.      - Continue aspirin, Plavix, statin.    - May benefit from inpatient rehab.    - F/U in outpatient neurology clinic in 3 months, may stop Plavix at that point.     Call for problems, questions, will see prn.    Thanks,      Electronically signed by Keegan Quiroz MD on 2/17/2019 at 12:06 PM

## 2019-02-17 NOTE — PLAN OF CARE
Problem: Fall Risk (Adult)  Goal: Identify Related Risk Factors and Signs and Symptoms  Outcome: Ongoing (interventions implemented as appropriate)    Goal: Absence of Fall  Outcome: Ongoing (interventions implemented as appropriate)      Problem: Stroke (Ischemic) (Adult)  Goal: Signs and Symptoms of Listed Potential Problems Will be Absent, Minimized or Managed (Stroke)  Outcome: Ongoing (interventions implemented as appropriate)      Problem: Patient Care Overview  Goal: Plan of Care Review  Outcome: Ongoing (interventions implemented as appropriate)   02/16/19 1921   Coping/Psychosocial   Plan of Care Reviewed With patient   Plan of Care Review   Progress improving   OTHER   Outcome Summary VSS. Went for echo. Worked with therapies. No complaints. Ambulated to restroom multiple times. Will CTM.

## 2019-02-17 NOTE — PLAN OF CARE
Problem: Stroke (Ischemic) (Adult)  Goal: Signs and Symptoms of Listed Potential Problems Will be Absent, Minimized or Managed (Stroke)  Outcome: Ongoing (interventions implemented as appropriate)   02/17/19 0606   Goal/Outcome Evaluation   Problems Assessed (Stroke (Ischemic)) acute neurologic deterioration   Problems Assessed (Stroke (Ischemic)) communication impairment;acute neurologic deterioration       Problem: Patient Care Overview  Goal: Plan of Care Review  Outcome: Ongoing (interventions implemented as appropriate)

## 2019-02-17 NOTE — PROGRESS NOTES
"DAILY PROGRESS NOTE  [facility]    Patient Identification:  Name: Mar Burgos  Age: 78 y.o.  Sex: female  :  1940  MRN: [id number]         Primary Care Physician: Vladimir Hernandez APRN      Subjective  No new c/o.  Believes she is feeling stronger.     Objective:  General Appearance:  Comfortable, well-appearing, in no acute distress and not in pain.    Vital signs: (most recent): Blood pressure 162/74, pulse 89, temperature 97.4 °F (36.3 °C), temperature source Oral, resp. rate 18, height 165.1 cm (65\"), weight 56.8 kg (125 lb 4.8 oz), SpO2 90 %.    Lungs:  Normal effort and normal respiratory rate.  Breath sounds clear to auscultation.    Heart: Normal rate.  Regular rhythm.    Extremities: There is no dependent edema.    Neurological: Patient is alert and oriented to person, place and time.  (Lt sided weakness.  Mild lt facial droop. ).    Skin:  Warm and dry.                Vital signs in last 24 hours:  Temp:  [97.4 °F (36.3 °C)-98.4 °F (36.9 °C)] 97.4 °F (36.3 °C)  Heart Rate:  [89-99] 89  Resp:  [18] 18  BP: (162-179)/(74-86) 162/74    Intake/Output:    Intake/Output Summary (Last 24 hours) at 2019 1506  Last data filed at 2019 1700  Gross per 24 hour   Intake 120 ml   Output --   Net 120 ml         Results from last 7 days   Lab Units 19  0529 02/15/19  1059   WBC 10*3/mm3 5.61 5.91   HEMOGLOBIN g/dL 11.8* 11.4*   PLATELETS 10*3/mm3 264 285     Results from last 7 days   Lab Units 19  0512 19  0529 02/15/19  1059   SODIUM mmol/L 144 144 139   POTASSIUM mmol/L 3.4* 3.8 3.8   CHLORIDE mmol/L 107 107 101   CO2 mmol/L 19.1* 20.4* 22.1   BUN mg/dL 13 16 25*   CREATININE mg/dL 0.89 0.93 1.30*   GLUCOSE mg/dL 148* 134* 257*   Estimated Creatinine Clearance: 45.2 mL/min (by C-G formula based on SCr of 0.89 mg/dL).  Results from last 7 days   Lab Units 19  0512 19  0529 02/15/19  1059   CALCIUM mg/dL 9.5 9.5 9.5   ALBUMIN g/dL  --   --  4.20 "     Results from last 7 days   Lab Units 02/15/19  1059   ALBUMIN g/dL 4.20   BILIRUBIN mg/dL 0.2   ALK PHOS U/L 81   AST (SGOT) U/L 14   ALT (SGPT) U/L 11       Assessment:    Acute CVA (cerebrovascular accident)    Anemia - mild, stable    CKD - СВЕТЛАНА?    Hyperlipidemia    Hypertension:  Resume lisinopril I AM.     Personal history of breast cancer    History of tobacco use    Type 2 diabetes mellitus, without long-term current use of insulin     Dysphagia due to recent cerebral infarction      Plan:  Please see above.  Probable d/c in AM.     Sean Condon MD  2/17/2019  3:06 PM

## 2019-02-18 ENCOUNTER — APPOINTMENT (OUTPATIENT)
Dept: GENERAL RADIOLOGY | Facility: HOSPITAL | Age: 79
End: 2019-02-18

## 2019-02-18 VITALS
WEIGHT: 125.3 LBS | SYSTOLIC BLOOD PRESSURE: 123 MMHG | BODY MASS INDEX: 20.88 KG/M2 | HEIGHT: 65 IN | HEART RATE: 80 BPM | RESPIRATION RATE: 18 BRPM | OXYGEN SATURATION: 94 % | TEMPERATURE: 98.1 F | DIASTOLIC BLOOD PRESSURE: 73 MMHG

## 2019-02-18 LAB
ANION GAP SERPL CALCULATED.3IONS-SCNC: 13.3 MMOL/L
BUN BLD-MCNC: 17 MG/DL (ref 8–23)
BUN/CREAT SERPL: 15.5 (ref 7–25)
CALCIUM SPEC-SCNC: 9 MG/DL (ref 8.6–10.5)
CHLORIDE SERPL-SCNC: 109 MMOL/L (ref 98–107)
CO2 SERPL-SCNC: 21.7 MMOL/L (ref 22–29)
CREAT BLD-MCNC: 1.1 MG/DL (ref 0.57–1)
GFR SERPL CREATININE-BSD FRML MDRD: 48 ML/MIN/1.73
GLUCOSE BLD-MCNC: 120 MG/DL (ref 65–99)
GLUCOSE BLDC GLUCOMTR-MCNC: 118 MG/DL (ref 70–130)
GLUCOSE BLDC GLUCOMTR-MCNC: 208 MG/DL (ref 70–130)
POTASSIUM BLD-SCNC: 3.9 MMOL/L (ref 3.5–5.2)
SODIUM BLD-SCNC: 144 MMOL/L (ref 136–145)

## 2019-02-18 PROCEDURE — 92611 MOTION FLUOROSCOPY/SWALLOW: CPT

## 2019-02-18 PROCEDURE — 97535 SELF CARE MNGMENT TRAINING: CPT

## 2019-02-18 PROCEDURE — 82962 GLUCOSE BLOOD TEST: CPT

## 2019-02-18 PROCEDURE — 74230 X-RAY XM SWLNG FUNCJ C+: CPT

## 2019-02-18 PROCEDURE — 63710000001 INSULIN LISPRO (HUMAN) PER 5 UNITS: Performed by: NURSE PRACTITIONER

## 2019-02-18 PROCEDURE — 80048 BASIC METABOLIC PNL TOTAL CA: CPT | Performed by: HOSPITALIST

## 2019-02-18 RX ORDER — CLONIDINE HYDROCHLORIDE 0.1 MG/1
0.2 TABLET ORAL EVERY 12 HOURS SCHEDULED
Status: DISCONTINUED | OUTPATIENT
Start: 2019-02-18 | End: 2019-02-18 | Stop reason: HOSPADM

## 2019-02-18 RX ORDER — METOPROLOL SUCCINATE 50 MG/1
50 TABLET, EXTENDED RELEASE ORAL DAILY
Qty: 30 TABLET | Refills: 0 | Status: CANCELLED | OUTPATIENT
Start: 2019-02-18

## 2019-02-18 RX ORDER — METOPROLOL SUCCINATE 50 MG/1
50 TABLET, EXTENDED RELEASE ORAL
Status: DISCONTINUED | OUTPATIENT
Start: 2019-02-18 | End: 2019-02-18 | Stop reason: HOSPADM

## 2019-02-18 RX ORDER — CLOPIDOGREL BISULFATE 75 MG/1
75 TABLET ORAL DAILY
Qty: 30 TABLET | Refills: 0 | Status: SHIPPED | OUTPATIENT
Start: 2019-02-18 | End: 2019-02-25 | Stop reason: SDUPTHER

## 2019-02-18 RX ORDER — PRAVASTATIN SODIUM 20 MG
20 TABLET ORAL NIGHTLY
Qty: 30 TABLET | Refills: 0 | Status: SHIPPED | OUTPATIENT
Start: 2019-02-18 | End: 2019-02-25 | Stop reason: SDUPTHER

## 2019-02-18 RX ORDER — METOPROLOL SUCCINATE 50 MG/1
50 TABLET, EXTENDED RELEASE ORAL
Qty: 30 TABLET | Refills: 0 | Status: SHIPPED | OUTPATIENT
Start: 2019-02-18 | End: 2019-02-25 | Stop reason: SDUPTHER

## 2019-02-18 RX ORDER — CLONIDINE HYDROCHLORIDE 0.2 MG/1
0.2 TABLET ORAL EVERY 12 HOURS SCHEDULED
Qty: 60 TABLET | Refills: 0 | Status: SHIPPED | OUTPATIENT
Start: 2019-02-18 | End: 2019-02-25 | Stop reason: SDUPTHER

## 2019-02-18 RX ORDER — LISINOPRIL 10 MG/1
10 TABLET ORAL
Qty: 30 TABLET | Refills: 0 | Status: SHIPPED | OUTPATIENT
Start: 2019-02-18 | End: 2019-02-25 | Stop reason: SDUPTHER

## 2019-02-18 RX ADMIN — CLONIDINE HYDROCHLORIDE 0.2 MG: 0.1 TABLET ORAL at 09:56

## 2019-02-18 RX ADMIN — BARIUM SULFATE 50 ML: 400 SUSPENSION ORAL at 08:35

## 2019-02-18 RX ADMIN — METOPROLOL SUCCINATE 50 MG: 50 TABLET, FILM COATED, EXTENDED RELEASE ORAL at 12:04

## 2019-02-18 RX ADMIN — ASPIRIN 81 MG: 81 TABLET, CHEWABLE ORAL at 09:56

## 2019-02-18 RX ADMIN — LISINOPRIL 10 MG: 10 TABLET ORAL at 09:55

## 2019-02-18 RX ADMIN — CLOPIDOGREL 75 MG: 75 TABLET, FILM COATED ORAL at 09:56

## 2019-02-18 RX ADMIN — AMLODIPINE BESYLATE 10 MG: 10 TABLET ORAL at 09:56

## 2019-02-18 RX ADMIN — PANTOPRAZOLE SODIUM 40 MG: 40 TABLET, DELAYED RELEASE ORAL at 06:25

## 2019-02-18 RX ADMIN — BARIUM SULFATE 50 ML: 400 SUSPENSION ORAL at 08:36

## 2019-02-18 RX ADMIN — BARIUM SULFATE 4 ML: 980 POWDER, FOR SUSPENSION ORAL at 08:35

## 2019-02-18 RX ADMIN — INSULIN LISPRO 3 UNITS: 100 INJECTION, SOLUTION INTRAVENOUS; SUBCUTANEOUS at 12:05

## 2019-02-18 RX ADMIN — BARIUM SULFATE 65 ML: 960 POWDER, FOR SUSPENSION ORAL at 08:35

## 2019-02-18 RX ADMIN — CETIRIZINE HYDROCHLORIDE 5 MG: 10 TABLET, FILM COATED ORAL at 09:56

## 2019-02-18 NOTE — DISCHARGE SUMMARY
PHYSICIAN DISCHARGE SUMMARY                                                                        [facility]    Patient Identification:  Name: Mar Burgos  Age: 78 y.o.  Sex: female  :  1940  MRN: [id number]  Primary Care Physician: Vladimir Hernandez APRN    Admit date: 2/15/2019  Discharge date and time: 2019     Discharged Condition: good    Discharge Diagnoses:    Acute CVA (cerebrovascular accident)    Anemia - Chronic, mild, stable    CKD III    Hyperlipidemia    Hypertension:      Personal history of breast cancer    History of tobacco use    Type 2 diabetes mellitus, without long-term current use of insulin     Dysphagia due to recent cerebral infarction        Hospital Course:  Pleasant 78-year-old female presented with reported mental status change and speech change.  Please see H&P for full details.  Workup revealed left-sided weakness including upper and lower extremity as well as left facial droop.  Patient was admitted and CVA protocol initiated.  CT scan revealed significant widespread small vessel disease as well as:  22 x 12 x 24 mm ovoid area of low density extending from  the mid right corona radiata region through the genu of the right  internal capsule and into the central to posterior right putamen most  consistent with an acute infarct,  MRI confirmed the presence of a CVA.  MRA revealed:  Mild stenosis of the proximal left internal carotid artery, in the  range of 20-30%    Mild to moderate stenosis of the occipital right vertebral artery, in  the range of 35-40%.  Moderate narrowing of the patient's left subclavian artery, in  the range of about 40%.  Echocardiogram is unremarkable for any source of embolic phenomenon.  The patient remained afebrile vital signs stable throughout hospitalization.  She has been re-gaining strength nicely and has been working very well with physical therapy.  She  does have dysphasia secondary to CVA and her diet was modified.  She has been intolerant to statins in the past but appears to be tolerating Pravachol at this point.  She has been counseled in the extreme importance of discontinuing smoking and shows good motivation in this respect.        Consults:     Consults     Date and Time Order Name Status Description    2/16/2019 0754 Inpatient Neurology Consult Stroke Completed     2/15/2019 1657 Inpatient Neurology Consult Stroke      2/15/2019 1259 [facility] (on-call MD unless specified) Completed     2/15/2019 1139 Inpatient Neurology Consult Stroke Completed             Discharge Exam:  Physical Exam  Afebrile vital signs stable.  Well-developed, well-nourished female in no apparent distress.  Lungs clear to auscultation good air movement.  Heart regular rate and rhythm.  Extremities with no clubbing cyanosis edema.  Alert oriented conversant cooperative and pleasant.  She remains with left-sided weakness approximately 4 out of 5 upper and lower extremity both.  There is still a mild left facial droop present.      Disposition:  Home    Patient Instructions:      Discharge Medications      New Medications      Instructions Start Date   clopidogrel 75 MG tablet  Commonly known as:  PLAVIX   75 mg, Oral, Daily      lisinopril 10 MG tablet  Commonly known as:  PRINIVIL,ZESTRIL   10 mg, Oral, Every 24 Hours Scheduled      pravastatin 20 MG tablet  Commonly known as:  PRAVACHOL   20 mg, Oral, Nightly         Changes to Medications      Instructions Start Date   CloNIDine 0.2 MG tablet  Commonly known as:  CATAPRES  What changed:    · medication strength  · how much to take  · when to take this   0.2 mg, Oral, Every 12 Hours Scheduled      metFORMIN 1000 MG tablet  Commonly known as:  GLUCOPHAGE  What changed:  when to take this   1,000 mg, Oral, 2 Times Daily With Meals      metoprolol succinate XL 50 MG 24 hr tablet  Commonly known as:  TOPROL-XL  What changed:    · medication  strength  · how much to take  · when to take this   50 mg, Oral, Every 24 Hours Scheduled         Continue These Medications      Instructions Start Date   amLODIPine 10 MG tablet  Commonly known as:  NORVASC   10 mg, Oral, Daily      aspirin 81 MG EC tablet   81 mg, Oral, Daily      cetirizine 10 MG tablet  Commonly known as:  zyrTEC   10 mg, Oral, Daily      fluticasone 50 MCG/ACT nasal spray  Commonly known as:  FLONASE   2 sprays, Nasal, Nightly         Stop These Medications    lisinopril-hydrochlorothiazide 20-25 MG per tablet  Commonly known as:  PRINZIDE,ZESTORETIC          Diet Instructions     Diet: Consistent Carbohydrate, Cardiac, Dysphagia; Pudding Thick Liquids; Mechanical Soft      Discharge Diet:   Consistent Carbohydrate  Cardiac  Dysphagia       Fluid Consistency:  Pudding Thick Liquids    Pureed Options:  Mechanical Soft        No future appointments.  Additional Instructions for the Follow-ups that You Need to Schedule     Ambulatory Referral to Home Health   As directed      Face to Face Visit Date:  2/18/2019    Follow-up Provider for Plan of Care?:  I treated the patient in an acute care facility and will not continue treatment after discharge.    Follow-up Provider:  DHIRAJ BYRNES [[id number]]    Reason/Clinical Findings:  CVA, lt sided weakness    Describe mobility limitations that make leaving home difficult:  See above.    Nursing/Therapeutic Services Requested:  Physical Therapy Occupational Therapy    PT orders:  Gait Training Transfer training Therapeutic exercise Strengthening Home safety assessment    Weight Bearing Status:  As Tolerated    Frequency:  1 Week 1         Discharge Follow-up with PCP   As directed       Currently Documented PCP:    Dhiraj Byrnes APRN    PCP Phone Number:    [phone]     Follow Up Details:  1 week         Discharge Follow-up with Specialty: Neurology; 3 Months   As directed      Specialty:  Neurology    Follow Up:  3 Months           Follow-up Information      Vladimir Hrenandez, APRN .    Specialties:  Family Medicine, Urgent Care  Why:  1 week  Contact information:  [address] [facility] [id number] [phone]                 Discharge Order (From admission, onward)    Start     Ordered    02/18/19 0952  Discharge patient  Once     Expected Discharge Date:  02/18/19    Discharge Disposition:  Home-Health Care [facility]    Physician of Record for Attribution - Please select from Treatment Team:  SEAN CONDON [[id number]]    Review needed by CMO to determine Physician of Record:  No       Question Answer Comment   Physician of Record for Attribution - Please select from Treatment Team SEAN CONDON    Review needed by CMO to determine Physician of Record No        02/18/19 0957            Total time spent discharging patient including evaluation,post hospitalization follow up,  medication and post hospitalization instructions and education total time exceeds 30 minutes.    Signed:  Sean Condon MD  2/18/2019  9:58 AM    EMR Dragon/Transcription disclaimer:   Much of this encounter note is an electronic transcription/translation of spoken language to printed text. The electronic translation of spoken language may permit erroneous, or at times, nonsensical words or phrases to be inadvertently transcribed; Although I have reviewed the note for such errors, some may still exist.

## 2019-02-18 NOTE — NURSING NOTE
Call received earlier from SHAINA Ware ([facility]) re: if patient could qualify for acute rehab.  Reviewed and discussed with Dr. Aguilera.  Ambulating 170 ft with CGA and cues.  Too high level for inpatient acute rehab but Dr. Aguilera recommends subacute rehab stay.  Discussed again with SHAINA Ware ([facility]).  Thank you--Cherelle Pineda,  Rehab Adm Nurse

## 2019-02-18 NOTE — PROGRESS NOTES
Discharge Planning Assessment  [facility]     Patient Name: Mar Burgos  MRN: [id number]  Today's Date: 2/18/2019    Admit Date: 2/15/2019    Discharge Needs Assessment     Row Name 02/18/19 0956       Living Environment    Lives With  grandchild(morro)    Name(s) of Who Lives With Patient  Grandson Andrey 18 yo.    Current Living Arrangements  home/apartment/condo    Primary Care Provided by  self    Provides Primary Care For  grandchild(morro)    Family Caregiver if Needed  child(morro), adult    Quality of Family Relationships  helpful;involved;supportive    Able to Return to Prior Arrangements  yes       Resource/Environmental Concerns    Resource/Environmental Concerns  none    Transportation Concerns  car, none       Transition Planning    Patient/Family Anticipates Transition to  home with family;home with help/services    Patient/Family Anticipated Services at Transition  home health care    Transportation Anticipated  family or friend will provide;car, drives self       Discharge Needs Assessment    Readmission Within the Last 30 Days  no previous admission in last 30 days    Concerns to be Addressed  discharge planning    Equipment Currently Used at Home  none    Anticipated Changes Related to Illness  inability to care for someone else    Equipment Needed After Discharge  none    Discharge Facility/Level of Care Needs  home with [facility]    Offered/Gave Vendor List  yes    Patient's Choice of Community Agency(s)  [facility] referral        Discharge Plan     Row Name 02/18/19 0949       Plan    Plan  Home [facility] and family support    Patient/Family in Agreement with Plan  yes    Plan Comments  Spoke with pt at bedside, introduced self and explained CCP role. Verified facesheet and [facility] [place]. Pt denies problems with medication costs. Pt lives in a s/s home with her 18 yo grandson Andrey and her daughter Maria R Burgos [phone]) stays  sometimes. Prior to admission pt was IADL at home, denies any HH, SNF or DME. CCP reviewed HH vs SNF service and pt states plan is home, but agreeable to HH for Nursing, PT and ST. CCP reviewed HH provider and pt chose [facility]. She states she has good family support with her daughters. Updated Dr. Condon on dc plan. CCP left  for referral for [facility]. Bianca RN/CCP         Destination      No service coordination in this encounter.      Durable Medical Equipment      No service coordination in this encounter.      Dialysis/Infusion      No service coordination in this encounter.      Home Medical Care      No service coordination in this encounter.      Community Resources      No service coordination in this encounter.        Expected Discharge Date and Time     Expected Discharge Date Expected Discharge Time    Feb 18, 2019         Demographic Summary     Row Name 02/18/19 0955       General Information    Admission Type  inpatient    Referral Source  admission list    Reason for Consult  discharge planning    Preferred Language  English     Used During This Interaction  no       Contact Information    Permission Granted to Share Info With  facility ;family/designee        Functional Status     Row Name 02/18/19 0956       Functional Status    Usual Activity Tolerance  good    Current Activity Tolerance  moderate       Functional Status, IADL    Medications  independent    Meal Preparation  independent    Housekeeping  independent    Laundry  independent    Shopping  independent       Mental Status    General Appearance WDL  WDL       Mental Status Summary    Recent Changes in Mental Status/Cognitive Functioning  no changes       Employment/    Employment Status  retired        Psychosocial    No documentation.       Abuse/Neglect    No documentation.       Legal     Row Name 02/18/19 0956       Financial/Legal    Finance Comments  Pt denies advance directive/poa..  Provided Informed decisions malvin. Bianca SANDERS/CCP        Substance Abuse    No documentation.       Patient Forms     Row Name 02/18/19 0958       Patient Forms    Provider Choice List  Delivered    Delivered to  Patient    Method of delivery  In person            Sharon Ware, RN

## 2019-02-18 NOTE — PLAN OF CARE
Problem: Fall Risk (Adult)  Goal: Identify Related Risk Factors and Signs and Symptoms  Outcome: Ongoing (interventions implemented as appropriate)    Goal: Absence of Fall  Outcome: Ongoing (interventions implemented as appropriate)      Problem: Stroke (Ischemic) (Adult)  Goal: Signs and Symptoms of Listed Potential Problems Will be Absent, Minimized or Managed (Stroke)  Outcome: Ongoing (interventions implemented as appropriate)      Problem: Patient Care Overview  Goal: Plan of Care Review  Outcome: Ongoing (interventions implemented as appropriate)   02/18/19 0612   Coping/Psychosocial   Plan of Care Reviewed With patient   Plan of Care Review   Progress improving   OTHER   Outcome Summary pt slept well throughout night. Ambulating with standby assist to the bathroom. Still issues with facial droop and left sided weakness. Would definitely beneift from inpatient therapy. Will continue to monitor patient closely and await further orders

## 2019-02-18 NOTE — PLAN OF CARE
Problem: Patient Care Overview  Goal: Plan of Care Review   02/18/19 0530   Coping/Psychosocial   Plan of Care Reviewed With patient   Plan of Care Review   Progress declining   OTHER   Outcome Summary VFSS completed and revealed non transient, deep penetration intermittently with honey via spoon and cup with fatigue. SLP recs downgrade to pudding thick liquids, continue mech soft. Chin tuck not attempted on this study, will train patient on strategy and attempt on repeat VFSS, if yovani. Will initiate dysphagia therapy. Pt may benefit from acute inpatient rehab placement d/t motivation. Allow free water protocol with ice chips only.

## 2019-02-18 NOTE — MBS/VFSS/FEES
Acute Care - Speech Language Pathology   Swallow Initial Evaluation [facility]     Patient Name: Mar Burgos  : 1940  MRN: [id number]  Today's Date: 2019  Onset of Illness/Injury or Date of Surgery: 02/15/19     Referring Physician: RAMON      Admit Date: 2/15/2019    Visit Dx:     ICD-10-CM ICD-9-CM   1. Cerebrovascular accident (CVA), unspecified mechanism (CMS/HCC) I63.9 434.91   2. Oropharyngeal dysphagia R13.12 787.22   3. Decreased mobility and endurance Z74.09 780.99     Patient Active Problem List   Diagnosis   • Anemia   • Uncontrolled type 2 diabetes mellitus (CMS/HCC)   • Diabetic neuropathy (CMS/HCC)   • Gastroesophageal reflux disease   • Gastritis   • Hearing loss   • Hyperlipidemia   • Hypertension   • Thoracic back pain   • Peripheral arterial occlusive disease (CMS/HCC)   • Personal history of breast cancer   • Personal history of other malignant neoplasm of skin   • History of tobacco use   • Seasonal allergic rhinitis   • Type 2 diabetes mellitus, without long-term current use of insulin (CMS/HCC)   • Cobalamin deficiency   • Osteoporosis screening   • Breast cancer screening   • Sinusitis   • Acute CVA (cerebrovascular accident) (CMS/HCC)   • Left-sided weakness   • СВЕТЛАНА (acute kidney injury) (CMS/HCC)   • Dysphagia due to recent cerebral infarction     Past Medical History:   Diagnosis Date   • Anemia     required prior blood transfusions   • Arthritis    • Cancer (CMS/HCC)     breast cancer    • Clostridium difficile colitis    • Diabetes mellitus (CMS/HCC)    • Diverticulitis    • History of malignant neoplasm of breast 2013    denies any chemo or radiation   • History of malignant neoplasm of skin 2013    possibly basal   • History of tobacco use 2013   • Hyperlipidemia    • Hypertension      Past Surgical History:   Procedure Laterality Date   • CATARACT EXTRACTION, BILATERAL     • EAR TUBES     • MASTECTOMY Bilateral     radical   • TUMOR REMOVAL Left      large lipoma removed from hip        SWALLOW EVALUATION (last 72 hours)      SLP Adult Swallow Evaluation     Row Name 02/18/19 0800 02/16/19 0900          Document Type  evaluation  -  evaluation  -LS    Subjective Information  no complaints  -SH  no complaints  -LS    Patient Observations  alert;cooperative  -  --    Patient Effort  good  -SH  good  -LS          Patient Profile Reviewed  yes  -SH  yes  -LS    Pertinent History Of Current Problem  Acute infarct R corona radiata and basal ganglia  -  78 y.o. with hx of COPD, DM, PAD, HTN, HLD thrombophlebitis .PMH also includes tobacco use, breast cancer, s/p mastectomy. Pt presented to ER 2/15 with complaints of altered mental status with associated garbled speech, left sided arm and leg weakness as well as left facial droo.  Pt reported she experienced difficulty with left sided weakness the night before as well changes in  her speech. She reported she took an asprin and went to bed. Upon awakening 2/15/19  left side weakness was worse.  -LS    Current Method of Nutrition  mechanical soft, no mixed consistencies;honey-thick liquids  -  NPO  -LS    Precautions/Limitations, Vision  WFL;for purposes of eval  -SH  WFL;for purposes of eval  -LS    Precautions/Limitations, Hearing  WFL;for purposes of eval  -SH  WFL;for purposes of eval  -LS    Prior Level of Function-Communication  WFL  -SH  WFL  -LS    Prior Level of Function-Swallowing  no diet consistency restrictions  -  no diet consistency restrictions  -    Plans/Goals Discussed with  patient;agreed upon  -  patient;family  -    Barriers to Rehab  medically complex  -  medically complex  -    Patient's Goals for Discharge  return to regular diet  -  --          Additional Documentation  Pain Scale: Numbers Pre/Post-Treatment (Group)  -  --          Pain Scale: Numbers, Pretreatment  0/10 - no pain  -  --    Pain Scale: Numbers, Post-Treatment  0/10 - no pain  -  --           Dentition Assessment  upper dentures/partial in place;missing teeth  -SH  --          Oral Motor General Assessment  oral labial or buccal impairment;lingual impairment;mandibular impairment  -  generalized oral motor weakness  -LS    Mandibular Impairment Detail, Cranial Nerve V (Trigeminal)  reduced strength on left  -SH  --    Oral Labial or Buccal Impairment, Detail, Cranial Nerve VII (Facial):  left labial droop  -SH  --    Lingual Impairment, Detail. Cranial Nerves IX, XII (Glossopharyngeal and Hypoglossal)  reduced strength left  -SH  --          Respiratory Support Currently in Use  --  room air  -LS    Eating/Swallowing Skills  --  fed by SLP  -LS    Positioning During Eating  --  upright 90 degree;upright in bed  -    Utensils Used  --  spoon;cup  -LS    Consistencies Trialed  --  regular textures;chopped;pureed;thin liquids;nectar/syrup-thick liquids;honey-thick liquids  -          Respiratory Status  --  room air  -LS          Oral Prep Phase  --  impaired  -LS    Oral Transit  --  WFL  -LS    Oral Residue  --  impaired  -LS    Pharyngeal Phase  --  suspected pharyngeal impairment  -LS    Clinical Swallow Evaluation Summary  --  Pt exhibited decreased rotary chew with regular consistency. Coughing and audible swallow noted with thin and nectar thick liquids respectively. SLP recommends a mechanical soft diet with honey thick liquids.  -LS          Oral Prep Concerns  --  prolonged mastication  -LS    Prolonged Mastication  --  regular consistencies  -LS    Oral Prep Concerns, Comment  --  -- pt does not have molars difficult to break down food.  -LS          Oral Residue Concerns  --  -- lingual residue with reg. consistency.  -LS          Pharyngeal Phase Concerns  --  cough;throat clear  -LS    Cough  --  thin  -LS    Throat Clear  --  nectar  -LS          VFSS Summary  Pt presents with moderate oropharyngeal dysphagia characterized by swallow delay and lingual weakness. Hyolaryngeal  elevation/excursion WFLs. Silent penetration/aspiration during the swallow with nectar via spoon x2. Stated below were the following results with honey at the beginning of the assessment. No penetration with honey via spoon. Deep transient penetration honey via cup 1/2 trials. Silent, deep non transient penetration with honey via straw. Stated below were the following results with honey at the end of the assessment, likely 2/2 fatigue and mistiming. Silent, deep penetration before the swallow with honey via spoon. Deep non transient and silent penetration with honey via cup during the swallow, despite cues for small drinks. No penetration with puree, mild base of tongue/valleculae residue post swallow. Pt cleared partially with spontaneous second swallow. Trace, deep penetration to vocal cords with thin portion of mech soft. Mild oral residue post swallow. Increased bolus preparation with regular with piece meal swallow. After three spontaneous swallows, mild oral residue persisted. SLP recs downgrade to pudding thick liquids, continue mech soft. Chin tuck not attempted on this study, will train patient on strategy and attempt on repeat VFSS, if yovani. Oral care following meals. Allow free water protocol with ice chips only.   -SH  --          Summary Statement  Pt presents with moderate oropharyngeal dysphagia characterized by swallow delay and lingual weakness. Hyolaryngeal elevation/excursion WFLs. Silent aspiration nectar. Non transient penetration honey and thin portion of mech soft. No penetration puree. Oral residue and piece meal swallow with regular.   -SH  --          SLP Swallowing Diagnosis  moderate;oral dysfunction;pharyngeal dysfunction  -SH  --    Functional Impact  risk of aspiration/pneumonia  -SH  --    Rehab Potential/Prognosis, Swallowing  good, to achieve stated therapy goals  -SH  --    Swallow Criteria for Skilled Therapeutic Interventions Met  demonstrates skilled criteria  -SH  --           Therapy Frequency (Swallow)  PRN  -  PRN  -    Predicted Duration Therapy Intervention (Days)  until discharge  -  until discharge  -    SLP Diet Recommendation  mechanical soft with no mixed consistencies;pudding thick liquids;ice chips between meals after oral care, with supervision  -  mechanical soft with no mixed consistencies;honey thick liquids  -    Recommended Diagnostics  reassess via VFSS (MBS)  -  VFSS ([facility])  -LS    Recommended Precautions and Strategies  upright posture during/after eating;small bites of food and sips of liquid;multiple swallows per bite of food;multiple swallows per sip of liquid;no straw;other (see comments) oral care following meals  -  upright posture during/after eating;small bites of food and sips of liquid  -    SLP Rec. for Method of Medication Administration  meds whole;meds crushed;with pudding or applesauce;as tolerated  -  meds crushed;with pudding or applesauce  -    Monitor for Signs of Aspiration  yes;notify SLP if any concerns  -  yes;no;cough;gurgly voice;throat clearing;fever  -    Anticipated Dischage Disposition  inpatient rehabilitation facility  -  home with assist  -          Labial Strengthening Goal Selection (SLP)  labial strengthening, SLP goal 1  -SH  --    Lingual Strengthening Goal Selection (SLP)  lingual strengthening, SLP goal 1  -SH  --    Additional Documentation  lingual strengthening goal selection (SLP);labial strengthening goal selection (SLP)  -  --          Activity (Labial Strengthening Goal 1, SLP)  increase labial tone  -  --    Increase Labial Tone  labial resistance exercises  -  --    [place]/Accuracy (Labial Strengthening Goal 1, SLP)  with minimal cues (75-90% accuracy)  -  --    Time Frame (Labial Strengthening Goal 1, SLP)  by discharge  -  --          Activity (Lingual Strengthening Goal 1, SLP)  increase tongue back strength  -  --    Increase Tongue Back Strength  lingual movement  exercises;lingual resistance exercises Laura  -  --    [place]/Accuracy (Lingual Strengthening Goal 1, SLP)  with minimal cues (75-90% accuracy)  -  --    Time Frame (Lingual Strengthening Goal 1, SLP)  by discharge  -  --      User Key  (r) = Recorded By, (t) = Taken By, (c) = Cosigned By    Initials Name Effective Dates    Hope Mcconnell, MS [facility]-SLP 06/08/18 -     SH Millie Padmini ALPHONSO, MS CCC-SLP 03/07/18 -           EDUCATION  The patient has been educated in the following areas:   Dysphagia (Swallowing Impairment).  SLP returned to patient's room for education pending possible D/C this date. Pt's daughter present. SLP contacted dietary and can of thickener was brought to the patient's room in preparation for possible D/C. SLP reviewed the results from VFSS (pudding thick and mech soft, no mixed) and discussed diet recs. Handouts provided on diet modification, food list, no mixed, information on thickening liquids, and free water protocol. SLP recs therapy at next level of care and discussed with case management regarding concern for discharge home on strict diet. [phone].    SLP Recommendation and Plan  SLP Swallowing Diagnosis: moderate, oral dysfunction, pharyngeal dysfunction  SLP Diet Recommendation: mechanical soft with no mixed consistencies, pudding thick liquids, ice chips between meals after oral care, with supervision  Recommended Precautions and Strategies: upright posture during/after eating, small bites of food and sips of liquid, multiple swallows per bite of food, multiple swallows per sip of liquid, no straw, other (see comments)(oral care following meals)     Monitor for Signs of Aspiration: yes, notify SLP if any concerns  Recommended Diagnostics: reassess via VFSS ([facility])  Swallow Criteria for Skilled Therapeutic Interventions Met: demonstrates skilled criteria  Anticipated Dischage Disposition: inpatient rehabilitation facility  Rehab Potential/Prognosis, Swallowing: good, to  achieve stated therapy goals  Therapy Frequency (Swallow): PRN  Predicted Duration Therapy Intervention (Days): until discharge       Plan of Care Reviewed With: patient  Plan of Care Review  Plan of Care Reviewed With: patient  Progress: declining  Outcome Summary: VFSS completed and revealed non transient, deep penetration intermittently with honey via spoon and cup with fatigue. SLP recs downgrade to pudding thick liquids, continue mech soft. Chin tuck not attempted on this study, will train patient on strategy and attempt on repeat VFSS 2/25 if yovani. Allow free water protocol with ice chips only.     SLP GOALS     Row Name 02/18/19 0800             Labial Strengthening Goal 1 (SLP)    Activity (Labial Strengthening Goal 1, SLP)  increase labial tone  -      Increase Labial Tone  labial resistance exercises  -      [place]/Accuracy (Labial Strengthening Goal 1, SLP)  with minimal cues (75-90% accuracy)  -      Time Frame (Labial Strengthening Goal 1, SLP)  by discharge  -         Lingual Strengthening Goal 1 (SLP)    Activity (Lingual Strengthening Goal 1, SLP)  increase tongue back strength  -      Increase Tongue Back Strength  lingual movement exercises;lingual resistance exercises Laura  -      [place]/Accuracy (Lingual Strengthening Goal 1, SLP)  with minimal cues (75-90% accuracy)  -      Time Frame (Lingual Strengthening Goal 1, SLP)  by discharge  -        User Key  (r) = Recorded By, (t) = Taken By, (c) = Cosigned By    Initials Name Provider Type    Padmini Mayfield MS CCC-SLP Speech and Language Pathologist           SLP Outcome Measures (last 72 hours)      SLP Outcome Measures     Row Name 02/18/19 0900 02/16/19 0900          SLP Outcome Measures    Outcome Measure Used?  Adult NOMS  -SH  Adult NOMS  -LS        Adult FCM Scores    FCM Chosen  Swallowing  -SH  Swallowing  -LS     Swallowing FCM Score  3  -SH  3  -LS       User Key  (r) = Recorded By, (t) = Taken By, (c) =  Cosigned By    Initials Name Effective Dates     Marielos GarciaMS magan CCC-SLP 06/08/18 -      Padmini Pinto MS CCC-SLP 03/07/18 -            Time Calculation:   Time Calculation- SLP     Row Name 02/18/19 0900             Time Calculation- SLP    SLP Start Time  0730  -      SLP Received On  02/18/19  -        User Key  (r) = Recorded By, (t) = Taken By, (c) = Cosigned By    Initials Name Provider Type     Padmini Pinto MS CCC-SLP Speech and Language Pathologist        Therapy Charges for Today     Code Description Service Date Service Provider Modifiers Qty    [id number] HC ST MOTION FLUORO EVAL SWALLOW 8 2/18/2019 Padmini Pinto MS CCC-SLP GN 1                 Padmini Pinto MS CCC-SLP  2/18/2019

## 2019-02-18 NOTE — PLAN OF CARE
Problem: Patient Care Overview  Goal: Plan of Care Review  Outcome: Ongoing (interventions implemented as appropriate)   02/18/19 1151   Coping/Psychosocial   Plan of Care Reviewed With patient;daughter   Plan of Care Review   Progress improving   OTHER   Outcome Summary Pt with improving L UE function (still with 2/3 AROM and weakness). Pt is close SBA for ambulation to bathroom, toileting and shower xfer. Daughter present during OT.

## 2019-02-18 NOTE — PROGRESS NOTES
Continued Stay Note  [facility]     Patient Name: Mar Burgos  MRN: [id number]  Today's Date: 2/18/2019    Admit Date: 2/15/2019    Discharge Plan     Row Name 02/18/19 1245       Plan    Plan  Home with Ingrid MARTINO and 3 in 1 commode from [employer] and family support    Patient/Family in Agreement with Plan  yes    Plan Comments  Spoke with Padmini CORTES, Recommended Acute for speech therapy. Spoke with Cherelle corona for Acute Rehab referral, she reviewed with Dr. Aguilera and pt to [facility] for acute and recommended SNF. CCP spoke with pt and daughter Antoinette at bedside, provided update, explained SNF vs HH and 30 day window for SNF if HH unsuccessful. Pt and daughter wish to go home with  services and request 3 in 1 commode. CCP listed [employer] and had no preference. CCP explained would ask MD for order and call [employer] who would deliver to her home. Daughter verbalized understanding. CCP spoke with Agnes SANDERS and spoke with Zoe/Ingrid MARTINO who is waiting on orders from PCP. Bianca RNCCP        Discharge Codes    No documentation.       Expected Discharge Date and Time     Expected Discharge Date Expected Discharge Time    Feb 18, 2019             Sharon Ware, RN

## 2019-02-18 NOTE — SIGNIFICANT NOTE
02/18/19 0823   Rehab Treatment   Discipline physical therapist   Reason Treatment Not Performed unavailable for treatment  (Off floor to swallow study; will follow up as time allows. )   Recommendation   PT - Next Appointment 02/18/19

## 2019-02-18 NOTE — PLAN OF CARE
Problem: Patient Care Overview  Goal: Plan of Care Review   02/18/19 1044   Coping/Psychosocial   Plan of Care Reviewed With patient   Plan of Care Review   Progress no change   OTHER   Outcome Summary SLP returned to patient's room for education pending possible D/C this date. Pt's daughter present. SLP contacted dietary and can of thickener was brought to the patient's room in preparation for possible D/C. SLP reviewed the results from VFSS (pudding thick and mech soft, no mixed) and discussed diet recs. Handouts provided on diet modification, food list, no mixed, information on thickening liquids, and free water protocol. SLP recs therapy at next level of care and discussed with case management regarding concern for discharge home on strict diet.

## 2019-02-18 NOTE — THERAPY TREATMENT NOTE
Acute Care - Occupational Therapy Progress Note  [facility]     Patient Name: Mar Burgos  : 1940  MRN: [id number]  Today's Date: 2019  Onset of Illness/Injury or Date of Surgery: 02/15/19     Referring Physician: RAMON    Admit Date: 2/15/2019       ICD-10-CM ICD-9-CM   1. Cerebrovascular accident (CVA), unspecified mechanism (CMS/HCC) I63.9 434.91   2. Oropharyngeal dysphagia R13.12 787.22   3. Decreased mobility and endurance Z74.09 780.99   4. Acute CVA (cerebrovascular accident) (CMS/HCC) I63.9 434.91     Patient Active Problem List   Diagnosis   • Anemia   • Uncontrolled type 2 diabetes mellitus (CMS/HCC)   • Diabetic neuropathy (CMS/HCC)   • Gastroesophageal reflux disease   • Gastritis   • Hearing loss   • Hyperlipidemia   • Hypertension   • Thoracic back pain   • Peripheral arterial occlusive disease (CMS/HCC)   • Personal history of breast cancer   • Personal history of other malignant neoplasm of skin   • History of tobacco use   • Seasonal allergic rhinitis   • Type 2 diabetes mellitus, without long-term current use of insulin (CMS/HCC)   • Cobalamin deficiency   • Osteoporosis screening   • Breast cancer screening   • Sinusitis   • Acute CVA (cerebrovascular accident) (CMS/HCC)   • Left-sided weakness   • Dysphagia due to recent cerebral infarction     Past Medical History:   Diagnosis Date   • Anemia     required prior blood transfusions   • Arthritis    • Cancer (CMS/HCC)     breast cancer    • Clostridium difficile colitis    • Diabetes mellitus (CMS/HCC)    • Diverticulitis    • History of malignant neoplasm of breast 2013    denies any chemo or radiation   • History of malignant neoplasm of skin 2013    possibly basal   • History of tobacco use 2013   • Hyperlipidemia    • Hypertension      Past Surgical History:   Procedure Laterality Date   • CATARACT EXTRACTION, BILATERAL     • EAR TUBES     • MASTECTOMY Bilateral     radical   • TUMOR REMOVAL Left      large lipoma removed from hip       Therapy Treatment    Rehabilitation Treatment Summary     Row Name 02/18/19 1155             Treatment Time/Intention    Discipline  occupational therapist  -LE      Document Type  therapy note (daily note)  -LE      Mode of Treatment  individual therapy;occupational therapy  -LE      Patient/Family Observations  supine in bed. dght present  -LE      Patient Effort  excellent  -LE      Comment  SLP recommending acute rehab referral.  If d/c home recommend close SBA for mobility and assist with showering as well as HH OT. (CCP aware).   -LE      Existing Precautions/Restrictions  fall  -LE      Treatment Considerations/Comments  review home safety and increasing neuro input to L side with pt and dght  -LE      Recorded by [LE] Kasie Neely OTR 02/18/19 1220      Row Name 02/18/19 1155             Vital Signs    O2 Delivery Pre Treatment  room air  -LE      O2 Delivery Intra Treatment  room air  -LE      Post SpO2 (%)  96  -LE      O2 Delivery Post Treatment  room air  -LE      Pre Patient Position  Supine  -LE      Intra Patient Position  Standing  -LE      Post Patient Position  Supine  -LE      Activity Duration  -- no overt SOA with walk to BR  -LE      Recorded by [LE] Kasie Neely OTR 02/18/19 1220      Row Name 02/18/19 1155             Cognitive Assessment/Intervention- PT/OT    Orientation Status (Cognition)  oriented x 4  -LE      Follows Commands (Cognition)  WFL  -LE      Recorded by [LE] Kasie Neely OTR 02/18/19 1220      Row Name 02/18/19 1155             Bed Mobility Assessment/Treatment    Bed Mobility Assessment/Treatment  supine-sit;sit-supine  -LE      Gray Level (Bed Mobility)  --  -LE      Supine-Sit Gray (Bed Mobility)  supervision  -LE      Sit-Supine Gray (Bed Mobility)  supervision  -LE      Recorded by [LE] Kasie Neely OTR 02/18/19 1220      Row Name 02/18/19 1155             Functional Mobility    Functional Mobility- Ind.  Level  supervision required  -LE      Functional Mobility-Distance (Feet)  -- 20 feet to/from bathroom  -LE      Functional Mobility- Safety Issues  -- slower than normal, mild hesitancy  -LE      Recorded by [LE] Kasie Neely, OTR 02/18/19 1220      Row Name 02/18/19 1155             Transfer Assessment/Treatment    Transfer Assessment/Treatment  shower transfer;toilet transfer  -LE      Recorded by [LE] Kasie Neely, OTR 02/18/19 1220      Row Name 02/18/19 1155             Sit-Stand Transfer    Sit-Stand [place] (Transfers)  supervision  -LE      Recorded by [LE] Kasie Neely, OTR 02/18/19 1220      Row Name 02/18/19 1155             Stand-Sit Transfer    Stand-Sit [place] (Transfers)  supervision  -LE      Recorded by [LE] Kasie Neely, OTR 02/18/19 1220      Row Name 02/18/19 1155             Toilet Transfer    Type (Toilet Transfer)  stand pivot/stand step  -LE      [place] Level (Toilet Transfer)  supervision  -LE      Recorded by [LE] Kasie Neely OTR 02/18/19 1220      Row Name 02/18/19 1155             Shower Transfer    Type (Shower Transfer)  lateral  -LE      [place] Level (Shower Transfer)  supervision  -LE      Assistive Device (Shower Transfer)  grab bars/tub rail has shower chair at home.   -LE      Recorded by [LE] Kasie Neely, OTR 02/18/19 1220      Row Name 02/18/19 1155             ADL Assessment/Intervention    BADL Assessment/Intervention  lower body dressing;upper body dressing;bathing;grooming;feeding;toileting  -LE      Recorded by [LE] Kasie Neely OTR 02/18/19 1220      Row Name 02/18/19 1155             Lower Body Dressing Assessment/Training    Lower Body Dressing [place] Level  doff;don;socks;contact guard assist A 2nd sock (donning over another sock). decrease L   -LE      Lower Body Dressing Position  edge of bed sitting  -LE      Recorded by [LE] Kasie Neely, OTR 02/18/19 1220      Row Name 02/18/19 1155             Grooming Assessment/Training     [place] Level (Grooming)  supervision;set up;wash face, hands assist only to Keep O2 sensor dry  -LE      Grooming Position  sink side;unsupported standing  -LE      Recorded by [LE] Kasie Neely, OTR 02/18/19 1220      Row Name 02/18/19 1155             Self-Feeding Assessment/Training    Comment (Feeding)  pt reports using L hand to help with set up.   -LE      Recorded by [LE] Kasie Neely, OTR 02/18/19 1220      Row Name 02/18/19 1155             Toileting Assessment/Training    [place] Level (Toileting)  supervision;adjust/manage clothing;perform perineal hygiene  -LE      Toileting Position  unsupported sitting;unsupported standing  -LE      Recorded by [LE] Kasie Neely, OTR 02/18/19 1220      Row Name 02/18/19 1155             BADL Safety/Performance    Impairments, BADL Safety/Performance  grasp/prehension  -LE      Recorded by [LE] Kasie Neely OTR 02/18/19 1220      Row Name 02/18/19 1155             General ROM    LT Upper Ext  Lt Shoulder Flexion;Lt Elbow Extension/Flexion;Lt Wrist Flexion;Lt Wrist Extension;Lt Elbow Supination;Lt Elbow Pronation  -LE      Recorded by [LE] Kasie Neely, OTR 02/18/19 1220      Row Name 02/18/19 1155             Left Upper Ext    Lt Shoulder Flexion AROM  -- 2/3 AROM  -LE      Lt Elbow Extension/Flexion AROM  2/3  -LE      Lt Elbow Supination AROM  2/3  -LE      Lt Elbow Pronation AROM  2/3  -LE      Lt Wrist Flexion AROM  2/3  -LE      Lt Wrist Extension AROM  2/3  -LE      Recorded by [LE] Kasie Neely, OTR 02/18/19 1220      Row Name 02/18/19 1155             Motor Skills Assessment/Interventions    Additional Documentation  Neuromuscular Re-education (Group)  -LE      Recorded by [LE] Kasie Neely, OTR 02/18/19 1220      Row Name 02/18/19 1155             Neuromuscular Re-education    Comment, Neuromuscular Re-education  ed looking at L UE while using, taking care w/ hot/sharp/breakable items.  ed using L hand incorporated in ADL tasks.   -LE      Recorded  by [LE] Kasie Neely, OTR 02/18/19 1220      Row Name 02/18/19 1155             Positioning and Restraints    Pre-Treatment Position  in bed  -LE      Post Treatment Position  bed  -LE      In Bed  fowlers;call light within reach;encouraged to call for assist;with family/caregiver  -LE      Recorded by [LE] ChinKonstantina, OTR 02/18/19 1220      Row Name 02/18/19 1155             Pain Scale: Numbers Pre/Post-Treatment    Pain Scale: Numbers, Pretreatment  0/10 - no pain  -LE      Recorded by [LE] Chin Kasie, OTR 02/18/19 1220      Row Name 02/18/19 1155             Coping    Observed Emotional State  accepting  -LE      Recorded by [LE] Kasie Neely, OTR 02/18/19 1220      Row Name 02/18/19 1155             Plan of Care Review    Plan of Care Reviewed With  patient;daughter  -LE      Recorded by [LE] Chin Kasie, OTR 02/18/19 1220        User Key  (r) = Recorded By, (t) = Taken By, (c) = Cosigned By    Initials Name Effective Dates Discipline    LE Kasie Neely, OTR 06/08/18 -  OT           Rehab Goal Summary     Row Name 02/18/19 0800             Swallow Goals (SLP)    Labial Strengthening Goal Selection (SLP)  labial strengthening, SLP goal 1  -SH      Lingual Strengthening Goal Selection (SLP)  lingual strengthening, SLP goal 1  -      Additional Documentation  lingual strengthening goal selection (SLP);labial strengthening goal selection (SLP)  -         Labial Strengthening Goal 1 (SLP)    Activity (Labial Strengthening Goal 1, SLP)  increase labial tone  -      Increase Labial Tone  labial resistance exercises  -      [place]/Accuracy (Labial Strengthening Goal 1, SLP)  with minimal cues (75-90% accuracy)  -      Time Frame (Labial Strengthening Goal 1, SLP)  by discharge  -         Lingual Strengthening Goal 1 (SLP)    Activity (Lingual Strengthening Goal 1, SLP)  increase tongue back strength  -      Increase Tongue Back Strength  lingual movement exercises;lingual resistance exercises  Laura  -      [place]/Accuracy (Lingual Strengthening Goal 1, SLP)  with minimal cues (75-90% accuracy)  -      Time Frame (Lingual Strengthening Goal 1, SLP)  by discharge  -        User Key  (r) = Recorded By, (t) = Taken By, (c) = Cosigned By    Initials Name Provider Type Discipline     Padmini Pinto MS CCC-SLP Speech and Language Pathologist SLP        Occupational Therapy Education     Title: PT OT SLP Therapies (In Progress)     Topic: Occupational Therapy (In Progress)     Point: ADL training (Done)     Description: Instruct learner(s) on proper safety adaptation and remediation techniques during self care or transfers.   Instruct in proper use of assistive devices.    Learning Progress Summary           Patient Acceptance, E,TB, VU,NR by  at 2/16/2019  2:03 PM    Comment:  Role of OT and POC.   Family Acceptance, E,TB, VU,NR by  at 2/16/2019  2:03 PM    Comment:  Role of OT and POC.                               User Key     Initials Effective Dates Name Provider Type Discipline     06/08/18 -  Millicent Carson OTR Occupational Therapist OT                OT Recommendation and Plan     Plan of Care Review  Plan of Care Reviewed With: patient, daughter  Plan of Care Reviewed With: patient, daughter  Outcome Summary: Pt with improving L UE function (still with 2/3 AROM and weakness).  Pt is close SBA for ambulation to bathroom, toileting and shower xfer.  Daughter present during OT.   Outcome Measures     Row Name 02/18/19 1151 02/18/19 1100 02/16/19 1405       How much help from another is currently needed...    Putting on and taking off regular lower body clothing?  3  -LE  --  2  -SG    Bathing (including washing, rinsing, and drying)  3  -LE  --  2  -SG    Toileting (which includes using toilet bed pan or urinal)  3  -LE  --  2  -SG    Putting on and taking off regular upper body clothing  3  -LE  --  2  -SG    Taking care of personal grooming (such as brushing teeth)  3  -LE  --  2   -SG    Eating meals  3  -LE  --  3  -SG    Score  18  -LE  --  13  -SG       Modified [place] Scale    Modified [place] Scale  --  --  4 - Moderately severe disability.  Unable to walk without assistance, and unable to attend to own bodily needs without assistance.  -SG       Functional Assessment    Outcome Measure Options  --  AM-PAC 6 Clicks Daily Activity (OT)  -LE  AM-PAC 6 Clicks Daily Activity (OT);Modified [place]  -SG    Row Name 02/16/19 1344             How much help from another person do you currently need...    Turning from your back to your side while in flat bed without using bedrails?  4  -JR      Moving from lying on back to sitting on the side of a flat bed without bedrails?  3  -JR      Moving to and from a bed to a chair (including a wheelchair)?  3  -JR      Standing up from a chair using your arms (e.g., wheelchair, bedside chair)?  3  -JR      Climbing 3-5 steps with a railing?  2  -JR      To walk in hospital room?  3  -JR      AM-PAC 6 Clicks Score  18  -JR         Functional Assessment    Outcome Measure Options  AM-PAC 6 Clicks Basic Mobility (PT)  (Significant)   -JR        User Key  (r) = Recorded By, (t) = Taken By, (c) = Cosigned By    Initials Name Provider Type    Millicent Brantley OTR Occupational Therapist    Kasie Goldsmith OTR Occupational Therapist    Osmin Quiroz, PT Physical Therapist           Time Calculation:   Time Calculation- OT     Row Name 02/18/19 1220             Time Calculation- OT    OT Start Time  1118  -LE      OT Stop Time  1137  -LE      OT Time Calculation (min)  19 min  -LE      Total Timed Code Minutes- OT  19 minute(s)  -LE      OT Received On  02/18/19  -LE        User Key  (r) = Recorded By, (t) = Taken By, (c) = Cosigned By    Initials Name Provider Type    Kasie Goldsmith OTR Occupational Therapist           Therapy Suggested Charges     Code   Minutes Charges    None           Therapy Charges for Today     Code Description Service Date  Service Provider Modifiers Qty    [id number] HC OT SELF CARE/MGMT/TRAIN EA 15 MIN 2/18/2019 Kasie Neely, OTR GO 1               REBECCA Mercado  2/18/2019

## 2019-02-19 ENCOUNTER — READMISSION MANAGEMENT (OUTPATIENT)
Dept: CALL CENTER | Facility: HOSPITAL | Age: 79
End: 2019-02-19

## 2019-02-19 ENCOUNTER — TELEPHONE (OUTPATIENT)
Dept: FAMILY MEDICINE CLINIC | Facility: CLINIC | Age: 79
End: 2019-02-19

## 2019-02-20 ENCOUNTER — READMISSION MANAGEMENT (OUTPATIENT)
Dept: CALL CENTER | Facility: HOSPITAL | Age: 79
End: 2019-02-20

## 2019-02-20 NOTE — PROGRESS NOTES
Case Management Discharge Note    Final Note: Home with [facility] and family, dtr courtney carlisle RN/CCP    Destination      No service has been selected for the patient.      Durable Medical Equipment      Service Provider Request Status Selected Services Address Phone Number Fax Number    [facility] [place] Selected Durable Medical Equipment [address], [facility] [address] [phone]      Dialysis/Infusion      No service has been selected for the patient.      Home Medical Care - Selection Complete      Service Provider Request Status Selected Services Address Phone Number Fax Number    [facility] [place] Selected Home Health Services [address], [facility] [address] [phone]      Community Resources      No service has been selected for the patient.             Final Discharge Disposition Code: 06 - home with home health care

## 2019-02-20 NOTE — OUTREACH NOTE
Prep Survey      Responses   Facility patient discharged from?  [facility]   Is patient eligible?  Yes   Discharge diagnosis  Acute CVA    Does the patient have one of the following disease processes/diagnoses(primary or secondary)?  Stroke (TIA)   Does the patient have Home health ordered?  Yes   What is the Home health agency?   [facility]     Is there a DME ordered?  Yes   What DME was ordered?  3 in 1 [facility]   Prep survey completed?  Yes          Yasmeen Perez RN

## 2019-02-21 ENCOUNTER — READMISSION MANAGEMENT (OUTPATIENT)
Dept: CALL CENTER | Facility: HOSPITAL | Age: 79
End: 2019-02-21

## 2019-02-21 NOTE — OUTREACH NOTE
Stroke Week 1 Survey      Responses   Facility patient discharged from?  [facility]   Does the patient have one of the following disease processes/diagnoses(primary or secondary)?  Stroke (TIA)   Is there a successful TCM telephone encounter documented?  No   Week 1 attempt successful?  Yes   Call start time  1301   Call end time  1308   Discharge diagnosis  Acute CVA    Is patient permission given to speak with other caregiver?  Yes   List who call center can speak with  dtr   Person spoke with today (if not patient) and relationship  dtr   Meds reviewed with patient/caregiver?  Yes   Is the patient having any side effects they believe may be caused by any medication additions or changes?  No   Does the patient have all medications ordered at discharge?  Yes   Is the patient taking all medications as directed (includes completed medication regime)?  Yes   Does the patient have a primary care provider?   Yes   What is the Home health agency?   [facility]    Has home health visited the patient within 72 hours of discharge?  Yes   Home health comments  Nursing, PT/OT/Speech   What DME was ordered?  3 in 1 [facility]   Has all DME been delivered?  Yes   Psychosocial issues?  No   Does the patient require any assistance with activities of daily living such as eating, bathing, dressing, walking, etc.?  No   Does the patient have any residual symptoms from stroke/TIA?  Yes   Residual symptoms comments  left mouth is drooping slightly, speech is slightly slurred still    Does the patient understand the diet ordered at discharge?  Yes   Did the patient receive a copy of their discharge instructions?  Yes   Nursing interventions  Reviewed instructions with patient   What is the patient's perception of their health status since discharge?  Improving   Is the patient able to teach back FAST for Stroke?  Yes   Is the patient/caregiver able to teach back the risk factors for a stroke?  High blood pressure-goal below  120/80, Diabetes, History of TIAs, HIgh red blood cell count, High Cholesterol, Carotid or other artery disease, Sleep apnea   Is the patient/caregiver able to teach back signs and symptoms related to disease process for when to call PCP?  Yes   Is the patient/caregiver able to teach back signs and symptoms related to disease process for when to call 911?  Yes   Is the patient/caregiver able to teach back the hierarchy of who to call/visit for symptoms/problems? PCP, Specialist, Home health nurse, Urgent Care, ED, 911  Yes   Week 1 call completed?  Yes          Yumiko Capellan RN

## 2019-02-21 NOTE — OUTREACH NOTE
Stroke Week 1 Survey      Responses   Facility patient discharged from?  [facility]   Does the patient have one of the following disease processes/diagnoses(primary or secondary)?  Stroke (TIA)   Is there a successful TCM telephone encounter documented?  No   Week 1 attempt successful?  No   Unsuccessful attempts  Attempt 1          Millicent Lopez, RN

## 2019-02-22 ENCOUNTER — OFFICE VISIT (OUTPATIENT)
Dept: FAMILY MEDICINE CLINIC | Facility: CLINIC | Age: 79
End: 2019-02-22

## 2019-02-22 VITALS
TEMPERATURE: 97.9 F | WEIGHT: 125.4 LBS | BODY MASS INDEX: 20.89 KG/M2 | HEIGHT: 65 IN | HEART RATE: 52 BPM | OXYGEN SATURATION: 94 % | SYSTOLIC BLOOD PRESSURE: 126 MMHG | DIASTOLIC BLOOD PRESSURE: 62 MMHG

## 2019-02-22 DIAGNOSIS — E78.5 HYPERLIPIDEMIA, UNSPECIFIED HYPERLIPIDEMIA TYPE: ICD-10-CM

## 2019-02-22 DIAGNOSIS — F17.200 SMOKER: ICD-10-CM

## 2019-02-22 DIAGNOSIS — I63.9 CEREBROVASCULAR ACCIDENT (CVA), UNSPECIFIED MECHANISM (HCC): Primary | ICD-10-CM

## 2019-02-22 DIAGNOSIS — I10 ESSENTIAL HYPERTENSION: ICD-10-CM

## 2019-02-22 DIAGNOSIS — E11.65 UNCONTROLLED TYPE 2 DIABETES MELLITUS WITH HYPERGLYCEMIA (HCC): ICD-10-CM

## 2019-02-22 PROCEDURE — 99496 TRANSJ CARE MGMT HIGH F2F 7D: CPT | Performed by: NURSE PRACTITIONER

## 2019-02-22 RX ORDER — AMLODIPINE BESYLATE 10 MG/1
10 TABLET ORAL DAILY
Qty: 90 TABLET | Refills: 3 | Status: SHIPPED | OUTPATIENT
Start: 2019-02-22 | End: 2019-03-14

## 2019-02-25 ENCOUNTER — TELEPHONE (OUTPATIENT)
Dept: FAMILY MEDICINE CLINIC | Facility: CLINIC | Age: 79
End: 2019-02-25

## 2019-02-25 RX ORDER — METOPROLOL SUCCINATE 50 MG/1
50 TABLET, EXTENDED RELEASE ORAL
Qty: 30 TABLET | Refills: 0 | Status: SHIPPED | OUTPATIENT
Start: 2019-02-25 | End: 2019-04-14 | Stop reason: SDUPTHER

## 2019-02-25 RX ORDER — PRAVASTATIN SODIUM 20 MG
20 TABLET ORAL NIGHTLY
Qty: 30 TABLET | Refills: 0 | Status: SHIPPED | OUTPATIENT
Start: 2019-02-25 | End: 2019-04-12 | Stop reason: SDUPTHER

## 2019-02-25 RX ORDER — CLONIDINE HYDROCHLORIDE 0.2 MG/1
0.2 TABLET ORAL EVERY 12 HOURS SCHEDULED
Qty: 60 TABLET | Refills: 0 | Status: SHIPPED | OUTPATIENT
Start: 2019-02-25 | End: 2019-04-08 | Stop reason: SDUPTHER

## 2019-02-25 RX ORDER — CLOPIDOGREL BISULFATE 75 MG/1
75 TABLET ORAL DAILY
Qty: 30 TABLET | Refills: 0 | Status: SHIPPED | OUTPATIENT
Start: 2019-02-25 | End: 2019-04-14 | Stop reason: SDUPTHER

## 2019-02-25 RX ORDER — LISINOPRIL 10 MG/1
10 TABLET ORAL
Qty: 30 TABLET | Refills: 0 | Status: SHIPPED | OUTPATIENT
Start: 2019-02-25 | End: 2019-04-14 | Stop reason: SDUPTHER

## 2019-02-25 NOTE — TELEPHONE ENCOUNTER
Patient left message stating she is needing the medications that she was discharged from the hospital with refilled. She said charisse went over these when she was seen. Is it ok to refill if so which ones does she need refilled?

## 2019-02-28 ENCOUNTER — READMISSION MANAGEMENT (OUTPATIENT)
Dept: CALL CENTER | Facility: HOSPITAL | Age: 79
End: 2019-02-28

## 2019-02-28 NOTE — OUTREACH NOTE
Stroke Week 2 Survey      Responses   Facility patient discharged from?  [facility]   Does the patient have one of the following disease processes/diagnoses(primary or secondary)?  Stroke (TIA)   Week 2 attempt successful?  Yes   Call start time  1647   Call end time  1652   Discharge diagnosis  Acute CVA    Meds reviewed with patient/caregiver?  Yes   Is the patient taking all medications as directed (includes completed medication regime)?  Yes   Has the patient kept scheduled appointments due by today?  Yes   Comments  Jennifer Mei last week   What is the Home health agency?   [facility]     Home health comments  Nursing, PT/OT/Speech   Psychosocial issues?  No   Does the patient require any assistance with activities of daily living such as eating, bathing, dressing, walking, etc.?  No   Does the patient have any residual symptoms from stroke/TIA?  Yes   Residual symptoms comments  Puree diet pudding diet   Does the patient understand the diet ordered at discharge?  Yes   Nursing interventions  Educated on MyChart [Doesn't use the computer]   What is the patient's perception of their health status since discharge?  Improving   Is the patient able to teach back FAST for Stroke?  Yes   Is the patient/caregiver able to teach back the risk factors for a stroke?  High blood pressure-goal below 120/80, Diabetes, Physical inactivity and obesity, History of TIAs, High Cholesterol   Week 2 call completed?  Yes          Daniela Lynch RN

## 2019-03-06 ENCOUNTER — TELEPHONE (OUTPATIENT)
Dept: FAMILY MEDICINE CLINIC | Facility: CLINIC | Age: 79
End: 2019-03-06

## 2019-03-06 ENCOUNTER — TELEPHONE (OUTPATIENT)
Dept: NEUROLOGY | Facility: CLINIC | Age: 79
End: 2019-03-06

## 2019-03-06 DIAGNOSIS — R13.10 DYSPHAGIA, UNSPECIFIED TYPE: Primary | ICD-10-CM

## 2019-03-06 NOTE — TELEPHONE ENCOUNTER
[facility] health called and requested swallowing study with speech language pathology .  She said pt needs to have new however she is making progracess after her stroke.  Order is in Epic pt is going to have procedure done on Monday ,

## 2019-03-07 NOTE — TELEPHONE ENCOUNTER
Two Week Stroke Phone Call  Spoke with the patient's daughter  · Admission Date: 2/15/2019  · Discharge Date: 2/18/2019  · Discharge Destination: Home  · Meds reviewed with patient/caregiver?    [x]Yes [] No   o Antiplatelet: Aspirin, Plavix  - Understands purpose     [x]  Yes     []  No     - Understands how to take      [x]  Yes     []  No    o Cholesterol Reducing: Pravastatin  - Understands purpose     [x]  Yes     []  No    - Understands how to take      [x]  Yes     []  No   · Is the patient taking all medication as directed?   [x]  Yes  []  No  · Discussed personal risk factors   [x]  Yes []  No    o High blood pressure   - Has been monitoring BP [x]  Yes     []  No  - Bp goal <130/80  o Diabetes    o High cholesterol   - Review desired LDL goal <70  • Discussed signs and symptoms of stroke and when patient to call 911?   [x]  Yes []  No  o Sudden weakness or numbness of the face, arm, or leg especially on one side of the body  o Sudden confusion, trouble speaking or understanding  o Sudden trouble seeing in one or both eyes   o Sudden trouble walking, dizziness, loss of balance or coordination  o Sudden severe headaches with no known cause    Notified Patient's daughter that if any of these symptoms occur to call 911  · Does the patient have any new signs or symptoms of a stroke?   []  Yes     [x]  No  · Does the patietnt have an appointment with PCP?  [x]  Yes     []  No  · Does the patient have 3 month Stroke Clinic appointment?  Schedule to follow up with Kasie MURPHY 5/22/19 at 3pm  · Is the patient currently in therapy, outpatient, or home health?  [x]  Yes     []  No    Needs a referral?      []  Yes     [x]  No  Patient Satisfaction   · How often were you kept up to date with your plan of care?    []Never  [x] Sometimes  [] Usually  [] Always  · When test were ordered how often did someone explain to you the results?    []  Never  [] Sometimes  [x] Usually  [] Always  · What suggestions does  the patient have of ways to improve the care to stroke patients?  Speech therapy communication during the hospital and evaluation   · Overall how satisfied were you with the stroke care you received at [facility]?   []  Very Dissatisfied [] Dissatisfied   [x] Satisfied [] Very Satisfied

## 2019-03-08 ENCOUNTER — READMISSION MANAGEMENT (OUTPATIENT)
Dept: CALL CENTER | Facility: HOSPITAL | Age: 79
End: 2019-03-08

## 2019-03-08 NOTE — OUTREACH NOTE
Stroke Week 3 Survey      Responses   Facility patient discharged from?  [facility]   Does the patient have one of the following disease processes/diagnoses(primary or secondary)?  Stroke (TIA)   Week 3 attempt successful?  No   Unsuccessful attempts  Attempt 1          Agnes Cat RN

## 2019-03-11 ENCOUNTER — TELEPHONE (OUTPATIENT)
Dept: FAMILY MEDICINE CLINIC | Facility: CLINIC | Age: 79
End: 2019-03-11

## 2019-03-11 ENCOUNTER — HOSPITAL ENCOUNTER (OUTPATIENT)
Dept: GENERAL RADIOLOGY | Facility: HOSPITAL | Age: 79
Discharge: HOME | End: 2019-03-11
Admitting: NURSE PRACTITIONER

## 2019-03-11 ENCOUNTER — READMISSION MANAGEMENT (OUTPATIENT)
Dept: CALL CENTER | Facility: HOSPITAL | Age: 79
End: 2019-03-11

## 2019-03-11 DIAGNOSIS — R13.10 DYSPHAGIA, UNSPECIFIED TYPE: ICD-10-CM

## 2019-03-11 PROCEDURE — 74230 X-RAY XM SWLNG FUNCJ C+: CPT

## 2019-03-11 PROCEDURE — 92611 MOTION FLUOROSCOPY/SWALLOW: CPT | Performed by: SPEECH-LANGUAGE PATHOLOGIST

## 2019-03-11 PROCEDURE — A9270 NON-COVERED ITEM OR SERVICE: HCPCS | Performed by: NURSE PRACTITIONER

## 2019-03-11 PROCEDURE — 63710000001 BARIUM SULFATE 98 % RECONSTITUTED SUSPENSION: Performed by: NURSE PRACTITIONER

## 2019-03-11 PROCEDURE — 63710000001 BARIUM SULFATE 96 % RECONSTITUTED SUSPENSION: Performed by: NURSE PRACTITIONER

## 2019-03-11 PROCEDURE — 63710000001 BARIUM SULFATE 40 % SUSPENSION: Performed by: NURSE PRACTITIONER

## 2019-03-11 RX ADMIN — BARIUM SULFATE 65 ML: 960 POWDER, FOR SUSPENSION ORAL at 09:56

## 2019-03-11 RX ADMIN — BARIUM SULFATE 50 ML: 400 SUSPENSION ORAL at 09:56

## 2019-03-11 RX ADMIN — BARIUM SULFATE 4 ML: 980 POWDER, FOR SUSPENSION ORAL at 09:56

## 2019-03-11 NOTE — MBS/VFSS/FEES
Outpatient Speech Language Pathology   Adult Swallow Initial Evaluation-VFSS  Psychiatric     Patient Name: Mar Burgos  : 1940  MRN: [id number]  Today's Date: 3/11/2019         Visit Date: 2019     SPEECH-LANGUAGE PATHOLOGY EVALUTION - VFSS  Subjective: The patient was seen on this date for a VFSS(Videofluoroscopic Swallowing Study).  Patient was alert and cooperative.    Significant history:   Objective: Risks/benefits were reviewed with the patient and family, and consent was obtained. The study was completed with SLP present and Radiologist review. The patient was seen in lateral view(s). Textures given included nectar thick liquid, honey thick liquid, puree consistency, mechanical soft consistency and regular consistency.  Assessment: Difficulties were noted with mixed consistencies and nectar thick liquid, characterized by premature spillage to valleculae and pyriforms.  Reduced bolus formation of dry solid with residue on back of tongue.   The patient demonstrated penetration and aspiration before the swallow due to premature spillage and mistiming/swallow delay.  Inconsistent sensation of penetration and aspiration.  Cued throat clear and cough was more productive in clearing than spontaneous cough when produced.  Residue was mild with puree and dry solids.  SLP Findings: Patient presents with moderate oropharyngeal dysphagia.   Comments: Small bolus size did not eliminate penetration of nectar thick liquids.  Chin tuck was effective initially; however, after multiple trials and fatigue pt began with deep penetration of nectar thick.  Chin tuck noted to become less pronounced without direct verbal cues.  Recommendations: Diet Textures: honey thick liquid, mechanical soft consistency with no mixed textures food. Medications should be taken whole or crushed with puree. May have Ice between meals after oral care, under staff or family supervision and with the recommended strategies for  safe swallowing.  Recommended Strategies: Upright for PO and small bites and sips. Oral care before breakfast, after all meals and PRN.  Other Recommended Evaluations: VFSS    Continued dysphagia therapy is recommended. Rationale: Continue current plan with addition of trials of nectar thick w/ chin tuck to train posture, trials of dry, sticky textures (ie. peanut butter/crackers, meats).        Patient Active Problem List   Diagnosis   • Anemia   • Uncontrolled type 2 diabetes mellitus (CMS/HCC)   • Diabetic neuropathy (CMS/HCC)   • Gastroesophageal reflux disease   • Gastritis   • Hearing loss   • Hyperlipidemia   • Hypertension   • Thoracic back pain   • Peripheral arterial occlusive disease (CMS/HCC)   • Personal history of breast cancer   • Personal history of other malignant neoplasm of skin   • History of tobacco use   • Seasonal allergic rhinitis   • Type 2 diabetes mellitus, without long-term current use of insulin (CMS/HCC)   • Cobalamin deficiency   • Osteoporosis screening   • Breast cancer screening   • Sinusitis   • Cerebrovascular accident (CVA) (CMS/HCC)   • Left-sided weakness   • Dysphagia due to recent cerebral infarction   • Smoker        Past Medical History:   Diagnosis Date   • Anemia     required prior blood transfusions   • Arthritis    • Cancer (CMS/HCC)     breast cancer    • Clostridium difficile colitis    • Diabetes mellitus (CMS/HCC)    • Diverticulitis    • History of malignant neoplasm of breast 07/01/2013    denies any chemo or radiation   • History of malignant neoplasm of skin 07/01/2013    possibly basal   • History of tobacco use 7/1/2013   • Hyperlipidemia    • Hypertension         Past Surgical History:   Procedure Laterality Date   • CATARACT EXTRACTION, BILATERAL     • EAR TUBES     • MASTECTOMY Bilateral     radical   • TUMOR REMOVAL Left     large lipoma removed from hip         Visit Dx:     ICD-10-CM ICD-9-CM   1. Dysphagia, unspecified type R13.10 787.20           SLP  Adult Swallow Evaluation - 03/11/19 1000        Rehab Evaluation    Document Type  evaluation   -SA    Subjective Information  no complaints   -SA    Patient Observations  alert;cooperative   -SA    Patient/Family Observations  daughter present for review of study and recommendations   -SA    Patient Effort  good   -SA    Symptoms Noted During/After Treatment  none   -SA       General Information    Pertinent History Of Current Problem  CVA    -SA    Current Method of Nutrition  soft textures;pudding thick   -SA    Prior Level of Function-Communication  WFL   -SA    Prior Level of Function-Swallowing  other (see comments) VFSS 2/2019-silent asp nectar, penetration honey and mixed   -SA    Plans/Goals Discussed with  patient and family   -    Barriers to Rehab  none identified   -SA    Patient's Goals for Discharge  return to regular diet   -SA    Family Goals for Discharge  patient able to return to regular diet   -SA       Pain Assessment    Additional Documentation  Pain Scale: Numbers Pre/Post-Treatment (Group)   -SA       Pain Scale: Numbers Pre/Post-Treatment    Pain Scale: Numbers, Pretreatment  0/10 - no pain   -SA    Pain Scale: Numbers, Post-Treatment  0/10 - no pain   -SA       MBS/VFSS    Utensils Used  spoon;cup;straw   -SA    Consistencies Trialed  regular textures;soft textures;pureed;nectar/syrup-thick liquids;honey-thick liquids   -SA       MBS/VFSS Interpretation    Oral Prep Phase  impaired oral phase of swallowing   -SA    Oral Transit Phase  impaired   -SA    Oral Residue  impaired   -SA       Oral Preparatory Phase    Oral Preparatory Phase  prolonged manipulation;other (see comments) reduced bolus formation w/ dry solids   -SA    Prolonged Manipulation  regular textures   -SA       Oral Transit Phase    Impaired Oral Transit Phase  premature spillage of liquids into pharynx;piecemeal oral transit   -SA    Piecemeal Oral Transit  mechanical soft;regular textures;mixed consistency   -SA     Premature Spillage of Liquids into Pharynx  all consistencies tested;other (see comments) to valleculae and pyriforms w/ nectar and mixed   -SA       Oral Residue    Impaired Oral Residue  lingual residue   -SA    Lingual Residue  regular textures   -SA    Response to Oral Residue  with cued swallow;other (see comments) reduced   -SA       Initiation of Pharyngeal Swallow    Pharyngeal Phase  impaired pharyngeal phase of swallowing   -SA    Penetration Before the Swallow  nectar-thick liquids;mixed consistency   -SA    Aspiration Before the Swallow  mixed consistency;nectar-thick liquids   -SA    Response to Penetration  deep;inconsistent response   -SA    Response to Aspiration  inconsistent response;weak cough/throat clear;other (see comments) cued cough/throat clear stronger w/increased ability to aga   -SA    Attempted Compensatory Maneuvers  bolus size;chin tuck   -SA    Response to Attempted Compensatory Maneuvers  other (see comments) chin tuck inconsistent in eliminating pen/asp   -SA       SLP Communication to Radiology    Summary Statement  Pt demonstrates a moderate oropharyngeal dysphagia.  Penetration and aspiration noted with nectar thick liquids and mixed consistencies d/t premature spillage and swallow delay/mistiming.  Chin tuck w/ nectar thick eliminated aspiration inconsistently.     -SA       Clinical Impression    SLP Swallowing Diagnosis  moderate   -SA    Functional Impact  risk of aspiration/pneumonia   -SA    Rehab Potential/Prognosis, Swallowing  good, to achieve stated therapy goals   -SA    Swallow Criteria for Skilled Therapeutic Interventions Met  demonstrates skilled criteria   -SA       Recommendations    Therapy Frequency (Swallow)  other (see comments) TBD by treating SLP   -SA    Predicted Duration Therapy Intervention (Days)  until discharge   -SA    SLP Diet Recommendation  chopped;mechanical soft with no mixed consistencies;honey thick liquids;ice chips between meals after oral  care, with supervision   -SA    Recommended Diagnostics  VFSS (MBS);other (see comments) prior to further diet advancement   -SA    Recommended Precautions and Strategies  upright posture during/after eating;small bites of food and sips of liquid   -SA    SLP Rec. for Method of Medication Administration  with pudding or applesauce   -SA      User Key  (r) = Recorded By, (t) = Taken By, (c) = Cosigned By    Initials Name Provider Type    Millicent Quezada MS CCC-SLP Speech and Language Pathologist                        OP SLP Education     Row Name 03/11/19 1112       Education    Barriers to Learning  No barriers identified  -    Education Provided  Described results of evaluation;Patient expressed understanding of evaluation;Family/caregivers expressed understanding of evaluation  -SA    Assessed  Learning needs;Learning motivation  -    Learning Motivation  Strong  -    Learning Method  Explanation  -    Teaching Response  Verbalized understanding  -      User Key  (r) = Recorded By, (t) = Taken By, (c) = Cosigned By    Initials Name Effective Dates    Millicent Quezada MS CCC-SLP 03/07/18 -               OP SLP Assessment/Plan - 03/11/19 1111        SLP Assessment    Functional Problems  Swallowing   -SA    Impact on Function: Swallowing  Risk of aspiration   -SA    Clinical Impression: Swallowing  Moderate:;oropharyngeal phase dysphagia   -SA    SLP Diagnosis  moderate oropharyngeal dysphagia   -SA    Prognosis  Good (comment)   -SA    Patient/caregiver participated in establishment of treatment plan and goals  Yes   -SA    Patient would benefit from skilled therapy intervention  Yes   -SA       SLP Plan    Frequency  TBD by treating SLP   -SA    Duration  TBD by treating SLP   -SA    Planned CPT's?  SLP MBS: 50116   -      User Key  (r) = Recorded By, (t) = Taken By, (c) = Cosigned By    Initials Name Provider Type    Millicent Quezada MS CCC-SLP Speech and Language Pathologist               SLP Outcome Measures (last 72 hours)      SLP Outcome Measures     Row Name 03/11/19 1100             SLP Outcome Measures    Outcome Measure Used?  Adult NOMS  -SA         Adult FCM Scores    FCM Chosen  Swallowing  -SA      Swallowing FCM Score  3  -SA        User Key  (r) = Recorded By, (t) = Taken By, (c) = Cosigned By    Initials Name Effective Dates     Millicent Palm MS CCC-SLP 03/07/18 -                Time Calculation:   SLP Start Time: 0930  SLP Stop Time: 1045  SLP Time Calculation (min): 75 min    Therapy Charges for Today     Code Description Service Date Service Provider Modifiers Qty    38022674241 HC ST MOTION FLUORO EVAL SWALLOW 5 3/11/2019 Millicent Palm MS CCC-SLP GN 1                   Millicnet Palm MS CCC-SLP  3/11/2019

## 2019-03-11 NOTE — TELEPHONE ENCOUNTER
Spoke with pt's daughter, Maria R. Mar is two to three weeks post-stroke. Mar is experiencing swelling her ankles that may be cause of the current blood pressure medications that she is taking?     Mar and Maria R spoke to the doctor on call over the weekend, . Maria R states that  changed all of Mar's medications and told Mar that she needed to make an appointment with him, as soon as possible?     I informed Maria R that  is on vacation. It appears that Vladimir authorized/changed pt's medications today?    Maria R made Mar an appointment to see Vladimir Wednesday morning.

## 2019-03-11 NOTE — TELEPHONE ENCOUNTER
Jason,    Will you contact the pharmacy to see if medications were called in? No information showing in the system for new medications.

## 2019-03-14 ENCOUNTER — OFFICE VISIT (OUTPATIENT)
Dept: FAMILY MEDICINE CLINIC | Facility: CLINIC | Age: 79
End: 2019-03-14

## 2019-03-14 VITALS
DIASTOLIC BLOOD PRESSURE: 62 MMHG | SYSTOLIC BLOOD PRESSURE: 136 MMHG | WEIGHT: 127.8 LBS | OXYGEN SATURATION: 99 % | HEART RATE: 58 BPM | BODY MASS INDEX: 21.29 KG/M2 | HEIGHT: 65 IN | TEMPERATURE: 97.8 F

## 2019-03-14 DIAGNOSIS — R22.32 LOCALIZED SWELLING ON LEFT HAND: ICD-10-CM

## 2019-03-14 DIAGNOSIS — M25.471 RIGHT ANKLE SWELLING: Primary | ICD-10-CM

## 2019-03-14 DIAGNOSIS — I63.9 CEREBROVASCULAR ACCIDENT (CVA), UNSPECIFIED MECHANISM (HCC): ICD-10-CM

## 2019-03-14 PROCEDURE — 99214 OFFICE O/P EST MOD 30 MIN: CPT | Performed by: NURSE PRACTITIONER

## 2019-03-14 NOTE — PROGRESS NOTES
"Subjective   Mar Burgos is a 78 y.o. female.     History of Present Illness   Patient presenting to the office today after following up from having a stroke.  This is a second time I have seen her after she has been discharged from the hospital.  She is no longer seeing physical therapy they just discharged her.  She is still seeing speech therapy her recent swallow study showed an improvement in her goal is to get back to work by May 1.  She is here with some concerns over mild swelling of the left hand the left side is what is been affected by the stroke and it is weak.  Also she has had some mild swelling of the right ankle.  She has no cough or shortness of breath the ankle swelling is always better in the morning.  The left ankle is not affected at all.  The following portions of the patient's history were reviewed and updated as appropriate: allergies, current medications, past social history and problem list.    Review of Systems   All other systems reviewed and are negative.      Objective   /62 (BP Location: Right arm, Patient Position: Sitting)   Pulse 58   Temp 97.8 °F (36.6 °C)   Ht 165.1 cm (65\")   Wt 58 kg (127 lb 12.8 oz)   SpO2 99%   BMI 21.27 kg/m²   Physical Exam   Constitutional: She is oriented to person, place, and time. Vital signs are normal. She appears well-developed and well-nourished. No distress.   HENT:   Head: Normocephalic.   Cardiovascular: Normal rate, regular rhythm and normal heart sounds.   Pulmonary/Chest: Effort normal and breath sounds normal.   Neurological: She is alert and oriented to person, place, and time. Gait normal.   Psychiatric: She has a normal mood and affect. Her behavior is normal. Judgment and thought content normal.   Vitals reviewed.      Assessment/Plan      Diagnosis Plan   1. Right ankle swelling     2. Localized swelling on left hand     3. Cerebrovascular accident (CVA), unspecified mechanism (CMS/HCC)       Discussed angle at length " and I think that she is probably compensating for the left-sided weakness that she is using her right ankle more and putting more weight on it therefore is reacting with slight swelling.  We will keep an eye on at this time if it worsens she is to return to the office.  We also discussed the fact that her right hand may swell off and on due to the stroke and also because she has had a mastectomy on that side it is not very swollen in the office today Zaroxolyn keep an eye on that as well.    RTO in 3 sridhar Hernandez, APRN  3/14/2019

## 2019-03-31 DIAGNOSIS — IMO0002 UNCONTROLLED TYPE 2 DIABETES MELLITUS WITH COMPLICATION, WITH LONG-TERM CURRENT USE OF INSULIN: ICD-10-CM

## 2019-04-02 ENCOUNTER — TELEPHONE (OUTPATIENT)
Dept: FAMILY MEDICINE CLINIC | Facility: CLINIC | Age: 79
End: 2019-04-02

## 2019-04-02 DIAGNOSIS — R13.10 DYSPHAGIA, UNSPECIFIED TYPE: Primary | ICD-10-CM

## 2019-04-02 NOTE — TELEPHONE ENCOUNTER
[facility] called and said pt's daughter needing another swallow procedure again, if you approve this they need it to go to [facility], preferable doing it Thursday or Friday. Please advise?

## 2019-04-03 NOTE — TELEPHONE ENCOUNTER
Made appt for pt to go to [facility], first available is 4/17. I spoke with pt and she states that is too long of a wait. I sent order to [facility] and informed pt they will contact her to schedule. Appt at [facility] cancelled.

## 2019-04-08 ENCOUNTER — HOSPITAL ENCOUNTER (OUTPATIENT)
Dept: GENERAL RADIOLOGY | Facility: HOSPITAL | Age: 79
Discharge: HOME | End: 2019-04-08
Admitting: NURSE PRACTITIONER

## 2019-04-08 DIAGNOSIS — R13.12 OROPHARYNGEAL DYSPHAGIA: Primary | ICD-10-CM

## 2019-04-08 DIAGNOSIS — R13.10 DYSPHAGIA, UNSPECIFIED TYPE: ICD-10-CM

## 2019-04-08 PROCEDURE — 63710000001 BARIUM SULFATE 40 % SUSPENSION: Performed by: NURSE PRACTITIONER

## 2019-04-08 PROCEDURE — 74230 X-RAY XM SWLNG FUNCJ C+: CPT

## 2019-04-08 PROCEDURE — A9270 NON-COVERED ITEM OR SERVICE: HCPCS | Performed by: NURSE PRACTITIONER

## 2019-04-08 PROCEDURE — 63710000001 BARIUM SULFATE 98 % RECONSTITUTED SUSPENSION: Performed by: NURSE PRACTITIONER

## 2019-04-08 PROCEDURE — 92611 MOTION FLUOROSCOPY/SWALLOW: CPT

## 2019-04-08 PROCEDURE — 63710000001 BARIUM SULFATE 96 % RECONSTITUTED SUSPENSION: Performed by: NURSE PRACTITIONER

## 2019-04-08 RX ORDER — CLONIDINE HYDROCHLORIDE 0.2 MG/1
TABLET ORAL
Qty: 180 TABLET | Refills: 0 | Status: SHIPPED | OUTPATIENT
Start: 2019-04-08 | End: 2019-07-11 | Stop reason: SDUPTHER

## 2019-04-08 RX ORDER — CLONIDINE HYDROCHLORIDE 0.2 MG/1
TABLET ORAL
Qty: 60 TABLET | Refills: 0 | Status: SHIPPED | OUTPATIENT
Start: 2019-04-08 | End: 2019-04-08 | Stop reason: SDUPTHER

## 2019-04-08 RX ADMIN — BARIUM SULFATE 50 ML: 400 SUSPENSION ORAL at 13:49

## 2019-04-08 RX ADMIN — BARIUM SULFATE 65 ML: 960 POWDER, FOR SUSPENSION ORAL at 13:49

## 2019-04-08 RX ADMIN — BARIUM SULFATE 4 ML: 980 POWDER, FOR SUSPENSION ORAL at 13:49

## 2019-04-10 NOTE — MBS/VFSS/FEES
Outpatient Speech Language Pathology   Adult Swallow Initial Evaluation  [facility]     Patient Name: Mar Burgos  : 1940  MRN: [id number]  Today's Date: 2019         Visit Date: 2019   Patient Active Problem List   Diagnosis   • Anemia   • Uncontrolled type 2 diabetes mellitus (CMS/HCC)   • Diabetic neuropathy (CMS/HCC)   • Gastroesophageal reflux disease   • Gastritis   • Hearing loss   • Hyperlipidemia   • Hypertension   • Thoracic back pain   • Peripheral arterial occlusive disease (CMS/HCC)   • Personal history of breast cancer   • Personal history of other malignant neoplasm of skin   • History of tobacco use   • Seasonal allergic rhinitis   • Type 2 diabetes mellitus, without long-term current use of insulin (CMS/HCC)   • Cobalamin deficiency   • Osteoporosis screening   • Breast cancer screening   • Sinusitis   • Cerebrovascular accident (CVA) (CMS/HCC)   • Left-sided weakness   • Dysphagia due to recent cerebral infarction   • Smoker   • Right ankle swelling   • Localized swelling on left hand        Past Medical History:   Diagnosis Date   • Anemia     required prior blood transfusions   • Arthritis    • Cancer (CMS/HCC)     breast cancer    • Clostridium difficile colitis    • Diabetes mellitus (CMS/HCC)    • Diverticulitis    • History of malignant neoplasm of breast 2013    denies any chemo or radiation   • History of malignant neoplasm of skin 2013    possibly basal   • History of tobacco use 2013   • Hyperlipidemia    • Hypertension         Past Surgical History:   Procedure Laterality Date   • CATARACT EXTRACTION, BILATERAL     • EAR TUBES     • MASTECTOMY Bilateral     radical   • TUMOR REMOVAL Left     large lipoma removed from hip         Visit Dx:     ICD-10-CM ICD-9-CM   1. Oropharyngeal dysphagia R13.12 787.22   2. Dysphagia, unspecified type R13.10 787.20           SLP Adult Swallow Evaluation - 19 1430        Rehab Evaluation    Document Type   evaluation   -AW    Subjective Information  no complaints   -AW    Patient Observations  alert;cooperative;agree to therapy   -AW    Patient/Family Observations  Pt accompanied by her daughter who was present for results and education.   -AW    Patient Effort  good   -AW    Symptoms Noted During/After Treatment  none   -AW       General Information    Patient Profile Reviewed  yes   -AW    Pertinent History Of Current Problem  Pt s/p CVA (R basal ganglia) in 2/1019 and has been undergoing home health Speech Therapy. She reported she does her exercises multiple times a day. Pt's last VFSS was 3/11/19 and recommended [facility] soft no mixed and HTL. Pt continues this diet at home along with the free water protocol.   -AW    Current Method of Nutrition  mechanical soft, no mixed consistencies;honey-thick liquids   -AW    Precautions/Limitations, Hearing  WFL;for purposes of eval   -AW    Prior Level of Function-Communication  WFL   -AW    Prior Level of Function-Swallowing  no diet consistency restrictions;other (see comments) prior to CVA   -AW    Plans/Goals Discussed with  patient;family;agreed upon   -AW    Barriers to Rehab  none identified   -AW    Patient's Goals for Discharge  return to regular diet   -AW    Family Goals for Discharge  patient able to return to regular diet   -AW       Pain Assessment    Additional Documentation  Pain Scale: Numbers Pre/Post-Treatment (Group)   -AW       Pain Scale: Numbers Pre/Post-Treatment    Pain Scale: Numbers, Pretreatment  0/10 - no pain   -AW    Pain Scale: Numbers, Post-Treatment  0/10 - no pain   -AW       Oral Motor and Function    Dentition Assessment  natural, present and adequate   -AW    Secretion Management  WNL/WFL   -AW    Mucosal Quality  moist, healthy   -AW       Oral Musculature and Cranial Nerve Assessment    Oral Labial or Buccal Impairment, Detail, Cranial Nerve VII (Facial):  left labial droop   -AW       General Eating/Swallowing Observations     Respiratory Support Currently in Use  room air   -AW    Eating/Swallowing Skills  fed by SLP;self-fed   -AW    Positioning During Eating  upright in chair   -AW       MBS/VFSS    Utensils Used  spoon;cup   -AW    Consistencies Trialed  regular textures;soft textures;thin liquids;nectar/syrup-thick liquids;honey-thick liquids mixed   -AW       MBS/VFSS Interpretation    Oral Prep Phase  WFL   -AW    Oral Transit Phase  impaired   -AW    Oral Residue  WFL   -AW    VFSS Summary  Pt presented with moderate oropharyngeal dysphagia characterized by mistiming, decreased tongue base and pharyngeal strength. Pt had silent penetration with HTL via chin tuck which she has been using at times. HTL tolerated via cup with chin in neutral position. Pt had silent penetration and trace aspiration during the swallow with NTL via cup. Thin liquids spilled to the pyriforms with deep silent penetration and aspiration before and during the swallow. Mastication was functional for soft and regular solids. With juicy/mixed solids, pt had premature spillage to the pyriforms with silent penetration/aspiration noted before and during the swallow. Bolus propulsion was difficulty with dry solids with moderate pharyngeal residuals noted needing a HTL wash to clear. Throughout study, pharyngeal residuals were mild and were assisted in clearing with a spontaneous or cued swallow. Long discussion with pt and daughter re: therapy after the study. Recommend pt continue on same diet of mech soft no mixed and HTL. Written info provided re: safe swallow strategies. Both demonstrated understanding. Discussed outpatient ST when pt is discharged from home health and trying NMES while continuing current strengthening exercises.   -AW       Initiation of Pharyngeal Swallow    Pharyngeal Phase  impaired pharyngeal phase of swallowing   -AW       SLP Communication to Radiology    Summary Statement  Pt presented with moderate oropharyngeal dysphagia  characterized by mistiming, decreased tongue base and pharyngeal strength. Pt had silent penetration with HTL via chin tuck which she has been using at times. HTL tolerated via cup with chin in neutral position. Pt had silent penetration and trace aspiration during the swallow with NTL via cup. Thin liquids spilled to the pyriforms with deep silent penetration and aspiration before and during the swallow. Mastication was functional for soft and regular solids. With juicy/mixed solids, pt had premature spillage to the pyriforms with silent penetration/aspiration noted before and during the swallow. Bolus propulsion was difficulty with dry solids with moderate pharyngeal residuals noted needing a HTL wash to clear. Throughout study, pharyngeal residuals were mild and were assisted in clearing with a spontaneous or cued swallow.    -AW       Clinical Impression    SLP Swallowing Diagnosis  moderate;oral dysfunction;pharyngeal dysfunction   -AW    Functional Impact  risk of aspiration/pneumonia   -AW       Recommendations    SLP Diet Recommendation  mechanical soft with no mixed consistencies;chopped;honey thick liquids;water between meals after oral care, with supervision;ice chips between meals after oral care, with supervision   -AW    Recommended Diagnostics  other (see comments) repeat VFSS in 4-6 weeks   -AW    Recommended Precautions and Strategies  upright posture during/after eating;small bites of food and sips of liquid;no straw;multiple swallows per bite of food;multiple swallows per sip of liquid;volitional throat clear   -AW    SLP Rec. for Method of Medication Administration  meds whole;with thick liquids;with pudding or applesauce   -AW    Demonstrates Need for Referral to Another Service  other (see comments) outpatient ST   -AW      User Key  (r) = Recorded By, (t) = Taken By, (c) = Cosigned By    Initials Name Provider Type    Dottie Sosa MS CCC-SLP Speech and Language Pathologist                                         SLP Outcome Measures (last 72 hours)      SLP Outcome Measures     Row Name 04/08/19 1700             SLP Outcome Measures    Outcome Measure Used?  Adult NOMS  -AW         Adult FCM Scores    FCM Chosen  Swallowing  -AW      Swallowing FCM Score  3  -AW        User Key  (r) = Recorded By, (t) = Taken By, (c) = Cosigned By    Initials Name Effective Dates    Dottie Sosa MS CCC-SLP 06/08/18 -                Time Calculation:   SLP Start Time: 1300  SLP Stop Time: 1430  SLP Time Calculation (min): 90 min    Therapy Charges for Today     Code Description Service Date Service Provider Modifiers Qty    26472436878 HC ST MOTION FLUORO EVAL SWALLOW 6 4/8/2019 Dottie Barrera, MS CCC-SLP GN 1                   Dottie Barrera MS CCC-SLP  4/9/2019

## 2019-04-11 ENCOUNTER — APPOINTMENT (OUTPATIENT)
Dept: CT IMAGING | Facility: HOSPITAL | Age: 79
End: 2019-04-11

## 2019-04-11 ENCOUNTER — HOSPITAL ENCOUNTER (EMERGENCY)
Facility: HOSPITAL | Age: 79
Discharge: HOME | End: 2019-04-11
Attending: EMERGENCY MEDICINE | Admitting: EMERGENCY MEDICINE

## 2019-04-11 VITALS
RESPIRATION RATE: 18 BRPM | SYSTOLIC BLOOD PRESSURE: 170 MMHG | BODY MASS INDEX: 20.49 KG/M2 | OXYGEN SATURATION: 93 % | TEMPERATURE: 97.3 F | HEIGHT: 64 IN | WEIGHT: 120 LBS | DIASTOLIC BLOOD PRESSURE: 82 MMHG | HEART RATE: 71 BPM

## 2019-04-11 DIAGNOSIS — G44.209 ACUTE NON INTRACTABLE TENSION-TYPE HEADACHE: Primary | ICD-10-CM

## 2019-04-11 DIAGNOSIS — I10 UNCONTROLLED HYPERTENSION: ICD-10-CM

## 2019-04-11 LAB
ALBUMIN SERPL-MCNC: 4.1 G/DL (ref 3.5–5.2)
ALBUMIN/GLOB SERPL: 1.3 G/DL
ALP SERPL-CCNC: 82 U/L (ref 39–117)
ALT SERPL W P-5'-P-CCNC: 12 U/L (ref 1–33)
ANION GAP SERPL CALCULATED.3IONS-SCNC: 13.1 MMOL/L
AST SERPL-CCNC: 14 U/L (ref 1–32)
BASOPHILS # BLD AUTO: 0.05 10*3/MM3 (ref 0–0.2)
BASOPHILS NFR BLD AUTO: 0.7 % (ref 0–1.5)
BILIRUB SERPL-MCNC: 0.3 MG/DL (ref 0.2–1.2)
BUN BLD-MCNC: 27 MG/DL (ref 8–23)
BUN/CREAT SERPL: 26 (ref 7–25)
CALCIUM SPEC-SCNC: 9.4 MG/DL (ref 8.6–10.5)
CHLORIDE SERPL-SCNC: 103 MMOL/L (ref 98–107)
CO2 SERPL-SCNC: 23.9 MMOL/L (ref 22–29)
CREAT BLD-MCNC: 1.04 MG/DL (ref 0.57–1)
DEPRECATED RDW RBC AUTO: 43.8 FL (ref 37–54)
EOSINOPHIL # BLD AUTO: 0.14 10*3/MM3 (ref 0–0.4)
EOSINOPHIL NFR BLD AUTO: 1.9 % (ref 0.3–6.2)
ERYTHROCYTE [DISTWIDTH] IN BLOOD BY AUTOMATED COUNT: 13.3 % (ref 12.3–15.4)
GFR SERPL CREATININE-BSD FRML MDRD: 51 ML/MIN/1.73
GLOBULIN UR ELPH-MCNC: 3.2 GM/DL
GLUCOSE BLD-MCNC: 169 MG/DL (ref 65–99)
HCT VFR BLD AUTO: 34 % (ref 34–46.6)
HGB BLD-MCNC: 10.9 G/DL (ref 12–15.9)
IMM GRANULOCYTES # BLD AUTO: 0.03 10*3/MM3 (ref 0–0.05)
IMM GRANULOCYTES NFR BLD AUTO: 0.4 % (ref 0–0.5)
INR PPP: 1.05 (ref 0.9–1.1)
LYMPHOCYTES # BLD AUTO: 1.04 10*3/MM3 (ref 0.7–3.1)
LYMPHOCYTES NFR BLD AUTO: 14.1 % (ref 19.6–45.3)
MCH RBC QN AUTO: 28.8 PG (ref 26.6–33)
MCHC RBC AUTO-ENTMCNC: 32.1 G/DL (ref 31.5–35.7)
MCV RBC AUTO: 89.7 FL (ref 79–97)
MONOCYTES # BLD AUTO: 0.52 10*3/MM3 (ref 0.1–0.9)
MONOCYTES NFR BLD AUTO: 7.1 % (ref 5–12)
NEUTROPHILS # BLD AUTO: 5.58 10*3/MM3 (ref 1.4–7)
NEUTROPHILS NFR BLD AUTO: 75.8 % (ref 42.7–76)
NRBC BLD AUTO-RTO: 0 /100 WBC (ref 0–0)
PLATELET # BLD AUTO: 235 10*3/MM3 (ref 140–450)
PMV BLD AUTO: 9.6 FL (ref 6–12)
POTASSIUM BLD-SCNC: 4.4 MMOL/L (ref 3.5–5.2)
PROT SERPL-MCNC: 7.3 G/DL (ref 6–8.5)
PROTHROMBIN TIME: 13.4 SECONDS (ref 11.7–14.2)
RBC # BLD AUTO: 3.79 10*6/MM3 (ref 3.77–5.28)
SODIUM BLD-SCNC: 140 MMOL/L (ref 136–145)
WBC NRBC COR # BLD: 7.36 10*3/MM3 (ref 3.4–10.8)

## 2019-04-11 PROCEDURE — 93005 ELECTROCARDIOGRAM TRACING: CPT | Performed by: EMERGENCY MEDICINE

## 2019-04-11 PROCEDURE — 70450 CT HEAD/BRAIN W/O DYE: CPT

## 2019-04-11 PROCEDURE — 96374 THER/PROPH/DIAG INJ IV PUSH: CPT

## 2019-04-11 PROCEDURE — 85610 PROTHROMBIN TIME: CPT | Performed by: EMERGENCY MEDICINE

## 2019-04-11 PROCEDURE — 93010 ELECTROCARDIOGRAM REPORT: CPT | Performed by: INTERNAL MEDICINE

## 2019-04-11 PROCEDURE — 99284 EMERGENCY DEPT VISIT MOD MDM: CPT

## 2019-04-11 PROCEDURE — 99283 EMERGENCY DEPT VISIT LOW MDM: CPT

## 2019-04-11 PROCEDURE — 80053 COMPREHEN METABOLIC PANEL: CPT | Performed by: EMERGENCY MEDICINE

## 2019-04-11 PROCEDURE — 85025 COMPLETE CBC W/AUTO DIFF WBC: CPT | Performed by: EMERGENCY MEDICINE

## 2019-04-11 RX ORDER — LABETALOL HYDROCHLORIDE 5 MG/ML
10 INJECTION, SOLUTION INTRAVENOUS ONCE
Status: COMPLETED | OUTPATIENT
Start: 2019-04-11 | End: 2019-04-11

## 2019-04-11 RX ORDER — SODIUM CHLORIDE 0.9 % (FLUSH) 0.9 %
10 SYRINGE (ML) INJECTION AS NEEDED
Status: DISCONTINUED | OUTPATIENT
Start: 2019-04-11 | End: 2019-04-11 | Stop reason: HOSPADM

## 2019-04-11 RX ADMIN — LABETALOL 20 MG/4 ML (5 MG/ML) INTRAVENOUS SYRINGE 10 MG: at 08:38

## 2019-04-11 NOTE — ED TRIAGE NOTES
Patient woke up with headache at 0630 this morning and /91. Patient notes feels like there is a tight band around her head. Patient had stroke in February, on Plavix

## 2019-04-11 NOTE — ED PROVIDER NOTES
" EMERGENCY DEPARTMENT ENCOUNTER    CHIEF COMPLAINT  Chief Complaint: hypertension  History given by: patient  History limited by: none  Room Number: 02/02  PMD: Vladimir Hernandez APRN      HPI:  Pt is a 78 y.o. female with hx of HTN who presents complaining of elevated BP (221/97) that started this morning. Pt also c/o HA that feels like she has a \"band around her head\". Pt had a stroke on 2/15/19 which left her with L-sided facial droop. Pt states that all her neurologic symptoms are at baseline currently and denies any new deficits. Pt reports that, in addition to her headache, pt felt nauseous this morning. Pt denies any aggravating factors of her headache including bright lights and loud noises. Pt denies blurry vision, CP, and SOA. Pt states she took her BP medication after checking her blood pressure this moring. Pt states that she did not miss any doses of her medications recently. Pt admits to hx of smoking, but states that she quit once she had her stroke 2 months ago. Pt denies hx of drinking.     Duration: since this morning  Onset: gradual  Timing: constant  Location: n/a  Radiation: n/a  Quality: hypertension  Intensity/Severity: moderate  Progression: unchanged  Associated Symptoms: HA, baseline, unchanged L-sided facial droop, nausea this morning  Aggravating Factors: none  Alleviating Factors: none  Previous Episodes: Pt has hx of HTN.  Treatment before arrival: Pt states she took her BP medication after she  her BP this morning. Pt has not recently missed any doses of her medications.     PAST MEDICAL HISTORY  Active Ambulatory Problems     Diagnosis Date Noted   • Anemia 07/01/2013   • Uncontrolled type 2 diabetes mellitus (CMS/[facility]) 01/16/2017   • Diabetic neuropathy (CMS/[facility]) 01/16/2017   • Gastroesophageal reflux disease 01/16/2017   • Gastritis 01/16/2017   • Hearing loss 01/16/2017   • Hyperlipidemia 01/16/2017   • Hypertension 01/16/2017   • Thoracic back pain 01/16/2017   • Peripheral " arterial occlusive disease (CMS/[facility]) 01/16/2017   • Personal history of breast cancer 07/01/2013   • Personal history of other malignant neoplasm of skin 07/01/2013   • History of tobacco use 07/01/2013   • Seasonal allergic rhinitis 01/16/2017   • Type 2 diabetes mellitus, without long-term current use of insulin (CMS/[facility]) 07/01/2013   • Cobalamin deficiency 01/16/2017   • Osteoporosis screening 02/22/2017   • Breast cancer screening 02/22/2017   • Sinusitis 03/15/2018   • Cerebrovascular accident (CVA) (CMS/HCC) 02/15/2019   • Left-sided weakness 02/15/2019   • Dysphagia due to recent cerebral infarction 02/16/2019   • Smoker 02/22/2019   • Right ankle swelling 03/14/2019   • Localized swelling on left hand 03/14/2019     Resolved Ambulatory Problems     Diagnosis Date Noted   • Diverticulitis of colon 01/16/2017   • Vitamin B-complex deficiency 07/01/2013   • Pneumonia 01/16/2017     Past Medical History:   Diagnosis Date   • Anemia    • Arthritis    • Cancer (CMS/HCC)    • Clostridium difficile colitis    • Diabetes mellitus (CMS/HCC)    • Diverticulitis    • History of malignant neoplasm of breast 07/01/2013   • History of malignant neoplasm of skin 07/01/2013   • History of tobacco use 7/1/2013   • Hyperlipidemia    • Hypertension        PAST SURGICAL HISTORY  Past Surgical History:   Procedure Laterality Date   • CATARACT EXTRACTION, BILATERAL     • EAR TUBES     • MASTECTOMY Bilateral     radical   • TUMOR REMOVAL Left     large lipoma removed from hip       FAMILY HISTORY  Family History   Problem Relation Age of Onset   • Stroke Mother 76   • Diabetes Mother    • Hypertension Mother    • Cancer Father         metastatic unknown cause   • Heart attack Father 70   • Cancer Brother         bladder       SOCIAL HISTORY  Social History     Socioeconomic History   • Marital status:      Spouse name: Not on file   • Number of children: Not on file   • Years of education: Not on file   • Highest education  "level: Not on file   Tobacco Use   • Smoking status: Current Every Day Smoker     Packs/day: 0.25     Years: 10.00     Pack years: 2.50     Types: Cigarettes   • Smokeless tobacco: Former User   • Tobacco comment: she quit for 8 years and started back about a year ago   Substance and Sexual Activity   • Alcohol use: No   • Drug use: No       ALLERGIES  Lipitor [atorvastatin]    REVIEW OF SYSTEMS  Review of Systems   Constitutional: Negative for fever.   HENT: Negative for sore throat.    Eyes: Negative.  Negative for photophobia and visual disturbance (blurry vision).   Respiratory: Negative for cough and shortness of breath.    Cardiovascular: Negative for chest pain.   Gastrointestinal: Positive for nausea ( this morning). Negative for abdominal pain, diarrhea and vomiting.   Genitourinary: Negative for dysuria.   Musculoskeletal: Negative for neck pain.   Skin: Negative for rash.   Neurological: Positive for facial asymmetry ( baseline, unchanged, L-sided facial droop) and headaches ( \"band around her head\"). Negative for weakness and numbness.   Hematological: Negative.    Psychiatric/Behavioral: Negative.    All other systems reviewed and are negative.      PHYSICAL EXAM  ED Triage Vitals [04/11/19 0804]   Temp Heart Rate Resp BP SpO2   97.3 °F (36.3 °C) 77 18 (!) 200/88 96 %      Temp src Heart Rate Source Patient Position BP Location FiO2 (%)   Tympanic -- -- -- --         Physical Exam   Constitutional: She is oriented to person, place, and time. No distress.   HENT:   Head: Normocephalic and atraumatic.   Eyes: EOM are normal. Pupils are equal, round, and reactive to light.   Neck: Normal range of motion. Neck supple.   Cardiovascular: Normal rate, regular rhythm and normal heart sounds.   No murmur heard.  Pulmonary/Chest: Effort normal and breath sounds normal. No respiratory distress.   CTAB   Abdominal: Soft. Bowel sounds are normal. She exhibits no distension. There is no tenderness. There is no " rebound and no guarding.   Musculoskeletal: Normal range of motion. She exhibits no edema.   Neurological: She is alert and oriented to person, place, and time. She has normal sensation and normal strength. She displays facial asymmetry ( on the L).   No weakness and sensation is intact. No expressive aphasia or dysarthria.    Skin: Skin is warm and dry. No rash noted.   Psychiatric: Mood and affect normal.   Nursing note and vitals reviewed.      LAB RESULTS  Lab Results (last 24 hours)     Procedure Component Value Units Date/Time    CBC & Differential [[id number]] Collected:  04/11/19 0838    Specimen:  Blood Updated:  04/11/19 0930    Narrative:       The following orders were created for panel order CBC & Differential.  Procedure                               Abnormality         Status                     ---------                               -----------         ------                     CBC Auto Differential[[id number]]        Abnormal            Final result                 Please view results for these tests on the individual orders.    Comprehensive Metabolic Panel [[id number]]  (Abnormal) Collected:  04/11/19 0838    Specimen:  Blood Updated:  04/11/19 0909     Glucose 169 mg/dL      BUN 27 mg/dL      Creatinine 1.04 mg/dL      Sodium 140 mmol/L      Potassium 4.4 mmol/L      Chloride 103 mmol/L      CO2 23.9 mmol/L      Calcium 9.4 mg/dL      Total Protein 7.3 g/dL      Albumin 4.10 g/dL      ALT (SGPT) 12 U/L      AST (SGOT) 14 U/L      Alkaline Phosphatase 82 U/L      Total Bilirubin 0.3 mg/dL      eGFR Non African Amer 51 mL/min/1.73      Globulin 3.2 gm/dL      A/G Ratio 1.3 g/dL      BUN/Creatinine Ratio 26.0     Anion Gap 13.1 mmol/L     Narrative:       GFR Normal >60  Chronic Kidney Disease <60  Kidney Failure <15    CBC Auto Differential [[id number]]  (Abnormal) Collected:  04/11/19 0838    Specimen:  Blood Updated:  04/11/19 0930     WBC 7.36 10*3/mm3      RBC 3.79 10*6/mm3      Hemoglobin 10.9  g/dL      Hematocrit 34.0 %      MCV 89.7 fL      MCH 28.8 pg      MCHC 32.1 g/dL      RDW 13.3 %      RDW-SD 43.8 fl      MPV 9.6 fL      Platelets 235 10*3/mm3      Neutrophil % 75.8 %      Lymphocyte % 14.1 %      Monocyte % 7.1 %      Eosinophil % 1.9 %      Basophil % 0.7 %      Immature Grans % 0.4 %      Neutrophils, Absolute 5.58 10*3/mm3      Lymphocytes, Absolute 1.04 10*3/mm3      Monocytes, Absolute 0.52 10*3/mm3      Eosinophils, Absolute 0.14 10*3/mm3      Basophils, Absolute 0.05 10*3/mm3      Immature Grans, Absolute 0.03 10*3/mm3      nRBC 0.0 /100 WBC     Protime-INR [[id number]]  (Normal) Collected:  04/11/19 0904    Specimen:  Blood Updated:  04/11/19 0921     Protime 13.4 Seconds      INR 1.05          I ordered the above labs and reviewed the results    RADIOLOGY  CT Head Without Contrast    (Results Pending)      CT Head - negative acute  I ordered the above noted radiological studies. Interpreted by radiologist. Discussed with radiologist Dr. Matthew. Reviewed by me in PACS.       PROCEDURES  Procedures  EKG          EKG time: 0836  Rhythm/Rate: sinus rhythm, rate 61  P waves and IN: nl  QRS, axis: nl   ST and T waves: T waves nl    Interpreted Contemporaneously by me, independently viewed. There is no significant change compared to prior 2/15/19.      PROGRESS AND CONSULTS     0829 Labetalol ordered for tx of pt's HTN. CT Head, EKG, and Labs ordered for further evaluation.     0926 Rechecked pt. Pt is resting comfortably and states that she is feeling better. Pt's BP is 157/93. Notified pt that her CT Head was negative for anything acute. Discussed the plan to wait for the results of her lab tests. Pt understands and agrees with the plan, all questions answered.    0956 Rechecked pt. Pt is resting comfortably. Notified pt of that her lab tests were negative. Discussed the plan to discharge the pt home. I instructed the pt to follow up with her PCP and with Patient Liaison [place]. Pt  understands and agrees with the plan, all questions answered.    MEDICAL DECISION MAKING  Results were reviewed/discussed with the patient and they were also made aware of online access. Pt also made aware that some labs, such as cultures, will not be resulted during ER visit and follow up with PMD is necessary.     MDM  Number of Diagnoses or Management Options     Amount and/or Complexity of Data Reviewed  Clinical lab tests: ordered and reviewed (HGB - 10.9)  Tests in the radiology section of CPT®: reviewed and ordered (CT Head - negative acute)  Tests in the medicine section of CPT®: reviewed and ordered (EKG - see procedure note)  Discussion of test results with the performing providers: yes (Dr. Matthew (Radiologist))  Decide to obtain previous medical records or to obtain history from someone other than the patient: yes  Obtain history from someone other than the patient: yes (Pt's family)  Review and summarize past medical records: yes (Pt had a R MCA stroke on 2/15/19. Pt was started on ASA and plavix. )  Independent visualization of images, tracings, or specimens: yes           DIAGNOSIS  Final diagnoses:   Acute non intractable tension-type headache   Uncontrolled hypertension       DISPOSITION  DISCHARGE    Patient discharged in stable condition.    Reviewed implications of results, diagnosis, meds, responsibility to follow up, warning signs and symptoms of possible worsening, potential complications and reasons to return to ER.    Patient/Family voiced understanding of above instructions.    Discussed plan for discharge, as there is no emergent indication for admission. Patient referred to primary care provider for BP management due to today's BP. Pt/family is agreeable and understands need for follow up and repeat testing.  Pt is aware that discharge does not mean that nothing is wrong but it indicates no emergency is present that requires admission and they must continue care with follow-up as given  below or physician of their choice.     FOLLOW-UP  Vladimir Hernandez, APRN  [address] [facility] [id number] [phone]    Schedule an appointment as soon as possible for a visit       PATIENT LIAISON [facility] [id number] [phone]  Schedule an appointment as soon as possible for a visit            Medication List      No changes were made to your prescriptions during this visit.           Latest Documented Vital Signs:  As of 9:57 AM  BP- 157/93 HR- 70 Temp- 97.3 °F (36.3 °C) (Tympanic) O2 sat- 94%    --  Documentation assistance provided by mayda Kerr for Dr. Patel.  Information recorded by the scribe was done at my direction and has been verified and validated by me.     Eric Kerr  04/11/19 2861       Nadir Patel MD  04/11/19 5713

## 2019-04-11 NOTE — DISCHARGE INSTRUCTIONS
Continue home medications and follow up with your doctor.  Please return to the ED if symptoms worsen with new weakness or numbness, or worsening headache.

## 2019-04-11 NOTE — ED NOTES
Pt states she started having a throbbing headache this morning all over her head. Pt denies blurry vision or vomiting. Pt states she has intermittent nausea. Pt states her blood pressure was high this morning after taking her blood pressure medicine. Pt had stroke in Feb of this year. Pt in Josie Qiu, RN  04/11/19 7785

## 2019-04-12 RX ORDER — PRAVASTATIN SODIUM 20 MG
20 TABLET ORAL NIGHTLY
Qty: 30 TABLET | Refills: 11 | Status: SHIPPED | OUTPATIENT
Start: 2019-04-12 | End: 2019-09-26

## 2019-04-15 RX ORDER — METOPROLOL SUCCINATE 50 MG/1
50 TABLET, EXTENDED RELEASE ORAL
Qty: 30 TABLET | Refills: 0 | Status: SHIPPED | OUTPATIENT
Start: 2019-04-15 | End: 2019-04-15 | Stop reason: SDUPTHER

## 2019-04-15 RX ORDER — CLOPIDOGREL BISULFATE 75 MG/1
75 TABLET ORAL DAILY
Qty: 30 TABLET | Refills: 0 | Status: SHIPPED | OUTPATIENT
Start: 2019-04-15 | End: 2019-04-15 | Stop reason: SDUPTHER

## 2019-04-15 RX ORDER — LISINOPRIL 10 MG/1
10 TABLET ORAL
Qty: 30 TABLET | Refills: 0 | Status: SHIPPED | OUTPATIENT
Start: 2019-04-15 | End: 2019-04-15 | Stop reason: SDUPTHER

## 2019-04-15 RX ORDER — CLOPIDOGREL BISULFATE 75 MG/1
75 TABLET ORAL DAILY
Qty: 90 TABLET | Refills: 0 | Status: SHIPPED | OUTPATIENT
Start: 2019-04-15 | End: 2019-05-21

## 2019-04-15 RX ORDER — LISINOPRIL 10 MG/1
10 TABLET ORAL
Qty: 90 TABLET | Refills: 0 | Status: SHIPPED | OUTPATIENT
Start: 2019-04-15 | End: 2019-05-22

## 2019-04-15 RX ORDER — METOPROLOL SUCCINATE 50 MG/1
50 TABLET, EXTENDED RELEASE ORAL
Qty: 90 TABLET | Refills: 0 | Status: SHIPPED | OUTPATIENT
Start: 2019-04-15 | End: 2019-05-22

## 2019-04-18 ENCOUNTER — TRANSCRIBE ORDERS (OUTPATIENT)
Dept: FAMILY MEDICINE CLINIC | Facility: CLINIC | Age: 79
End: 2019-04-18

## 2019-04-18 DIAGNOSIS — R13.10 DYSPHAGIA, UNSPECIFIED TYPE: Primary | ICD-10-CM

## 2019-04-22 ENCOUNTER — HOSPITAL ENCOUNTER (OUTPATIENT)
Dept: SPEECH THERAPY | Facility: HOSPITAL | Age: 79
Setting detail: THERAPIES SERIES
Discharge: HOME | End: 2019-04-22

## 2019-04-22 DIAGNOSIS — I63.9 CEREBROVASCULAR ACCIDENT (CVA), UNSPECIFIED MECHANISM (HCC): ICD-10-CM

## 2019-04-22 DIAGNOSIS — I69.391 DYSPHAGIA DUE TO RECENT CEREBRAL INFARCTION: Primary | ICD-10-CM

## 2019-04-22 PROCEDURE — 92610 EVALUATE SWALLOWING FUNCTION: CPT

## 2019-04-22 NOTE — THERAPY EVALUATION
Outpatient Speech Language Pathology   Adult Swallow Initial Evaluation  [facility]     Patient Name: Mar Burgos  : 1940  MRN: [id number]  Today's Date: 2019         Visit Date: 2019   Patient Active Problem List   Diagnosis   • Anemia   • Uncontrolled type 2 diabetes mellitus (CMS/HCC)   • Diabetic neuropathy (CMS/HCC)   • Gastroesophageal reflux disease   • Gastritis   • Hearing loss   • Hyperlipidemia   • Hypertension   • Thoracic back pain   • Peripheral arterial occlusive disease (CMS/HCC)   • Personal history of breast cancer   • Personal history of other malignant neoplasm of skin   • History of tobacco use   • Seasonal allergic rhinitis   • Type 2 diabetes mellitus, without long-term current use of insulin (CMS/HCC)   • Cobalamin deficiency   • Osteoporosis screening   • Breast cancer screening   • Sinusitis   • Cerebrovascular accident (CVA) (CMS/HCC)   • Left-sided weakness   • Dysphagia due to recent cerebral infarction   • Smoker   • Right ankle swelling   • Localized swelling on left hand        Past Medical History:   Diagnosis Date   • Anemia     required prior blood transfusions   • Arthritis    • Cancer (CMS/HCC)     breast cancer    • Clostridium difficile colitis    • Diabetes mellitus (CMS/HCC)    • Diverticulitis    • History of malignant neoplasm of breast 2013    denies any chemo or radiation   • History of malignant neoplasm of skin 2013    possibly basal   • History of tobacco use 2013   • Hyperlipidemia    • Hypertension         Past Surgical History:   Procedure Laterality Date   • CATARACT EXTRACTION, BILATERAL     • EAR TUBES     • MASTECTOMY Bilateral     radical   • TUMOR REMOVAL Left     large lipoma removed from hip         Visit Dx:     ICD-10-CM ICD-9-CM   1. Dysphagia due to recent cerebral infarction I69.391 438.82           SLP Adult Swallow Evaluation - 19 1500        Rehab Evaluation    Document Type  evaluation  (Pended)     -BL    Subjective Information  no complaints  (Pended)    -BL    Patient Observations  alert;cooperative;agree to therapy  (Pended)    -BL    Patient/Family Observations  Pt accompanied to session by her daughter.   (Pended)    -BL    Patient Effort  good  (Pended)    -BL    Symptoms Noted During/After Treatment  none  (Pended)    -BL       General Information    Patient Profile Reviewed  yes  (Pended)    -BL    Pertinent History Of Current Problem  Pt presents to SLP following CVA (R basal ganglia) in 2/2019 and has recieved home health speech therapy. She reported she does her exercises 2 times per day and sometimes wakes up at night and completes more. Pt's last VFSS was 4/8/2019, recommended mechanical soft with no mixed consistencies and honey thick liquids. SLP also recommended water protocol.   (Pended)    -BL    Current Method of Nutrition  mechanical soft, no mixed consistencies;honey-thick liquids  (Pended)    -BL    Precautions/Limitations, Hearing  WFL  (Pended)    -BL    Prior Level of Function-Communication  WFL  (Pended)    -BL    Prior Level of Function-Swallowing  no diet consistency restrictions  (Pended)  prior to CVA   -    Plans/Goals Discussed with  patient;family;agreed upon  (Pended)    -    Barriers to Rehab  none identified  (Pended)    -BL    Patient's Goals for Discharge  return to regular diet  (Pended)    -    Family Goals for Discharge  family did not state  (Pended)    -       Pain Assessment    Additional Documentation  Pain Scale: FACES Pre/Post-Treatment (Group)  (Pended)    -BL       Pain Scale: Numbers Pre/Post-Treatment    Pain Scale: Numbers, Pretreatment  0/10 - no pain  (Pended)    -BL    Pain Scale: Numbers, Post-Treatment  0/10 - no pain  (Pended)    -BL       Oral Motor and Function    Dentition Assessment  upper dentures/partial in place;lower dentures/partial in place  (Pended)    -BL    Secretion Management  WNL/WFL  (Pended)  Pt reports she has left anterior  loss   -BL    Mucosal Quality  moist, healthy  (Pended)    -BL       Oral Musculature and Cranial Nerve Assessment    Oral Motor General Assessment  generalized oral motor weakness  (Pended)    -BL    Oral Labial or Buccal Impairment, Detail, Cranial Nerve VII (Facial):  left labial droop  (Pended)    -BL    Lingual Impairment, Detail. Cranial Nerves IX, XII (Glossopharyngeal and Hypoglossal)  reduced lingual ROM  (Pended)    -       General Eating/Swallowing Observations    Respiratory Support Currently in Use  room air  (Pended)    -    Eating/Swallowing Skills  self-fed  (Pended)    -BL    Positioning During Eating  upright in chair  (Pended)    -BL    Utensils Used  cup  (Pended)    -    Consistencies Trialed  thin liquids;honey-thick liquids  (Pended)    -       Clinical Swallow Eval    Clinical Swallow Evaluation Summary  No overt s/s pen/asp with HTL. SLP initiated thin liquid and water trials during NMES, with 3 second prep, pt demonstrated x2 throat clear during trials. SLP educated pt and daughter on safe swallow precautions, VFSS results from 4/8 including showing video, and use of NMES device.Recommend NMES in conjunction with swallow exercises. SLP also provided information for pt and daughter if they wish to purchase device their self.   (Pended)    -       Clinical Impression    SLP Swallowing Diagnosis  moderate;oral dysfunction;pharyngeal dysfunction  (Pended)    -    Functional Impact  risk of aspiration/pneumonia  (Pended)    -    Rehab Potential/Prognosis, Swallowing  good, to achieve stated therapy goals  (Pended)    -    Swallow Criteria for Skilled Therapeutic Interventions Met  demonstrates skilled criteria  (Pended)    -       Recommendations    Therapy Frequency (Swallow)  2 days per week  (Pended)    -    Predicted Duration Therapy Intervention (Days)  until discharge  (Pended)    -    SLP Diet Recommendation  mechanical soft with no mixed consistencies;honey thick  liquids;water between meals after oral care, with supervision;ice chips between meals after oral care, with supervision  (Pended)    -    Recommended Diagnostics  [place] [place]  (Pended)  4-6 weeks   -    Recommended Precautions and Strategies  upright posture during/after eating;small bites of food and sips of liquid;no straw;multiple swallows per bite of food;multiple swallows per sip of liquid;volitional throat clear  (Pended)    -BL    SLP Rec. for Method of Medication Administration  meds whole;with thick liquids;with pudding or applesauce  (Pended)    -      User Key  (r) = Recorded By, (t) = Taken By, (c) = Cosigned By    Initials Name Provider Type    BL Shirin Ricardo Speech Therapy Student Speech and Language Pathologist                        OP SLP Education     Row Name 04/22/19 1530       Education    Barriers to Learning  No barriers identified  (Pended)   -    Education Provided  Described results of evaluation;Patient expressed understanding of evaluation  (Pended)   -    Assessed  Learning needs;Learning motivation  (Pended)   -    Learning Motivation  Strong  (Pended)   -    Learning Method  Explanation;Demonstration  (Pended)   -    Teaching Response  Verbalized understanding;Demonstrated understanding  (Pended)   -      User Key  (r) = Recorded By, (t) = Taken By, (c) = Cosigned By    Initials Name Effective Dates    BL Shirin Ricardo Speech Therapy Student 01/07/19 -           SLP OP Goals     Row Name 04/22/19 1500          Goal Type Needed    Goal Type Needed  Dysphagia  (Pended)   -BL        Subjective Pain    Able to rate subjective pain?  yes  (Pended)   -BL     Pre-Treatment Pain Level  0  (Pended)   -BL     Post-Treatment Pain Level  0  (Pended)   -BL        Dysphagia Goals    Dysphagia LTG's  Patient will safely consume the recommended diet without complications such as aspiration pneumonia  (Pended)   -BL     Status: Patient will safely consume the  recommended diet without complications such as aspiration pneumonia  New  (Pended)   -BL     Dysphagia STG's  Patient will improve stage transition duration of swallow/improve timing to reduce food falling into the airway by decreasing swallow delay after neurosensory  stimulation;Patient will increase laryngeal elevation to reduce residue that might fall into airway by completing;Patient will increase strength of tongue base and posterior pharyngeal walls to reduce residue that might fall into airway by completing;Patient will improve hyolaryngeal elevation via completing Jorge A (head lift);Patient will compensate for oral/pharyngeal deficits and reduce risks while eating by utilizing  compensatory strategies  (Pended)   -BL     Patient will improve stage transition duration of swallow/improve timing to reduce food falling into the airway by decreasing swallow delay after neurosensory  stimulation  neurosensory stimulation;without cues  (Pended)   -BL     Status: Patient will improve stage transition duration of swallow/improve timing to reduce food falling into the airway by decreasing swallow delay after neurosensory  stimulation  New  (Pended)   -BL     Patient will increase laryngeal elevation to reduce residue that might fall into airway by completing  Mendelsohn maneuver;falsetto/pitch glide;without cues  (Pended)   -BL     Status: Patient will increase laryngeal elevation to reduce residue that might fall into airway by completing  New  (Pended)   -BL     Patient will increase strength of tongue base and posterior pharyngeal walls to reduce residue that might fall into airway by completing  effotful swallow;Laura (tongue hold);tongue base retraction;without cues  (Pended)   -BL     Status: Patient will increase strength of tongue base and posterior pharyngeal walls to reduce residue that might fall into airway by completing  New  (Pended)   -BL     Patient will improve hyolaryngeal elevation via completing  Jorge A (head lift)  sustained lift (comment #/duration of lifts);repetitive lift (comment #/duration of lifts);without cues  (Pended)   -BL     Status: Patient will improve hyolaryngeal elevation via completing Jorge A (head lift)  New  (Pended)   -BL     Patient will compensate for oral/pharyngeal deficits and reduce risks while eating by utilizing  compensatory strategies  no straw;multiple swallows;without cues  (Pended)   -BL     Status: Patient will compensate for oral/pharyngeal deficits and reduce risks while eating by utilizing  compensatory strategies  New  (Pended)   -       User Key  (r) = Recorded By, (t) = Taken By, (c) = Cosigned By    Initials Name Provider Type    BL Shirin Ricardo Speech Therapy Student Speech and Language Pathologist          OP SLP Assessment/Plan - 04/22/19 1529        SLP Assessment    Functional Problems  Swallowing  (Pended)    -    Impact on Function: Swallowing  Risk of aspiration;Risk of pneumonia  (Pended)    -BL    Clinical Impression: Swallowing  Moderate:;oropharyngeal phase dysphagia  (Pended)    -    Please refer to paper survey for additional self-reported information  Yes  (Pended)    -BL    Please refer to items scanned into chart for additional diagnostic informaiton and handouts as provided by clinician  Yes  (Pended)    -BL    SLP Diagnosis  Moderate oropharyngeal dysphagia   (Pended)    -    Prognosis  Good (comment)  (Pended)    -BL    Patient/caregiver participated in establishment of treatment plan and goals  Yes  (Pended)    -BL    Patient would benefit from skilled therapy intervention  Yes  (Pended)    -BL       SLP Plan    Frequency  --  (Pended)  2x per week    2x per week  -BL    Duration  --  (Pended)  until discharge    until discharge  -    Planned CPT's?  SLP INDIVIDUAL SPEECH THERAPY: 24963  (Pended)    -    Expected Duration Therapy Session - minutes  45-60 minutes  (Pended)    -BL      User Key  (r) = Recorded By, (t) = Taken By, (c)  = Cosigned By    Initials Name Provider Type     Shirin Ricardo Speech Therapy Student Speech and Language Pathologist              SLP Outcome Measures (last 72 hours)      SLP Outcome Measures     Row Name 04/22/19 1500             SLP Outcome Measures    Outcome Measure Used?  Adult NOMS  (Pended)   -         Adult FCM Scores    FCM Chosen  Swallowing  (Pended)   -      Swallowing FCM Score  3  (Pended)   -        User Key  (r) = Recorded By, (t) = Taken By, (c) = Cosigned By    Initials Name Effective Dates     Shirin Ricardo Speech Therapy Student 01/07/19 -                Time Calculation:   SLP Start Time: (P) 1400  SLP Stop Time: (P) 1500  SLP Time Calculation (min): (P) 60 min    Therapy Charges for Today     Code Description Service Date Service Provider Modifiers Qty    76293831124 HC ST EVAL ORAL PHARYNG SWALLOW 4 4/22/2019 Shirin Ricardo Speech Therapy Student GN 1                   Shirin Ricardo Speech Therapy Student  4/22/2019

## 2019-04-26 ENCOUNTER — APPOINTMENT (OUTPATIENT)
Dept: SPEECH THERAPY | Facility: HOSPITAL | Age: 79
End: 2019-04-26

## 2019-04-29 ENCOUNTER — APPOINTMENT (OUTPATIENT)
Dept: SPEECH THERAPY | Facility: HOSPITAL | Age: 79
End: 2019-04-29

## 2019-04-29 ENCOUNTER — HOSPITAL ENCOUNTER (OUTPATIENT)
Dept: SPEECH THERAPY | Facility: HOSPITAL | Age: 79
Setting detail: THERAPIES SERIES
Discharge: HOME | End: 2019-04-29

## 2019-04-29 DIAGNOSIS — I63.9 CEREBROVASCULAR ACCIDENT (CVA), UNSPECIFIED MECHANISM (HCC): ICD-10-CM

## 2019-04-29 DIAGNOSIS — I69.391 DYSPHAGIA DUE TO RECENT CEREBRAL INFARCTION: Primary | ICD-10-CM

## 2019-04-29 PROCEDURE — 92526 ORAL FUNCTION THERAPY: CPT

## 2019-04-29 NOTE — THERAPY TREATMENT NOTE
Outpatient Speech Language Pathology   Adult Swallow Treatment Note  [facility]     Patient Name: Mar Burgos  : 1940  MRN: [id number]  Today's Date: 2019    Visit Date: 2019   Patient Active Problem List   Diagnosis   • Anemia   • Uncontrolled type 2 diabetes mellitus (CMS/HCC)   • Diabetic neuropathy (CMS/HCC)   • Gastroesophageal reflux disease   • Gastritis   • Hearing loss   • Hyperlipidemia   • Hypertension   • Thoracic back pain   • Peripheral arterial occlusive disease (CMS/HCC)   • Personal history of breast cancer   • Personal history of other malignant neoplasm of skin   • History of tobacco use   • Seasonal allergic rhinitis   • Type 2 diabetes mellitus, without long-term current use of insulin (CMS/HCC)   • Cobalamin deficiency   • Osteoporosis screening   • Breast cancer screening   • Sinusitis   • Cerebrovascular accident (CVA) (CMS/HCC)   • Left-sided weakness   • Dysphagia due to recent cerebral infarction   • Smoker   • Right ankle swelling   • Localized swelling on left hand     Visit Dx:    ICD-10-CM ICD-9-CM   1. Dysphagia due to recent cerebral infarction I69.391 438.82   2. Cerebrovascular accident (CVA), unspecified mechanism (CMS/HCC) I63.9 434.91     SLP OP Goals     Row Name 19 1500          Subjective Comments    Subjective Comments  Pt. arrived 5 minutes late to session with her daughter. Pt. was pleasant and showed good motivation throughout all therapy tasks.   -KB        Dysphagia Goals    Dysphagia STG's  Patient will improve stage transition duration of swallow/improve timing to reduce food falling into the airway by decreasing swallow delay after neurosensory  stimulation;Patient will increase laryngeal elevation to reduce residue that might fall into airway by completing;Patient will increase strength of tongue base and posterior pharyngeal walls to reduce residue that might fall into airway by completing;Patient will improve hyolaryngeal  elevation via completing Jorge A (head lift);Patient will compensate for oral/pharyngeal deficits and reduce risks while eating by utilizing  compensatory strategies  -KB     Patient will improve stage transition duration of swallow/improve timing to reduce food falling into the airway by decreasing swallow delay after neurosensory  stimulation  neurosensory stimulation;without cues  -KB     Status: Patient will improve stage transition duration of swallow/improve timing to reduce food falling into the airway by decreasing swallow delay after neurosensory  stimulation  Progressing as expected  -KB     Comments: Patient will improve stage transition duration of swallow/improve timing to reduce food falling into the airway by decreasing swallow delay after neurosensory  stimulation  The pt. tolerated E-stim for 30 minutes with the following parameters: 14 mA; 50 Hz; 175 usec; 3 sec. ramp time, 3 sec. hold, 3 sec. break. Pt. had recalled 11 mA from her last session and stated she felt like it could go higher; she reported no pain/discomfort throughout 30 min. of E-stim at 14 mA. Hard swallow with HTL completed on intervals.   -KB     Patient will increase laryngeal elevation to reduce residue that might fall into airway by completing  Mendelsohn maneuver;falsetto/pitch glide;without cues  -KB     Status: Patient will increase laryngeal elevation to reduce residue that might fall into airway by completing  Progressing as expected  -KB     Comments: Patient will increase laryngeal elevation to reduce residue that might fall into airway by completing  Mendelsohn Maneuver- teaching required, completed x5 reps.; falsetto completed x10 i'ly; pitch glide completed x10 with min. cues for differentiating starting/ending pitch  -KB     Patient will increase strength of tongue base and posterior pharyngeal walls to reduce residue that might fall into airway by completing  effotful swallow;Laura (tongue hold);tongue base  "retraction;without cues  -KB     Status: Patient will increase strength of tongue base and posterior pharyngeal walls to reduce residue that might fall into airway by completing  Progressing as expected  -KB     Comments: Patient will increase strength of tongue base and posterior pharyngeal walls to reduce residue that might fall into airway by completing  effortful swallow completed throughout E-stim and x10 additional repetitions i'ly; Laura completed x5 i'ly; tongue base retraction completed x10 i'ly  -KB     Patient will improve hyolaryngeal elevation via completing Jorge A (head lift)  sustained lift (comment #/duration of lifts);repetitive lift (comment #/duration of lifts);without cues  -KB     Status: Patient will improve hyolaryngeal elevation via completing Jorge A (head lift)  Progressing as expected  -KB     Comments: Patient will improve hyolaryngeal elevation via completing Jorge A (head lift)  completed modified exercises with CTAR ball: sustained hold completed 30 sec. i'ly; repetitive completed x5 with min. cues   -KB     Patient will compensate for oral/pharyngeal deficits and reduce risks while eating by utilizing  compensatory strategies  no straw;multiple swallows;without cues  -KB     Status: Patient will compensate for oral/pharyngeal deficits and reduce risks while eating by utilizing  compensatory strategies  Progressing as expected  -KB     Comments: Patient will compensate for oral/pharyngeal deficits and reduce risks while eating by utilizing  compensatory strategies  Pt. was able to recall her diet recommendations without cues. Recalled strategies with minimal cues. HTL tolerated throughout session without overt s/s pen/asp. Provided information to pt. and daughter about \"[employer]\" brand as requested because patient stated she really liked the iced tea provided in session.   -KB       User Key  (r) = Recorded By, (t) = Taken By, (c) = Cosigned By    Initials Name Provider Type    KB " Bruton, Katherine L Speech and Language Pathologist        OP SLP Education     Row Name 04/29/19 1534       Education    Barriers to Learning  No barriers identified  -KIRAN    Action Taken to Address Barriers  Daughter present  -KB    Education Provided  Patient expressed understanding of evaluation;Patient demonstrated recommended strategies;Family/caregivers demonstrated recommended strategies;Family/caregivers expressed understanding of evaluation  -KB    Assessed  Learning needs;Learning motivation  -KIRAN    Learning Motivation  Strong  -KIRAN    Learning Method  Explanation;Demonstration  -KB    Teaching Response  Verbalized understanding;Demonstrated understanding  -KB      User Key  (r) = Recorded By, (t) = Taken By, (c) = Cosigned By    Initials Name Effective Dates    KB Bruton, Katherine L 03/07/18 -         OP SLP Assessment/Plan - 04/29/19 1534        SLP Assessment    Functional Problems  Speech Language- Adult/Cognition   -KB       SLP Plan    Plan Comments  Continue current plan of care.    -KB      User Key  (r) = Recorded By, (t) = Taken By, (c) = Cosigned By    Initials Name Provider Type    KB Bruton, Katherine L Speech and Language Pathologist        Time Calculation:   SLP Start Time: 1405  SLP Stop Time: 1505  SLP Time Calculation (min): 60 min    Therapy Charges for Today     Code Description Service Date Service Provider Modifiers Qty    [id number] HC ST TREATMENT SWALLOW 4 4/29/2019 Bruton, Katherine L GN 1          Katherine L Bruton  4/29/2019

## 2019-04-30 ENCOUNTER — APPOINTMENT (OUTPATIENT)
Dept: SPEECH THERAPY | Facility: HOSPITAL | Age: 79
End: 2019-04-30

## 2019-05-03 ENCOUNTER — HOSPITAL ENCOUNTER (OUTPATIENT)
Dept: SPEECH THERAPY | Facility: HOSPITAL | Age: 79
Setting detail: THERAPIES SERIES
Discharge: HOME | End: 2019-05-03

## 2019-05-03 DIAGNOSIS — I63.9 CEREBROVASCULAR ACCIDENT (CVA), UNSPECIFIED MECHANISM (HCC): ICD-10-CM

## 2019-05-03 DIAGNOSIS — I69.391 DYSPHAGIA DUE TO RECENT CEREBRAL INFARCTION: Primary | ICD-10-CM

## 2019-05-03 PROCEDURE — 92526 ORAL FUNCTION THERAPY: CPT | Performed by: SPEECH-LANGUAGE PATHOLOGIST

## 2019-05-03 NOTE — THERAPY TREATMENT NOTE
Outpatient Speech Language Pathology   Adult Swallow Treatment Note  [facility]     Patient Name: Mar Burgos  : 1940  MRN: [id number]  Today's Date: 5/3/2019         Visit Date: 2019   Patient Active Problem List   Diagnosis   • Anemia   • Uncontrolled type 2 diabetes mellitus (CMS/HCC)   • Diabetic neuropathy (CMS/HCC)   • Gastroesophageal reflux disease   • Gastritis   • Hearing loss   • Hyperlipidemia   • Hypertension   • Thoracic back pain   • Peripheral arterial occlusive disease (CMS/HCC)   • Personal history of breast cancer   • Personal history of other malignant neoplasm of skin   • History of tobacco use   • Seasonal allergic rhinitis   • Type 2 diabetes mellitus, without long-term current use of insulin (CMS/HCC)   • Cobalamin deficiency   • Osteoporosis screening   • Breast cancer screening   • Sinusitis   • Cerebrovascular accident (CVA) (CMS/HCC)   • Left-sided weakness   • Dysphagia due to recent cerebral infarction   • Smoker   • Right ankle swelling   • Localized swelling on left hand        Visit Dx:    ICD-10-CM ICD-9-CM   1. Dysphagia due to recent cerebral infarction I69.391 438.82   2. Cerebrovascular accident (CVA), unspecified mechanism (CMS/HCC) I63.9 434.91                         SLP OP Goals     Row Name 19 1000          Subjective Comments    Subjective Comments  Mrs Burgos is pleasant and cooperative. She reports compliance with home exercise program and completes exercises more than 2x a day.   -KA        Subjective Pain    Able to rate subjective pain?  yes  -KA     Pre-Treatment Pain Level  0  -KA     Post-Treatment Pain Level  0  -KA        Dysphagia Goals    Patient will improve stage transition duration of swallow/improve timing to reduce food falling into the airway by decreasing swallow delay after neurosensory  stimulation  neurosensory stimulation;without cues  -KA     Status: Patient will improve stage transition duration of swallow/improve  timing to reduce food falling into the airway by decreasing swallow delay after neurosensory  stimulation  Progressing as expected  -KA     Comments: Patient will improve stage transition duration of swallow/improve timing to reduce food falling into the airway by decreasing swallow delay after neurosensory  stimulation  The pt. tolerated E-stim for 30 minutes with the following parameters: 13 mA; 50 Hz; 175 usec; 3 sec. ramp time, 3 sec. hold, 12 sec. break. She reported no pain/discomfort throughout 30 min. of E-stim at 13 mA. Hard swallow with thin liquids (water) completed on intervals. Patient timed swallow on peak of amplitude and demonstrated no overt s/s of pen/asp with water trials with use of three second hold.  -KA     Patient will increase laryngeal elevation to reduce residue that might fall into airway by completing  Mendelsohn maneuver;falsetto/pitch glide;without cues  -KA     Status: Patient will increase laryngeal elevation to reduce residue that might fall into airway by completing  Progressing as expected  -KA     Comments: Patient will increase laryngeal elevation to reduce residue that might fall into airway by completing  Mendelsohn Maneuver- teaching required and pt palpated neck to feel for elevation, completed x5 reps.; falsetto completed x10 i'ly  -KA     Patient will increase strength of tongue base and posterior pharyngeal walls to reduce residue that might fall into airway by completing  effotful swallow;Laura (tongue hold);tongue base retraction;without cues  -KA     Status: Patient will increase strength of tongue base and posterior pharyngeal walls to reduce residue that might fall into airway by completing  Progressing as expected  -KA     Comments: Patient will increase strength of tongue base and posterior pharyngeal walls to reduce residue that might fall into airway by completing  effortful swallow completed throughout E-stim Laura completed x10 i'ly; tongue base retraction  completed x10 i'ly  -KA     Patient will improve hyolaryngeal elevation via completing Jorge A (head lift)  sustained lift (comment #/duration of lifts);repetitive lift (comment #/duration of lifts);without cues  -KA     Status: Patient will improve hyolaryngeal elevation via completing Jorge A (head lift)  Progressing as expected  -KA     Comments: Patient will improve hyolaryngeal elevation via completing Jorge A (head lift)  completed modified exercises with CTAR ball: sustained hold completed 30 sec. i'ly; repetitive completed x5 with min. cues   -KA     Patient will compensate for oral/pharyngeal deficits and reduce risks while eating by utilizing  compensatory strategies  no straw;multiple swallows;without cues  -KA     Status: Patient will compensate for oral/pharyngeal deficits and reduce risks while eating by utilizing  compensatory strategies  Progressing as expected  -KA     Comments: Patient will compensate for oral/pharyngeal deficits and reduce risks while eating by utilizing  compensatory strategies  Pt. was able to recall her diet recommendations without cues. Recalled strategies with minimal cues.  -KA       User Key  (r) = Recorded By, (t) = Taken By, (c) = Cosigned By    Initials Name Provider Type    Maxime Ramírez MA,CCC-SLP Speech and Language Pathologist                        Time Calculation:   SLP Start Time: 1000  SLP Stop Time: 1053  SLP Time Calculation (min): 53 min    Therapy Charges for Today     Code Description Service Date Service Provider Modifiers Qty    [id number] HC ST TREATMENT SWALLOW 4 5/3/2019 Maxime Delgado MA,CCC-SLP GN 1                   Maxime Delgado MA,CCC-SLP  5/3/2019

## 2019-05-06 ENCOUNTER — APPOINTMENT (OUTPATIENT)
Dept: SPEECH THERAPY | Facility: HOSPITAL | Age: 79
End: 2019-05-06

## 2019-05-06 ENCOUNTER — HOSPITAL ENCOUNTER (OUTPATIENT)
Dept: SPEECH THERAPY | Facility: HOSPITAL | Age: 79
Setting detail: THERAPIES SERIES
Discharge: HOME | End: 2019-05-06

## 2019-05-06 DIAGNOSIS — I69.391 DYSPHAGIA DUE TO RECENT CEREBRAL INFARCTION: Primary | ICD-10-CM

## 2019-05-06 DIAGNOSIS — I63.9 CEREBROVASCULAR ACCIDENT (CVA), UNSPECIFIED MECHANISM (HCC): ICD-10-CM

## 2019-05-06 PROCEDURE — 92526 ORAL FUNCTION THERAPY: CPT | Performed by: SPEECH-LANGUAGE PATHOLOGIST

## 2019-05-06 NOTE — THERAPY TREATMENT NOTE
Outpatient Speech Language Pathology   Adult Swallow Treatment Note  [facility]     Patient Name: Mar Burgos  : 1940  MRN: [id number]  Today's Date: 2019         Visit Date: 2019   Patient Active Problem List   Diagnosis   • Anemia   • Uncontrolled type 2 diabetes mellitus (CMS/HCC)   • Diabetic neuropathy (CMS/HCC)   • Gastroesophageal reflux disease   • Gastritis   • Hearing loss   • Hyperlipidemia   • Hypertension   • Thoracic back pain   • Peripheral arterial occlusive disease (CMS/HCC)   • Personal history of breast cancer   • Personal history of other malignant neoplasm of skin   • History of tobacco use   • Seasonal allergic rhinitis   • Type 2 diabetes mellitus, without long-term current use of insulin (CMS/HCC)   • Cobalamin deficiency   • Osteoporosis screening   • Breast cancer screening   • Sinusitis   • Cerebrovascular accident (CVA) (CMS/HCC)   • Left-sided weakness   • Dysphagia due to recent cerebral infarction   • Smoker   • Right ankle swelling   • Localized swelling on left hand        Visit Dx:    ICD-10-CM ICD-9-CM   1. Dysphagia due to recent cerebral infarction I69.391 438.82   2. Cerebrovascular accident (CVA), unspecified mechanism (CMS/HCC) I63.9 434.91                         SLP OP Goals     Row Name 19 1500          Subjective Comments    Subjective Comments  Patient is pleasant and cooperative, she consistently completes her home exercise program.   -KA        Subjective Pain    Able to rate subjective pain?  yes  -KA     Pre-Treatment Pain Level  0  -KA     Post-Treatment Pain Level  0  -KA        Dysphagia Goals    Patient will improve stage transition duration of swallow/improve timing to reduce food falling into the airway by decreasing swallow delay after neurosensory  stimulation  neurosensory stimulation;without cues  -KA     Status: Patient will improve stage transition duration of swallow/improve timing to reduce food falling into the airway by  decreasing swallow delay after neurosensory  stimulation  Progressing as expected  -KA     Comments: Patient will improve stage transition duration of swallow/improve timing to reduce food falling into the airway by decreasing swallow delay after neurosensory  stimulation  The pt. tolerated E-stim for 30 minutes with the following parameters: 13 mA; 50 Hz; 175 usec; 3 sec. ramp time, 3 sec. hold, 12 sec. break. She reported no pain/discomfort throughout 30 min. of E-stim at 13 mA. Hard swallow with thin liquids (water) completed on intervals. Patient timed swallow on peak of amplitude and demonstrated no overt s/s of pen/asp with water trials with use of three second hold.  -KA     Patient will increase laryngeal elevation to reduce residue that might fall into airway by completing  Mendelsohn maneuver;falsetto/pitch glide;without cues  -KA     Status: Patient will increase laryngeal elevation to reduce residue that might fall into airway by completing  Progressing as expected  -KA     Comments: Patient will increase laryngeal elevation to reduce residue that might fall into airway by completing  Mendelsohn Maneuver- teaching required and pt palpated neck to feel for elevation, completed x10 reps 100% accuracy.; falsetto completed x10 i'ly  -KA     Patient will increase strength of tongue base and posterior pharyngeal walls to reduce residue that might fall into airway by completing  effotful swallow;Laura (tongue hold);tongue base retraction;without cues  -KA     Status: Patient will increase strength of tongue base and posterior pharyngeal walls to reduce residue that might fall into airway by completing  Progressing as expected  -KA     Comments: Patient will increase strength of tongue base and posterior pharyngeal walls to reduce residue that might fall into airway by completing  effortful swallow completed throughout E-stim Laura completed x10 i'ly; tongue base retraction completed x10 i'ly  -KA     Patient  will improve hyolaryngeal elevation via completing Jorge A (head lift)  sustained lift (comment #/duration of lifts);repetitive lift (comment #/duration of lifts);without cues  -KA     Status: Patient will improve hyolaryngeal elevation via completing Jorge A (head lift)  Progressing as expected  -KA     Comments: Patient will improve hyolaryngeal elevation via completing Jorge A (head lift)  completed modified exercises with CTAR ball: sustained hold completed 30 sec. i'ly; repetitive completed x5 with min. cues   -KA     Patient will compensate for oral/pharyngeal deficits and reduce risks while eating by utilizing  compensatory strategies  no straw;multiple swallows;without cues  -KA     Status: Patient will compensate for oral/pharyngeal deficits and reduce risks while eating by utilizing  compensatory strategies  Progressing as expected  -KA     Comments: Patient will compensate for oral/pharyngeal deficits and reduce risks while eating by utilizing  compensatory strategies  Pt. was able to recall her diet recommendations without cues. Recalled strategies with minimal cues.  -KA       User Key  (r) = Recorded By, (t) = Taken By, (c) = Cosigned By    Initials Name Provider Type    Maxime Ramírez MA,CCC-SLP Speech and Language Pathologist                        Time Calculation:   SLP Start Time: 1400  SLP Stop Time: 1455  SLP Time Calculation (min): 55 min    Therapy Charges for Today     Code Description Service Date Service Provider Modifiers Qty    16025981463  ST TREATMENT SWALLOW 4 5/6/2019 Maxime Delgado MA,CCC-SLP GN 1                   Maxime Delgado MA,CCC-SLP  5/6/2019

## 2019-05-07 ENCOUNTER — APPOINTMENT (OUTPATIENT)
Dept: SPEECH THERAPY | Facility: HOSPITAL | Age: 79
End: 2019-05-07

## 2019-05-10 ENCOUNTER — HOSPITAL ENCOUNTER (OUTPATIENT)
Dept: SPEECH THERAPY | Facility: HOSPITAL | Age: 79
Setting detail: THERAPIES SERIES
Discharge: HOME | End: 2019-05-10

## 2019-05-10 DIAGNOSIS — I69.391 DYSPHAGIA DUE TO RECENT CEREBRAL INFARCTION: Primary | ICD-10-CM

## 2019-05-10 DIAGNOSIS — I63.9 CEREBROVASCULAR ACCIDENT (CVA), UNSPECIFIED MECHANISM (HCC): ICD-10-CM

## 2019-05-10 PROCEDURE — 92507 TX SP LANG VOICE COMM INDIV: CPT | Performed by: SPEECH-LANGUAGE PATHOLOGIST

## 2019-05-10 NOTE — THERAPY TREATMENT NOTE
Outpatient Speech Language Pathology   Adult Speech Language Cognitive Treatment Note  [facility]     Patient Name: Mar Burgos  : 1940  MRN: [id number]  Today's Date: 5/10/2019         Visit Date: 05/10/2019   Patient Active Problem List   Diagnosis   • Anemia   • Uncontrolled type 2 diabetes mellitus (CMS/HCC)   • Diabetic neuropathy (CMS/HCC)   • Gastroesophageal reflux disease   • Gastritis   • Hearing loss   • Hyperlipidemia   • Hypertension   • Thoracic back pain   • Peripheral arterial occlusive disease (CMS/HCC)   • Personal history of breast cancer   • Personal history of other malignant neoplasm of skin   • History of tobacco use   • Seasonal allergic rhinitis   • Type 2 diabetes mellitus, without long-term current use of insulin (CMS/HCC)   • Cobalamin deficiency   • Osteoporosis screening   • Breast cancer screening   • Sinusitis   • Cerebrovascular accident (CVA) (CMS/HCC)   • Left-sided weakness   • Dysphagia due to recent cerebral infarction   • Smoker   • Right ankle swelling   • Localized swelling on left hand          Visit Dx:    ICD-10-CM ICD-9-CM   1. Dysphagia due to recent cerebral infarction I69.391 438.82   2. Cerebrovascular accident (CVA), unspecified mechanism (CMS/HCC) I63.9 434.91                         SLP OP Goals     Row Name 05/10/19 1000          Subjective Comments    Subjective Comments  Patient is pleasant and cooperative. She demonstrates exercises adequately and reports compliance with home exercise program.  -KA        Subjective Pain    Able to rate subjective pain?  yes  -KA     Pre-Treatment Pain Level  0  -KA     Post-Treatment Pain Level  0  -KA        Dysphagia Goals    Patient will improve stage transition duration of swallow/improve timing to reduce food falling into the airway by decreasing swallow delay after neurosensory  stimulation  neurosensory stimulation;without cues  -KA     Status: Patient will improve stage transition duration of  swallow/improve timing to reduce food falling into the airway by decreasing swallow delay after neurosensory  stimulation  Progressing as expected  -KA     Comments: Patient will improve stage transition duration of swallow/improve timing to reduce food falling into the airway by decreasing swallow delay after neurosensory  stimulation  The pt. tolerated E-stim for 30 minutes with the following parameters: 11 mA; 50 Hz; 175 usec; 3 sec. ramp time, 3 sec. hold, 12 sec. break. She reported no pain/discomfort throughout 30 min. of E-stim at 11 mA. Hard swallow with thin liquids (water) completed on intervals. Patient timed swallow on peak of amplitude and demonstrated no overt s/s of pen/asp with water trials with use of three second hold.  -KA     Patient will increase laryngeal elevation to reduce residue that might fall into airway by completing  Mendelsohn maneuver;falsetto/pitch glide;without cues  -KA     Status: Patient will increase laryngeal elevation to reduce residue that might fall into airway by completing  Progressing as expected  -KA     Comments: Patient will increase laryngeal elevation to reduce residue that might fall into airway by completing  Mendelsohn Maneuver- teaching required and pt palpated neck to feel for elevation, completed x10 reps 100% accuracy.; falsetto completed x10 i'ly  -KA     Patient will increase strength of tongue base and posterior pharyngeal walls to reduce residue that might fall into airway by completing  effotful swallow;Laura (tongue hold);tongue base retraction;without cues  -KA     Status: Patient will increase strength of tongue base and posterior pharyngeal walls to reduce residue that might fall into airway by completing  Progressing as expected  -KA     Comments: Patient will increase strength of tongue base and posterior pharyngeal walls to reduce residue that might fall into airway by completing  effortful swallow completed throughout E-stim Laura completed x10  i'ly; tongue base retraction completed x10 i'ly  -KA     Patient will compensate for oral/pharyngeal deficits and reduce risks while eating by utilizing  compensatory strategies  no straw;multiple swallows;without cues  -KA     Status: Patient will compensate for oral/pharyngeal deficits and reduce risks while eating by utilizing  compensatory strategies  Progressing as expected  -KA     Comments: Patient will compensate for oral/pharyngeal deficits and reduce risks while eating by utilizing  compensatory strategies  Pt. was able to recall her diet recommendations without cues. Recalled strategies with minimal cues.  -KA       User Key  (r) = Recorded By, (t) = Taken By, (c) = Cosigned By    Initials Name Provider Type    Maxime Ramírez MA,CCC-SLP Speech and Language Pathologist                         Time Calculation:   SLP Start Time: 1000  SLP Stop Time: 1100  SLP Time Calculation (min): 60 min    Therapy Charges for Today     Code Description Service Date Service Provider Modifiers Qty    [id number] [facility] TREATMENT SPEECH 4 5/10/2019 Maxime Delgado MA,CCC-SLP GN 1                   Maxime Delgado MA,CCC-SLP  5/10/2019

## 2019-05-13 ENCOUNTER — HOSPITAL ENCOUNTER (OUTPATIENT)
Dept: SPEECH THERAPY | Facility: HOSPITAL | Age: 79
Setting detail: THERAPIES SERIES
Discharge: HOME | End: 2019-05-13

## 2019-05-13 ENCOUNTER — APPOINTMENT (OUTPATIENT)
Dept: SPEECH THERAPY | Facility: HOSPITAL | Age: 79
End: 2019-05-13

## 2019-05-13 DIAGNOSIS — I63.9 CEREBROVASCULAR ACCIDENT (CVA), UNSPECIFIED MECHANISM (HCC): ICD-10-CM

## 2019-05-13 DIAGNOSIS — I69.391 DYSPHAGIA DUE TO RECENT CEREBRAL INFARCTION: Primary | ICD-10-CM

## 2019-05-13 PROCEDURE — 92526 ORAL FUNCTION THERAPY: CPT | Performed by: SPEECH-LANGUAGE PATHOLOGIST

## 2019-05-13 NOTE — THERAPY TREATMENT NOTE
Outpatient Speech Language Pathology   Adult Swallow Treatment Note  [facility]     Patient Name: Mar Burgos  : 1940  MRN: [id number]  Today's Date: 2019         Visit Date: 2019   Patient Active Problem List   Diagnosis   • Anemia   • Uncontrolled type 2 diabetes mellitus (CMS/HCC)   • Diabetic neuropathy (CMS/HCC)   • Gastroesophageal reflux disease   • Gastritis   • Hearing loss   • Hyperlipidemia   • Hypertension   • Thoracic back pain   • Peripheral arterial occlusive disease (CMS/HCC)   • Personal history of breast cancer   • Personal history of other malignant neoplasm of skin   • History of tobacco use   • Seasonal allergic rhinitis   • Type 2 diabetes mellitus, without long-term current use of insulin (CMS/HCC)   • Cobalamin deficiency   • Osteoporosis screening   • Breast cancer screening   • Sinusitis   • Cerebrovascular accident (CVA) (CMS/HCC)   • Left-sided weakness   • Dysphagia due to recent cerebral infarction   • Smoker   • Right ankle swelling   • Localized swelling on left hand        Visit Dx:    ICD-10-CM ICD-9-CM   1. Dysphagia due to recent cerebral infarction I69.391 438.82   2. Cerebrovascular accident (CVA), unspecified mechanism (CMS/HCC) I63.9 434.91                         SLP OP Goals     Row Name 19 1400          Subjective Comments    Subjective Comments  Patient is pleasant and cooperative, remains consistent and motivated with home exercise program. SLP has sent VFSS order to MD and recommend repeating VFSS end of May or beginning of , SLP will schedule once MD order is received.  -KA        Subjective Pain    Able to rate subjective pain?  yes  -KA     Pre-Treatment Pain Level  0  -KA     Post-Treatment Pain Level  0  -KA        Dysphagia Goals    Patient will improve stage transition duration of swallow/improve timing to reduce food falling into the airway by decreasing swallow delay after neurosensory  stimulation  neurosensory  stimulation;without cues  -KA     Status: Patient will improve stage transition duration of swallow/improve timing to reduce food falling into the airway by decreasing swallow delay after neurosensory  stimulation  Progressing as expected  -KA     Comments: Patient will improve stage transition duration of swallow/improve timing to reduce food falling into the airway by decreasing swallow delay after neurosensory  stimulation  The pt. tolerated E-stim for 30 minutes with the following parameters: 11 mA; 50 Hz; 175 usec; 3 sec. ramp time, 3 sec. hold, 12 sec. break. She reported no pain/discomfort throughout 30 min. of E-stim at 11 mA. Hard swallow with thin liquids (water) completed on intervals. Patient timed swallow on peak of amplitude and demonstrated no overt s/s of pen/asp with water trials with use of three second hold.  -KA     Patient will increase laryngeal elevation to reduce residue that might fall into airway by completing  Mendelsohn maneuver;falsetto/pitch glide;without cues  -KA     Status: Patient will increase laryngeal elevation to reduce residue that might fall into airway by completing  Progressing as expected  -KA     Comments: Patient will increase laryngeal elevation to reduce residue that might fall into airway by completing  Mendelsohn Maneuver-  pt palpated neck to feel for elevation, completed x10 reps 100% accuracy.; falsetto completed x10 i'ly  -KA     Patient will increase strength of tongue base and posterior pharyngeal walls to reduce residue that might fall into airway by completing  effotful swallow;Laura (tongue hold);tongue base retraction;without cues  -KA     Status: Patient will increase strength of tongue base and posterior pharyngeal walls to reduce residue that might fall into airway by completing  Progressing as expected  -KA     Comments: Patient will increase strength of tongue base and posterior pharyngeal walls to reduce residue that might fall into airway by  completing  effortful swallow completed throughout E-stim Laura completed x10 i'ly; tongue base retraction completed x10 i'ly  -KA     Patient will improve hyolaryngeal elevation via completing Jorge A (head lift)  sustained lift (comment #/duration of lifts);repetitive lift (comment #/duration of lifts);without cues  -KA     Status: Patient will improve hyolaryngeal elevation via completing Jorge A (head lift)  Progressing as expected  -KA     Comments: Patient will improve hyolaryngeal elevation via completing Jorge A (head lift)  completed modified exercises with CTAR ball: sustained hold completed 30 sec. i'ly; repetitive completed x5 with min. cues   -KA     Patient will compensate for oral/pharyngeal deficits and reduce risks while eating by utilizing  compensatory strategies  no straw;multiple swallows;without cues  -KA     Status: Patient will compensate for oral/pharyngeal deficits and reduce risks while eating by utilizing  compensatory strategies  Progressing as expected  -KA     Comments: Patient will compensate for oral/pharyngeal deficits and reduce risks while eating by utilizing  compensatory strategies  Pt. was able to recall her diet recommendations without cues. Recalled strategies with minimal cues.  -KA       User Key  (r) = Recorded By, (t) = Taken By, (c) = Cosigned By    Initials Name Provider Type    Maxime Ramírez MA,CCC-SLP Speech and Language Pathologist                        Time Calculation:   SLP Start Time: 1400  SLP Stop Time: 1455  SLP Time Calculation (min): 55 min    Therapy Charges for Today     Code Description Service Date Service Provider Modifiers Qty    46305618164 HC ST TREATMENT SWALLOW 4 5/13/2019 Maxime Delgado MA,CCC-SLP GN 1                   Maxime Delgado MA,CCC-SLP  5/13/2019

## 2019-05-14 ENCOUNTER — APPOINTMENT (OUTPATIENT)
Dept: SPEECH THERAPY | Facility: HOSPITAL | Age: 79
End: 2019-05-14

## 2019-05-16 RX ORDER — LISINOPRIL 10 MG/1
10 TABLET ORAL
Qty: 30 TABLET | Refills: 0 | Status: SHIPPED | OUTPATIENT
Start: 2019-05-16 | End: 2019-05-22

## 2019-05-16 RX ORDER — CLOPIDOGREL BISULFATE 75 MG/1
75 TABLET ORAL DAILY
Qty: 30 TABLET | Refills: 0 | Status: SHIPPED | OUTPATIENT
Start: 2019-05-16 | End: 2019-05-21

## 2019-05-16 RX ORDER — METOPROLOL SUCCINATE 50 MG/1
50 TABLET, EXTENDED RELEASE ORAL
Qty: 30 TABLET | Refills: 0 | Status: SHIPPED | OUTPATIENT
Start: 2019-05-16 | End: 2019-07-16 | Stop reason: SDUPTHER

## 2019-05-17 ENCOUNTER — HOSPITAL ENCOUNTER (OUTPATIENT)
Dept: SPEECH THERAPY | Facility: HOSPITAL | Age: 79
Setting detail: THERAPIES SERIES
Discharge: HOME | End: 2019-05-17

## 2019-05-17 ENCOUNTER — TRANSCRIBE ORDERS (OUTPATIENT)
Dept: ADMINISTRATIVE | Facility: HOSPITAL | Age: 79
End: 2019-05-17

## 2019-05-17 DIAGNOSIS — I63.9 CEREBROVASCULAR ACCIDENT (CVA), UNSPECIFIED MECHANISM (HCC): ICD-10-CM

## 2019-05-17 DIAGNOSIS — I69.391 DYSPHAGIA DUE TO RECENT CEREBRAL INFARCTION: Primary | ICD-10-CM

## 2019-05-17 DIAGNOSIS — R13.12 OROPHARYNGEAL DYSPHAGIA: Primary | ICD-10-CM

## 2019-05-17 PROCEDURE — 92526 ORAL FUNCTION THERAPY: CPT | Performed by: SPEECH-LANGUAGE PATHOLOGIST

## 2019-05-17 PROCEDURE — 92507 TX SP LANG VOICE COMM INDIV: CPT | Performed by: SPEECH-LANGUAGE PATHOLOGIST

## 2019-05-17 NOTE — THERAPY TREATMENT NOTE
Outpatient Speech Language Pathology   Adult Swallow Treatment Note  [facility]     Patient Name: Mar Burgos  : 1940  MRN: [id number]  Today's Date: 2019         Visit Date: 2019   Patient Active Problem List   Diagnosis   • Anemia   • Uncontrolled type 2 diabetes mellitus (CMS/HCC)   • Diabetic neuropathy (CMS/HCC)   • Gastroesophageal reflux disease   • Gastritis   • Hearing loss   • Hyperlipidemia   • Hypertension   • Thoracic back pain   • Peripheral arterial occlusive disease (CMS/HCC)   • Personal history of breast cancer   • Personal history of other malignant neoplasm of skin   • History of tobacco use   • Seasonal allergic rhinitis   • Type 2 diabetes mellitus, without long-term current use of insulin (CMS/HCC)   • Cobalamin deficiency   • Osteoporosis screening   • Breast cancer screening   • Sinusitis   • Cerebrovascular accident (CVA) (CMS/HCC)   • Left-sided weakness   • Dysphagia due to recent cerebral infarction   • Smoker   • Right ankle swelling   • Localized swelling on left hand        Visit Dx:    ICD-10-CM ICD-9-CM   1. Dysphagia due to recent cerebral infarction I69.391 438.82   2. Cerebrovascular accident (CVA), unspecified mechanism (CMS/HCC) I63.9 434.91                         SLP OP Goals     Row Name 19 1100          Subjective Comments    Subjective Comments  Patient is pleasant and cooperative, repeat VFSS scheduled for May 31st  -        Subjective Pain    Able to rate subjective pain?  yes  -KA     Pre-Treatment Pain Level  0  -KA     Post-Treatment Pain Level  0  -KA        Dysphagia Goals    Patient will improve stage transition duration of swallow/improve timing to reduce food falling into the airway by decreasing swallow delay after neurosensory  stimulation  neurosensory stimulation;without cues  -KA     Status: Patient will improve stage transition duration of swallow/improve timing to reduce food falling into the airway by decreasing swallow  delay after neurosensory  stimulation  Progressing as expected  -KA     Comments: Patient will improve stage transition duration of swallow/improve timing to reduce food falling into the airway by decreasing swallow delay after neurosensory  stimulation  The pt. tolerated E-stim for 30 minutes with the following parameters: 11 mA; 50 Hz; 175 usec; 3 sec. ramp time, 3 sec. hold, 12 sec. break. She reported no pain/discomfort throughout 30 min. of E-stim at 11 mA. Hard swallow with thin liquids (water) completed on intervals. Patient timed swallow on peak of amplitude 80% of the time with min cues and demonstrated no overt s/s of pen/asp with water trials with use of three second hold.  -KA     Patient will increase laryngeal elevation to reduce residue that might fall into airway by completing  Mendelsohn maneuver;falsetto/pitch glide;without cues  -KA     Status: Patient will increase laryngeal elevation to reduce residue that might fall into airway by completing  Progressing as expected  -KA     Comments: Patient will increase laryngeal elevation to reduce residue that might fall into airway by completing  Mendelsohn Maneuver-  pt palpated neck to feel for elevation, completed x10 reps 100% accuracy.; falsetto completed x10 i'ly  -KA     Patient will increase strength of tongue base and posterior pharyngeal walls to reduce residue that might fall into airway by completing  effotful swallow;Laura (tongue hold);tongue base retraction;without cues  -KA     Status: Patient will increase strength of tongue base and posterior pharyngeal walls to reduce residue that might fall into airway by completing  Progressing as expected  -KA     Comments: Patient will increase strength of tongue base and posterior pharyngeal walls to reduce residue that might fall into airway by completing  effortful swallow completed throughout E-stim Laura completed x10 i'ly; tongue base retraction completed x10 i'ly  -KA     Patient will  improve hyolaryngeal elevation via completing Jorge A (head lift)  sustained lift (comment #/duration of lifts);repetitive lift (comment #/duration of lifts);without cues  -KA     Status: Patient will improve hyolaryngeal elevation via completing Jorge A (head lift)  Progressing as expected  -KA     Comments: Patient will improve hyolaryngeal elevation via completing Jorge A (head lift)  completed modified exercises with CTAR ball: sustained hold completed 30 sec. i'ly; repetitive completed x5 with min. cues   -KA     Patient will compensate for oral/pharyngeal deficits and reduce risks while eating by utilizing  compensatory strategies  no straw;multiple swallows;without cues  -KA     Status: Patient will compensate for oral/pharyngeal deficits and reduce risks while eating by utilizing  compensatory strategies  Progressing as expected  -KA     Comments: Patient will compensate for oral/pharyngeal deficits and reduce risks while eating by utilizing  compensatory strategies  Pt. was able to recall her diet recommendations without cues. Recalled strategies with minimal cues.  -KA       User Key  (r) = Recorded By, (t) = Taken By, (c) = Cosigned By    Initials Name Provider Type    Maxime Ramírez MA,CCC-SLP Speech and Language Pathologist                        Time Calculation:   SLP Start Time: 1000  SLP Stop Time: 1053  SLP Time Calculation (min): 53 min    Therapy Charges for Today     Code Description Service Date Service Provider Modifiers Qty    [id number]  ST TREATMENT SWALLOW 4 5/17/2019 Maxime Delgado MA,CCC-SLP GN 1                   Maxime Delgado MA,CCC-SLP  5/17/2019

## 2019-05-20 ENCOUNTER — APPOINTMENT (OUTPATIENT)
Dept: SPEECH THERAPY | Facility: HOSPITAL | Age: 79
End: 2019-05-20

## 2019-05-20 ENCOUNTER — HOSPITAL ENCOUNTER (OUTPATIENT)
Dept: SPEECH THERAPY | Facility: HOSPITAL | Age: 79
Setting detail: THERAPIES SERIES
Discharge: HOME | End: 2019-05-20

## 2019-05-21 ENCOUNTER — APPOINTMENT (OUTPATIENT)
Dept: SPEECH THERAPY | Facility: HOSPITAL | Age: 79
End: 2019-05-21

## 2019-05-21 ENCOUNTER — OFFICE VISIT (OUTPATIENT)
Dept: NEUROLOGY | Facility: CLINIC | Age: 79
End: 2019-05-21

## 2019-05-21 VITALS
OXYGEN SATURATION: 98 % | SYSTOLIC BLOOD PRESSURE: 164 MMHG | BODY MASS INDEX: 19.46 KG/M2 | HEART RATE: 58 BPM | DIASTOLIC BLOOD PRESSURE: 58 MMHG | WEIGHT: 114 LBS | HEIGHT: 64 IN

## 2019-05-21 DIAGNOSIS — I10 ESSENTIAL HYPERTENSION: ICD-10-CM

## 2019-05-21 DIAGNOSIS — I63.9 CEREBROVASCULAR ACCIDENT (CVA), UNSPECIFIED MECHANISM (HCC): ICD-10-CM

## 2019-05-21 DIAGNOSIS — E78.2 MIXED HYPERLIPIDEMIA: ICD-10-CM

## 2019-05-21 DIAGNOSIS — R53.1 LEFT-SIDED WEAKNESS: Primary | ICD-10-CM

## 2019-05-21 PROCEDURE — 99214 OFFICE O/P EST MOD 30 MIN: CPT | Performed by: NURSE PRACTITIONER

## 2019-05-21 NOTE — PROGRESS NOTES
DOS: 2019  NAME: Mar Burgos   : 1940  PCP: Vladimir Hernandez APRN    Chief Complaint   Patient presents with   • Stroke      Referring MD: No ref. provider found    Neurological Problem:  78 y.o. right handed female with a Hx of diabetes, hyperlipidemia, hypertension, breast cancer who presents today for 3-month stroke follow-up.  She suffered a right basal ganglia stroke in 2019.  She is accompanied by her daughter.  Patient and problem are new to examiner.  History is provided by patient, daughter, and review of records that are summarized below.    Interval History:   Mrs. Burgos presented to [facility] on 02/15/2019 with complaints of slurred speech and left-sided weakness.  Her NIHSS was 4 at that time, blood pressure on arrival 159/76 and she was in normal sinus rhythm on her EKG.  She was taking a baby aspirin 81 mg prior to admission.  CT head revealed an ovoid area of low density extending from the mid right corona radiata through the right internal capsule and into the central to posterior right putamen most consistent with an acute infarct.  MRI brain showed an acute right basal ganglia stroke with no evidence of hemorrhagic transformation.  MRA head and neck showing mild stenosis (20-30%) in the left ICA and mild to moderate stenosis (35-40%) of the occipital right vertebral artery.  2D echo with normal LA size, normal LV function, EF 57%, aortic valve with no stenosis present.  She was started on Plavix 75 mg in addition to the aspirin 81 mg and continued pravastatin 20mg due to intolerance to atorvastatin.  Per review of her discharge summary she was discharged on Plavix 75 mg, aspirin 81 mg, pravastatin 20 mg.    Mrs. Burgos presents today and continues on aspirin, Plavix, and Pravachol and is tolerating well.  She denies any recurrent or worsening speech or visual disturbances, weakness, numbness/tingling or any new stroke/TIA symptoms.  She has mild left upper extremity and  lower extremity weakness with intermittent decreased left hand , mild dysarthria and is currently working with speech therapy for her dysphagia.  Her daughter reports that sometimes when they are out walking she notices that she drags her left foot and if she is really tired she does notice her slurred speech.  She did receive 3 visits from physical therapy outpatient and was discharged.  Since discharge from the hospital in February after stroke she did present to the ER for hypertension and was treated and sent home without being admitted.  She has since been experiencing high blood pressure more at night stating her top number runs anywhere from 160s to 200s, and has associated headaches with this.  She denies any tobacco use, alcohol abuse, or substance abuse.  She would like to return to work and to start driving again.  She is able to perform mostly all of her ADLs herself.     Review of Systems:        Review of Systems   Constitutional: Negative for activity change, appetite change and unexpected weight change.   HENT: Positive for trouble swallowing. Negative for facial swelling and voice change.    Eyes: Negative for photophobia, pain and visual disturbance.   Respiratory: Negative for chest tightness, shortness of breath and wheezing.    Cardiovascular: Negative for chest pain, palpitations and leg swelling.   Gastrointestinal: Negative for abdominal pain, nausea and vomiting.   Endocrine: Negative for cold intolerance and heat intolerance.   Musculoskeletal: Positive for gait problem. Negative for arthralgias, back pain, joint swelling, myalgias, neck pain and neck stiffness.   Neurological: Positive for weakness and headaches. Negative for dizziness, tremors, seizures, syncope, facial asymmetry, speech difficulty, light-headedness and numbness.   Hematological: Does not bruise/bleed easily.   Psychiatric/Behavioral: Negative for agitation, behavioral problems, confusion, decreased concentration,  "dysphoric mood, hallucinations, self-injury, sleep disturbance and suicidal ideas. The patient is not nervous/anxious and is not hyperactive.        \"The following portions of the patient's history were reviewed and updated as appropriate: allergies, current medications, past family history, past medical history, past social history, past surgical history and problem list.\"    Review and Interpretation of Imaging:    CT/CTA/CTP: An ovoid area of low density extending from the mid right corona radiata through the right internal capsule and into the central to posterior right putamen most consistent with an acute infarct.    MRI/MRA:  An acute right basal ganglia stroke with no evidence of hemorrhagic transformation.  MRA head and neck showing mild stenosis (20-30%) in the left ICA and mild to moderate stenosis (35-40%) of the occipital right vertebral artery    TTE: normal LA size, normal LV function, EF 57%, aortic valve with no stenosis present    Laboratory Results:             Lab Results   Component Value Date    HGBA1C 6.80 (H) 02/16/2019     Lab Results   Component Value Date    CHOL 226 (H) 02/16/2019     Lab Results   Component Value Date    HDL 40 02/16/2019    HDL 37 (L) 01/16/2017     Lab Results   Component Value Date     (H) 02/16/2019     (H) 01/16/2017     Lab Results   Component Value Date    TRIG 247 (H) 02/16/2019    TRIG 287 (H) 01/16/2017     No components found for: CHOLHDL  No results found for: RPR  Lab Results   Component Value Date    TSH 2.720 02/16/2019     No results found for: DZXSOQXS13  Lab Results   Component Value Date    GLUCOSE 169 (H) 04/11/2019    BUN 27 (H) 04/11/2019    CREATININE 1.04 (H) 04/11/2019    EGFRIFNONA 51 (L) 04/11/2019    EGFRIFAFRI 42 (L) 01/16/2017    BCR 26.0 (H) 04/11/2019    K 4.4 04/11/2019    CO2 23.9 04/11/2019    CALCIUM 9.4 04/11/2019    PROTENTOTREF 7.8 01/16/2017    ALBUMIN 4.10 04/11/2019    LABIL2 1.4 01/16/2017    AST 14 04/11/2019    " ALT 12 04/11/2019     Lab Results   Component Value Date    WBC 7.36 04/11/2019    HGB 10.9 (L) 04/11/2019    HCT 34.0 04/11/2019    MCV 89.7 04/11/2019     04/11/2019     Lab Results   Component Value Date    INR 1.05 04/11/2019    INR 0.98 02/15/2019    INR 0.9 04/05/2015    PROTIME 13.4 04/11/2019    PROTIME 12.8 02/15/2019    PROTIME 10.4 04/05/2015       Physical Examination:   mRS:   General Appearance:   Well developed, well nourished, well groomed, alert, and cooperative.  HEENT: Normocephalic. Atraumatic.   Cardiac: Regular rate and rhythm.   Extremities:    Equal pulses. No edema.  Respiratory:   Even and unlabored.    Neurological examination:  Higher Integrative  Function: Oriented to time, place and person. Normal registration, recall, attention span and concentration. Normal language including comprehension, spontaneous speech, repetition, reading, writing, naming and vocabulary. No neglect with normal visual-spatial function and construction. Normal fund of knowledge and higher integrative function.  CN II: Pupils are equal, round, and reactive to light. Normal visual acuity and visual fields.    CN III IV VI: Extraocular movements are full without nystagmus.   CN V: Normal facial sensation and strength of muscles of mastication.  CN VII: Facial movements are symmetric. No weakness.  CN VIII:   Auditory acuity is decreased, chronic history of hearing loss  CN IX & X:   Symmetric palatal movement.  CN XI: Sternocleidomastoid and trapezius are normal.  No weakness.  CN XII:   The tongue is midline.  No atrophy or fasciculations.  Motor: 5/5 strength on right, +4/5 LUE/LLE,  bulk and tone in upper and lower extremities normal.  No fasciculations, rigidity, spasticity, or abnormal movements.  Reflexes: 2+ in the upper and lower extremities. Plantar responses are flexor.  Sensation: Normal to light touch, vibration, temperature, and proprioception in arms and legs. Normal graphesthesia   Station and  Gait: Slow gait and station.    Coordination: Finger to nose test shows no dysmetria.  Rapid alternating movements are normal.     Impression/Assessment:    Mrs. Burgos is doing well following her right basal ganglia stroke she suffered in February 2019 while on baby aspirin.  The etiology of her event likely due to small vessel disease.  She has since been on aspirin 81 mg, Plavix 75 mg, statin with no new stroke/TIA symptoms.  She does remain with some mild left-sided weakness and intermittent dysarthria.  She is currently working with speech therapy and has swallow study scheduled for next week.  She recently had some adjustments with her medications and her blood pressure, she states that her blood pressure runs higher at night but has been running high every day 160s to 200s and she has been experiencing headaches.  I would like for her to speak with her PCP about BP management tomorrow as well as repeating a lipid panel during her appointment.  She will need her blood pressure lowered slowly, given her longstanding history of hypertension.  She is requesting outpatient physical therapy as her and her daughter feel that she still has some left hand  weakness and drags her left foot at times when she walks and would like to see about using a walker.  She has been doing her exercises that physical therapy taught her during treatments.  She also would like to start driving again.  Her daughter feels that her reaction time is not the best.  Recommend occupational driving rehab prior to resuming driving.  She also wants to go back to work.  She works in a [employer] and feels that she could return as long as she did not have to lift anything over 10 pounds.  I am okay with her returning back to work, and performing her duties as tolerated.  She states that she has previously had a sleep study done and did not require a CPAP mask.  Her stop bang score was of 4 which puts her at intermediate risk for obstructive  sleep apnea, I have instructed patient to follow-up with her pulmonologist for possible re-screen for MARCELO.  I have informed the patient to stop her Plavix and continue her aspirin 81 mg and pravastatin 20 mg.  We discussed at length her personal risk factors for stroke and the importance of risk factor control for stroke prevention, including blood pressure and cholesterol control.  She will monitor blood pressure regularly and follow-up with PCP for continued cholesterol surveillance.  We discussed stroke/TIA symptoms and importance of calling 911 if she were to experience any of these.  Follow-up with me in 1 year, sooner if symptoms warrant.    Plan:     Stop Plavix, continue aspirin 81 mg and pravastatin 20 mg  Repeat lipid panel with PCP tomorrow  Outpatient physical therapy referral for persistent left-sided weakness and evaluation for walker  Follow-up with pulmonology for evaluation of repeat sleep study  Recommend occupational driving rehab prior to resuming driving   Blood pressure control to <130/80   Goal LDL <70-recommend high dose statins-    Serum glucose < 140   Call 911 for stroke any stroke symptoms   Follow-up with me in 1 year for stroke follow-up  Mar was seen today for stroke.    Diagnoses and all orders for this visit:    Left-sided weakness  -     Ambulatory Referral to Physical Therapy Evaluate and treat; Strengthening; Left; Full weight bearing    Mixed hyperlipidemia    Essential hypertension    Cerebrovascular accident (CVA), unspecified mechanism (CMS/HCC)        MDM  Reviewed: previous chart, nursing note and vitals  Reviewed previous: labs, ECG, MRI and CT scan  Interpretation: labs, ECG, MRI and CT scan        JEFFREY Guzmán    **Ceci Disclaimer:**  Much of this encounter note is an electronic transcription/translation of spoken language to printed text. The electronic translation of spoken language may permit erroneous, or at times, nonsensical words or phrases to be  inadvertently transcribed. Although I have reviewed the note for such errors, some may still exist.

## 2019-05-22 ENCOUNTER — OFFICE VISIT (OUTPATIENT)
Dept: FAMILY MEDICINE CLINIC | Facility: CLINIC | Age: 79
End: 2019-05-22

## 2019-05-22 VITALS
OXYGEN SATURATION: 99 % | DIASTOLIC BLOOD PRESSURE: 70 MMHG | WEIGHT: 116 LBS | TEMPERATURE: 98.1 F | BODY MASS INDEX: 19.81 KG/M2 | HEART RATE: 61 BPM | SYSTOLIC BLOOD PRESSURE: 170 MMHG | HEIGHT: 64 IN

## 2019-05-22 DIAGNOSIS — E78.2 MIXED HYPERLIPIDEMIA: ICD-10-CM

## 2019-05-22 DIAGNOSIS — I10 ESSENTIAL HYPERTENSION: Primary | ICD-10-CM

## 2019-05-22 PROCEDURE — 99214 OFFICE O/P EST MOD 30 MIN: CPT | Performed by: NURSE PRACTITIONER

## 2019-05-22 RX ORDER — LISINOPRIL 20 MG/1
20 TABLET ORAL DAILY
Qty: 90 TABLET | Refills: 3 | Status: SHIPPED | OUTPATIENT
Start: 2019-05-22 | End: 2019-07-16

## 2019-05-22 NOTE — PROGRESS NOTES
"Subjective   Mar Burgos is a 78 y.o. female.     History of Present Illness   Into the office today with concerns of her blood pressure.  At night is getting all the way up to the 200s.  She is taking all of her blood pressure medication daily as directed and it is well controlled until the nighttime hits.  She is also having some headaches at night due to the blood pressure.    She is fasting also today to have her cholesterol rechecked.  She has been taking her medication daily as directed.  She is also seen neurology for stroke follow-up.  The following portions of the patient's history were reviewed and updated as appropriate: allergies, current medications, past social history and problem list.    Review of Systems   Constitutional: Negative for fever.   Respiratory: Negative for cough and shortness of breath.    Cardiovascular: Negative for chest pain.   Gastrointestinal: Negative for abdominal pain.   Neurological: Negative for dizziness.       Objective   /70 (BP Location: Right arm, Patient Position: Sitting)   Pulse 61   Temp 98.1 °F (36.7 °C)   Ht 162.6 cm (64.02\")   Wt 52.6 kg (116 lb)   SpO2 99%   BMI 19.90 kg/m²   Physical Exam   Constitutional: She is oriented to person, place, and time. Vital signs are normal. She appears well-developed and well-nourished. No distress.   HENT:   Head: Normocephalic.   Cardiovascular: Normal rate, regular rhythm and normal heart sounds.   Pulmonary/Chest: Effort normal and breath sounds normal.   Neurological: She is alert and oriented to person, place, and time. Gait normal.   Psychiatric: She has a normal mood and affect. Her behavior is normal. Judgment and thought content normal.   Vitals reviewed.      Assessment/Plan      Diagnosis Plan   1. Essential hypertension  lisinopril (PRINIVIL,ZESTRIL) 20 MG tablet   2. Mixed hyperlipidemia  Lipid Panel With LDL / HDL Ratio     Increase Lisinopril to 20mg a day and call with readings in 1 week    Vladimir" David, APRN  5/22/2019

## 2019-05-23 LAB
CHOLEST SERPL-MCNC: 116 MG/DL (ref 0–200)
HDLC SERPL-MCNC: 33 MG/DL (ref 40–60)
LDLC SERPL CALC-MCNC: 50 MG/DL (ref 0–100)
LDLC/HDLC SERPL: 1.53 {RATIO}
TRIGL SERPL-MCNC: 163 MG/DL (ref 0–150)
VLDLC SERPL CALC-MCNC: 32.6 MG/DL

## 2019-05-24 ENCOUNTER — HOSPITAL ENCOUNTER (OUTPATIENT)
Dept: SPEECH THERAPY | Facility: HOSPITAL | Age: 79
Setting detail: THERAPIES SERIES
Discharge: HOME | End: 2019-05-24

## 2019-05-24 DIAGNOSIS — I69.391 DYSPHAGIA DUE TO RECENT CEREBRAL INFARCTION: Primary | ICD-10-CM

## 2019-05-24 DIAGNOSIS — I63.9 CEREBROVASCULAR ACCIDENT (CVA), UNSPECIFIED MECHANISM (HCC): ICD-10-CM

## 2019-05-24 PROCEDURE — 92526 ORAL FUNCTION THERAPY: CPT | Performed by: SPEECH-LANGUAGE PATHOLOGIST

## 2019-05-24 NOTE — THERAPY TREATMENT NOTE
Outpatient Speech Language Pathology   Adult Swallow Treatment Note  [facility]     Patient Name: Mar Burgos  : 1940  MRN: [id number]  Today's Date: 2019         Visit Date: 2019   Patient Active Problem List   Diagnosis   • Anemia   • Uncontrolled type 2 diabetes mellitus (CMS/HCC)   • Diabetic neuropathy (CMS/HCC)   • Gastroesophageal reflux disease   • Gastritis   • Hearing loss   • Hyperlipidemia   • Hypertension   • Thoracic back pain   • Peripheral arterial occlusive disease (CMS/HCC)   • Personal history of breast cancer   • Personal history of other malignant neoplasm of skin   • History of tobacco use   • Seasonal allergic rhinitis   • Type 2 diabetes mellitus, without long-term current use of insulin (CMS/HCC)   • Cobalamin deficiency   • Osteoporosis screening   • Breast cancer screening   • Sinusitis   • Cerebrovascular accident (CVA) (CMS/HCC)   • Left-sided weakness   • Dysphagia due to recent cerebral infarction   • Smoker   • Right ankle swelling   • Localized swelling on left hand        Visit Dx:    ICD-10-CM ICD-9-CM   1. Dysphagia due to recent cerebral infarction I69.391 438.82   2. Cerebrovascular accident (CVA), unspecified mechanism (CMS/HCC) I63.9 434.91                         SLP OP Goals     Row Name 19 1000          Subjective Comments    Subjective Comments  Patient is pleasant and cooperative, repeat VFSS next   -KA        Subjective Pain    Able to rate subjective pain?  yes  -KA     Pre-Treatment Pain Level  0  -KA     Post-Treatment Pain Level  0  -KA        Dysphagia Goals    Patient will improve stage transition duration of swallow/improve timing to reduce food falling into the airway by decreasing swallow delay after neurosensory  stimulation  neurosensory stimulation;without cues  -KA     Status: Patient will improve stage transition duration of swallow/improve timing to reduce food falling into the airway by decreasing swallow delay  after neurosensory  stimulation  Progressing as expected  -KA     Comments: Patient will improve stage transition duration of swallow/improve timing to reduce food falling into the airway by decreasing swallow delay after neurosensory  stimulation  The pt. tolerated E-stim for 30 minutes with the following parameters: 9 mA; 50 Hz; 175 usec; 3 sec. ramp time, 3 sec. hold, 12 sec. break. She reported no pain/discomfort throughout 30 min. of E-stim at 11 mA. Hard swallow with thin liquids (water) completed on intervals. Patient timed swallow on peak of amplitude 80% of the time with min cues and demonstrated no overt s/s of pen/asp with water trials with use of three second hold.  -KA     Patient will increase laryngeal elevation to reduce residue that might fall into airway by completing  Mendelsohn maneuver;falsetto/pitch glide;without cues  -KA     Status: Patient will increase laryngeal elevation to reduce residue that might fall into airway by completing  Progressing as expected  -KA     Comments: Patient will increase laryngeal elevation to reduce residue that might fall into airway by completing  Mendelsohn Maneuver-  pt palpated neck to feel for elevation, completed x10 reps 100% accuracy.; falsetto completed x10 i'ly  -KA     Patient will increase strength of tongue base and posterior pharyngeal walls to reduce residue that might fall into airway by completing  effotful swallow;Laura (tongue hold);tongue base retraction;without cues  -KA     Status: Patient will increase strength of tongue base and posterior pharyngeal walls to reduce residue that might fall into airway by completing  Progressing as expected  -KA     Comments: Patient will increase strength of tongue base and posterior pharyngeal walls to reduce residue that might fall into airway by completing  effortful swallow completed throughout E-stim Laura completed x10 i'ly; tongue base retraction completed x10 i'ly  -KA     Patient will compensate  for oral/pharyngeal deficits and reduce risks while eating by utilizing  compensatory strategies  no straw;multiple swallows;without cues  -KA     Status: Patient will compensate for oral/pharyngeal deficits and reduce risks while eating by utilizing  compensatory strategies  Progressing as expected  -KA     Comments: Patient will compensate for oral/pharyngeal deficits and reduce risks while eating by utilizing  compensatory strategies  Pt. was able to recall her diet recommendations without cues. Recalled strategies with minimal cues.  -WALTER       User Key  (r) = Recorded By, (t) = Taken By, (c) = Cosigned By    Initials Name Provider Type    Maxime Ramírez MA,CCC-SLP Speech and Language Pathologist                        Time Calculation:   SLP Start Time: 1000  SLP Stop Time: 1053  SLP Time Calculation (min): 53 min    Therapy Charges for Today     Code Description Service Date Service Provider Modifiers Qty    84736458118 HC ST TREATMENT SWALLOW 4 5/24/2019 Maxime Delgado MA,CCC-SLP GN 1                   Maxime Delgado MA,CCC-SLP  5/24/2019

## 2019-05-31 ENCOUNTER — HOSPITAL ENCOUNTER (OUTPATIENT)
Dept: GENERAL RADIOLOGY | Facility: HOSPITAL | Age: 79
Discharge: HOME | End: 2019-05-31
Admitting: FAMILY MEDICINE

## 2019-05-31 ENCOUNTER — APPOINTMENT (OUTPATIENT)
Dept: SPEECH THERAPY | Facility: HOSPITAL | Age: 79
End: 2019-05-31

## 2019-05-31 ENCOUNTER — HOSPITAL ENCOUNTER (OUTPATIENT)
Dept: PHYSICAL THERAPY | Facility: HOSPITAL | Age: 79
Setting detail: THERAPIES SERIES
Discharge: HOME | End: 2019-05-31

## 2019-05-31 DIAGNOSIS — R13.12 OROPHARYNGEAL DYSPHAGIA: Primary | ICD-10-CM

## 2019-05-31 DIAGNOSIS — I69.952 HEMIPARESIS OF LEFT DOMINANT SIDE AS LATE EFFECT OF CEREBROVASCULAR DISEASE, UNSPECIFIED CEREBROVASCULAR DISEASE TYPE (HCC): Primary | ICD-10-CM

## 2019-05-31 DIAGNOSIS — R26.89 FUNCTIONAL GAIT ABNORMALITY: ICD-10-CM

## 2019-05-31 PROCEDURE — 97162 PT EVAL MOD COMPLEX 30 MIN: CPT

## 2019-05-31 PROCEDURE — 74230 X-RAY XM SWLNG FUNCJ C+: CPT

## 2019-05-31 PROCEDURE — 92611 MOTION FLUOROSCOPY/SWALLOW: CPT | Performed by: SPEECH-LANGUAGE PATHOLOGIST

## 2019-06-03 ENCOUNTER — APPOINTMENT (OUTPATIENT)
Dept: SPEECH THERAPY | Facility: HOSPITAL | Age: 79
End: 2019-06-03

## 2019-06-03 ENCOUNTER — TELEPHONE (OUTPATIENT)
Dept: FAMILY MEDICINE CLINIC | Facility: CLINIC | Age: 79
End: 2019-06-03

## 2019-06-03 ENCOUNTER — HOSPITAL ENCOUNTER (OUTPATIENT)
Dept: SPEECH THERAPY | Facility: HOSPITAL | Age: 79
Setting detail: THERAPIES SERIES
Discharge: HOME | End: 2019-06-03

## 2019-06-03 ENCOUNTER — HOSPITAL ENCOUNTER (OUTPATIENT)
Dept: PHYSICAL THERAPY | Facility: HOSPITAL | Age: 79
Setting detail: THERAPIES SERIES
Discharge: HOME | End: 2019-06-03

## 2019-06-03 DIAGNOSIS — I69.391 DYSPHAGIA DUE TO RECENT CEREBRAL INFARCTION: Primary | ICD-10-CM

## 2019-06-03 DIAGNOSIS — R53.1 LEFT-SIDED WEAKNESS: ICD-10-CM

## 2019-06-03 DIAGNOSIS — I63.9 CEREBROVASCULAR ACCIDENT (CVA), UNSPECIFIED MECHANISM (HCC): ICD-10-CM

## 2019-06-03 DIAGNOSIS — I63.9 CEREBROVASCULAR ACCIDENT (CVA), UNSPECIFIED MECHANISM (HCC): Primary | ICD-10-CM

## 2019-06-03 PROCEDURE — 92507 TX SP LANG VOICE COMM INDIV: CPT | Performed by: SPEECH-LANGUAGE PATHOLOGIST

## 2019-06-03 PROCEDURE — 97112 NEUROMUSCULAR REEDUCATION: CPT

## 2019-06-03 NOTE — TELEPHONE ENCOUNTER
Patients daughter left message stating patient saw charisse last week for b/p and started lisinopril. She said that the lisinopril really hasn't helped. She said she was on amlodipine but it was discontinued when she was at the hospital due to ankle swelling. She said that medication did seem to help with her b/p though

## 2019-06-03 NOTE — TELEPHONE ENCOUNTER
Left message stating charisse needs to know what b/p readings are and to call office back to let her know

## 2019-06-03 NOTE — THERAPY TREATMENT NOTE
Outpatient Speech Language Pathology   Adult Speech Language Cognitive Treatment Note  [facility]     Patient Name: Mar Burgos  : 1940  MRN: [id number]  Today's Date: 6/3/2019         Visit Date: 2019   Patient Active Problem List   Diagnosis   • Anemia   • Uncontrolled type 2 diabetes mellitus (CMS/HCC)   • Diabetic neuropathy (CMS/HCC)   • Gastroesophageal reflux disease   • Gastritis   • Hearing loss   • Hyperlipidemia   • Hypertension   • Thoracic back pain   • Peripheral arterial occlusive disease (CMS/HCC)   • Personal history of breast cancer   • Personal history of other malignant neoplasm of skin   • History of tobacco use   • Seasonal allergic rhinitis   • Type 2 diabetes mellitus, without long-term current use of insulin (CMS/HCC)   • Cobalamin deficiency   • Osteoporosis screening   • Breast cancer screening   • Sinusitis   • Cerebrovascular accident (CVA) (CMS/HCC)   • Left-sided weakness   • Dysphagia due to recent cerebral infarction   • Smoker   • Right ankle swelling   • Localized swelling on left hand          Visit Dx:    ICD-10-CM ICD-9-CM   1. Dysphagia due to recent cerebral infarction I69.391 438.82   2. Cerebrovascular accident (CVA), unspecified mechanism (CMS/HCC) I63.9 434.91         SLP Adult Swallow Evaluation - 19 1200        Rehab Evaluation    Document Type  re-evaluation   -KA    Subjective Information  no complaints   -KA    Patient Observations  alert;cooperative   -KA    Patient Effort  good   -KA    Symptoms Noted During/After Treatment  none   -KA       General Information    Patient Profile Reviewed  yes   -KA    Pertinent History Of Current Problem  Hx of CVA refer to previous swallow evaluation for history. Repeat VFSS to determine progress from outpatient dysphagia therapy. patient has participated in five weeks of therapy including e-stim twice a week and independent in home exerise program she completes daily.    -KA    Current Method of  Nutrition  mechanical soft, no mixed consistencies;honey-thick liquids   -KA    Precautions/Limitations, Vision  WFL;for purposes of eval   -KA    Precautions/Limitations, Hearing  hearing impairment, bilaterally   -KA    Prior Level of Function-Communication  WFL   -KA    Prior Level of Function-Swallowing  no diet consistency restrictions   -    Plans/Goals Discussed with  patient;family;agreed upon   -    Barriers to Rehab  none identified   -KA    Patient's Goals for Discharge  return to regular diet   -    Family Goals for Discharge  patient able to return to regular diet   -       Oral Motor and Function    Dentition Assessment  upper dentures/partial in place;lower dentures/partial in place   -KA       MBS/VFSS    Utensils Used  spoon;cup   -KA    Consistencies Trialed  regular textures;soft textures;chopped;pureed;thin liquids;nectar/syrup-thick liquids;honey-thick liquids mixed   -KA       MBS/VFSS Interpretation    Oral Prep Phase  WFL   -KA    Oral Transit Phase  impaired   -KA    Oral Residue  WFL   -KA    VFSS Summary  Mild to moderate oropharyngeal dysphagia characterized by premture spillage, mistiming, reduced laryngeal vestibule closure, epiglottic deflection and hyolaryngeal excursion. Premature spillage to pyriforms with thins and NTL, with deep penetration and trace aspiration of thins during the swallow. Trace laryngeal vestibule residue. Initially pt displayed no penetration with NTL, however end of VFSS deep penetration and trace aspiration with NTL during liquid wash with regular cracker. Deep penetration also occured during regular cup size of NTL with laryngeal vestibule residue. No penetration with HTL, puree, mech soft no mixed and regular solids. Deep penetration and silent aspiration juice from peaches. All penetration/aspiration was silent and pt cued to throat clear/cough throughout VFSS. Patient did demonstrate improvement with pharyngeal strength and BOT retraction in  comparison to last VFSS, pt had no pharyngeal residue with all consistencies. Patient also has laryngeal calcification and times to difficult to r/o possible trace pen/asp at times throughout VFSS.     -       SLP Communication to Radiology    Summary Statement  Mild to moderate oropharyngeal dysphagia characterized by premture spillage, mistiming, reduced laryngeal vestibule closure, epiglottic deflection and hyolaryngeal excursion. Premature spillage to pyriforms with thins and NTL, with deep penetration and trace aspiration of thins during the swallow. Trace laryngeal vestibule residue. Initially pt displayed no penetration with NTL, however end of VFSS deep penetration and trace aspiration with NTL during liquid wash with regular cracker. Deep penetration also occured during regular cup size of NTL with laryngeal vestibule residue. No penetration with HTL, puree, mech soft no mixed and regular solids. Deep penetration and silent aspiration juice from peaches. All penetration/aspiration was silent and pt cued to throat clear/cough throughout VFSS. Patient did demonstrate improvement with pharyngeal strength and BOT retraction in comparison to last VFSS, pt had no pharyngeal residue with all consistencies.    -KA       Clinical Impression    SLP Swallowing Diagnosis  mild-moderate;oral dysfunction;pharyngeal dysfunction   -KA    Functional Impact  risk of aspiration/pneumonia   -KA    Rehab Potential/Prognosis, Swallowing  good, to achieve stated therapy goals   -    Swallow Criteria for Skilled Therapeutic Interventions Met  demonstrates skilled criteria   -       Recommendations    Therapy Frequency (Swallow)  2 days per week   -KA    Predicted Duration Therapy Intervention (Days)  until discharge   -    SLP Diet Recommendation  regular textures;water between meals after oral care, with supervision;ice chips between meals after oral care, with supervision;honey thick liquids no  mixed consistencies     -KA    Recommended Precautions and Strategies  upright posture during/after eating;small bites of food and sips of liquid   -KA    SLP Rec. for Method of Medication Administration  meds whole;with pudding or applesauce   -KA    Monitor for Signs of Aspiration  yes;notify SLP if any concerns;right lower lobe infiltrates;pneumonia   -KA      User Key  (r) = Recorded By, (t) = Taken By, (c) = Cosigned By    Initials Name Provider Type    Maxime Ramírez MA,[facility]-SLP Speech and Language Pathologist                      SLP OP Goals     Row Name 06/03/19 1600          Goal Type Needed    Goal Type Needed  Other Adult Goals  -KA        Subjective Comments    Subjective Comments  SLP continued education today on VFSS from Friday 5/31, pt and daughter voiced understanding of education completed.  -KA        Subjective Pain    Able to rate subjective pain?  yes  -KA     Pre-Treatment Pain Level  0  -KA     Post-Treatment Pain Level  0  -KA        Dysphagia Goals    Patient will improve stage transition duration of swallow/improve timing to reduce food falling into the airway by decreasing swallow delay after neurosensory  stimulation  neurosensory stimulation;without cues  -KA     Status: Patient will improve stage transition duration of swallow/improve timing to reduce food falling into the airway by decreasing swallow delay after neurosensory  stimulation  Progressing as expected  -KA     Comments: Patient will improve stage transition duration of swallow/improve timing to reduce food falling into the airway by decreasing swallow delay after neurosensory  stimulation  The pt. tolerated E-stim for 30 minutes with the following parameters: 9 mA; 50 Hz; 175 usec; 3 sec. ramp time, 3 sec. hold, 12 sec. break. She reported no pain/discomfort throughout 30 min. of E-stim at 11 mA. Hard swallow with thin liquids (water) completed on intervals. Patient timed swallow on peak of amplitude 80% of the time with min cues and  demonstrated no overt s/s of pen/asp with water trials with use of two-three second hold.  -KA     Patient will compensate for oral/pharyngeal deficits and reduce risks while eating by utilizing  compensatory strategies  no straw;multiple swallows;without cues  -KA     Status: Patient will compensate for oral/pharyngeal deficits and reduce risks while eating by utilizing  compensatory strategies  Achieved  -KA     Comments: Patient will compensate for oral/pharyngeal deficits and reduce risks while eating by utilizing  compensatory strategies  Patient independent with strategies, and does not require a consistent multiple swallow due to improved strength in swallow with no pharyngeal residue after all consistencies on VFSS 5/31, pt to continue with small bites and sips and slow rate  -KA        Other Goals    Other Adult Goal- 1  Patient will increase timing of swallow and reduce risk for premature spillage with practice of 2 second hold with thin liquids without overt s/s of pen/asp 90% of the time  -KA     Status: Other Adult Goal- 1  New  -KA     Comments: Other Adult Goal- 1  Really practiced and reinforced use of 2 second hold with water trials today during e-stim with education on purpose, and risks for asp due to swallow delay, mistiming and premature spillage. SLP printed multiple handouts for patient for her to put in areas in he house for reminders, and daughter also discussed putting a reminder on patients cup. Patient practiced and demonstrated 70% accuracy with min cues   -KA       User Key  (r) = Recorded By, (t) = Taken By, (c) = Cosigned By    Initials Name Provider Type    Maxime Ramírez MA,[facility]-SLP Speech and Language Pathologist                         Time Calculation:   SLP Start Time: 1400  SLP Stop Time: 1500  SLP Time Calculation (min): 60 min    Therapy Charges for Today     Code Description Service Date Service Provider Modifiers Qty    [id number] [facility] TREATMENT SPEECH 4 6/3/2019  Maxime Delgado MA,CCC-SLP GN 1                   Maxime Delgado MA,CCC-SLP  6/3/2019

## 2019-06-04 ENCOUNTER — APPOINTMENT (OUTPATIENT)
Dept: SPEECH THERAPY | Facility: HOSPITAL | Age: 79
End: 2019-06-04

## 2019-06-04 NOTE — TELEPHONE ENCOUNTER
Patient's daughter called and left message to give charisse her b/p readings Friday 185/79 Sunday 201/77 she said she had her lay down for a while and take her evening meds than rechecked it and it was 162/72. She said the other readings have been around that range

## 2019-06-04 NOTE — THERAPY TREATMENT NOTE
"    Outpatient Physical Therapy Neuro Treatment Note  [facility]     Patient Name: Mar Burgos  : 1940  MRN: [id number]  Today's Date: 2019      Visit Date: 2019    Visit Dx:    ICD-10-CM ICD-9-CM   1. Cerebrovascular accident (CVA), unspecified mechanism (CMS/HCC) I63.9 434.91   2. Left-sided weakness R53.1 728.87       Patient Active Problem List   Diagnosis   • Anemia   • Uncontrolled type 2 diabetes mellitus (CMS/HCC)   • Diabetic neuropathy (CMS/HCC)   • Gastroesophageal reflux disease   • Gastritis   • Hearing loss   • Hyperlipidemia   • Hypertension   • Thoracic back pain   • Peripheral arterial occlusive disease (CMS/HCC)   • Personal history of breast cancer   • Personal history of other malignant neoplasm of skin   • History of tobacco use   • Seasonal allergic rhinitis   • Type 2 diabetes mellitus, without long-term current use of insulin (CMS/HCC)   • Cobalamin deficiency   • Osteoporosis screening   • Breast cancer screening   • Sinusitis   • Cerebrovascular accident (CVA) (CMS/HCC)   • Left-sided weakness   • Dysphagia due to recent cerebral infarction   • Smoker   • Right ankle swelling   • Localized swelling on left hand           PT Neuro     Row Name 19 3155             Subjective Comments    Subjective Comments  \"My foot  is not hurting.\" \"I feel a little wobbly in the morning.\" \"I think a walker would help.\" Pt's daughter reports she was outside in the garden earlier today.   -LB         Precautions and Contraindications    Precautions  B mastectomies in   -LB         Subjective Pain    Able to rate subjective pain?  yes  -LB      Pre-Treatment Pain Level  0  -LB      Post-Treatment Pain Level  0  -LB      Subjective Pain Comment  Pt denied any pain in her left foot and reports she has been wearing her shoes in the house verses her slippers.   -LB         Cognition    Memory  -- good recall during PT session   -LB      Orientation Level  Oriented to " place;Oriented to situation;Oriented to person  -LB      Safety Judgment  Good awareness of safety precautions  -LB      Deficits  Fully aware of deficits  -LB         Posture/Observations    Alignment Options  Genu varus  -LB      Genu varus  Left:;Mild  -LB         Transfers    Sit-Stand Perry (Transfers)  conditional independence  -LB      Stand-Sit Perry (Transfers)  conditional independence  -LB      Transfers, Sit-Stand-Sit, Assist Device  -- light ue of her UE's   -LB         Gait/Stairs Assessment/Training    Perry Level (Gait)  supervision;conditional independence;verbal cues verbal cues for placement of the cane  -LB      Assistive Device (Gait)  walker, front-wheeled;walker, 4-wheeled;cane, straight;other (see comments) no device at beginning of session   -LB      Distance in Feet (Gait)  80' x 6  (2 x without a device, 2 x with rolling walker (swivel and non-swivel), rollator and the cane  -LB      Pattern (Gait)  step-through  -LB      Deviations/Abnormal Patterns (Gait)  jose decreased;stride length decreased  -LB      Left Sided Gait Deviations  -- genu varus ,  no inversion   -LB        User Key  (r) = Recorded By, (t) = Taken By, (c) = Cosigned By    Initials Name Provider Type    Kasie Alba, PT Physical Therapist                  PT Assessment/Plan     Row Name 06/04/19 0915          PT Assessment    Assessment Comments  Pt reporting no pain in her left ankle otr sole of her foot. Pt demonstrated no left ankle inversion during PT session. Pt was evaluated with various assitive devices and preferred the swivel wheeled rolling walker. Pt would benefit from use of the rolling walker for stability early in am, late evening and longer disances  -LB       User Key  (r) = Recorded By, (t) = Taken By, (c) = Cosigned By    Initials Name Provider Type    Kasie Alba, PT Physical Therapist             Exercises     Row Name 06/03/19 1021             Subjective Comments     "Subjective Comments  \"My foot  is not hurting.\" \"I feel a little wobbly in the morning.\" \"I think a walker would help.\" Pt's daughter reports she was outside in the garden earlier today.   -LB         Subjective Pain    Able to rate subjective pain?  yes  -LB      Pre-Treatment Pain Level  0  -LB      Post-Treatment Pain Level  0  -LB      Subjective Pain Comment  Pt denied any pain in her left foot and reports she has been wearing her shoes in the house verses her slippers.   -LB        User Key  (r) = Recorded By, (t) = Taken By, (c) = Cosigned By    Initials Name Provider Type    LB Kasie Hurst, PT Physical Therapist                          Therapy Education  Education Details: Instructed ptin proper use of a rolling walker on level surfcae and on the curb. Discussed options for various walkers.   Given: Mobility training  How Provided: Verbal, Demonstration  Provided to: Patient, Caregiver  Level of Understanding: Teach back education performed, Verbalized, Demonstrated              Time Calculation:   Start Time: 1515  Stop Time: 1545  Time Calculation (min): 30 min  Total Timed Code Minutes- PT: 30 minute(s)   Therapy Charges for Today     Code Description Service Date Service Provider Modifiers Qty    79387924506 HC PT NEUROMUSC RE EDUCATION EA 15 MIN 6/3/2019 Kasie Hurst, PT GP 2                    Kasie Hurst, PT  6/4/2019     "

## 2019-06-05 NOTE — TELEPHONE ENCOUNTER
Just verifying she should be on clonidine 0.2 bid  Lisinopril 20mg qd  Metoprolol 50mg qd    increase metoprolol to 100mg a day

## 2019-06-05 NOTE — TELEPHONE ENCOUNTER
Left message letting patient's daughter know that charisse is verifying what medication she should be taking and that she wants her to increase the metoprolol to 100mg

## 2019-06-07 ENCOUNTER — APPOINTMENT (OUTPATIENT)
Dept: PHYSICAL THERAPY | Facility: HOSPITAL | Age: 79
End: 2019-06-07

## 2019-06-07 ENCOUNTER — APPOINTMENT (OUTPATIENT)
Dept: SPEECH THERAPY | Facility: HOSPITAL | Age: 79
End: 2019-06-07

## 2019-06-10 ENCOUNTER — HOSPITAL ENCOUNTER (OUTPATIENT)
Dept: PHYSICAL THERAPY | Facility: HOSPITAL | Age: 79
Setting detail: THERAPIES SERIES
Discharge: HOME | End: 2019-06-10

## 2019-06-10 ENCOUNTER — APPOINTMENT (OUTPATIENT)
Dept: SPEECH THERAPY | Facility: HOSPITAL | Age: 79
End: 2019-06-10

## 2019-06-10 ENCOUNTER — HOSPITAL ENCOUNTER (OUTPATIENT)
Dept: SPEECH THERAPY | Facility: HOSPITAL | Age: 79
Setting detail: THERAPIES SERIES
Discharge: HOME | End: 2019-06-10

## 2019-06-10 DIAGNOSIS — R26.89 FUNCTIONAL GAIT ABNORMALITY: ICD-10-CM

## 2019-06-10 DIAGNOSIS — I69.952 HEMIPARESIS OF LEFT DOMINANT SIDE AS LATE EFFECT OF CEREBROVASCULAR DISEASE, UNSPECIFIED CEREBROVASCULAR DISEASE TYPE (HCC): ICD-10-CM

## 2019-06-10 DIAGNOSIS — R53.1 LEFT-SIDED WEAKNESS: ICD-10-CM

## 2019-06-10 DIAGNOSIS — I63.9 CEREBROVASCULAR ACCIDENT (CVA), UNSPECIFIED MECHANISM (HCC): Primary | ICD-10-CM

## 2019-06-10 DIAGNOSIS — I69.391 DYSPHAGIA DUE TO RECENT CEREBRAL INFARCTION: Primary | ICD-10-CM

## 2019-06-10 PROCEDURE — 92526 ORAL FUNCTION THERAPY: CPT

## 2019-06-10 PROCEDURE — 97112 NEUROMUSCULAR REEDUCATION: CPT

## 2019-06-10 NOTE — THERAPY TREATMENT NOTE
"    Outpatient Physical Therapy Neuro Treatment Note  [facility]     Patient Name: Mar Burgos  : 1940  MRN: [id number]  Today's Date: 6/10/2019      Visit Date: 06/10/2019    Visit Dx:    ICD-10-CM ICD-9-CM   1. Cerebrovascular accident (CVA), unspecified mechanism (CMS/HCC) I63.9 434.91   2. Left-sided weakness R53.1 728.87   3. Hemiparesis of left dominant side as late effect of cerebrovascular disease, unspecified cerebrovascular disease type (CMS/HCC) I69.952 438.21   4. Functional gait abnormality R26.89 781.2       Patient Active Problem List   Diagnosis   • Anemia   • Uncontrolled type 2 diabetes mellitus (CMS/HCC)   • Diabetic neuropathy (CMS/HCC)   • Gastroesophageal reflux disease   • Gastritis   • Hearing loss   • Hyperlipidemia   • Hypertension   • Thoracic back pain   • Peripheral arterial occlusive disease (CMS/HCC)   • Personal history of breast cancer   • Personal history of other malignant neoplasm of skin   • History of tobacco use   • Seasonal allergic rhinitis   • Type 2 diabetes mellitus, without long-term current use of insulin (CMS/HCC)   • Cobalamin deficiency   • Osteoporosis screening   • Breast cancer screening   • Sinusitis   • Cerebrovascular accident (CVA) (CMS/HCC)   • Left-sided weakness   • Dysphagia due to recent cerebral infarction   • Smoker   • Right ankle swelling   • Localized swelling on left hand           PT Neuro     Row Name 06/10/19 1515             Subjective Comments    Subjective Comments  Pt reporting she is \"Doing good.\" Pt denied any pain in her left foot or any falls. \"I have been standing in the yard watching the neighbors cut trees. \"I like this cane better.\"   -LB         Precautions and Contraindications    Precautions  B mastectomies in   -LB         Subjective Pain    Able to rate subjective pain?  yes  -LB      Pre-Treatment Pain Level  0  -LB      Post-Treatment Pain Level  0  -LB         Cognition    Overall Cognitive Status  WFL  -LB " "     Orientation Level  Oriented to place;Oriented to situation;Oriented to person  -LB      Safety Judgment  Good awareness of safety precautions  -LB      Deficits  Fully aware of deficits  -LB         Posture/Observations    Alignment Options  Genu varus  -LB      Genu varus  Left:;Mild  -LB         Transfers    Sit-Stand Ripon (Transfers)  conditional independence  -LB      Stand-Sit [place]on (Transfers)  conditional independence  -LB      Transfers, Sit-Stand-Sit, Assist Device  straight cane;other (see comments)  -LB      Comment (Transfers)  light use of UE's with to stand   -LB         Gait/Stairs Assessment/Training    Ripon Level (Gait)  supervision;conditional independence;verbal cues  -LB      Assistive Device (Gait)  other (see comments);cane, straight no device  -LB      Distance in Feet (Gait)  90' x 6 (3 x without a device, 3 x with a single point cane)  -LB      Pattern (Gait)  step-through  -LB      Deviations/Abnormal Patterns (Gait)  jose decreased;stride length decreased  -LB      Left Sided Gait Deviations  --  genu varus ,    -LB      [place]on Level (Stairs)  contact guard;verbal cues  -LB      Assistive Device (Stairs)  cane, straight 2 x with cane, 1 x with HHA of daughter   -LB      Number of Steps (Stairs)  curb 3 x   -LB      Stairs, Impairments  impaired balance  -LB      Comment (Gait/Stairs)  Pt's daughter was instructed how to provide HHA with ambulation on pt's R side. STAIRS with one rail and single point cane and CGA, step to step pattern   -LB         Balance Skills Training    Standing-Level of Assistance  Contact guard;Minimum assistance PT standing in fron of the pt   -LB      Static Standing Balance Support  No upper extremity supported  -LB      Standing-Balance Activities  Feet together;Standing on 1/2 roll;Compliant surfaces stnading on 4\" foam with feet even then one foot forward   -LB        User Key  (r) = Recorded By, (t) = Taken By, (c) = " "Cosigned By    Initials Name Provider Type    Kasie Alba PT Physical Therapist                  PT Assessment/Plan     Row Name 06/10/19 1615          PT Assessment    Assessment Comments  Pt reporting no issues with her balance at home. Pt denied any falls or complaints of pain in her left foot. Pt receptive to gait training with a single point cane.   -LB       User Key  (r) = Recorded By, (t) = Taken By, (c) = Cosigned By    Initials Name Provider Type    Kasie Alba PT Physical Therapist             Exercises     Row Name 06/10/19 1515             Subjective Comments    Subjective Comments  Pt reporting she is \"Doing good.\" Pt denied any pain in her left foot or any falls. \"I have been standing in the yard watching the neighbors cut trees. \"I like this cane better.\"   -LB         Subjective Pain    Able to rate subjective pain?  yes  -LB      Pre-Treatment Pain Level  0  -LB      Post-Treatment Pain Level  0  -LB        User Key  (r) = Recorded By, (t) = Taken By, (c) = Cosigned By    Initials Name Provider Type    Kasie Alba PT Physical Therapist                      PT OP Goals     Row Name 06/10/19 1600          PT Short Term Goals    STG 1  Pt to be evalauted for possible left ankle orthotic.   -LB     STG 1 Progress  Met  -LB     STG 3  Pt to report decreased pain in her left foot with ambulation.   -LB     STG 3 Progress  Met  -LB     STG 4  Pt to be educated on the importance of wearing shoes to promote stability and for diabetic skin related issues.   -LB     STG 4 Progress  Met  -LB     STG 5  Pt to be evaluated for possible use of an assistive device.  -LB     STG 5 Progress  Met  -LB       User Key  (r) = Recorded By, (t) = Taken By, (c) = Cosigned By    Initials Name Provider Type    Kasie Alba PT Physical Therapist          Therapy Education  Education Details: Pt educated in use of an offset single point cane on level surface, cur band staris. Pt and daughter " instructed in ascending and descending a curb with HHA.  Given: Mobility training  How Provided: Verbal, Demonstration  Provided to: Patient, Caregiver  Level of Understanding: Teach back education performed, Verbalized, Demonstrated              Time Calculation:   Start Time: 1515  Stop Time: 1549  Time Calculation (min): 34 min  Total Timed Code Minutes- PT: 34 minute(s)   Therapy Charges for Today     Code Description Service Date Service Provider Modifiers Qty    17363741109 HC PT NEUROMUSC RE EDUCATION EA 15 MIN 6/10/2019 Kasie Hurst, PT GP 3                    Kasie Hurst, PT  6/10/2019

## 2019-06-10 NOTE — THERAPY TREATMENT NOTE
Outpatient Speech Language Pathology   Adult Swallow Treatment Note  [facility]     Patient Name: Mar Burgos  : 1940  MRN: [id number]  Today's Date: 6/10/2019         Visit Date: 06/10/2019   Patient Active Problem List   Diagnosis   • Anemia   • Uncontrolled type 2 diabetes mellitus (CMS/HCC)   • Diabetic neuropathy (CMS/HCC)   • Gastroesophageal reflux disease   • Gastritis   • Hearing loss   • Hyperlipidemia   • Hypertension   • Thoracic back pain   • Peripheral arterial occlusive disease (CMS/HCC)   • Personal history of breast cancer   • Personal history of other malignant neoplasm of skin   • History of tobacco use   • Seasonal allergic rhinitis   • Type 2 diabetes mellitus, without long-term current use of insulin (CMS/HCC)   • Cobalamin deficiency   • Osteoporosis screening   • Breast cancer screening   • Sinusitis   • Cerebrovascular accident (CVA) (CMS/HCC)   • Left-sided weakness   • Dysphagia due to recent cerebral infarction   • Smoker   • Right ankle swelling   • Localized swelling on left hand        Visit Dx:    ICD-10-CM ICD-9-CM   1. Dysphagia due to recent cerebral infarction I69.391 438.82                         SLP OP Goals     Row Name 06/10/19 1500          Dysphagia Goals    Patient will improve stage transition duration of swallow/improve timing to reduce food falling into the airway by decreasing swallow delay after neurosensory  stimulation  neurosensory stimulation;without cues  -CP     Status: Patient will improve stage transition duration of swallow/improve timing to reduce food falling into the airway by decreasing swallow delay after neurosensory  stimulation  Progressing as expected  -CP     Comments: Patient will improve stage transition duration of swallow/improve timing to reduce food falling into the airway by decreasing swallow delay after neurosensory  stimulation  Pt tolerated NMES for 50 minutes at 12 mA w/no c/o pain.   -CP     Patient will increase  laryngeal elevation to reduce residue that might fall into airway by completing  Mendelsohn maneuver  -CP     Status: Patient will increase laryngeal elevation to reduce residue that might fall into airway by completing  Progressing as expected  -CP     Comments: Patient will increase laryngeal elevation to reduce residue that might fall into airway by completing  independently x5  -CP     Patient will increase strength of tongue base and posterior pharyngeal walls to reduce residue that might fall into airway by completing  effotful swallow;Laura (tongue hold)  -CP     Status: Patient will increase strength of tongue base and posterior pharyngeal walls to reduce residue that might fall into airway by completing  Progressing as expected  -CP     Comments: Patient will increase strength of tongue base and posterior pharyngeal walls to reduce residue that might fall into airway by completing  Completed in unison with NMES and 3 second prep x50 w/min to no cues for increased effort.  -CP       User Key  (r) = Recorded By, (t) = Taken By, (c) = Cosigned By    Initials Name Provider Type    Dayana Oliveros MS CCC-SLP Speech and Language Pathologist          OP SLP Education     Row Name 06/10/19 1511       Education    Barriers to Learning  No barriers identified  -CP    Education Provided  Described results of evaluation;Patient expressed understanding of evaluation  -CP    Assessed  Learning needs;Learning motivation;Learning preferences;Learning readiness  -CP    Learning Motivation  Strong  -CP    Learning Method  Explanation  -CP    Teaching Response  Verbalized understanding  -CP      User Key  (r) = Recorded By, (t) = Taken By, (c) = Cosigned By    Initials Name Effective Dates    Dayana Oliveros MS CCC-SLP 06/08/18 -           OP SLP Assessment/Plan - 06/10/19 1510        SLP Assessment    Functional Problems  Swallowing   -CP    Impact on Function: Swallowing  Risk of aspiration;Risk of pneumonia    -CP    Prognosis  Good (comment)   -CP    Patient/caregiver participated in establishment of treatment plan and goals  Yes   -CP    Patient would benefit from skilled therapy intervention  Yes   -CP      User Key  (r) = Recorded By, (t) = Taken By, (c) = Cosigned By    Initials Name Provider Type    CP Dayana Alberto, MS CCC-SLP Speech and Language Pathologist                Time Calculation:   SLP Start Time: 1400  SLP Stop Time: 1455  SLP Time Calculation (min): 55 min    Therapy Charges for Today     Code Description Service Date Service Provider Modifiers Qty    97041020989  ST TREATMENT SWALLOW 4 6/10/2019 Dayana Alberto MS CCC-SLP GN 1                   Dayana Alberto MS CCC-SLP  6/10/2019

## 2019-06-11 ENCOUNTER — APPOINTMENT (OUTPATIENT)
Dept: SPEECH THERAPY | Facility: HOSPITAL | Age: 79
End: 2019-06-11

## 2019-06-14 ENCOUNTER — HOSPITAL ENCOUNTER (OUTPATIENT)
Dept: SPEECH THERAPY | Facility: HOSPITAL | Age: 79
Setting detail: THERAPIES SERIES
Discharge: HOME | End: 2019-06-14

## 2019-06-14 ENCOUNTER — APPOINTMENT (OUTPATIENT)
Dept: PHYSICAL THERAPY | Facility: HOSPITAL | Age: 79
End: 2019-06-14

## 2019-06-14 DIAGNOSIS — I69.391 DYSPHAGIA DUE TO RECENT CEREBRAL INFARCTION: Primary | ICD-10-CM

## 2019-06-14 DIAGNOSIS — I63.9 CEREBROVASCULAR ACCIDENT (CVA), UNSPECIFIED MECHANISM (HCC): ICD-10-CM

## 2019-06-14 PROCEDURE — 92526 ORAL FUNCTION THERAPY: CPT | Performed by: SPEECH-LANGUAGE PATHOLOGIST

## 2019-06-14 NOTE — THERAPY TREATMENT NOTE
Outpatient Speech Language Pathology   Adult Swallow Treatment Note  [facility]     Patient Name: Mar Burgos  : 1940  MRN: [id number]  Today's Date: 2019         Visit Date: 2019   Patient Active Problem List   Diagnosis   • Anemia   • Uncontrolled type 2 diabetes mellitus (CMS/HCC)   • Diabetic neuropathy (CMS/HCC)   • Gastroesophageal reflux disease   • Gastritis   • Hearing loss   • Hyperlipidemia   • Hypertension   • Thoracic back pain   • Peripheral arterial occlusive disease (CMS/HCC)   • Personal history of breast cancer   • Personal history of other malignant neoplasm of skin   • History of tobacco use   • Seasonal allergic rhinitis   • Type 2 diabetes mellitus, without long-term current use of insulin (CMS/HCC)   • Cobalamin deficiency   • Osteoporosis screening   • Breast cancer screening   • Sinusitis   • Cerebrovascular accident (CVA) (CMS/HCC)   • Left-sided weakness   • Dysphagia due to recent cerebral infarction   • Smoker   • Right ankle swelling   • Localized swelling on left hand        Visit Dx:    ICD-10-CM ICD-9-CM   1. Dysphagia due to recent cerebral infarction I69.391 438.82   2. Cerebrovascular accident (CVA), unspecified mechanism (CMS/HCC) I63.9 434.91                         SLP OP Goals     Row Name 19 1000          Subjective Comments    Subjective Comments  Patient is pleasant and cooperative, she reports no difficulty recalling her 2-3 second hold with water at home.   -KA        Subjective Pain    Able to rate subjective pain?  yes  -KA     Pre-Treatment Pain Level  0  -KA     Post-Treatment Pain Level  0  -KA        Dysphagia Goals    Dysphagia STG's  Patient will increase tipping of arytenoids and closure at entrance to the airway to reduce penetration into the vestibule by completing  -KA     Patient will improve stage transition duration of swallow/improve timing to reduce food falling into the airway by decreasing swallow delay after  neurosensory  stimulation  neurosensory stimulation;without cues  -KA     Status: Patient will improve stage transition duration of swallow/improve timing to reduce food falling into the airway by decreasing swallow delay after neurosensory  stimulation  Progressing as expected  -KA     Comments: Patient will improve stage transition duration of swallow/improve timing to reduce food falling into the airway by decreasing swallow delay after neurosensory  stimulation  Pt tolerated NMES for 30 minutes at 11 mA w/no c/o pain and timed swallow on peak of amplitude 90% of the time without cues.   -KA     Patient will increase laryngeal elevation to reduce residue that might fall into airway by completing  super-supraglottic swallow  -KA     Status: Patient will increase laryngeal elevation to reduce residue that might fall into airway by completing  New  -KA     Comments: Patient will increase laryngeal elevation to reduce residue that might fall into airway by completing  Introduced super supraglottic swallow today with patient with pt demonstrating 70% accuracy with min cues. SLP also introduced hard glottal attacks and pt had difficulty with this exercises and performed with 40% with mod cues. new exercises were provided and written down on paper for patient  -KA        Other Goals    Other Adult Goal- 1  Patient will increase timing of swallow and reduce risk for premature spillage with practice of 2 second hold with thin liquids without overt s/s of pen/asp 90% of the time  -KA     Status: Other Adult Goal- 1  Progressing as expected  -KA     Comments: Other Adult Goal- 1  Really practiced and reinforced use of 2 second hold with water trials today during e-stim with education on purpose, and risks for asp due to swallow delay, mistiming and premature spillage. SLP printed multiple handouts for patient for her to put in areas in he house for reminders, and daughter also discussed putting a reminder on patients cup.  Patient practiced and demonstrated 70% accuracy with min cues   -WALTER       User Key  (r) = Recorded By, (t) = Taken By, (c) = Cosigned By    Initials Name Provider Type    Maxime Ramírez MA,CCC-SLP Speech and Language Pathologist                        Time Calculation:   SLP Start Time: 1000  SLP Stop Time: 1055  SLP Time Calculation (min): 55 min    Therapy Charges for Today     Code Description Service Date Service Provider Modifiers Qty    62522985542 HC ST TREATMENT SWALLOW 4 6/14/2019 Maxime Delgado MA,CCC-SLP GN 1                   Maxime Delgado MA,CCC-SLP  6/14/2019

## 2019-06-17 ENCOUNTER — APPOINTMENT (OUTPATIENT)
Dept: SPEECH THERAPY | Facility: HOSPITAL | Age: 79
End: 2019-06-17

## 2019-06-17 ENCOUNTER — HOSPITAL ENCOUNTER (OUTPATIENT)
Dept: SPEECH THERAPY | Facility: HOSPITAL | Age: 79
Setting detail: THERAPIES SERIES
Discharge: HOME | End: 2019-06-17

## 2019-06-17 ENCOUNTER — HOSPITAL ENCOUNTER (OUTPATIENT)
Dept: PHYSICAL THERAPY | Facility: HOSPITAL | Age: 79
Setting detail: THERAPIES SERIES
Discharge: HOME | End: 2019-06-17

## 2019-06-17 DIAGNOSIS — I69.391 DYSPHAGIA DUE TO RECENT CEREBRAL INFARCTION: Primary | ICD-10-CM

## 2019-06-17 DIAGNOSIS — R26.89 FUNCTIONAL GAIT ABNORMALITY: ICD-10-CM

## 2019-06-17 DIAGNOSIS — I63.9 CEREBROVASCULAR ACCIDENT (CVA), UNSPECIFIED MECHANISM (HCC): Primary | ICD-10-CM

## 2019-06-17 DIAGNOSIS — I69.952 HEMIPARESIS OF LEFT DOMINANT SIDE AS LATE EFFECT OF CEREBROVASCULAR DISEASE, UNSPECIFIED CEREBROVASCULAR DISEASE TYPE (HCC): ICD-10-CM

## 2019-06-17 DIAGNOSIS — R53.1 LEFT-SIDED WEAKNESS: ICD-10-CM

## 2019-06-17 DIAGNOSIS — I63.9 CEREBROVASCULAR ACCIDENT (CVA), UNSPECIFIED MECHANISM (HCC): ICD-10-CM

## 2019-06-17 PROCEDURE — 97112 NEUROMUSCULAR REEDUCATION: CPT

## 2019-06-17 PROCEDURE — 92526 ORAL FUNCTION THERAPY: CPT | Performed by: SPEECH-LANGUAGE PATHOLOGIST

## 2019-06-17 NOTE — THERAPY TREATMENT NOTE
Outpatient Speech Language Pathology   Adult Swallow Treatment Note  [facility]     Patient Name: Mar Burgos  : 1940  MRN: [id number]  Today's Date: 2019         Visit Date: 2019   Patient Active Problem List   Diagnosis   • Anemia   • Uncontrolled type 2 diabetes mellitus (CMS/HCC)   • Diabetic neuropathy (CMS/HCC)   • Gastroesophageal reflux disease   • Gastritis   • Hearing loss   • Hyperlipidemia   • Hypertension   • Thoracic back pain   • Peripheral arterial occlusive disease (CMS/HCC)   • Personal history of breast cancer   • Personal history of other malignant neoplasm of skin   • History of tobacco use   • Seasonal allergic rhinitis   • Type 2 diabetes mellitus, without long-term current use of insulin (CMS/HCC)   • Cobalamin deficiency   • Osteoporosis screening   • Breast cancer screening   • Sinusitis   • Cerebrovascular accident (CVA) (CMS/HCC)   • Left-sided weakness   • Dysphagia due to recent cerebral infarction   • Smoker   • Right ankle swelling   • Localized swelling on left hand        Visit Dx:    ICD-10-CM ICD-9-CM   1. Dysphagia due to recent cerebral infarction I69.391 438.82   2. Cerebrovascular accident (CVA), unspecified mechanism (CMS/HCC) I63.9 434.91                         SLP OP Goals     Row Name 19 1500          Subjective Comments    Subjective Comments  Patient is pleasant and cooperative  -KA        Subjective Pain    Able to rate subjective pain?  yes  -KA     Pre-Treatment Pain Level  0  -KA     Post-Treatment Pain Level  0  -KA        Dysphagia Goals    Patient will improve stage transition duration of swallow/improve timing to reduce food falling into the airway by decreasing swallow delay after neurosensory  stimulation  neurosensory stimulation;without cues  -KA     Status: Patient will improve stage transition duration of swallow/improve timing to reduce food falling into the airway by decreasing swallow delay after neurosensory   stimulation  Progressing as expected  -KA     Comments: Patient will improve stage transition duration of swallow/improve timing to reduce food falling into the airway by decreasing swallow delay after neurosensory  stimulation  Pt tolerated NMES for 30 minutes at 11 mA w/no c/o pain and timed swallow on peak of amplitude 90% of the time without cues.   -KA     Patient will increase laryngeal elevation to reduce residue that might fall into airway by completing  super-supraglottic swallow  -KA     Status: Patient will increase laryngeal elevation to reduce residue that might fall into airway by completing  Progressing as expected  -KA     Comments: Patient will increase laryngeal elevation to reduce residue that might fall into airway by completing  60% with min cues  -KA     Patient will increase strength of tongue base and posterior pharyngeal walls to reduce residue that might fall into airway by completing  effotful swallow;Laura (tongue hold)  -KA     Status: Patient will increase strength of tongue base and posterior pharyngeal walls to reduce residue that might fall into airway by completing  Progressing as expected  -KA     Comments: Patient will increase strength of tongue base and posterior pharyngeal walls to reduce residue that might fall into airway by completing  Completed in unison with NMES and 3 second prep w/min to no cues for increased effort. Laura completed 90% accuracy without cues.  -KA     Patient will improve hyolaryngeal elevation via completing Jorge A (head lift)  sustained lift (comment #/duration of lifts);repetitive lift (comment #/duration of lifts);without cues  -KA     Status: Patient will improve hyolaryngeal elevation via completing Jorge A (head lift)  Progressing as expected  -KA     Comments: Patient will improve hyolaryngeal elevation via completing Jorge A (head lift)  completed modified exercises with CTAR ball: sustained hold completed 30 sec. i'ly x2; repetitive completed  x5 with min. cues   -KA        Other Goals    Other Adult Goal- 1  Patient will increase timing of swallow and reduce risk for premature spillage with practice of 2 second hold with thin liquids without overt s/s of pen/asp 90% of the time  -KA     Status: Other Adult Goal- 1  Progressing as expected  -KA     Comments: Other Adult Goal- 1  2 to 3 second hold with water trials during e-stim trials min cues needed at times  -KA       User Key  (r) = Recorded By, (t) = Taken By, (c) = Cosigned By    Initials Name Provider Type    Maxime Ramírez MA,CCC-SLP Speech and Language Pathologist                        Time Calculation:   SLP Start Time: 1400  SLP Stop Time: 1455  SLP Time Calculation (min): 55 min    Therapy Charges for Today     Code Description Service Date Service Provider Modifiers Qty    [id number]  ST TREATMENT SWALLOW 4 6/17/2019 Maxime Delgado MA,CCC-SLP GN 1                   Maxime Delgado MA,CCC-SLP  6/17/2019

## 2019-06-17 NOTE — THERAPY TREATMENT NOTE
"    Outpatient Physical Therapy Neuro Treatment Note  [facility]     Patient Name: Mar Burgos  : 1940  MRN: [id number]  Today's Date: 2019      Visit Date: 2019    Visit Dx:    ICD-10-CM ICD-9-CM   1. Cerebrovascular accident (CVA), unspecified mechanism (CMS/HCC) I63.9 434.91   2. Left-sided weakness R53.1 728.87   3. Hemiparesis of left dominant side as late effect of cerebrovascular disease, unspecified cerebrovascular disease type (CMS/HCC) I69.952 438.21   4. Functional gait abnormality R26.89 781.2       Patient Active Problem List   Diagnosis   • Anemia   • Uncontrolled type 2 diabetes mellitus (CMS/HCC)   • Diabetic neuropathy (CMS/HCC)   • Gastroesophageal reflux disease   • Gastritis   • Hearing loss   • Hyperlipidemia   • Hypertension   • Thoracic back pain   • Peripheral arterial occlusive disease (CMS/HCC)   • Personal history of breast cancer   • Personal history of other malignant neoplasm of skin   • History of tobacco use   • Seasonal allergic rhinitis   • Type 2 diabetes mellitus, without long-term current use of insulin (CMS/HCC)   • Cobalamin deficiency   • Osteoporosis screening   • Breast cancer screening   • Sinusitis   • Cerebrovascular accident (CVA) (CMS/HCC)   • Left-sided weakness   • Dysphagia due to recent cerebral infarction   • Smoker   • Right ankle swelling   • Localized swelling on left hand           PT Neuro     Row Name 19 1517             Subjective Comments    Subjective Comments  \"I went walking in the park with my cane and I did fine.\" Per daughter pt walked half the track before she needed to sit down. \"I picked some weeds the other day.\"   -LB         Precautions and Contraindications    Precautions  B mastectomies in   -LB         Subjective Pain    Able to rate subjective pain?  yes  -LB      Pre-Treatment Pain Level  0  -LB      Post-Treatment Pain Level  0  -LB         Cognition    Overall Cognitive Status  WFL  -LB      " Orientation Level  Oriented to place;Oriented to situation;Oriented to person  -LB      Safety Judgment  Good awareness of safety precautions  -LB      Deficits  Fully aware of deficits  -LB         Posture/Observations    Alignment Options  Genu varus  -LB      Genu varus  Left:;Mild  -LB         Transfers    Sit-Stand [place] (Transfers)  verbal cues;supervision  -LB      Stand-Sit [place] (Transfers)  verbal cues;supervision  -LB      Transfers, Sit-Stand-Sit, Assist Device  straight cane;other (see comments) no device  -LB      Comment (Transfers)  light use of UE's   -LB         Gait/Stairs Assessment/Training    [place] Level (Gait)  conditional independence;supervision  -LB      Assistive Device (Gait)  cane, straight  -LB      Distance in Feet (Gait)  120' x 4 (2 x with cane , 2 x without cane)  -LB      Pattern (Gait)  step-through  -LB      Deviations/Abnormal Patterns (Gait)  other (see comments);jose decreased jose increased with the use of the cane  -LB      Left Sided Gait Deviations  -- genu varus   -LB      [place] Level (Stairs)  stand by assist;verbal cues  -LB      Assistive Device (Stairs)  cane, straight  -LB      Number of Steps (Stairs)  curb 2 x   -LB      [place] Level (Ramp)  stand by assist  -LB      Assistive Device (Ramp)  cane, straight  -LB      Comment (Gait/Stairs)  Pt ambulated with her cane with head turns with a slower jose.   -LB         Balance Skills Training    Standing-Level of Assistance  Contact guard;Minimum assistance  -LB      Static Standing Balance Support  parallel bars;Right upper extremity supported;Left upper extremity supported  -LB      Standing-Balance Activities  Standing on 1/2 roll;Rocker board  -LB      Gait Balance-Level of Assistance  Minimum assistance  -LB      Gait Balance Support  Right upper extremity supported;Left upper extremity supported PT in front   -LB      Gait Balance Activities  braiding / front   "(Significant)  Pt reported this made her feel dizzy so discontinued.  -LB      Balance Comments  STANDING with one foot forward while moving her cane in a horizontal plane.   -LB        User Key  (r) = Recorded By, (t) = Taken By, (c) = Cosigned By    Initials Name Provider Type    Kasie Alba PT Physical Therapist                  PT Assessment/Plan     Row Name 06/17/19 1604          PT Assessment    Assessment Comments  Pt arrived to PT with her offsedt single point cane. Pt reporting she has been able to walk longer distances with the cane.   -LB       User Key  (r) = Recorded By, (t) = Taken By, (c) = Cosigned By    Initials Name Provider Type    Kasie Alba PT Physical Therapist             Exercises     Row Name 06/17/19 1517             Subjective Comments    Subjective Comments  \"I went walking in the park with my cane and I did fine.\" Per daughter pt walked half the track before she needed to sit down. \"I picked some weeds the other day.\"   -LB         Subjective Pain    Able to rate subjective pain?  yes  -LB      Pre-Treatment Pain Level  0  -LB      Post-Treatment Pain Level  0  -LB        User Key  (r) = Recorded By, (t) = Taken By, (c) = Cosigned By    Initials Name Provider Type    Kasie Alba, PT Physical Therapist                          Therapy Education  Education Details: TRANSFERS with proper positioning of the cane with to sit and to stand. Reviewed with pt ascending and descending the steps with a cane.   Given: Mobility training  How Provided: Verbal  Provided to: Patient  Level of Understanding: Teach back education performed, Verbalized, Demonstrated              Time Calculation:   Start Time: 1517  Stop Time: 1550  Time Calculation (min): 33 min  Total Timed Code Minutes- PT: 33 minute(s)   Therapy Charges for Today     Code Description Service Date Service Provider Modifiers Qty    63797917156 HC PT NEUROMUSC RE EDUCATION EA 15 MIN 6/17/2019 Kasie Hurst, PT GP 2 "                    Kasie Hurst, PT  6/17/2019

## 2019-06-18 ENCOUNTER — APPOINTMENT (OUTPATIENT)
Dept: SPEECH THERAPY | Facility: HOSPITAL | Age: 79
End: 2019-06-18

## 2019-06-21 ENCOUNTER — HOSPITAL ENCOUNTER (OUTPATIENT)
Dept: SPEECH THERAPY | Facility: HOSPITAL | Age: 79
Setting detail: THERAPIES SERIES
Discharge: HOME | End: 2019-06-21

## 2019-06-21 ENCOUNTER — APPOINTMENT (OUTPATIENT)
Dept: PHYSICAL THERAPY | Facility: HOSPITAL | Age: 79
End: 2019-06-21

## 2019-06-21 DIAGNOSIS — I63.9 CEREBROVASCULAR ACCIDENT (CVA), UNSPECIFIED MECHANISM (HCC): ICD-10-CM

## 2019-06-21 DIAGNOSIS — I69.391 DYSPHAGIA DUE TO RECENT CEREBRAL INFARCTION: Primary | ICD-10-CM

## 2019-06-21 PROCEDURE — 92526 ORAL FUNCTION THERAPY: CPT | Performed by: SPEECH-LANGUAGE PATHOLOGIST

## 2019-06-21 NOTE — THERAPY TREATMENT NOTE
Outpatient Speech Language Pathology   Adult Swallow Treatment Note  [facility]     Patient Name: Mar Burgos  : 1940  MRN: [id number]  Today's Date: 2019         Visit Date: 2019   Patient Active Problem List   Diagnosis   • Anemia   • Uncontrolled type 2 diabetes mellitus (CMS/HCC)   • Diabetic neuropathy (CMS/HCC)   • Gastroesophageal reflux disease   • Gastritis   • Hearing loss   • Hyperlipidemia   • Hypertension   • Thoracic back pain   • Peripheral arterial occlusive disease (CMS/HCC)   • Personal history of breast cancer   • Personal history of other malignant neoplasm of skin   • History of tobacco use   • Seasonal allergic rhinitis   • Type 2 diabetes mellitus, without long-term current use of insulin (CMS/HCC)   • Cobalamin deficiency   • Osteoporosis screening   • Breast cancer screening   • Sinusitis   • Cerebrovascular accident (CVA) (CMS/HCC)   • Left-sided weakness   • Dysphagia due to recent cerebral infarction   • Smoker   • Right ankle swelling   • Localized swelling on left hand        Visit Dx:    ICD-10-CM ICD-9-CM   1. Dysphagia due to recent cerebral infarction I69.391 438.82   2. Cerebrovascular accident (CVA), unspecified mechanism (CMS/HCC) I63.9 434.91                         SLP OP Goals     Row Name 19 1200          Subjective Comments    Subjective Comments  Patient is pleasant and cooperative.  -KA        Subjective Pain    Able to rate subjective pain?  yes  -KA     Pre-Treatment Pain Level  0  -KA     Post-Treatment Pain Level  0  -KA        Dysphagia Goals    Patient will improve stage transition duration of swallow/improve timing to reduce food falling into the airway by decreasing swallow delay after neurosensory  stimulation  neurosensory stimulation;without cues  -KA     Status: Patient will improve stage transition duration of swallow/improve timing to reduce food falling into the airway by decreasing swallow delay after neurosensory   stimulation  Progressing as expected  -KA     Comments: Patient will improve stage transition duration of swallow/improve timing to reduce food falling into the airway by decreasing swallow delay after neurosensory  stimulation  Pt tolerated NMES for 30 minutes at 11 mA w/no c/o pain and timed swallow on peak of amplitude 90% of the time without cues.   -KA     Patient will increase laryngeal elevation to reduce residue that might fall into airway by completing  super-supraglottic swallow  -KA     Status: Patient will increase laryngeal elevation to reduce residue that might fall into airway by completing  Progressing as expected  -KA     Comments: Patient will increase laryngeal elevation to reduce residue that might fall into airway by completing  60% with min cues  -KA     Patient will increase strength of tongue base and posterior pharyngeal walls to reduce residue that might fall into airway by completing  effotful swallow;Laura (tongue hold)  -KA     Status: Patient will increase strength of tongue base and posterior pharyngeal walls to reduce residue that might fall into airway by completing  Progressing as expected  -KA     Comments: Patient will increase strength of tongue base and posterior pharyngeal walls to reduce residue that might fall into airway by completing  Completed effortful swallow in unison with NMES and 3 second prep w/min to no cues for increased effort. Laura completed 90% accuracy without cues.  -KA     Patient will improve hyolaryngeal elevation via completing Jorge A (head lift)  sustained lift (comment #/duration of lifts);repetitive lift (comment #/duration of lifts);without cues  -KA     Status: Patient will improve hyolaryngeal elevation via completing Jorge A (head lift)  Progressing as expected  -KA     Comments: Patient will improve hyolaryngeal elevation via completing Jorge A (head lift)  completed modified exercises with CTAR ball: sustained hold completed 30 sec. i'ly x2;  repetitive completed x5 with min. cues   -KA        Other Goals    Other Adult Goal- 1  Patient will increase timing of swallow and reduce risk for premature spillage with practice of 2 second hold with thin liquids without overt s/s of pen/asp 90% of the time  -KA     Status: Other Adult Goal- 1  Progressing as expected  -KA     Comments: Other Adult Goal- 1  2 to 3 second hold with water trials during e-stim trials min cues needed at times. Patient demonstrated audible swallow at times, and also demonstrated x1 cough   -KA       User Key  (r) = Recorded By, (t) = Taken By, (c) = Cosigned By    Initials Name Provider Type    Maxime Ramírez MA,CCC-SLP Speech and Language Pathologist                        Time Calculation:   SLP Start Time: 1000  SLP Stop Time: 1055  SLP Time Calculation (min): 55 min    Therapy Charges for Today     Code Description Service Date Service Provider Modifiers Qty    [id number]  ST TREATMENT SWALLOW 4 6/21/2019 Maxime Delgado MA,CCC-SLP GN 1                   Maxime Delgado MA,CCC-SLP  6/21/2019

## 2019-06-24 ENCOUNTER — HOSPITAL ENCOUNTER (OUTPATIENT)
Dept: PHYSICAL THERAPY | Facility: HOSPITAL | Age: 79
Setting detail: THERAPIES SERIES
Discharge: HOME | End: 2019-06-24

## 2019-06-24 ENCOUNTER — HOSPITAL ENCOUNTER (OUTPATIENT)
Dept: SPEECH THERAPY | Facility: HOSPITAL | Age: 79
Setting detail: THERAPIES SERIES
Discharge: HOME | End: 2019-06-24

## 2019-06-24 DIAGNOSIS — I63.9 CEREBROVASCULAR ACCIDENT (CVA), UNSPECIFIED MECHANISM (HCC): ICD-10-CM

## 2019-06-24 DIAGNOSIS — R26.89 FUNCTIONAL GAIT ABNORMALITY: ICD-10-CM

## 2019-06-24 DIAGNOSIS — R53.1 LEFT-SIDED WEAKNESS: ICD-10-CM

## 2019-06-24 DIAGNOSIS — I69.391 DYSPHAGIA DUE TO RECENT CEREBRAL INFARCTION: Primary | ICD-10-CM

## 2019-06-24 DIAGNOSIS — I69.952 HEMIPARESIS OF LEFT DOMINANT SIDE AS LATE EFFECT OF CEREBROVASCULAR DISEASE, UNSPECIFIED CEREBROVASCULAR DISEASE TYPE (HCC): ICD-10-CM

## 2019-06-24 DIAGNOSIS — I63.9 CEREBROVASCULAR ACCIDENT (CVA), UNSPECIFIED MECHANISM (HCC): Primary | ICD-10-CM

## 2019-06-24 PROCEDURE — 92526 ORAL FUNCTION THERAPY: CPT | Performed by: SPEECH-LANGUAGE PATHOLOGIST

## 2019-06-24 PROCEDURE — 97112 NEUROMUSCULAR REEDUCATION: CPT

## 2019-06-24 NOTE — THERAPY DISCHARGE NOTE
"      Outpatient Physical Therapy Neuro Progress Note/Discharge Summary  [facility]     Patient Name: Mar Burgos  : 1940  MRN: [id number]  Today's Date: 2019      Visit Date: 2019    Visit Dx:    ICD-10-CM ICD-9-CM   1. Cerebrovascular accident (CVA), unspecified mechanism (CMS/HCC) I63.9 434.91   2. Left-sided weakness R53.1 728.87   3. Hemiparesis of left dominant side as late effect of cerebrovascular disease, unspecified cerebrovascular disease type (CMS/HCC) I69.952 438.21   4. Functional gait abnormality R26.89 781.2       Patient Active Problem List   Diagnosis   • Anemia   • Uncontrolled type 2 diabetes mellitus (CMS/HCC)   • Diabetic neuropathy (CMS/HCC)   • Gastroesophageal reflux disease   • Gastritis   • Hearing loss   • Hyperlipidemia   • Hypertension   • Thoracic back pain   • Peripheral arterial occlusive disease (CMS/HCC)   • Personal history of breast cancer   • Personal history of other malignant neoplasm of skin   • History of tobacco use   • Seasonal allergic rhinitis   • Type 2 diabetes mellitus, without long-term current use of insulin (CMS/HCC)   • Cobalamin deficiency   • Osteoporosis screening   • Breast cancer screening   • Sinusitis   • Cerebrovascular accident (CVA) (CMS/HCC)   • Left-sided weakness   • Dysphagia due to recent cerebral infarction   • Smoker   • Right ankle swelling   • Localized swelling on left hand            PT Neuro     Row Name 19 1515             Subjective Comments    Subjective Comments  \"I use the cane when I go out. I had a little trouble getting up from the ground so I went over to the garden hose campbell.\"   -LB         Precautions and Contraindications    Precautions  B mastectomies in   -LB         Subjective Pain    Able to rate subjective pain?  yes  -LB      Pre-Treatment Pain Level  0  -LB      Post-Treatment Pain Level  0  -LB      Subjective Pain Comment  During ambulation at a fast spped pt complained of left leg " pain. After resting a few minutes pain was absent and pt denied pain the rest of the session.   -LB         Cognition    Overall Cognitive Status  WFL  -LB      Orientation Level  Oriented to place;Oriented to situation;Oriented to person  -LB      Safety Judgment  Good awareness of safety precautions  -LB      Deficits  Fully aware of deficits  -LB         Posture/Observations    Alignment Options  Genu varus  -LB      Genu varus  Left:;Mild  -LB         Transfers    Sit-Stand [place] (Transfers)  conditional independence  -LB      Stand-Sit [place] (Transfers)  conditional independence  -LB      Transfers, Sit-Stand-Sit, Assist Device  straight cane;other (see comments) no device   -LB      Comment (Transfers)  light use of UE's   -LB         Gait/Stairs Assessment/Training    [place] Level (Gait)  conditional independence  -LB      Assistive Device (Gait)  cane, straight no device  -LB      Distance in Feet (Gait)  150' x 4 (2 x with cane , 2 x without cane)  -LB      Pattern (Gait)  step-through  -LB      Deviations/Abnormal Patterns (Gait)  other (see comments);jose decreased jose decreased without use of cane   -LB      [place] Level (Stairs)  conditional independence  -LB      Assistive Device (Stairs)  cane, straight  -LB      Number of Steps (Stairs)  curb   -LB      Comment (Gait/Stairs)  Stairs with one rail condtional independent step over step technique.  -LB         Balance Skills Training    Standing-Level of Assistance  Contact guard;Minimum assistance  -LB      Static Standing Balance Support  Right upper extremity supported;mateo bar  -LB      Standing-Balance Activities  Standing on 1/2 roll;Compliant surfaces  -LB      Gait Balance-Level of Assistance  Contact guard  -LB      Gait Balance Support  No upper extremity supported  -LB      Gait Balance Activities  side-stepping  -LB        User Key  (r) = Recorded By, (t) = Taken By, (c) = Cosigned By    Initials Name  "Provider Type    Kasie Alba, PT Physical Therapist                  PT Assessment/Plan     Row Name 06/24/19 1641          PT Assessment    Assessment Comments  Pt demonstrating improvement with transfers, gait and balance as indicated by the Dynamic Gait Index. Pt's left ankle was monitored closely for pain and alignment during PT sessions. It was determined pt did not require an ankle orthosis and PT advised pt to wear her shoes at home. Pt reports she uses her cane in the community and ambulates without a device in her home environment. All STG's and LTG's met.   -LB       User Key  (r) = Recorded By, (t) = Taken By, (c) = Cosigned By    Initials Name Provider Type    Kasie Alba, PT Physical Therapist               Exercises     Row Name 06/24/19 1515             Subjective Comments    Subjective Comments  \"I use the cane when I go out. I had a little trouble getting up from the ground so I went over to the garden hose campbell.\"   -LB         Subjective Pain    Able to rate subjective pain?  yes  -LB      Pre-Treatment Pain Level  0  -LB      Post-Treatment Pain Level  0  -LB      Subjective Pain Comment  During ambulation at a fast spped pt complained of left leg pain. After resting a few minutes pain was absent and pt denied pain the rest of the session.   -LB        User Key  (r) = Recorded By, (t) = Taken By, (c) = Cosigned By    Initials Name Provider Type    Kasie Alba, PT Physical Therapist                        PT OP Goals     Row Name 06/24/19 1600          PT Short Term Goals    STG 1  Pt to be evalauted for possible left ankle orthotic.   -LB     STG 1 Progress  Met  -LB     STG 2  Pt to score a 17/24 on the Dynamic Gait Index to reduce risk of falls.   -LB     STG 2 Progress  Met  -LB     STG 3  Pt to report decreased pain in her left foot with ambulation.   -LB     STG 3 Progress  Met  -LB     STG 4  Pt to be educated on the importance of wearing shoes to promote stability and " for diabetic skin related issues.   -LB     STG 4 Progress  Met  -LB     STG 5  Pt to be evaluated for possible use of an assistive device.  -LB     STG 5 Progress  Met  -LB        Long Term Goals    LTG 1  Pt to score a 19/24 on the Dynamic Gait Index to reduce risk of falls.   -LB     LTG 1 Progress  Met  -LB     LTG 2  Pt to ambulate with increased step length bilaterally.   -LB     LTG 2 Progress  Met  -LB     LTG 3  Pt to ambulate with increased armswing bilaterally   -LB     LTG 3 Progress  Met  -LB     LTG 4  Pt to ambulate without a device with head turns without loss of balance.   -LB     LTG 4 Progress  Met  -LB       User Key  (r) = Recorded By, (t) = Taken By, (c) = Cosigned By    Initials Name Provider Type    Kasie Alba PT Physical Therapist          Therapy Education  Education Details: Reviewed with pt nad daughter proper hand hold to assist pt if down on the ground.   Given: Mobility training  How Provided: Verbal, Demonstration  Provided to: Patient, Caregiver  Level of Understanding: Teach back education performed, Verbalized, Demonstrated       Dynamic Gait Index (DGI)  Gait Level Surface: Normal: walks 20’, no assistive devices, good speed, no evidence for imbalance, normal gait pattern  Change in Gait Speed: Normal: Able to smoothly change walking speed without loss of balance or gait deviation. Shows significant difference in walking speeds between normal, fast and slow paces.  Gait with Horizontal Head Turns: Normal: Performs head turns smoothly with no change in gait.  Gait with Vertical Head Turns: Mild impairment: Performs head turns smoothly with slight change in gait velocity, i.e. minor disruption to smooth gait path or uses walking aid.  Gait and Pivot Turn: Normal: Pivot turns safely within 3 seconds and stops quickly with no loss of balance.  Step Over Obstacle: Moderate impairment: Is able to step over box but must stop, then step over. May require verbal cueing.  Step  Around Obstacles: Mild impairment: Is able to step around both cones, but must slow down and adjust steps to clear cones.  Steps: Mild impairment: Alternating feet, must use rail.  Dynamic Gait Index Score: 19    Time Calculation:   Start Time: 1515  Stop Time: 1548  Time Calculation (min): 33 min  Total Timed Code Minutes- PT: 33 minute(s)     Therapy Charges for Today     Code Description Service Date Service Provider Modifiers Qty    42543850337 HC PT NEUROMUSC RE EDUCATION EA 15 MIN 6/24/2019 Kasie Hurst, PT GP 2                OP PT Discharge Summary  Date of Discharge: 06/24/19  Reason for Discharge: All goals achieved  Outcomes Achieved: Able to achieve all goals within established timeline  Discharge Instructions/Additional Comments: see education         Kasie Hurst, PT  6/24/2019

## 2019-06-24 NOTE — THERAPY TREATMENT NOTE
Outpatient Speech Language Pathology   Adult Swallow Treatment Note  [facility]     Patient Name: Mar Burgos  : 1940  MRN: [id number]  Today's Date: 2019         Visit Date: 2019   Patient Active Problem List   Diagnosis   • Anemia   • Uncontrolled type 2 diabetes mellitus (CMS/HCC)   • Diabetic neuropathy (CMS/HCC)   • Gastroesophageal reflux disease   • Gastritis   • Hearing loss   • Hyperlipidemia   • Hypertension   • Thoracic back pain   • Peripheral arterial occlusive disease (CMS/HCC)   • Personal history of breast cancer   • Personal history of other malignant neoplasm of skin   • History of tobacco use   • Seasonal allergic rhinitis   • Type 2 diabetes mellitus, without long-term current use of insulin (CMS/HCC)   • Cobalamin deficiency   • Osteoporosis screening   • Breast cancer screening   • Sinusitis   • Cerebrovascular accident (CVA) (CMS/HCC)   • Left-sided weakness   • Dysphagia due to recent cerebral infarction   • Smoker   • Right ankle swelling   • Localized swelling on left hand        Visit Dx:    ICD-10-CM ICD-9-CM   1. Dysphagia due to recent cerebral infarction I69.391 438.82   2. Cerebrovascular accident (CVA), unspecified mechanism (CMS/HCC) I63.9 434.91                         SLP OP Goals     Row Name 19 1400          Subjective Comments    Subjective Comments  Patient is pleasant and cooperative.  -KA        Subjective Pain    Able to rate subjective pain?  yes  -KA     Pre-Treatment Pain Level  0  -KA     Post-Treatment Pain Level  0  -KA        Dysphagia Goals    Patient will improve stage transition duration of swallow/improve timing to reduce food falling into the airway by decreasing swallow delay after neurosensory  stimulation  neurosensory stimulation;without cues  -KA     Status: Patient will improve stage transition duration of swallow/improve timing to reduce food falling into the airway by decreasing swallow delay after neurosensory   stimulation  Progressing as expected  -KA     Comments: Patient will improve stage transition duration of swallow/improve timing to reduce food falling into the airway by decreasing swallow delay after neurosensory  stimulation  Pt tolerated NMES for 30 minutes at 11 mA w/no c/o pain and timed swallow on peak of amplitude 90% of the time without cues.   -KA     Patient will increase laryngeal elevation to reduce residue that might fall into airway by completing  super-supraglottic swallow  -KA     Status: Patient will increase laryngeal elevation to reduce residue that might fall into airway by completing  Progressing as expected  -KA     Comments: Patient will increase laryngeal elevation to reduce residue that might fall into airway by completing  70% with min cues  -KA     Patient will increase strength of tongue base and posterior pharyngeal walls to reduce residue that might fall into airway by completing  effotful swallow;Laura (tongue hold)  -KA     Status: Patient will increase strength of tongue base and posterior pharyngeal walls to reduce residue that might fall into airway by completing  Progressing as expected  -KA     Comments: Patient will increase strength of tongue base and posterior pharyngeal walls to reduce residue that might fall into airway by completing  Completed effortful swallow in unison with NMES and 3 second prep w/min to no cues for increased effort. Laura completed 90% accuracy without cues.  -KA     Patient will improve hyolaryngeal elevation via completing Jorge A (head lift)  sustained lift (comment #/duration of lifts);repetitive lift (comment #/duration of lifts);without cues  -KA     Status: Patient will improve hyolaryngeal elevation via completing Jorge A (head lift)  Progressing as expected  -KA     Comments: Patient will improve hyolaryngeal elevation via completing Jorge A (head lift)  completed modified exercises with CTAR ball: sustained hold completed 30 sec. i'ly x2;  repetitive completed x5 with min. cues   -KA        Other Goals    Other Adult Goal- 1  Patient will increase timing of swallow and reduce risk for premature spillage with practice of 2 second hold with thin liquids without overt s/s of pen/asp 90% of the time  -KA     Status: Other Adult Goal- 1  Progressing as expected  -KA     Comments: Other Adult Goal- 1  2 to 3 second hold with water trials during e-stim trials min cues needed at times. Patient demonstrated audible swallow at times, and also demonstrated x1 cough   -KA       User Key  (r) = Recorded By, (t) = Taken By, (c) = Cosigned By    Initials Name Provider Type    Maxime Ramírez MA,CCC-SLP Speech and Language Pathologist                        Time Calculation:   SLP Start Time: 1400  SLP Stop Time: 1455  SLP Time Calculation (min): 55 min    Therapy Charges for Today     Code Description Service Date Service Provider Modifiers Qty    [id number]  ST TREATMENT SWALLOW 4 6/24/2019 Maxime Delgado MA,CCC-SLP GN 1                   Maxime Delgado MA,CCC-SLP  6/24/2019

## 2019-06-28 ENCOUNTER — APPOINTMENT (OUTPATIENT)
Dept: PHYSICAL THERAPY | Facility: HOSPITAL | Age: 79
End: 2019-06-28

## 2019-06-28 ENCOUNTER — HOSPITAL ENCOUNTER (OUTPATIENT)
Dept: SPEECH THERAPY | Facility: HOSPITAL | Age: 79
Setting detail: THERAPIES SERIES
Discharge: HOME | End: 2019-06-28

## 2019-06-28 DIAGNOSIS — IMO0002 UNCONTROLLED TYPE 2 DIABETES MELLITUS WITH COMPLICATION, WITH LONG-TERM CURRENT USE OF INSULIN: ICD-10-CM

## 2019-06-28 DIAGNOSIS — I69.391 DYSPHAGIA DUE TO RECENT CEREBRAL INFARCTION: Primary | ICD-10-CM

## 2019-06-28 PROCEDURE — 92526 ORAL FUNCTION THERAPY: CPT | Performed by: SPEECH-LANGUAGE PATHOLOGIST

## 2019-06-28 NOTE — THERAPY TREATMENT NOTE
Outpatient Speech Language Pathology   Adult Swallow Treatment Note  [facility]     Patient Name: Mar Burgos  : 1940  MRN: [id number]  Today's Date: 2019         Visit Date: 2019   Patient Active Problem List   Diagnosis   • Anemia   • Uncontrolled type 2 diabetes mellitus (CMS/HCC)   • Diabetic neuropathy (CMS/HCC)   • Gastroesophageal reflux disease   • Gastritis   • Hearing loss   • Hyperlipidemia   • Hypertension   • Thoracic back pain   • Peripheral arterial occlusive disease (CMS/HCC)   • Personal history of breast cancer   • Personal history of other malignant neoplasm of skin   • History of tobacco use   • Seasonal allergic rhinitis   • Type 2 diabetes mellitus, without long-term current use of insulin (CMS/HCC)   • Cobalamin deficiency   • Osteoporosis screening   • Breast cancer screening   • Sinusitis   • Cerebrovascular accident (CVA) (CMS/HCC)   • Left-sided weakness   • Dysphagia due to recent cerebral infarction   • Smoker   • Right ankle swelling   • Localized swelling on left hand        Visit Dx:    ICD-10-CM ICD-9-CM   1. Dysphagia due to recent cerebral infarction I69.391 438.82                         SLP OP Goals     Row Name 19 1100          Subjective Comments    Subjective Comments  Patient is pleasant and cooperative  -KA        Subjective Pain    Able to rate subjective pain?  yes  -KA     Pre-Treatment Pain Level  0  -KA     Post-Treatment Pain Level  0  -KA        Dysphagia Goals    Patient will improve stage transition duration of swallow/improve timing to reduce food falling into the airway by decreasing swallow delay after neurosensory  stimulation  neurosensory stimulation;without cues  -KA     Status: Patient will improve stage transition duration of swallow/improve timing to reduce food falling into the airway by decreasing swallow delay after neurosensory  stimulation  Progressing as expected  -KA     Comments: Patient will improve stage  transition duration of swallow/improve timing to reduce food falling into the airway by decreasing swallow delay after neurosensory  stimulation  Pt tolerated NMES for 30 minutes at 11 mA w/no c/o pain and timed swallow on peak of amplitude 90% of the time without cues.   -KA     Patient will increase laryngeal elevation to reduce residue that might fall into airway by completing  super-supraglottic swallow  -KA     Status: Patient will increase laryngeal elevation to reduce residue that might fall into airway by completing  Progressing as expected  -KA     Comments: Patient will increase laryngeal elevation to reduce residue that might fall into airway by completing  70% with min cues. pt also completed hard glottal attack with 50% accuracy with mod cues  -KA     Patient will increase strength of tongue base and posterior pharyngeal walls to reduce residue that might fall into airway by completing  effotful swallow;Laura (tongue hold)  -KA     Status: Patient will increase strength of tongue base and posterior pharyngeal walls to reduce residue that might fall into airway by completing  Progressing as expected  -KA     Comments: Patient will increase strength of tongue base and posterior pharyngeal walls to reduce residue that might fall into airway by completing  Completed effortful swallow in unison with NMES and 3 second prep w/min to no cues for increased effort. Laura completed 90% accuracy without cues.  -KA     Patient will improve hyolaryngeal elevation via completing Jorge A (head lift)  sustained lift (comment #/duration of lifts);repetitive lift (comment #/duration of lifts);without cues  -KA     Status: Patient will improve hyolaryngeal elevation via completing Jorge A (head lift)  Progressing as expected  -KA     Comments: Patient will improve hyolaryngeal elevation via completing Jorge A (head lift)  completed modified exercises with CTAR ball: sustained hold completed 30 sec. i'ly x2; repetitive  completed x5 with min. cues   -KA        Other Goals    Other Adult Goal- 1  Patient will increase timing of swallow and reduce risk for premature spillage with practice of 2 second hold with thin liquids without overt s/s of pen/asp 90% of the time  -KA     Status: Other Adult Goal- 1  Progressing as expected  -KA     Comments: Other Adult Goal- 1  2 to 3 second hold with water trials during e-stim trials min cues needed at times. Patient demonstrated audible swallow at times, and also demonstrated x1 cough   -KA       User Key  (r) = Recorded By, (t) = Taken By, (c) = Cosigned By    Initials Name Provider Type    Maxime Ramírez MA,CCC-SLP Speech and Language Pathologist                        Time Calculation:   SLP Start Time: 1000  SLP Stop Time: 1055  SLP Time Calculation (min): 55 min    Therapy Charges for Today     Code Description Service Date Service Provider Modifiers Qty    [id number]  ST TREATMENT SWALLOW 4 6/28/2019 Maxime Delgado MA,CCC-SLP GN 1                   Maxime Delgado MA,CCC-SLP  6/28/2019

## 2019-07-01 ENCOUNTER — APPOINTMENT (OUTPATIENT)
Dept: PHYSICAL THERAPY | Facility: HOSPITAL | Age: 79
End: 2019-07-01

## 2019-07-01 ENCOUNTER — APPOINTMENT (OUTPATIENT)
Dept: SPEECH THERAPY | Facility: HOSPITAL | Age: 79
End: 2019-07-01

## 2019-07-05 ENCOUNTER — HOSPITAL ENCOUNTER (OUTPATIENT)
Dept: SPEECH THERAPY | Facility: HOSPITAL | Age: 79
Setting detail: THERAPIES SERIES
Discharge: HOME | End: 2019-07-05

## 2019-07-05 ENCOUNTER — APPOINTMENT (OUTPATIENT)
Dept: PHYSICAL THERAPY | Facility: HOSPITAL | Age: 79
End: 2019-07-05

## 2019-07-05 DIAGNOSIS — I69.391 DYSPHAGIA DUE TO RECENT CEREBRAL INFARCTION: Primary | ICD-10-CM

## 2019-07-05 PROCEDURE — 92526 ORAL FUNCTION THERAPY: CPT

## 2019-07-05 NOTE — THERAPY TREATMENT NOTE
Outpatient Speech Language Pathology   Adult Swallow Treatment Note  [facility]     Patient Name: Mar Burgos  : 1940  MRN: [id number]  Today's Date: 2019         Visit Date: 2019   Patient Active Problem List   Diagnosis   • Anemia   • Uncontrolled type 2 diabetes mellitus (CMS/HCC)   • Diabetic neuropathy (CMS/HCC)   • Gastroesophageal reflux disease   • Gastritis   • Hearing loss   • Hyperlipidemia   • Hypertension   • Thoracic back pain   • Peripheral arterial occlusive disease (CMS/HCC)   • Personal history of breast cancer   • Personal history of other malignant neoplasm of skin   • History of tobacco use   • Seasonal allergic rhinitis   • Type 2 diabetes mellitus, without long-term current use of insulin (CMS/HCC)   • Cobalamin deficiency   • Osteoporosis screening   • Breast cancer screening   • Sinusitis   • Cerebrovascular accident (CVA) (CMS/HCC)   • Left-sided weakness   • Dysphagia due to recent cerebral infarction   • Smoker   • Right ankle swelling   • Localized swelling on left hand        Visit Dx:    ICD-10-CM ICD-9-CM   1. Dysphagia due to recent cerebral infarction I69.391 438.82                         SLP OP Goals     Row Name 19 1300          Dysphagia Goals    Patient will improve stage transition duration of swallow/improve timing to reduce food falling into the airway by decreasing swallow delay after neurosensory  stimulation  neurosensory stimulation  -AW     Status: Patient will improve stage transition duration of swallow/improve timing to reduce food falling into the airway by decreasing swallow delay after neurosensory  stimulation  Progressing as expected  -AW     Comments: Patient will improve stage transition duration of swallow/improve timing to reduce food falling into the airway by decreasing swallow delay after neurosensory  stimulation  Pt tolerated NMES for 30 min at 50 Hz, 175 usec and 11 amps. Pt utilized sips of water during stim to assist  in eliciting a swallow. Swallow audible at times with occasional throat clearing or coughing noted.  -AW     Patient will increase laryngeal elevation to reduce residue that might fall into airway by completing  Mendelsohn maneuver;falsetto/pitch glide;with cues  -AW     Status: Patient will increase laryngeal elevation to reduce residue that might fall into airway by completing  Progressing as expected  -AW     Comments: Patient will increase laryngeal elevation to reduce residue that might fall into airway by completing  Pt needed min cues to improve accuracy. Exercises completed in sets of 10. Pt reported daily completion of HEP.  -AW     Patient will increase strength of tongue base and posterior pharyngeal walls to reduce residue that might fall into airway by completing  effotful swallow;Laura (tongue hold);tongue base retraction  -AW     Status: Patient will increase strength of tongue base and posterior pharyngeal walls to reduce residue that might fall into airway by completing  Progressing as expected  -AW     Comments: Patient will increase strength of tongue base and posterior pharyngeal walls to reduce residue that might fall into airway by completing  Sets of 10 with min cues   -AW     Patient will improve hyolaryngeal elevation via completing Jorge A (head lift)  repetitive lift (comment #/duration of lifts);sustained lift (comment #/duration of lifts);with cues  -AW     Status: Patient will improve hyolaryngeal elevation via completing Jorge A (head lift)  Progressing as expected  -AW     Comments: Patient will improve hyolaryngeal elevation via completing Jorge A (head lift)  Exercises completed with CTAR ball  -AW       User Key  (r) = Recorded By, (t) = Taken By, (c) = Cosigned By    Initials Name Provider Type    Dottie Sosa MS CCC-SLP Speech and Language Pathologist          OP SLP Education     Row Name 07/05/19 1312       Education    Barriers to Learning  No barriers identified  -AW     Assessed  Learning needs;Learning motivation;Learning preferences;Learning readiness  -AW    Learning Motivation  Strong  -AW    Learning Method  Explanation;Demonstration;Teach back  -AW    Teaching Response  Verbalized understanding;Demonstrated understanding  -AW      User Key  (r) = Recorded By, (t) = Taken By, (c) = Cosigned By    Initials Name Effective Dates    Dottie Sosa MS CCC-SLP 06/08/18 -           OP SLP Assessment/Plan - 07/05/19 1311        SLP Assessment    Functional Problems  Swallowing   -AW    Impact on Function: Swallowing  Risk of aspiration   -AW    Prognosis  Good (comment)   -AW    Patient/caregiver participated in establishment of treatment plan and goals  Yes   -AW    Patient would benefit from skilled therapy intervention  Yes   -AW      User Key  (r) = Recorded By, (t) = Taken By, (c) = Cosigned By    Initials Name Provider Type    Dottie Sosa MS CCC-SLP Speech and Language Pathologist                Time Calculation:   SLP Start Time: 1000  SLP Stop Time: 1100  SLP Time Calculation (min): 60 min    Therapy Charges for Today     Code Description Service Date Service Provider Modifiers Qty    [id number] HC ST TREATMENT SWALLOW 4 7/5/2019 Dottie Barrera MS CCC-SLP GN 1                   Dottie Barrera MS CCC-SLP  7/5/2019

## 2019-07-06 ENCOUNTER — APPOINTMENT (OUTPATIENT)
Dept: CT IMAGING | Facility: HOSPITAL | Age: 79
End: 2019-07-06

## 2019-07-06 ENCOUNTER — HOSPITAL ENCOUNTER (EMERGENCY)
Facility: HOSPITAL | Age: 79
Discharge: HOME | End: 2019-07-06
Attending: EMERGENCY MEDICINE | Admitting: EMERGENCY MEDICINE

## 2019-07-06 ENCOUNTER — APPOINTMENT (OUTPATIENT)
Dept: GENERAL RADIOLOGY | Facility: HOSPITAL | Age: 79
End: 2019-07-06

## 2019-07-06 VITALS
WEIGHT: 115 LBS | HEIGHT: 64 IN | SYSTOLIC BLOOD PRESSURE: 216 MMHG | HEART RATE: 81 BPM | OXYGEN SATURATION: 98 % | RESPIRATION RATE: 15 BRPM | BODY MASS INDEX: 19.63 KG/M2 | DIASTOLIC BLOOD PRESSURE: 78 MMHG | TEMPERATURE: 99.6 F

## 2019-07-06 DIAGNOSIS — S70.02XA CONTUSION OF LEFT HIP, INITIAL ENCOUNTER: ICD-10-CM

## 2019-07-06 DIAGNOSIS — I10 HYPERTENSION, UNSPECIFIED TYPE: ICD-10-CM

## 2019-07-06 DIAGNOSIS — Z86.79 HISTORY OF HYPERTENSION: ICD-10-CM
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
## 2019-07-06 PROCEDURE — 70450 CT HEAD/BRAIN W/O DYE: CPT

## 2019-07-06 PROCEDURE — 73502 X-RAY EXAM HIP UNI 2-3 VIEWS: CPT

## 2019-07-06 PROCEDURE — 99284 EMERGENCY DEPT VISIT MOD MDM: CPT

## 2019-07-06 RX ORDER — CLONIDINE HYDROCHLORIDE 0.1 MG/1
0.2 TABLET ORAL ONCE
Status: COMPLETED | OUTPATIENT
Start: 2019-07-06 | End: 2019-07-06

## 2019-07-06 RX ORDER — ACETAMINOPHEN 325 MG/1
650 TABLET ORAL ONCE
Status: COMPLETED | OUTPATIENT
Start: 2019-07-06 | End: 2019-07-06

## 2019-07-06 RX ADMIN — CLONIDINE HYDROCHLORIDE 0.2 MG: 0.1 TABLET ORAL at 15:14

## 2019-07-06 RX ADMIN — ACETAMINOPHEN 650 MG: 325 TABLET, FILM COATED ORAL at 15:08

## 2019-07-06 NOTE — ED TRIAGE NOTES
Patient presents to er via private vehicle from home.  Patient reports a mechanical fall and reports left pain.  Reports painful ambulation.

## 2019-07-06 NOTE — DISCHARGE INSTRUCTIONS
Expect to be stiff and sore for several days.  Continue all current medications.  Heat for muscle stiffness or soreness with gradual increased range of motion exercises. Ice for pain and swelling if needed. Tylenol for pain as needed. Follow-up closely with your primary care provider, and return to the emergency department for worsening symptoms as needed.

## 2019-07-06 NOTE — ED PROVIDER NOTES
EMERGENCY DEPARTMENT ENCOUNTER    CHIEF COMPLAINT  Chief Complaint: Mechanical Fall  History given by: Patient  History limited by: nothing  Room Number: 07/07  PMD: Vladimir Hernandez APRN      HPI:  Pt is a 78 y.o. female who presents complaining of a mechanical fall that occurred earlier today. Pt admits to L hip pain. Pt notes that movement aggravates the pain. Pt denies hitting her head and LOC. Pt notes that she takes ASA q.d.    Duration:  Earlier today  Onset: sudden  Timing: constant  Quality: Mechanical Fall  Intensity/Severity: mild  Progression: unchanged  Associated Symptoms: L hip pain  Aggravating Factors: movement  Alleviating Factors: none  Previous Episodes: none  Treatment before arrival: Pt notes that she takes ASA q.d.    PAST MEDICAL HISTORY  Active Ambulatory Problems     Diagnosis Date Noted   • Anemia 07/01/2013   • Uncontrolled type 2 diabetes mellitus (CMS/[facility]) 01/16/2017   • Diabetic neuropathy (CMS/[facility]) 01/16/2017   • Gastroesophageal reflux disease 01/16/2017   • Gastritis 01/16/2017   • Hearing loss 01/16/2017   • Hyperlipidemia 01/16/2017   • Hypertension 01/16/2017   • Thoracic back pain 01/16/2017   • Peripheral arterial occlusive disease (CMS/[facility]) 01/16/2017   • Personal history of breast cancer 07/01/2013   • Personal history of other malignant neoplasm of skin 07/01/2013   • History of tobacco use 07/01/2013   • Seasonal allergic rhinitis 01/16/2017   • Type 2 diabetes mellitus, without long-term current use of insulin (CMS/[facility]) 07/01/2013   • Cobalamin deficiency 01/16/2017   • Osteoporosis screening 02/22/2017   • Breast cancer screening 02/22/2017   • Sinusitis 03/15/2018   • Cerebrovascular accident (CVA) (CMS/[facility]) 02/15/2019   • Left-sided weakness 02/15/2019   • Dysphagia due to recent cerebral infarction 02/16/2019   • Smoker 02/22/2019   • Right ankle swelling 03/14/2019   • Localized swelling on left hand 03/14/2019     Resolved Ambulatory Problems     Diagnosis Date Noted    • Diverticulitis of colon 01/16/2017   • Vitamin B-complex deficiency 07/01/2013   • Pneumonia 01/16/2017     Past Medical History:   Diagnosis Date   • Anemia    • Arthritis    • Cancer (CMS/HCC)    • Clostridium difficile colitis    • Diabetes mellitus (CMS/HCC)    • Difficulty walking    • Diverticulitis    • Environmental allergies    • History of malignant neoplasm of breast 07/01/2013   • History of malignant neoplasm of skin 07/01/2013   • History of tobacco use 7/1/2013   • HL (hearing loss)    • Hyperlipidemia    • Hypertension    • Kidney disease    • Neuropathy in diabetes (CMS/HCC)    • Peripheral neuropathy    • Shingles    • Stroke (CMS/HCC)    • Vision loss        PAST SURGICAL HISTORY  Past Surgical History:   Procedure Laterality Date   • CATARACT EXTRACTION, BILATERAL     • EAR TUBES     • MASTECTOMY Bilateral     radical   • TUMOR REMOVAL Left     large lipoma removed from hip       FAMILY HISTORY  Family History   Problem Relation Age of Onset   • Stroke Mother 76   • Diabetes Mother    • Hypertension Mother    • Arthritis Mother    • Cancer Father         metastatic unknown cause   • Heart attack Father 70   • Cancer Brother         bladder   • Arthritis Sister        SOCIAL HISTORY  Social History     Socioeconomic History   • Marital status:      Spouse name: Not on file   • Number of children: 3   • Years of education: 9   • Highest education level: Not on file   Tobacco Use   • Smoking status: Former Smoker     Packs/day: 0.25     Years: 10.00     Pack years: 2.50     Types: Cigarettes   • Smokeless tobacco: Former User   • Tobacco comment: she quit for 8 years and started back about a year ago   Substance and Sexual Activity   • Alcohol use: No   • Drug use: No       ALLERGIES  Lipitor [atorvastatin]    REVIEW OF SYSTEMS  Review of Systems   Constitutional: Negative for fever.   HENT: Negative for sore throat.    Eyes: Negative.    Respiratory: Negative for cough and shortness of  breath.    Cardiovascular: Negative for chest pain.   Gastrointestinal: Negative for abdominal pain, diarrhea and vomiting.   Genitourinary: Negative for dysuria.   Musculoskeletal: Positive for arthralgias (L hip). Negative for neck pain.   Skin: Negative for rash.   Allergic/Immunologic: Negative.    Neurological: Negative for weakness, numbness and headaches.   Hematological: Negative.    Psychiatric/Behavioral: Negative.    All other systems reviewed and are negative.      PHYSICAL EXAM  ED Triage Vitals   Temp Heart Rate Resp BP SpO2   07/06/19 1353 07/06/19 1353 07/06/19 1353 07/06/19 1400 07/06/19 1353   99.6 °F (37.6 °C) 76 15 (!) 247/108 98 %      Temp src Heart Rate Source Patient Position BP Location FiO2 (%)   07/06/19 1353 07/06/19 1353 -- -- --   Tympanic Monitor          Physical Exam   Constitutional: She is oriented to person, place, and time and well-developed, well-nourished, and in no distress. No distress.   HENT:   Mouth/Throat: Mucous membranes are normal.   Eyes: EOM are normal.   Neck: Normal range of motion.   Cardiovascular: Normal rate and regular rhythm. Exam reveals no gallop and no friction rub.   No murmur heard.  Pulmonary/Chest: Effort normal. No respiratory distress. She has no wheezes. She has no rhonchi. She has no rales.   Abdominal: Soft. She exhibits no distension. There is no tenderness. There is no guarding.   Musculoskeletal: Normal range of motion. She exhibits no deformity.        Left hip: She exhibits tenderness (pain with flexion). She exhibits no deformity.   No shortening of the LLE identified.   Neurological: She is alert and oriented to person, place, and time.   moving all extremities, no focal deficits   Skin: Skin is warm and dry.   No scalp hematomas noted   Psychiatric:   Calm, cooperative       RADIOLOGY  CT Head Without Contrast   Preliminary Result   No acute process identified.       Radiation dose reduction techniques were utilized, including automated    exposure control and exposure modulation based on body size.              XR Hip With or Without Pelvis 2 - 3 View Left   Final Result       No acute fracture is identified. Degenerative changes. Follow-up/further   evaluation can be obtained as indicated.       This report was finalized on 7/6/2019 2:44 PM by Dr. Demarco Malik M.D.               I ordered the above noted radiological studies. Interpreted by radiologist. Reviewed by me in PACS.         PROGRESS AND CONSULTS     1408- CT head and XR L hip ordered for further evaluation.    1536- Rechecked pt who is resting comfortably in NAD. Informed pt of the imaging results. D/w pt the plan to DC pending ambulation test and evalution of BP. BP is currently 216/78. Pt understands and agrees with plan. All questions answered.            MEDICATIONS GIVEN IN ER  Medications   acetaminophen (TYLENOL) tablet 650 mg (650 mg Oral Given 7/6/19 1508)   CloNIDine (CATAPRES) tablet 0.2 mg (0.2 mg Oral Given 7/6/19 1514)           MEDICAL DECISION MAKING  Results were reviewed/discussed with the patient and they were also made aware of online access. Pt also made aware that some labs, such as cultures, will not be resulted during ER visit and follow up with PMD is necessary.     MDM  Number of Diagnoses or Management Options  Accident due to mechanical fall without injury, initial encounter:   Contusion of left hip, initial encounter:   History of hypertension:   Hypertension, unspecified type:   Diagnosis management comments: Patient presents today for evaluation after mechanical fall at home.  CT of the head is negative, x-rays of the hip and pelvis on the left are negative for fracture.  She has normal range of motion no obvious deformity on exam.  Blood pressure was significant elevated today, though I suspect this is from the pain and stress of the event today.  It did significantly improve after a dose of oral clonidine.  Patient given a walker today in the  emergency department, as they had been trying to get one as an outpatient but were having difficulties with final approval through insurance.  This certainly will help her in the short-term in terms of recovery from her hip contusion, and does seem to be indicated from an outpatient basis anyway as her primary care provider had ordered it.  Patient to follow-up closely with her primary care provider and to return to the emergency department for worsening symptoms as needed.         DIAGNOSIS  Final diagnoses:   Accident due to mechanical fall without injury, initial encounter   Contusion of left hip, initial encounter   Hypertension, unspecified type   History of hypertension       DISPOSITION  DISCHARGE    Patient discharged in stable condition.    Reviewed implications of results, diagnosis, meds, responsibility to follow up, warning signs and symptoms of possible worsening, potential complications and reasons to return to ER.    Patient/Family voiced understanding of above instructions.    Discussed plan for discharge, as there is no emergent indication for admission. Patient referred to primary care provider for BP management due to today's BP. Pt/family is agreeable and understands need for follow up and repeat testing.  Pt is aware that discharge does not mean that nothing is wrong but it indicates no emergency is present that requires admission and they must continue care with follow-up as given below or physician of their choice.     FOLLOW-UP  [facility] Emergency Department  [address] [facility] [address] [phone]    As needed, If symptoms worsen    Vladimir Hernandez, APRN  [address] [facility] [id number] [phone]    Schedule an appointment as soon as possible for a visit   As needed         Medication List      Changed    metoprolol succinate XL 50 MG 24 hr tablet  Commonly known as:  TOPROL-XL  TAKE 1 TABLET BY MOUTH DAILY  What changed:  when to take this               Latest Documented Vital Signs:  As of 4:20 PM  BP- (!) 216/78 HR- 81 Temp- 99.6 °F (37.6 °C) (Tympanic) O2 sat- 98%    --  Documentation assistance provided by mayda Lanier for Dr. Munguia.  Information recorded by the scribe was done at my direction and has been verified and validated by me.         Eneida Lanier  07/06/19 0786       Damon Munguia MD  07/06/19 1844

## 2019-07-08 ENCOUNTER — APPOINTMENT (OUTPATIENT)
Dept: PHYSICAL THERAPY | Facility: HOSPITAL | Age: 79
End: 2019-07-08

## 2019-07-08 ENCOUNTER — HOSPITAL ENCOUNTER (OUTPATIENT)
Dept: SPEECH THERAPY | Facility: HOSPITAL | Age: 79
Setting detail: THERAPIES SERIES
Discharge: HOME | End: 2019-07-08

## 2019-07-08 ENCOUNTER — APPOINTMENT (OUTPATIENT)
Dept: CT IMAGING | Facility: HOSPITAL | Age: 79
End: 2019-07-08

## 2019-07-08 ENCOUNTER — HOSPITAL ENCOUNTER (EMERGENCY)
Facility: HOSPITAL | Age: 79
Discharge: HOME | End: 2019-07-08
Attending: EMERGENCY MEDICINE | Admitting: EMERGENCY MEDICINE

## 2019-07-08 VITALS
DIASTOLIC BLOOD PRESSURE: 74 MMHG | WEIGHT: 115 LBS | TEMPERATURE: 97.3 F | OXYGEN SATURATION: 100 % | BODY MASS INDEX: 19.63 KG/M2 | HEIGHT: 64 IN | SYSTOLIC BLOOD PRESSURE: 196 MMHG | RESPIRATION RATE: 18 BRPM | HEART RATE: 62 BPM

## 2019-07-08 DIAGNOSIS — I63.9 CEREBROVASCULAR ACCIDENT (CVA), UNSPECIFIED MECHANISM (HCC): ICD-10-CM

## 2019-07-08 DIAGNOSIS — I95.1 ORTHOSTATIC HYPOTENSION: ICD-10-CM

## 2019-07-08 DIAGNOSIS — I69.391 DYSPHAGIA DUE TO RECENT CEREBRAL INFARCTION: Primary | ICD-10-CM

## 2019-07-08 DIAGNOSIS — E86.0 DEHYDRATION: Primary | ICD-10-CM

## 2019-07-08 LAB
ANION GAP SERPL CALCULATED.3IONS-SCNC: 15.1 MMOL/L (ref 5–15)
BACTERIA UR QL AUTO: ABNORMAL /HPF
BASOPHILS # BLD AUTO: 0.04 10*3/MM3 (ref 0–0.2)
BASOPHILS NFR BLD AUTO: 0.6 % (ref 0–1.5)
BILIRUB UR QL STRIP: NEGATIVE
BUN BLD-MCNC: 37 MG/DL (ref 8–23)
BUN/CREAT SERPL: 22.8 (ref 7–25)
CALCIUM SPEC-SCNC: 9 MG/DL (ref 8.6–10.5)
CHLORIDE SERPL-SCNC: 108 MMOL/L (ref 98–107)
CLARITY UR: CLEAR
CO2 SERPL-SCNC: 19.9 MMOL/L (ref 22–29)
COLOR UR: YELLOW
CREAT BLD-MCNC: 1.62 MG/DL (ref 0.57–1)
DEPRECATED RDW RBC AUTO: 52.9 FL (ref 37–54)
EOSINOPHIL # BLD AUTO: 0.15 10*3/MM3 (ref 0–0.4)
EOSINOPHIL NFR BLD AUTO: 2.4 % (ref 0.3–6.2)
ERYTHROCYTE [DISTWIDTH] IN BLOOD BY AUTOMATED COUNT: 15.8 % (ref 12.3–15.4)
GFR SERPL CREATININE-BSD FRML MDRD: 31 ML/MIN/1.73
GLUCOSE BLD-MCNC: 105 MG/DL (ref 65–99)
GLUCOSE UR STRIP-MCNC: NEGATIVE MG/DL
HCT VFR BLD AUTO: 30.1 % (ref 34–46.6)
HGB BLD-MCNC: 9 G/DL (ref 12–15.9)
HGB UR QL STRIP.AUTO: NEGATIVE
HYALINE CASTS UR QL AUTO: ABNORMAL /LPF
IMM GRANULOCYTES # BLD AUTO: 0.03 10*3/MM3 (ref 0–0.05)
IMM GRANULOCYTES NFR BLD AUTO: 0.5 % (ref 0–0.5)
KETONES UR QL STRIP: ABNORMAL
LEUKOCYTE ESTERASE UR QL STRIP.AUTO: ABNORMAL
LYMPHOCYTES # BLD AUTO: 1.52 10*3/MM3 (ref 0.7–3.1)
LYMPHOCYTES NFR BLD AUTO: 24.6 % (ref 19.6–45.3)
MCH RBC QN AUTO: 27.4 PG (ref 26.6–33)
MCHC RBC AUTO-ENTMCNC: 29.9 G/DL (ref 31.5–35.7)
MCV RBC AUTO: 91.8 FL (ref 79–97)
MONOCYTES # BLD AUTO: 0.62 10*3/MM3 (ref 0.1–0.9)
MONOCYTES NFR BLD AUTO: 10 % (ref 5–12)
NEUTROPHILS # BLD AUTO: 3.81 10*3/MM3 (ref 1.7–7)
NEUTROPHILS NFR BLD AUTO: 61.9 % (ref 42.7–76)
NITRITE UR QL STRIP: NEGATIVE
NRBC BLD AUTO-RTO: 0 /100 WBC (ref 0–0.2)
PH UR STRIP.AUTO: <=5 [PH] (ref 5–8)
PLATELET # BLD AUTO: 248 10*3/MM3 (ref 140–450)
PMV BLD AUTO: 9.6 FL (ref 6–12)
POTASSIUM BLD-SCNC: 5.5 MMOL/L (ref 3.5–5.2)
PROT UR QL STRIP: ABNORMAL
RBC # BLD AUTO: 3.28 10*6/MM3 (ref 3.77–5.28)
RBC # UR: ABNORMAL /HPF
REF LAB TEST METHOD: ABNORMAL
SODIUM BLD-SCNC: 143 MMOL/L (ref 136–145)
SP GR UR STRIP: >=1.03 (ref 1–1.03)
SQUAMOUS #/AREA URNS HPF: ABNORMAL /HPF
UROBILINOGEN UR QL STRIP: ABNORMAL
WBC NRBC COR # BLD: 6.17 10*3/MM3 (ref 3.4–10.8)
WBC UR QL AUTO: ABNORMAL /HPF

## 2019-07-08 PROCEDURE — 80048 BASIC METABOLIC PNL TOTAL CA: CPT | Performed by: EMERGENCY MEDICINE

## 2019-07-08 PROCEDURE — 81001 URINALYSIS AUTO W/SCOPE: CPT | Performed by: EMERGENCY MEDICINE

## 2019-07-08 PROCEDURE — 70450 CT HEAD/BRAIN W/O DYE: CPT

## 2019-07-08 PROCEDURE — 92526 ORAL FUNCTION THERAPY: CPT | Performed by: SPEECH-LANGUAGE PATHOLOGIST

## 2019-07-08 PROCEDURE — 99284 EMERGENCY DEPT VISIT MOD MDM: CPT

## 2019-07-08 PROCEDURE — 96360 HYDRATION IV INFUSION INIT: CPT

## 2019-07-08 PROCEDURE — 85025 COMPLETE CBC W/AUTO DIFF WBC: CPT | Performed by: EMERGENCY MEDICINE

## 2019-07-08 RX ORDER — ACETAMINOPHEN 500 MG
500 TABLET ORAL EVERY 6 HOURS PRN
COMMUNITY

## 2019-07-08 RX ADMIN — SODIUM CHLORIDE 500 ML: 9 INJECTION, SOLUTION INTRAVENOUS at 17:32

## 2019-07-08 RX ADMIN — SODIUM CHLORIDE 1000 ML: 9 INJECTION, SOLUTION INTRAVENOUS at 16:12

## 2019-07-08 NOTE — DISCHARGE INSTRUCTIONS
Increase your water intake, continue current medications, follow-up closely with your primary care provider, return to the emergency department for worsening symptoms as needed.

## 2019-07-08 NOTE — ED PROVIDER NOTES
EMERGENCY DEPARTMENT ENCOUNTER    CHIEF COMPLAINT  Chief Complaint: dizziness  History given by: patient, daughter  History limited by: none  Room Number: 34/34  PMD: Vladimir Hernandez APRN      HPI:  Pt is a 78 y.o. female who presents complaining of dizziness and fatigue that the family noticed today after taking the pt to speech therapy. Pt did sustain a fall a couple of days ago, but had a negative work up at the ED. She denies any n/v, HA, vision complaints, or weakness since the fal or evaluation. She notices the dizziness worse with standing and ambulation.      PAST MEDICAL HISTORY  Active Ambulatory Problems     Diagnosis Date Noted   • Anemia 07/01/2013   • Uncontrolled type 2 diabetes mellitus (CMS/[facility]) 01/16/2017   • Diabetic neuropathy (CMS/[facility]) 01/16/2017   • Gastroesophageal reflux disease 01/16/2017   • Gastritis 01/16/2017   • Hearing loss 01/16/2017   • Hyperlipidemia 01/16/2017   • Hypertension 01/16/2017   • Thoracic back pain 01/16/2017   • Peripheral arterial occlusive disease (CMS/[facility]) 01/16/2017   • Personal history of breast cancer 07/01/2013   • Personal history of other malignant neoplasm of skin 07/01/2013   • History of tobacco use 07/01/2013   • Seasonal allergic rhinitis 01/16/2017   • Type 2 diabetes mellitus, without long-term current use of insulin (CMS/[facility]) 07/01/2013   • Cobalamin deficiency 01/16/2017   • Osteoporosis screening 02/22/2017   • Breast cancer screening 02/22/2017   • Sinusitis 03/15/2018   • Cerebrovascular accident (CVA) (CMS/[facility]) 02/15/2019   • Left-sided weakness 02/15/2019   • Dysphagia due to recent cerebral infarction 02/16/2019   • Smoker 02/22/2019   • Right ankle swelling 03/14/2019   • Localized swelling on left hand 03/14/2019     Resolved Ambulatory Problems     Diagnosis Date Noted   • Diverticulitis of colon 01/16/2017   • Vitamin B-complex deficiency 07/01/2013   • Pneumonia 01/16/2017     Past Medical History:   Diagnosis Date   • Anemia    • Arthritis     • Cancer (CMS/[facility]CC)    • Clostridium difficile colitis    • Diabetes mellitus (CMS/[facility]CC)    • Difficulty walking    • Diverticulitis    • Environmental allergies    • History of malignant neoplasm of breast 07/01/2013   • History of malignant neoplasm of skin 07/01/2013   • History of tobacco use 7/1/2013   • HL (hearing loss)    • Hyperlipidemia    • Hypertension    • Kidney disease    • Neuropathy in diabetes (CMS/HCC)    • Peripheral neuropathy    • Shingles    • Stroke (CMS/HCC)    • Vision loss        PAST SURGICAL HISTORY  Past Surgical History:   Procedure Laterality Date   • CATARACT EXTRACTION, BILATERAL     • EAR TUBES     • MASTECTOMY Bilateral     radical   • TUMOR REMOVAL Left     large lipoma removed from hip       FAMILY HISTORY  Family History   Problem Relation Age of Onset   • Stroke Mother 76   • Diabetes Mother    • Hypertension Mother    • Arthritis Mother    • Cancer Father         metastatic unknown cause   • Heart attack Father 70   • Cancer Brother         bladder   • Arthritis Sister        SOCIAL HISTORY  Social History     Socioeconomic History   • Marital status:      Spouse name: Not on file   • Number of children: 3   • Years of education: 9   • Highest education level: Not on file   Tobacco Use   • Smoking status: Former Smoker     Packs/day: 0.25     Years: 10.00     Pack years: 2.50     Types: Cigarettes   • Smokeless tobacco: Former User   • Tobacco comment: she quit for 8 years and started back about a year ago   Substance and Sexual Activity   • Alcohol use: No   • Drug use: No       ALLERGIES  Lipitor [atorvastatin]    REVIEW OF SYSTEMS  Review of Systems   Constitutional: Positive for fatigue. Negative for fever.   HENT: Negative for sore throat.    Eyes: Negative.    Respiratory: Negative for cough and shortness of breath.    Cardiovascular: Negative for chest pain.   Gastrointestinal: Negative for abdominal pain, diarrhea and vomiting.   Genitourinary: Negative for  dysuria.   Musculoskeletal: Negative for neck pain.   Skin: Negative for rash.   Allergic/Immunologic: Negative.    Neurological: Positive for dizziness. Negative for weakness, numbness and headaches.   Hematological: Negative.    Psychiatric/Behavioral: Negative.    All other systems reviewed and are negative.      PHYSICAL EXAM  ED Triage Vitals   Temp Heart Rate Resp BP SpO2   07/08/19 1517 07/08/19 1517 07/08/19 1517 07/08/19 1532 07/08/19 1517   97.3 °F (36.3 °C) 51 18 (!) 182/71 100 %      Temp src Heart Rate Source Patient Position BP Location FiO2 (%)   07/08/19 1517 07/08/19 1517 -- -- --   Tympanic Monitor          Physical Exam   Constitutional: She is oriented to person, place, and time and well-developed, well-nourished, and in no distress. No distress.   HENT:   Mouth/Throat: Mucous membranes are dry.   Eyes: EOM are normal. Pupils are equal, round, and reactive to light.   Neck: Normal range of motion.   Cardiovascular: Normal rate and regular rhythm. Exam reveals no gallop and no friction rub.   No murmur heard.  Pulmonary/Chest: Effort normal. No respiratory distress. She has no wheezes. She has no rhonchi. She has no rales.   Abdominal: Soft. She exhibits no distension. There is no tenderness. There is no guarding.   Musculoskeletal: Normal range of motion. She exhibits no deformity.   No calf tenderness, no BLE edema   Neurological: She is alert and oriented to person, place, and time. She has normal sensation and normal strength.   moving all extremities, no focal deficits   Skin: Skin is warm and dry.   Psychiatric:   Calm, cooperative       LAB RESULTS  Lab Results (last 24 hours)     Procedure Component Value Units Date/Time    Urinalysis With Microscopic If Indicated (No Culture) - Urine, Clean Catch [[id number]]  (Abnormal) Collected:  07/08/19 1556    Specimen:  Urine, Clean Catch Updated:  07/08/19 1630     Color, UA Yellow     Appearance, UA Clear     pH, UA <=5.0     Specific Gravity, UA  >=1.030     Glucose, UA Negative     Ketones, UA Trace     Bilirubin, UA Negative     Blood, UA Negative     Protein, UA 30 mg/dL (1+)     Leuk Esterase, UA Small (1+)     Nitrite, UA Negative     Urobilinogen, UA 1.0 E.U./dL    Urinalysis, Microscopic Only - Urine, Clean Catch [[id number]]  (Abnormal) Collected:  07/08/19 1556    Specimen:  Urine, Clean Catch Updated:  07/08/19 1720     RBC, UA 0-2 /HPF      WBC, UA 6-12 /HPF      Bacteria, UA None Seen /HPF      Squamous Epithelial Cells, UA 7-12 /HPF      Hyaline Casts, UA 3-6 /LPF      Methodology Manual Light Microscopy    CBC & Differential [[id number]] Collected:  07/08/19 1605    Specimen:  Blood Updated:  07/08/19 1626    Narrative:       The following orders were created for panel order CBC & Differential.  Procedure                               Abnormality         Status                     ---------                               -----------         ------                     CBC Auto Differential[[id number]]        Abnormal            Final result                 Please view results for these tests on the individual orders.    Basic Metabolic Panel [[id number]]  (Abnormal) Collected:  07/08/19 1605    Specimen:  Blood from Arm, Right Updated:  07/08/19 1647     Glucose 105 mg/dL      BUN 37 mg/dL      Creatinine 1.62 mg/dL      Sodium 143 mmol/L      Potassium 5.5 mmol/L      Chloride 108 mmol/L      CO2 19.9 mmol/L      Calcium 9.0 mg/dL      eGFR Non African Amer 31 mL/min/1.73      BUN/Creatinine Ratio 22.8     Anion Gap 15.1 mmol/L     Narrative:       GFR Normal >60  Chronic Kidney Disease <60  Kidney Failure <15    CBC Auto Differential [[id number]]  (Abnormal) Collected:  07/08/19 1605    Specimen:  Blood from Arm, Right Updated:  07/08/19 1626     WBC 6.17 10*3/mm3      RBC 3.28 10*6/mm3      Hemoglobin 9.0 g/dL      Hematocrit 30.1 %      MCV 91.8 fL      MCH 27.4 pg      MCHC 29.9 g/dL      RDW 15.8 %      RDW-SD 52.9 fl      MPV 9.6 fL       Platelets 248 10*3/mm3      Neutrophil % 61.9 %      Lymphocyte % 24.6 %      Monocyte % 10.0 %      Eosinophil % 2.4 %      Basophil % 0.6 %      Immature Grans % 0.5 %      Neutrophils, Absolute 3.81 10*3/mm3      Lymphocytes, Absolute 1.52 10*3/mm3      Monocytes, Absolute 0.62 10*3/mm3      Eosinophils, Absolute 0.15 10*3/mm3      Basophils, Absolute 0.04 10*3/mm3      Immature Grans, Absolute 0.03 10*3/mm3      nRBC 0.0 /100 WBC           I ordered the above labs and reviewed the results    RADIOLOGY  CT Head Without Contrast   Final Result   1. Chronic ischemic changes as described.   2. No acute process identified.               Radiation dose reduction techniques were utilized, including automated   exposure control and exposure modulation based on body size.       This report was finalized on 7/8/2019 4:47 PM by Dr. Ryley Castellon M.D.               I ordered the above noted radiological studies. Interpreted by radiologist. Reviewed by me in PACS.       PROCEDURES  Procedures      PROGRESS AND CONSULTS     1518  BP- 103/50 HR- 64 Temp- 97.3 °F (36.3 °C) (Tympanic) O2 sat- 98%  Rechecked the patient who is in NAD and is resting comfortably. Discussed imaging showing nothing acute and labs showing evidence of dehydration and the plan to administer IVF. Shared decision making w/ the pt and family for admission vs administer additional IVF and d/c. Pt and family advised they would prefer to do more IVF and d/c home. Pt told to make sure she drinks plenty of water throughout the day and to f/u w/ her PCP. Pt given return to ED instructions. Pt understands and agrees with the plan, all questions answered.    MEDICAL DECISION MAKING  Results were reviewed/discussed with the patient and they were also made aware of online access. Pt also made aware that some labs, such as cultures, will not be resulted during ER visit and follow up with PMD is necessary.     MDM  Number of Diagnoses or Management  Options  Dehydration:   Orthostatic hypotension:   Diagnosis management comments: Patient showing evidence of dehydration today with mild acute kidney injury.  This explains her orthostatic hypotension.  Has been given IV fluids and oral rehydration, and after discussion with the patient and family, they would prefer to go home and attempt oral rehydration there.  They will follow-up closely with her primary care provider and have been advised to return to the emergency department for worsening symptoms as needed.       Amount and/or Complexity of Data Reviewed  Clinical lab tests: reviewed (Creatinine 1.62)  Tests in the radiology section of CPT®: reviewed (CT head-1. Chronic ischemic changes as described. No acute process identified.)  Decide to obtain previous medical records or to obtain history from someone other than the patient: yes  Independent visualization of images, tracings, or specimens: yes    Patient Progress  Patient progress: stable         DIAGNOSIS  Final diagnoses:   Dehydration   Orthostatic hypotension       DISPOSITION  DISCHARGE    Patient discharged in stable condition.    Reviewed implications of results, diagnosis, meds, responsibility to follow up, warning signs and symptoms of possible worsening, potential complications and reasons to return to ER.    Patient/Family voiced understanding of above instructions.    Discussed plan for discharge, as there is no emergent indication for admission. Patient referred to primary care provider for BP management due to today's BP. Pt/family is agreeable and understands need for follow up and repeat testing.  Pt is aware that discharge does not mean that nothing is wrong but it indicates no emergency is present that requires admission and they must continue care with follow-up as given below or physician of their choice.     FOLLOW-UP  [facility] Emergency Department  [address] [facility] [address] [phone]    As  needed, If symptoms worsen    Vladimir Hernandez, APRN  [address] [facility] [address] [phone]    Schedule an appointment as soon as possible for a visit   As needed         Medication List      Changed    metoprolol succinate XL 50 MG 24 hr tablet  Commonly known as:  TOPROL-XL  TAKE 1 TABLET BY MOUTH DAILY  What changed:  when to take this          Latest Documented Vital Signs:  As of 6:02 PM  BP- 103/50 HR- 64 Temp- 97.3 °F (36.3 °C) (Tympanic) O2 sat- 98%    --  Documentation assistance provided by mayda Temple for Dr. Munguia.  Information recorded by the scribe was done at my direction and has been verified and validated by me.     Reta Temple  07/08/19 5569       Damon Munguia MD  07/08/19 0036

## 2019-07-08 NOTE — THERAPY TREATMENT NOTE
"Outpatient Speech Language Pathology   Adult Swallow Treatment Note  [facility]     Patient Name: Mar Burgos  : 1940  MRN: [id number]  Today's Date: 2019         Visit Date: 2019   Patient Active Problem List   Diagnosis   • Anemia   • Uncontrolled type 2 diabetes mellitus (CMS/HCC)   • Diabetic neuropathy (CMS/HCC)   • Gastroesophageal reflux disease   • Gastritis   • Hearing loss   • Hyperlipidemia   • Hypertension   • Thoracic back pain   • Peripheral arterial occlusive disease (CMS/HCC)   • Personal history of breast cancer   • Personal history of other malignant neoplasm of skin   • History of tobacco use   • Seasonal allergic rhinitis   • Type 2 diabetes mellitus, without long-term current use of insulin (CMS/HCC)   • Cobalamin deficiency   • Osteoporosis screening   • Breast cancer screening   • Sinusitis   • Cerebrovascular accident (CVA) (CMS/HCC)   • Left-sided weakness   • Dysphagia due to recent cerebral infarction   • Smoker   • Right ankle swelling   • Localized swelling on left hand        Visit Dx:    ICD-10-CM ICD-9-CM   1. Dysphagia due to recent cerebral infarction I69.391 438.82   2. Cerebrovascular accident (CVA), unspecified mechanism (CMS/HCC) I63.9 434.91                         SLP OP Goals     Row Name 19 1400          Subjective Comments    Subjective Comments  Daughter reported pt \"is in slow motion today.\" Patient had a fall on  and went to ER and d/c home. All scans were negative. Patient reports feeling very fatigued today and also reported some dizziness. Pt demonstrated fatigue during therapy session and almost fell asleep at times. Session ended a little early. SLP advised pt and her daughter to call their MD regarind patient's fatigue and dizziness and they stated they would.   -KA        Subjective Pain    Able to rate subjective pain?  yes  -KA     Pre-Treatment Pain Level  0  -KA     Post-Treatment Pain Level  0  -KA     Subjective Pain " Comment  Denied being in pain   -KA        Dysphagia Goals    Patient will improve stage transition duration of swallow/improve timing to reduce food falling into the airway by decreasing swallow delay after neurosensory  stimulation  neurosensory stimulation  -KA     Status: Patient will improve stage transition duration of swallow/improve timing to reduce food falling into the airway by decreasing swallow delay after neurosensory  stimulation  Progressing as expected  -KA     Comments: Patient will improve stage transition duration of swallow/improve timing to reduce food falling into the airway by decreasing swallow delay after neurosensory  stimulation  Difficulty timing swallow on peak of amplitude due to pt falling asleep at times requiring mod to max cues 50% accuracy   -KA     Patient will increase strength of tongue base and posterior pharyngeal walls to reduce residue that might fall into airway by completing  effotful swallow;Laura (tongue hold);tongue base retraction  -KA     Status: Patient will increase strength of tongue base and posterior pharyngeal walls to reduce residue that might fall into airway by completing  Progressing as expected  -KA     Comments: Patient will increase strength of tongue base and posterior pharyngeal walls to reduce residue that might fall into airway by completing  Sets of 10 with min cues   -KA        Other Goals    Other Adult Goal- 1  Patient will increase timing of swallow and reduce risk for premature spillage with practice of 2 second hold with thin liquids without overt s/s of pen/asp 90% of the time  -KA     Status: Other Adult Goal- 1  Progressing as expected  -KA     Comments: Other Adult Goal- 1  2 to 3 second hold with water trials during e-stim trials min cues needed at times. Patient demonstrated audible swallow at times, and also demonstrated x3 throat clears   -KA       User Key  (r) = Recorded By, (t) = Taken By, (c) = Cosigned By    Initials Name  Provider Type    KA Maxime Delgado MA,CCC-SLP Speech and Language Pathologist                        Time Calculation:   SLP Start Time: 1400  SLP Stop Time: 1453  SLP Time Calculation (min): 53 min    Therapy Charges for Today     Code Description Service Date Service Provider Modifiers Qty    [id number]  ST TREATMENT SWALLOW 4 7/8/2019 Maxime Delgado MA,CCC-SLP GN 1                   Maxime Delgado MA,CCC-SLP  7/8/2019

## 2019-07-11 RX ORDER — CLONIDINE HYDROCHLORIDE 0.2 MG/1
TABLET ORAL
Qty: 180 TABLET | Refills: 0 | Status: SHIPPED | OUTPATIENT
Start: 2019-07-11 | End: 2019-09-26

## 2019-07-12 ENCOUNTER — APPOINTMENT (OUTPATIENT)
Dept: PHYSICAL THERAPY | Facility: HOSPITAL | Age: 79
End: 2019-07-12

## 2019-07-12 ENCOUNTER — HOSPITAL ENCOUNTER (OUTPATIENT)
Dept: SPEECH THERAPY | Facility: HOSPITAL | Age: 79
Setting detail: THERAPIES SERIES
Discharge: HOME | End: 2019-07-12

## 2019-07-12 DIAGNOSIS — I69.391 DYSPHAGIA DUE TO RECENT CEREBRAL INFARCTION: Primary | ICD-10-CM

## 2019-07-12 PROCEDURE — 92526 ORAL FUNCTION THERAPY: CPT | Performed by: SPEECH-LANGUAGE PATHOLOGIST

## 2019-07-12 NOTE — THERAPY TREATMENT NOTE
Outpatient Speech Language Pathology   Adult Swallow Treatment Note  [facility]     Patient Name: Mar Burgos  : 1940  MRN: [id number]  Today's Date: 2019         Visit Date: 2019   Patient Active Problem List   Diagnosis   • Anemia   • Uncontrolled type 2 diabetes mellitus (CMS/HCC)   • Diabetic neuropathy (CMS/HCC)   • Gastroesophageal reflux disease   • Gastritis   • Hearing loss   • Hyperlipidemia   • Hypertension   • Thoracic back pain   • Peripheral arterial occlusive disease (CMS/HCC)   • Personal history of breast cancer   • Personal history of other malignant neoplasm of skin   • History of tobacco use   • Seasonal allergic rhinitis   • Type 2 diabetes mellitus, without long-term current use of insulin (CMS/HCC)   • Cobalamin deficiency   • Osteoporosis screening   • Breast cancer screening   • Sinusitis   • Cerebrovascular accident (CVA) (CMS/HCC)   • Left-sided weakness   • Dysphagia due to recent cerebral infarction   • Smoker   • Right ankle swelling   • Localized swelling on left hand        Visit Dx:    ICD-10-CM ICD-9-CM   1. Dysphagia due to recent cerebral infarction I69.391 438.82                         SLP OP Goals     Row Name 19 1100          Subjective Comments    Subjective Comments  Patient is pleasant and cooperative. went to the ED last Monday and treated with IV fliuds for dehydration pt reports she is feeling much better.  -KA        Subjective Pain    Able to rate subjective pain?  yes  -KA     Pre-Treatment Pain Level  0  -KA     Post-Treatment Pain Level  0  -KA        Dysphagia Goals    Patient will improve stage transition duration of swallow/improve timing to reduce food falling into the airway by decreasing swallow delay after neurosensory  stimulation  neurosensory stimulation  -KA     Status: Patient will improve stage transition duration of swallow/improve timing to reduce food falling into the airway by decreasing swallow delay after  neurosensory  stimulation  Progressing as expected  -KA     Comments: Patient will improve stage transition duration of swallow/improve timing to reduce food falling into the airway by decreasing swallow delay after neurosensory  stimulation  Improvement today with timing swallow, 80% of the time with min cues inconjunction with effortful swallow. 2-3 second hold   -KA     Patient will increase laryngeal elevation to reduce residue that might fall into airway by completing  Mendelsohn maneuver;falsetto/pitch glide;with cues  -KA     Status: Patient will increase laryngeal elevation to reduce residue that might fall into airway by completing  Progressing as expected  -KA     Comments: Patient will increase laryngeal elevation to reduce residue that might fall into airway by completing  Pt needed min cues to improve accuracy. Exercises completed in sets of 10. Pt reported daily completion of HEP.  -KA     Patient will increase strength of tongue base and posterior pharyngeal walls to reduce residue that might fall into airway by completing  effotful swallow;Laura (tongue hold);tongue base retraction  -KA     Status: Patient will increase strength of tongue base and posterior pharyngeal walls to reduce residue that might fall into airway by completing  Progressing as expected  -KA     Comments: Patient will increase strength of tongue base and posterior pharyngeal walls to reduce residue that might fall into airway by completing  Sets of 10 with min cues completd in conjunction with NMES 90% of the time  -KA     Patient will improve hyolaryngeal elevation via completing Jorge A (head lift)  repetitive lift (comment #/duration of lifts);sustained lift (comment #/duration of lifts);with cues  -KA     Status: Patient will improve hyolaryngeal elevation via completing Jorge A (head lift)  Progressing as expected  -KA     Comments: Patient will improve hyolaryngeal elevation via completing Jorge A (head lift)  Exercises  completed with CTAR ball  -KA     Patient will compensate for oral/pharyngeal deficits and reduce risks while eating by utilizing  compensatory strategies  no straw;multiple swallows;without cues  -KA     Status: Patient will compensate for oral/pharyngeal deficits and reduce risks while eating by utilizing  compensatory strategies  Achieved  -KA     Comments: Patient will compensate for oral/pharyngeal deficits and reduce risks while eating by utilizing  compensatory strategies  Patient independent with strategies, and does not require a consistent multiple swallow due to improved strength in swallow with no pharyngeal residue after all consistencies on VFSS 5/31, pt to continue with small bites and sips and slow rate  -KA        Other Goals    Other Adult Goal- 1  Patient will increase timing of swallow and reduce risk for premature spillage with practice of 2 second hold with thin liquids without overt s/s of pen/asp 90% of the time  -KA     Status: Other Adult Goal- 1  Progressing as expected  -KA     Comments: Other Adult Goal- 1  2 to 3 second hold with water trials during e-stim trials min cues needed at times. Patient demonstrated audible swallow at times, and also demonstrated x3 throat clears   -KA       User Key  (r) = Recorded By, (t) = Taken By, (c) = Cosigned By    Initials Name Provider Type    Maxime Ramírez MA,CCC-SLP Speech and Language Pathologist                        Time Calculation:   SLP Start Time: 1000  SLP Stop Time: 1100  SLP Time Calculation (min): 60 min    Therapy Charges for Today     Code Description Service Date Service Provider Modifiers Qty    [id number]  ST TREATMENT SWALLOW 4 7/12/2019 Maxime Delgado MA,CCC-SLP GN 1                   Maxime Delgado MA,CCC-SLP  7/12/2019

## 2019-07-12 NOTE — THERAPY TREATMENT NOTE
Outpatient Speech Language Pathology   Adult Swallow Treatment Note  [facility]     Patient Name: Mar Burgos  : 1940  MRN: [id number]  Today's Date: 2019         Visit Date: 2019   Patient Active Problem List   Diagnosis   • Anemia   • Uncontrolled type 2 diabetes mellitus (CMS/HCC)   • Diabetic neuropathy (CMS/HCC)   • Gastroesophageal reflux disease   • Gastritis   • Hearing loss   • Hyperlipidemia   • Hypertension   • Thoracic back pain   • Peripheral arterial occlusive disease (CMS/HCC)   • Personal history of breast cancer   • Personal history of other malignant neoplasm of skin   • History of tobacco use   • Seasonal allergic rhinitis   • Type 2 diabetes mellitus, without long-term current use of insulin (CMS/HCC)   • Cobalamin deficiency   • Osteoporosis screening   • Breast cancer screening   • Sinusitis   • Cerebrovascular accident (CVA) (CMS/HCC)   • Left-sided weakness   • Dysphagia due to recent cerebral infarction   • Smoker   • Right ankle swelling   • Localized swelling on left hand        Visit Dx:    ICD-10-CM ICD-9-CM   1. Dysphagia due to recent cerebral infarction I69.391 438.82                         SLP OP Goals     Row Name 19 1100          Subjective Comments    Subjective Comments  Patient is pleasant and cooperative. went to the ED last Monday and treated with IV fliuds for dehydration pt reports she is feeling much better.  -KA        Subjective Pain    Able to rate subjective pain?  yes  -KA     Pre-Treatment Pain Level  0  -KA     Post-Treatment Pain Level  0  -KA        Dysphagia Goals    Patient will improve stage transition duration of swallow/improve timing to reduce food falling into the airway by decreasing swallow delay after neurosensory  stimulation  neurosensory stimulation  -KA     Status: Patient will improve stage transition duration of swallow/improve timing to reduce food falling into the airway by decreasing swallow delay after  neurosensory  stimulation  Progressing as expected  -KA     Comments: Patient will improve stage transition duration of swallow/improve timing to reduce food falling into the airway by decreasing swallow delay after neurosensory  stimulation  Improvement today with timing swallow, 80% of the time with min cues inconjunction with effortful swallow. 2-3 second hold   -KA     Patient will increase laryngeal elevation to reduce residue that might fall into airway by completing  Mendelsohn maneuver;falsetto/pitch glide;with cues  -KA     Status: Patient will increase laryngeal elevation to reduce residue that might fall into airway by completing  Progressing as expected  -KA     Comments: Patient will increase laryngeal elevation to reduce residue that might fall into airway by completing  Pt needed min cues to improve accuracy. Exercises completed in sets of 10. Pt reported daily completion of HEP.  -KA     Patient will increase strength of tongue base and posterior pharyngeal walls to reduce residue that might fall into airway by completing  effotful swallow;Laura (tongue hold);tongue base retraction  -KA     Status: Patient will increase strength of tongue base and posterior pharyngeal walls to reduce residue that might fall into airway by completing  Progressing as expected  -KA     Comments: Patient will increase strength of tongue base and posterior pharyngeal walls to reduce residue that might fall into airway by completing  Sets of 10 with min cues completd in conjunction with NMES 90% of the time  -KA     Patient will improve hyolaryngeal elevation via completing Jorge A (head lift)  repetitive lift (comment #/duration of lifts);sustained lift (comment #/duration of lifts);with cues  -KA     Status: Patient will improve hyolaryngeal elevation via completing Jorge A (head lift)  Progressing as expected  -KA     Comments: Patient will improve hyolaryngeal elevation via completing Jorge A (head lift)  Exercises  completed with CTAR ball  -KA     Patient will compensate for oral/pharyngeal deficits and reduce risks while eating by utilizing  compensatory strategies  no straw;multiple swallows;without cues  -KA     Status: Patient will compensate for oral/pharyngeal deficits and reduce risks while eating by utilizing  compensatory strategies  Achieved  -KA     Comments: Patient will compensate for oral/pharyngeal deficits and reduce risks while eating by utilizing  compensatory strategies  Patient independent with strategies, and does not require a consistent multiple swallow due to improved strength in swallow with no pharyngeal residue after all consistencies on VFSS 5/31, pt to continue with small bites and sips and slow rate  -KA        Other Goals    Other Adult Goal- 1  Patient will increase timing of swallow and reduce risk for premature spillage with practice of 2 second hold with thin liquids without overt s/s of pen/asp 90% of the time  -KA     Status: Other Adult Goal- 1  Progressing as expected  -KA     Comments: Other Adult Goal- 1  2 to 3 second hold with water trials during e-stim trials min cues needed at times. Patient demonstrated audible swallow at times, and also demonstrated x3 throat clears   -KA       User Key  (r) = Recorded By, (t) = Taken By, (c) = Cosigned By    Initials Name Provider Type    Maxime Ramírez MA,CCC-SLP Speech and Language Pathologist                        Time Calculation:   SLP Start Time: 1000  SLP Stop Time: 1053  SLP Time Calculation (min): 53 min    Therapy Charges for Today     Code Description Service Date Service Provider Modifiers Qty    [id number]  ST TREATMENT SWALLOW 4 7/12/2019 Maxime Delgado MA,CCC-SLP GN 1                   Maxime Delgado MA,CCC-SLP  7/12/2019

## 2019-07-15 ENCOUNTER — HOSPITAL ENCOUNTER (OUTPATIENT)
Dept: SPEECH THERAPY | Facility: HOSPITAL | Age: 79
Setting detail: THERAPIES SERIES
Discharge: HOME | End: 2019-07-15

## 2019-07-15 ENCOUNTER — APPOINTMENT (OUTPATIENT)
Dept: PHYSICAL THERAPY | Facility: HOSPITAL | Age: 79
End: 2019-07-15

## 2019-07-15 ENCOUNTER — TRANSCRIBE ORDERS (OUTPATIENT)
Dept: ADMINISTRATIVE | Facility: HOSPITAL | Age: 79
End: 2019-07-15

## 2019-07-15 DIAGNOSIS — I69.391 DYSPHAGIA DUE TO RECENT CEREBRAL INFARCTION: Primary | ICD-10-CM

## 2019-07-15 DIAGNOSIS — R13.12 OROPHARYNGEAL DYSPHAGIA: Primary | ICD-10-CM

## 2019-07-15 DIAGNOSIS — I63.9 CEREBROVASCULAR ACCIDENT (CVA), UNSPECIFIED MECHANISM (HCC): ICD-10-CM

## 2019-07-15 PROCEDURE — 92526 ORAL FUNCTION THERAPY: CPT | Performed by: SPEECH-LANGUAGE PATHOLOGIST

## 2019-07-15 NOTE — THERAPY TREATMENT NOTE
Outpatient Speech Language Pathology   Adult Swallow Treatment Note  [facility]     Patient Name: Mar Burgos  : 1940  MRN: [id number]  Today's Date: 7/15/2019         Visit Date: 07/15/2019   Patient Active Problem List   Diagnosis   • Anemia   • Uncontrolled type 2 diabetes mellitus (CMS/HCC)   • Diabetic neuropathy (CMS/HCC)   • Gastroesophageal reflux disease   • Gastritis   • Hearing loss   • Hyperlipidemia   • Hypertension   • Thoracic back pain   • Peripheral arterial occlusive disease (CMS/HCC)   • Personal history of breast cancer   • Personal history of other malignant neoplasm of skin   • History of tobacco use   • Seasonal allergic rhinitis   • Type 2 diabetes mellitus, without long-term current use of insulin (CMS/HCC)   • Cobalamin deficiency   • Osteoporosis screening   • Breast cancer screening   • Sinusitis   • Cerebrovascular accident (CVA) (CMS/HCC)   • Left-sided weakness   • Dysphagia due to recent cerebral infarction   • Smoker   • Right ankle swelling   • Localized swelling on left hand        Visit Dx:    ICD-10-CM ICD-9-CM   1. Dysphagia due to recent cerebral infarction I69.391 438.82   2. Cerebrovascular accident (CVA), unspecified mechanism (CMS/HCC) I63.9 434.91                         SLP OP Goals     Row Name 07/15/19 1400          Subjective Comments    Subjective Comments  Patient is pleasant and cooperative. Repeat VFSS is scheduled for this Friday the .  -KA        Subjective Pain    Able to rate subjective pain?  yes  -KA     Pre-Treatment Pain Level  0  -KA     Post-Treatment Pain Level  0  -KA        Dysphagia Goals    Patient will improve stage transition duration of swallow/improve timing to reduce food falling into the airway by decreasing swallow delay after neurosensory  stimulation  neurosensory stimulation  -KA     Status: Patient will improve stage transition duration of swallow/improve timing to reduce food falling into the airway by decreasing  swallow delay after neurosensory  stimulation  Progressing as expected  -KA     Comments: Patient will improve stage transition duration of swallow/improve timing to reduce food falling into the airway by decreasing swallow delay after neurosensory  stimulation  Improvement today with timing swallow, 80% of the time with min cues inconjunction with effortful swallow. 2-3 second hold   -KA     Patient will increase laryngeal elevation to reduce residue that might fall into airway by completing  Mendelsohn maneuver;with cues  -KA     Status: Patient will increase laryngeal elevation to reduce residue that might fall into airway by completing  Progressing as expected  -KA     Comments: Patient will increase laryngeal elevation to reduce residue that might fall into airway by completing  Pt needed min cues to improve accuracy, 70% accuracy . Exercises completed in sets of 10. Pt reported daily completion of HEP.  -KA     Patient will increase strength of tongue base and posterior pharyngeal walls to reduce residue that might fall into airway by completing  effotful swallow;Laura (tongue hold);tongue base retraction  -KA     Status: Patient will increase strength of tongue base and posterior pharyngeal walls to reduce residue that might fall into airway by completing  Progressing as expected  -KA     Comments: Patient will increase strength of tongue base and posterior pharyngeal walls to reduce residue that might fall into airway by completing  Sets of 10 with min cues completed in conjunction with NMES 90% of the timeq  -KA     Patient will improve hyolaryngeal elevation via completing Jorge A (head lift)  repetitive lift (comment #/duration of lifts);sustained lift (comment #/duration of lifts);with cues  -KA     Status: Patient will improve hyolaryngeal elevation via completing Jorge A (head lift)  Progressing as expected  -KA     Comments: Patient will improve hyolaryngeal elevation via completing Jorge A (head lift)   Exercises completed with CTAR ball x2 sustained for 30 seconds and x10 repetitions  -KA        Other Goals    Other Adult Goal- 1  Patient will increase timing of swallow and reduce risk for premature spillage with practice of 2 second hold with thin liquids without overt s/s of pen/asp 90% of the time  -KA     Status: Other Adult Goal- 1  Progressing as expected  -KA     Comments: Other Adult Goal- 1  2 to 3 second hold with water trials during e-stim trials min cues needed at times. Patient demonstrated audible swallow at times, and also demonstrated x3 throat clears   -KA       User Key  (r) = Recorded By, (t) = Taken By, (c) = Cosigned By    Initials Name Provider Type    Maxime Ramírez MA,CCC-SLP Speech and Language Pathologist                        Time Calculation:   SLP Start Time: 1400  SLP Stop Time: 1500  SLP Time Calculation (min): 60 min    Therapy Charges for Today     Code Description Service Date Service Provider Modifiers Qty    [id number] HC ST TREATMENT SWALLOW 4 7/15/2019 Maxime Delgado MA,CCC-SLP GN 1                   Maxime Delgado MA,CCC-SLP  7/15/2019

## 2019-07-16 ENCOUNTER — OFFICE VISIT (OUTPATIENT)
Dept: FAMILY MEDICINE CLINIC | Facility: CLINIC | Age: 79
End: 2019-07-16

## 2019-07-16 VITALS
HEIGHT: 64 IN | DIASTOLIC BLOOD PRESSURE: 70 MMHG | BODY MASS INDEX: 19.5 KG/M2 | OXYGEN SATURATION: 99 % | WEIGHT: 114.2 LBS | HEART RATE: 55 BPM | TEMPERATURE: 97.9 F | SYSTOLIC BLOOD PRESSURE: 180 MMHG

## 2019-07-16 DIAGNOSIS — N63.0 BREAST MASS: ICD-10-CM

## 2019-07-16 DIAGNOSIS — I10 ESSENTIAL HYPERTENSION: Primary | ICD-10-CM

## 2019-07-16 DIAGNOSIS — Z85.3 HISTORY OF BREAST CANCER: ICD-10-CM

## 2019-07-16 DIAGNOSIS — N64.52 NIPPLE DISCHARGE: ICD-10-CM

## 2019-07-16 PROBLEM — J32.9 SINUSITIS: Status: RESOLVED | Noted: 2018-03-15 | Resolved: 2019-07-16

## 2019-07-16 PROCEDURE — 99214 OFFICE O/P EST MOD 30 MIN: CPT | Performed by: NURSE PRACTITIONER

## 2019-07-16 RX ORDER — LISINOPRIL 40 MG/1
40 TABLET ORAL DAILY
Qty: 90 TABLET | Refills: 0 | Status: SHIPPED | OUTPATIENT
Start: 2019-07-16 | End: 2019-10-12 | Stop reason: SDUPTHER

## 2019-07-16 RX ORDER — METOPROLOL SUCCINATE 50 MG/1
50 TABLET, EXTENDED RELEASE ORAL
Qty: 90 TABLET | Refills: 3 | Status: SHIPPED | OUTPATIENT
Start: 2019-07-16 | End: 2020-07-20

## 2019-07-16 NOTE — PROGRESS NOTES
"Subjective   Mar Burgos is a 78 y.o. female.     History of Present Illness     Was in the emergency room on 7/6/19 due to a fall and was discharged with no concerns. Went back to the ER 7/8/19 for dizziness and lightheadedness upon standing. Diagnosed with dehydration at that time. Received fluids while in the ER and was discharged. Was told to make an appointment with primary care to adjust medications for hypertension and low heart rate. Says she is not currently having any episodes of dizziness or lightheadedness since being discharged from the ER.     BP is elevated today. Has been taking metoprolol succinate  mg for six weeks at this time, increased from metoprolol XL 50 mg daily. Is also taking lisinopril 20 mg daily and clinidine 0.2 mg BID. If she does not take the metoprolol, she has episodes of tachycardia. Does say the tachycardia was well controlled with the metoprolol XL 50 mg daily.     Rash and swelling of the right breast. Swelling begin 8 or 9 months ago with a rash appearing just two months ago. Has had a left mastectomy. No pain present. Rash is itching and oozing a yellow color drainage. Does have scabbing on the base of the breast and yellow crusting around the nipple. She says she notices an odor occasionally with the discharge.     The following portions of the patient's history were reviewed and updated as appropriate: allergies, current medications, past social history and problem list.      Review of Systems   Constitutional: Negative for fever.   Respiratory: Negative for cough and shortness of breath.    Cardiovascular: Negative for chest pain.   Gastrointestinal: Negative for abdominal pain.   Neurological: Negative for dizziness.       Objective   /70 (BP Location: Right arm, Patient Position: Sitting)   Pulse 55   Temp 97.9 °F (36.6 °C)   Ht 162.6 cm (64.02\")   Wt 51.8 kg (114 lb 3.2 oz)   SpO2 99%   BMI 19.59 kg/m²   Physical Exam   Constitutional: She is " oriented to person, place, and time. Vital signs are normal. She appears well-developed and well-nourished. No distress.   HENT:   Head: Normocephalic.   Cardiovascular: Normal rate, regular rhythm and normal heart sounds.   Pulmonary/Chest: Effort normal and breath sounds normal.   Rash on right outer breast and breast mass felt at 6oclock very hard and large -baseball size       Neurological: She is alert and oriented to person, place, and time. Gait normal.   Psychiatric: She has a normal mood and affect. Her behavior is normal. Judgment and thought content normal.   Vitals reviewed.      Assessment/Plan      Diagnosis Plan   1. Essential hypertension  lisinopril (PRINIVIL,ZESTRIL) 40 MG tablet    metoprolol succinate XL (TOPROL-XL) 50 MG 24 hr tablet   2. History of breast cancer     3. Breast mass  US breast right complete    Mammo Diagnostic Right With CAD   4. Nipple discharge  US breast right complete    Mammo Diagnostic Right With CAD     Decrease back to 50mg of  Metoprolol a day  Increase lisinopril to 40mg daily  rto in 4 weeks     Follow up after mammo and u/s  Vladimir Hernandez, JEFFREY  7/16/2019

## 2019-07-19 ENCOUNTER — APPOINTMENT (OUTPATIENT)
Dept: SPEECH THERAPY | Facility: HOSPITAL | Age: 79
End: 2019-07-19

## 2019-07-19 ENCOUNTER — APPOINTMENT (OUTPATIENT)
Dept: PHYSICAL THERAPY | Facility: HOSPITAL | Age: 79
End: 2019-07-19

## 2019-07-19 ENCOUNTER — HOSPITAL ENCOUNTER (OUTPATIENT)
Dept: GENERAL RADIOLOGY | Facility: HOSPITAL | Age: 79
Discharge: HOME | End: 2019-07-19
Admitting: NURSE PRACTITIONER

## 2019-07-19 DIAGNOSIS — R13.12 OROPHARYNGEAL DYSPHAGIA: ICD-10-CM

## 2019-07-19 PROCEDURE — 63710000001 BARIUM SULFATE 98 % RECONSTITUTED SUSPENSION: Performed by: NURSE PRACTITIONER

## 2019-07-19 PROCEDURE — A9270 NON-COVERED ITEM OR SERVICE: HCPCS | Performed by: NURSE PRACTITIONER

## 2019-07-19 PROCEDURE — 63710000001 BARIUM SULFATE 40 % SUSPENSION: Performed by: NURSE PRACTITIONER

## 2019-07-19 PROCEDURE — 74230 X-RAY XM SWLNG FUNCJ C+: CPT

## 2019-07-19 PROCEDURE — 92611 MOTION FLUOROSCOPY/SWALLOW: CPT | Performed by: SPEECH-LANGUAGE PATHOLOGIST

## 2019-07-19 PROCEDURE — 63710000001 BARIUM SULFATE 96 % RECONSTITUTED SUSPENSION: Performed by: NURSE PRACTITIONER

## 2019-07-19 RX ADMIN — BARIUM SULFATE 4 ML: 980 POWDER, FOR SUSPENSION ORAL at 10:42

## 2019-07-19 RX ADMIN — BARIUM SULFATE 50 ML: 400 SUSPENSION ORAL at 10:43

## 2019-07-19 RX ADMIN — BARIUM SULFATE 50 ML: 400 SUSPENSION ORAL at 10:42

## 2019-07-19 RX ADMIN — BARIUM SULFATE 65 ML: 960 POWDER, FOR SUSPENSION ORAL at 10:42

## 2019-07-19 NOTE — MBS/VFSS/FEES
Outpatient Speech Language Pathology   Adult Swallow Initial Evaluation  [facility]     Patient Name: Mar Burgos  : 1940  MRN: [id number]  Today's Date: 2019         Visit Date: 2019   Patient Active Problem List   Diagnosis   • Anemia   • Uncontrolled type 2 diabetes mellitus (CMS/HCC)   • Diabetic neuropathy (CMS/HCC)   • Gastroesophageal reflux disease   • Gastritis   • Hearing loss   • Hyperlipidemia   • Hypertension   • Thoracic back pain   • Peripheral arterial occlusive disease (CMS/HCC)   • History of breast cancer   • Personal history of other malignant neoplasm of skin   • History of tobacco use   • Seasonal allergic rhinitis   • Type 2 diabetes mellitus, without long-term current use of insulin (CMS/HCC)   • Cobalamin deficiency   • Osteoporosis screening   • Breast cancer screening   • Cerebrovascular accident (CVA) (CMS/HCC)   • Left-sided weakness   • Dysphagia due to recent cerebral infarction   • Smoker   • Right ankle swelling   • Localized swelling on left hand   • Breast mass   • Nipple discharge        Past Medical History:   Diagnosis Date   • Anemia     required prior blood transfusions   • Arthritis    • Cancer (CMS/HCC)     breast cancer    • Clostridium difficile colitis    • Diabetes mellitus (CMS/HCC)    • Difficulty walking    • Diverticulitis    • Environmental allergies    • History of malignant neoplasm of breast 2013    denies any chemo or radiation   • History of malignant neoplasm of skin 2013    possibly basal   • History of tobacco use 2013   • HL (hearing loss)    • Hyperlipidemia    • Hypertension    • Kidney disease    • Neuropathy in diabetes (CMS/HCC)    • Peripheral neuropathy    • Shingles    • Stroke (CMS/HCC)    • Vision loss         Past Surgical History:   Procedure Laterality Date   • CATARACT EXTRACTION, BILATERAL     • EAR TUBES     • MASTECTOMY Bilateral     radical   • TUMOR REMOVAL Left     large lipoma removed from hip          Visit Dx:     ICD-10-CM ICD-9-CM   1. Oropharyngeal dysphagia R13.12 787.22           SLP Adult Swallow Evaluation - 07/19/19 1200        Rehab Evaluation    Document Type  re-evaluation   -KA    Subjective Information  no complaints   -KA    Patient Observations  alert;cooperative   -KA    Patient Effort  good   -KA    Symptoms Noted During/After Treatment  none   -KA       General Information    Patient Profile Reviewed  yes   -KA    Pertinent History Of Current Problem  Repeat VFSS today to determine ongoing progress from dysphagia therapy. Patient's last VFSS occured 5/31/19. Patient has continued to to participate in OP dysphagia therapy 2x a week with E-stim and pharyngeal swallow exercises (completes exercises at home). Patient has history of acute CVA.   -KA    Current Method of Nutrition  honey-thick liquids;regular textures no  mixed  -KA    Precautions/Limitations, Vision  WFL;for purposes of eval   -KA    Precautions/Limitations, Hearing  hearing impairment, bilaterally   -KA       MBS/VFSS Interpretation    Oral Prep Phase  WFL   -KA    Oral Transit Phase  impaired   -KA    Oral Residue  WFL   -KA    VFSS Summary  Patient demonstrates moderate oropharyngeal dysphagia characterized by premature spillage, mistiming, reduced laryngeal vestibule closure and airway protection, and reduced hyolaryngeal excursion elevation and epiglottic deflection. Deep penetration and silent aspiration before the swallow with thin liquids and deep penetration with thin during the swallow with laryngeal vestibule residue. Patient unable to clear residue with cued coughs. With subsequent trials of PO solids, pt aspirated thin liquids laryngeal vestibule residue. WIth NTL pt demonstrated penetration (deep at times) before and during the swallow with laryngeal vestibule residue. Penetration was all silent. Penetration with juice from peaches. WIth HTL patient demonstrated trace penetration at times and no pen with  controlled small sips sizes at times. Unforunately pt demonstrated continued mistiming and premature spillage with thins to NTL (larynx and pyriforms), 2 to 3 second hold (pt working on this in therapy and instructed to perform during VFSS) did not eliminate penetration/aspiration with thins and NTL. Similar results from previous VFSS.    -       SLP Communication to Radiology    Summary Statement  Patient demonstrates moderate oropharyngeal dysphagia characterized by premature spillage, mistiming, reduced laryngeal vestibule closure and airway protection, and reduced hyolaryngeal excursion elevation and epiglottic deflection. Deep penetration and silent aspiration before the swallow with thin liquids and deep penetration with thin durng the swallow with laryngeal vestibule residue. Patient unable to clear residue with cued coughs. With subsequent trials of PO solids, pt aspirated thin liquids laryngeal vstibule residue. WIth NTL pt demonstrated penetration (deep at times) before and during the swallow with laryngeal vestibule residue. Penetration was all silent. Penetration with juice from peaches. WIth HTL patient demonstrated trace penetration at times and no pen with controlled small sips sizes at times.     -       Clinical Impression    SLP Swallowing Diagnosis  moderate;oral dysfunction;pharyngeal dysfunction   -    Functional Impact  risk of aspiration/pneumonia   -    Rehab Potential/Prognosis, Swallowing  adequate, monitor progress closely   -    Swallow Criteria for Skilled Therapeutic Interventions Met  demonstrates skilled criteria;other (see comments) planned d/c this Monday    -       Recommendations    Therapy Frequency (Swallow)  -- planned d/c Monday    -    Predicted Duration Therapy Intervention (Days)  until discharge   -    SLP Diet Recommendation  regular textures;water between meals after oral care, with supervision;ice chips between meals after oral care, with  supervision;honey thick liquids no mixed consistencies    -KA    Recommended Precautions and Strategies  upright posture during/after eating;small bites of food and sips of liquid;no straw;volitional throat clear   -KA    SLP Rec. for Method of Medication Administration  meds whole;with pudding or applesauce   -KA    Monitor for Signs of Aspiration  yes;notify SLP if any concerns;right lower lobe infiltrates;pneumonia   -KA      User Key  (r) = Recorded By, (t) = Taken By, (c) = Cosigned By    Initials Name Provider Type    Maxime Ramírez MA,CCC-SLP Speech and Language Pathologist                        OP SLP Education     Row Name 07/19/19 1247       Education    Barriers to Learning  No barriers identified  -KA    Education Provided  Described results of evaluation;Patient expressed understanding of evaluation  -KA    Assessed  Learning needs;Learning motivation  -KA    Learning Motivation  Strong  -KA    Learning Method  Explanation  -KA    Teaching Response  Verbalized understanding  -KA      User Key  (r) = Recorded By, (t) = Taken By, (c) = Cosigned By    Initials Name Effective Dates    Maxime Ramírez MA,CCC-SLP 06/08/18 -               OP SLP Assessment/Plan - 07/19/19 1246        SLP Assessment    Functional Problems  Swallowing   -KA    Impact on Function: Swallowing  Risk of aspiration;Risk of pneumonia   -KA    Clinical Impression: Swallowing  Moderate:;oropharyngeal phase dysphagia   -KA    Please refer to paper survey for additional self-reported information  Yes   -KA    Please refer to items scanned into chart for additional diagnostic informaiton and handouts as provided by clinician  Yes   -KA    Prognosis  Fair (comment)   -KA    Patient/caregiver participated in establishment of treatment plan and goals  Yes   -KA    Patient would benefit from skilled therapy intervention  Yes   -KA       SLP Plan    Planned CPT's?  SLP INDIVIDUAL SPEECH THERAPY: 75774   -KA    Expected Duration Therapy  Session - minutes  45-60 minutes   -WALTER    Plan Comments  see plan in note   -KA      User Key  (r) = Recorded By, (t) = Taken By, (c) = Cosigned By    Initials Name Provider Type    Maxime Ramírez MA,CCC-SLP Speech and Language Pathologist              SLP Outcome Measures (last 72 hours)      SLP Outcome Measures     Row Name 07/19/19 1200             SLP Outcome Measures    Outcome Measure Used?  Adult NOMS  -KA         Adult FCM Scores    FCM Chosen  Swallowing  -      Swallowing FCM Score  3  -KA        User Key  (r) = Recorded By, (t) = Taken By, (c) = Cosigned By    Initials Name Effective Dates    Maxime Ramírez MA,CCC-SLP 06/08/18 -                Time Calculation:   SLP Start Time: 1015    Therapy Charges for Today     Code Description Service Date Service Provider Modifiers Qty    13526493398 HC ST MOTION FLUORO EVAL SWALLOW 5 7/19/2019 Maxime Delgado MA,CCC-SLP GN 1                   Maxime Delgado MA,CCC-SLP  7/19/2019

## 2019-07-22 ENCOUNTER — HOSPITAL ENCOUNTER (OUTPATIENT)
Dept: SPEECH THERAPY | Facility: HOSPITAL | Age: 79
Setting detail: THERAPIES SERIES
Discharge: HOME | End: 2019-07-22

## 2019-07-22 DIAGNOSIS — I63.9 CEREBROVASCULAR ACCIDENT (CVA), UNSPECIFIED MECHANISM (HCC): ICD-10-CM

## 2019-07-22 DIAGNOSIS — I69.391 DYSPHAGIA DUE TO RECENT CEREBRAL INFARCTION: Primary | ICD-10-CM

## 2019-07-22 PROCEDURE — 92526 ORAL FUNCTION THERAPY: CPT | Performed by: SPEECH-LANGUAGE PATHOLOGIST

## 2019-07-22 NOTE — THERAPY DISCHARGE NOTE
Outpatient Speech Language Pathology   Adult Swallow Treatment Note/Discharge Summary  [facility]     Patient Name: Mar Burgos  : 1940  MRN: [id number]  Today's Date: 2019         Visit Date: 2019   Patient Active Problem List   Diagnosis   • Anemia   • Uncontrolled type 2 diabetes mellitus (CMS/HCC)   • Diabetic neuropathy (CMS/HCC)   • Gastroesophageal reflux disease   • Gastritis   • Hearing loss   • Hyperlipidemia   • Hypertension   • Thoracic back pain   • Peripheral arterial occlusive disease (CMS/HCC)   • History of breast cancer   • Personal history of other malignant neoplasm of skin   • History of tobacco use   • Seasonal allergic rhinitis   • Type 2 diabetes mellitus, without long-term current use of insulin (CMS/HCC)   • Cobalamin deficiency   • Osteoporosis screening   • Breast cancer screening   • Cerebrovascular accident (CVA) (CMS/HCC)   • Left-sided weakness   • Dysphagia due to recent cerebral infarction   • Smoker   • Right ankle swelling   • Localized swelling on left hand   • Breast mass   • Nipple discharge        Visit Dx:    ICD-10-CM ICD-9-CM   1. Dysphagia due to recent cerebral infarction I69.391 438.82   2. Cerebrovascular accident (CVA), unspecified mechanism (CMS/HCC) I63.9 434.91       SLP Adult Swallow Evaluation - 19 1200        Rehab Evaluation    Document Type  re-evaluation   -KA    Subjective Information  no complaints   -KA    Patient Observations  alert;cooperative   -KA    Patient Effort  good   -KA    Symptoms Noted During/After Treatment  none   -KA       General Information    Patient Profile Reviewed  yes   -KA    Pertinent History Of Current Problem  Repeat VFSS today to determine ongoing progress from dysphagia therapy. Patient's last VFSS occured 19. Patient has continued to to participate in OP dysphagia therapy 2x a week with E-stim and pharyngeal swallow exercises. Patient has history of acute CVA.   -KA    Current Method of  Nutrition  honey-thick liquids;regular textures   -KA    Precautions/Limitations, Vision  WFL;for purposes of eval   -KA    Precautions/Limitations, Hearing  hearing impairment, bilaterally   -KA       MBS/VFSS Interpretation    Oral Prep Phase  WFL   -KA    Oral Transit Phase  impaired   -KA    Oral Residue  WFL   -KA    VFSS Summary  Patient demonstrates moderate oropharyngeal dysphagia characterized by premature spillage, mistiming, reduced laryngeal vestibule closure and airway protection, and reduced hyolaryngeal excursion elevation and epiglottic deflection. Deep penetration and silent aspiration before the swallow with thin liquids and deep penetration with thin durng the swallow with laryngeal vestibule residue. Patient unable to clear residue with cued coughs. With subsequent trials of PO solids, pt aspirated thin liquids laryngeal vstibule residue. WIth NTL pt demonstrated penetration (deep at times) before and during the swallow with laryngeal vestibule residue. Penetration was all silent. Penetration with juice from peaches. WIth HTL patient demonstrated trace penetration at times and no pen with controlled small sips sizes at times. Unforunately pt demonstrated continued mistiming and premature spillage, 2 to 3 second hold (pt working on this in therapy and instructed to perform during VFSS) did not eliminate penetration/aspiration with thins and NTL. Similar results from previous VFSS.    -KA       SLP Communication to Radiology    Summary Statement  Patient demonstrates moderate oropharyngeal dysphagia characterized by premature spillage, mistiming, reduced laryngeal vestibule closure and airway protection, and reduced hyolaryngeal excursion elevation and epiglottic deflection. Deep penetration and silent aspiration before the swallow with thin liquids and deep penetration with thin durng the swallow with laryngeal vestibule residue. Patient unable to clear residue with cued coughs. With subsequent  trials of PO solids, pt aspirated thin liquids laryngeal vstibule residue. WIth NTL pt demonstrated penetration (deep at times) before and during the swallow with laryngeal vestibule residue. Penetration was all silent. Penetration with juice from peaches. WIth HTL patient demonstrated trace penetration at times and no pen with controlled small sips sizes at times.     -KA       Clinical Impression    SLP Swallowing Diagnosis  moderate;oral dysfunction;pharyngeal dysfunction   -KA    Functional Impact  risk of aspiration/pneumonia   -KA    Rehab Potential/Prognosis, Swallowing  adequate, monitor progress closely   -    Swallow Criteria for Skilled Therapeutic Interventions Met  demonstrates skilled criteria;other (see comments) planned d/c this Monday    -       Recommendations    Therapy Frequency (Swallow)  -- planned d/c Monday    -    Predicted Duration Therapy Intervention (Days)  until discharge   -    SLP Diet Recommendation  regular textures;water between meals after oral care, with supervision;ice chips between meals after oral care, with supervision;honey thick liquids no mixed consistencies    -    Recommended Precautions and Strategies  upright posture during/after eating;small bites of food and sips of liquid;no straw;volitional throat clear   -    SLP Rec. for Method of Medication Administration  meds whole;with pudding or applesauce   -    Monitor for Signs of Aspiration  yes;notify SLP if any concerns;right lower lobe infiltrates;pneumonia   -      User Key  (r) = Recorded By, (t) = Taken By, (c) = Cosigned By    Initials Name Provider Type    Maxime Ramírez MA,CCC-SLP Speech and Language Pathologist                        SLP OP Goals     Row Name 07/22/19 1400          Subjective Comments    Subjective Comments  Patient two daughters present today for therapy and education. Today is patient's last treatment session. SLP reviewed the results of VFSS with pt and family including  showing patient and family VFSS. Patient's swallow continues to remain improved/stronger from initial VFSS in March/April with reduced pharyngeal residue. Patient remains at risk for pen/asp with thins and NTL and is to continue current diet. Reviewed s/s of aspiration PNA and PNA and discussed safe swallow precautions. Pt/family demonstrated understanding of education today.   -KA        Subjective Pain    Able to rate subjective pain?  yes  -KA     Pre-Treatment Pain Level  0  -KA     Post-Treatment Pain Level  0  -KA        Dysphagia Goals    Status: Patient will safely consume the recommended diet without complications such as aspiration pneumonia  Achieved  -KA     Comments: Patient will safely consume the recommended diet without complications such as aspiration pneumonia  Patient to continue same diet of regular solids no mixed and HTL, with same swallow precautions and continue water protocol.   -KA     Patient will improve stage transition duration of swallow/improve timing to reduce food falling into the airway by decreasing swallow delay after neurosensory  stimulation  neurosensory stimulation  -KA     Status: Patient will improve stage transition duration of swallow/improve timing to reduce food falling into the airway by decreasing swallow delay after neurosensory  stimulation  Achieved  -KA     Comments: Patient will improve stage transition duration of swallow/improve timing to reduce food falling into the airway by decreasing swallow delay after neurosensory  stimulation  Patient has participated and tolerated in e-stim   -KA     Patient will increase laryngeal elevation to reduce residue that might fall into airway by completing  Mendelsohn maneuver;with cues  -KA     Status: Patient will increase laryngeal elevation to reduce residue that might fall into airway by completing  -- partially achieved  -KA     Comments: Patient will increase laryngeal elevation to reduce residue that might fall into  airway by completing  Pt needed min cues to improve accuracy, 70% accuracy . Exercises completed in sets of 10. Pt reported daily completion of HEP.  -KA     Patient will increase strength of tongue base and posterior pharyngeal walls to reduce residue that might fall into airway by completing  effotful swallow;Laura (tongue hold);tongue base retraction  -KA     Status: Patient will increase strength of tongue base and posterior pharyngeal walls to reduce residue that might fall into airway by completing  -- partially achieved  -KA     Comments: Patient will increase strength of tongue base and posterior pharyngeal walls to reduce residue that might fall into airway by completing  Sets of 10 with min cues completed in conjunction with NMES 90% of the timeq  -KA     Patient will improve hyolaryngeal elevation via completing Jorge A (head lift)  repetitive lift (comment #/duration of lifts);sustained lift (comment #/duration of lifts);with cues  -KA     Status: Patient will improve hyolaryngeal elevation via completing Jorge A (head lift)  Achieved  -KA     Comments: Patient will improve hyolaryngeal elevation via completing Jorge A (head lift)  Exercises completed with CTAR ball x2 sustained for 30 seconds and x10 repetitions  -KA     Patient will compensate for oral/pharyngeal deficits and reduce risks while eating by utilizing  compensatory strategies  no straw;multiple swallows;without cues  -KA     Status: Patient will compensate for oral/pharyngeal deficits and reduce risks while eating by utilizing  compensatory strategies  Achieved  -KA     Comments: Patient will compensate for oral/pharyngeal deficits and reduce risks while eating by utilizing  compensatory strategies  Patient independent with strategies, and does not require a consistent multiple swallow due to improved strength in swallow with no pharyngeal residue after all consistencies on VFSS 5/31, pt to continue with small bites and sips and slow rate   -KA        Other Goals    Other Adult Goal- 1  Patient will increase timing of swallow and reduce risk for premature spillage with practice of 2 second hold with thin liquids without overt s/s of pen/asp 90% of the time  -KA     Status: Other Adult Goal- 1  -- partially achieved  -WALTER     Comments: Other Adult Goal- 1  Therapy has been targeting 2-3 second hold, however during VFSS does not reduce premature spillage or risk for pen/asp.   -KA       User Key  (r) = Recorded By, (t) = Taken By, (c) = Cosigned By    Initials Name Provider Type    Maxime Ramírez MA,CCC-SLP Speech and Language Pathologist                        Time Calculation:   SLP Start Time: 1400  SLP Stop Time: 1500  SLP Time Calculation (min): 60 min    Therapy Charges for Today     Code Description Service Date Service Provider Modifiers Qty    84144377780 HC ST TREATMENT SWALLOW 4 7/22/2019 Maxime Delgado MA,CCC-SLP GN 1               OP SLP Discharge Summary  Date of Discharge: 07/22/19  Reason for Discharge: identified goals partially met, no further expectation of functional progress  Progress Toward Achieving Short/long Term Goals: goals partially met within established timelines  Discharge Destination: home w/ assist  Discharge Instructions: Patient to continue dysphagia therapy home exercise program       Maxime Delgado MA,CCC-SLP  7/22/2019

## 2019-07-26 ENCOUNTER — APPOINTMENT (OUTPATIENT)
Dept: SPEECH THERAPY | Facility: HOSPITAL | Age: 79
End: 2019-07-26

## 2019-07-29 ENCOUNTER — APPOINTMENT (OUTPATIENT)
Dept: SPEECH THERAPY | Facility: HOSPITAL | Age: 79
End: 2019-07-29

## 2019-07-30 ENCOUNTER — APPOINTMENT (OUTPATIENT)
Dept: WOMENS IMAGING | Facility: HOSPITAL | Age: 79
End: 2019-07-30

## 2019-07-30 PROCEDURE — G0279 TOMOSYNTHESIS, MAMMO: HCPCS | Performed by: RADIOLOGY

## 2019-07-30 PROCEDURE — 77065 DX MAMMO INCL CAD UNI: CPT | Performed by: RADIOLOGY

## 2019-07-30 PROCEDURE — 76641 ULTRASOUND BREAST COMPLETE: CPT | Performed by: RADIOLOGY

## 2019-07-30 PROCEDURE — MDREVSPNEW: Performed by: RADIOLOGY

## 2019-07-31 DIAGNOSIS — R92.8 ABNORMAL MAMMOGRAM OF RIGHT BREAST: Primary | ICD-10-CM

## 2019-08-01 ENCOUNTER — APPOINTMENT (OUTPATIENT)
Dept: WOMENS IMAGING | Facility: HOSPITAL | Age: 79
End: 2019-08-01

## 2019-08-01 PROCEDURE — 10005 FNA BX W/US GDN 1ST LES: CPT | Performed by: RADIOLOGY

## 2019-08-01 PROCEDURE — 19083 BX BREAST 1ST LESION US IMAG: CPT | Performed by: RADIOLOGY

## 2019-08-02 ENCOUNTER — APPOINTMENT (OUTPATIENT)
Dept: SPEECH THERAPY | Facility: HOSPITAL | Age: 79
End: 2019-08-02

## 2019-08-05 ENCOUNTER — APPOINTMENT (OUTPATIENT)
Dept: SPEECH THERAPY | Facility: HOSPITAL | Age: 79
End: 2019-08-05

## 2019-08-09 ENCOUNTER — APPOINTMENT (OUTPATIENT)
Dept: SPEECH THERAPY | Facility: HOSPITAL | Age: 79
End: 2019-08-09

## 2019-08-12 ENCOUNTER — APPOINTMENT (OUTPATIENT)
Dept: SPEECH THERAPY | Facility: HOSPITAL | Age: 79
End: 2019-08-12

## 2019-08-12 RX ORDER — LISINOPRIL 10 MG/1
10 TABLET ORAL
Qty: 90 TABLET | Refills: 0 | OUTPATIENT
Start: 2019-08-12

## 2019-08-12 RX ORDER — CLOPIDOGREL BISULFATE 75 MG/1
75 TABLET ORAL DAILY
Qty: 90 TABLET | Refills: 0 | OUTPATIENT
Start: 2019-08-12

## 2019-08-13 ENCOUNTER — OFFICE VISIT (OUTPATIENT)
Dept: MAMMOGRAPHY | Facility: CLINIC | Age: 79
End: 2019-08-13

## 2019-08-13 VITALS
DIASTOLIC BLOOD PRESSURE: 60 MMHG | SYSTOLIC BLOOD PRESSURE: 132 MMHG | HEART RATE: 54 BPM | HEIGHT: 64 IN | WEIGHT: 110.3 LBS | BODY MASS INDEX: 18.83 KG/M2 | OXYGEN SATURATION: 96 %

## 2019-08-13 DIAGNOSIS — C50.111 MALIGNANT NEOPLASM OF CENTRAL PORTION OF RIGHT BREAST IN FEMALE, ESTROGEN RECEPTOR POSITIVE (HCC): Primary | ICD-10-CM

## 2019-08-13 DIAGNOSIS — Z17.0 MALIGNANT NEOPLASM OF CENTRAL PORTION OF RIGHT BREAST IN FEMALE, ESTROGEN RECEPTOR POSITIVE (HCC): Primary | ICD-10-CM

## 2019-08-13 PROCEDURE — 99205 OFFICE O/P NEW HI 60 MIN: CPT | Performed by: SURGERY

## 2019-08-13 NOTE — PROGRESS NOTES
Chief Complaint: Mar Burgos is a 78 y.o.. female here today for Breast Cancer (Newly diagnosed right breast cancer.)        History of Present Illness:  Patient presents with newly diagnosed breast cancer.  She is a nice 78-year-old white female who about 8 months ago noticed some changes to the look of the right nipple.  She actually felt a masslike area at that point in time but did not really seek attention to it.  She began noticing some skin discoloration and I think when her family noted something was going on imaging studies were obtained.  Her past history is significant for having undergone a left modified radical mastectomy for cancer back when she was 38 years old.  Her most recent mammogram reveals a large irregular mass measuring 6 cm with skin retraction, nipple retraction, and skin thickening.  The area measures 6 cm and involves all 4 quadrants.  At the 10 o'clock position there appeared to be some skin involvement.  I also made note of some large axillary lymph nodes.  An ultrasound was then obtained and it revealed an irregular solid mass with indistinct margins measuring about 64 x 51 mm.  On ultrasound there were also some enlarged axillary lymph nodes measuring about 30 mm in greatest size.    A biopsy was recommended and has revealed a grade 2 invasive ductal cancer that is ER positive at 98%, HI positive at 82%, and HER-2 equivocal.  The Ki-67 is 20.81%.     The patient's family history is significant for her personal history of bilateral breast cancer.  She does have a sister with melanoma.  She also has a brother with bladder or kidney cancer.    She did have a stroke in February of this year but has recovered rather well.  She does live with 1 of her daughters at home and is quite functional although she does walk carefully with a cane.  Review of Systems:  Review of Systems   Skin: Positive for rash.        Pt notices a monty on her right breast recently.    Neurological:        Due  to stroke    Hematological: Positive for adenopathy.        Right axilla    All other systems reviewed and are negative.       Past Medical and Surgical History:  Breast Biopsy History:  Patient has had the following breast biopsies:pt had one in 1979 (unsure of date) and 08/01/19, 08/08/19   Breast Cancer HIstory:  Patient has the following past medical history of breast cancer treatment:  Breast Operations, and year:  Left sided mastectomy 1979  Social History     Tobacco Use   Smoking Status Former Smoker   • Packs/day: 0.25   • Years: 10.00   • Pack years: 2.50   • Types: Cigarettes   Smokeless Tobacco Former User   Tobacco Comment    she quit for 8 years and started back about a year ago     Obstetric History:  Patient is postmenopausal, entered menopause naturally at age: 48   Number of pregnancies:5  Number of live births: 3  Number of abortions or miscarriages: 2  Age of delivery of first child: 24  Patient breast fed, for the following lenth of time: 22 months  Length of time taking birth control pills:0  Patient has never taken hormone replacement    Past Surgical History:   Procedure Laterality Date   • CATARACT EXTRACTION, BILATERAL     • EAR TUBES     • MASTECTOMY Bilateral     radical   • TUMOR REMOVAL Left     large lipoma removed from hip       Past Medical History:   Diagnosis Date   • Anemia     required prior blood transfusions   • Arthritis    • Cancer (CMS/HCC)     breast cancer    • Clostridium difficile colitis    • Diabetes mellitus (CMS/HCC)    • Difficulty walking    • Diverticulitis    • Environmental allergies    • History of malignant neoplasm of breast 07/01/2013    denies any chemo or radiation   • History of malignant neoplasm of skin 07/01/2013    possibly basal   • History of tobacco use 7/1/2013   • HL (hearing loss)    • Hyperlipidemia    • Hypertension    • Kidney disease    • Neuropathy in diabetes (CMS/HCC)    • Peripheral neuropathy    • Shingles    • Stroke (CMS/HCC)    •  Vision loss        Prior Hospitalizations, other than for surgery or childbirth, and year:  Stroke 02/14/2019     Social History:  Patient is .  Patient has 2 daughters. and Patient has 1 sons.    Family History:  Family History   Problem Relation Age of Onset   • Stroke Mother 76   • Diabetes Mother    • Hypertension Mother    • Arthritis Mother    • Cancer Father         metastatic unknown cause   • Heart attack Father 70   • Cancer Brother         bladder   • Arthritis Sister        Vital Signs:  Vitals:    08/13/19 1100   BP: 132/60   Pulse: 54   SpO2: 96%       Medications:    Current Outpatient Prescriptions:     Current Outpatient Medications:   •  acetaminophen (TYLENOL) 500 MG tablet, Take 500 mg by mouth Every 6 (Six) Hours As Needed for Mild Pain ., Disp: , Rfl:   •  aspirin 81 MG EC tablet, Take 81 mg by mouth Daily., Disp: , Rfl:   •  cetirizine (zyrTEC) 10 MG tablet, Take 10 mg by mouth Daily., Disp: , Rfl:   •  CloNIDine (CATAPRES) 0.2 MG tablet, TAKE 1 TABLET BY MOUTH EVERY 12 HOURS, Disp: 180 tablet, Rfl: 0  •  fluticasone (FLONASE) 50 MCG/ACT nasal spray, 2 sprays into the nostril(s) as directed by provider Every Night., Disp: , Rfl:   •  lisinopril (PRINIVIL,ZESTRIL) 40 MG tablet, Take 1 tablet by mouth Daily., Disp: 90 tablet, Rfl: 0  •  metFORMIN (GLUCOPHAGE) 1000 MG tablet, TAKE 1 TABLET BY MOUTH TWICE DAILY WITH MEALS, Disp: 180 tablet, Rfl: 0  •  metoprolol succinate XL (TOPROL-XL) 50 MG 24 hr tablet, Take 1 tablet by mouth Daily., Disp: 90 tablet, Rfl: 3  •  pravastatin (PRAVACHOL) 20 MG tablet, TAKE 1 TABLET BY MOUTH EVERY NIGHT, Disp: 30 tablet, Rfl: 11    Physical Examination:  General Appearance:   Patient is in no distress.  She is well kept and has an average build.   Psychiatric:  Patient with appropriate mood and affect. Alert and oriented to self, time, and place.    Breast, RIGHT:  medium sized, the nipple is obviously retracted in the breast is visibly misshapened.  There  is a large central mass beneath the nipple areolar complex.  It measured 13 x 10 cm.  And was mobile from the deeper structures.  There appeared to be several nodules just beneath the skin and in a couple of areas it appeared to be involving the skin.    Breast, LEFT:  Status post mastectomy.  I do not palpate any masses beneath the skin flaps.    Lymphatic:  There is no  cervical, infraclavicular, or supraclavicular adenopathy bilaterally.  She clearly has some firm masses in the axilla mostly on the low side.  The largest one does appear to be somewhere around 3 cm in size.  Eyes:  Pupils are round and reactive to light.  Cardiovascular:  Heart rate and rhythm are regular.  Respiratory:  Lungs are clear bilaterally with no crackles or wheezes in any lung field.  Gastrointestinal:  Abdomen is soft, nondistended, and nontender.  There was no obvious hepatosplenomegaly or abdominal mass.  There are no scars from previous surgery.    Musculoskeletal:  Good strength in all 4 extremities.   There is good range of motion in both shoulders.    Skin:  No new skin lesions or rashes on the skin excluding the breast (see breast exam above).    Cancer Staging  Primary Tumor: T4b  Regional Lymph Nodes:  N2b  Distant Mets: M0  Clinical Stage Group: IIIA    Assessment:  1. Malignant neoplasm of central portion of right breast in female, estrogen receptor positive (CMS/HCC)          Plan:  She was accompanied today by her daughter.  The office visit lasted 1 hour with 48 minutes spent in face-to-face consultation.    We began the conversation discussing her pathology report.  We talked about the origin of most of breast cancers from either the ducts or the lobules.  The difference between invasive and in situ disease was explained and the visual was drawn for her.  When invasion has occurred, the lymph nodes need to be evaluated.  When there is in situ disease the lymph nodes should be considered if the area of concern is  widespread or it is high-grade.  We also discussed the significance of hormone receptors.  They often can give us some idea how the breast cancer will behave and potentially lead us to offer neoadjuvant chemotherapy.    Next we discussed the surgical options which include breast conserving therapy versus a mastectomy.  With breast conserving therapy we are talking about a lumpectomy with margins, lymph node evaluation, and radiation treatment.  The radiation treatment is generally given to the entire breast for 6 weeks.  The side effects consist of local skin change and potential injury to nearby structures such as the lung or the heart.  A mastectomy would involve removing the breast tissues with preservation of the pectoral muscles and evaluation of the lymph nodes if indicated.  The potential for reconstruction by either an implant or autologous tissue was discussed.  This could be performed on a delayed basis or immediately depending on a multitude of factors.  The survival rates for these 2 procedures are equivalent but there are incidences where one may be favored over the other.  There are times when a lumpectomy is not possible due to the large tumor to breast ratio or the location of the tumor.  Previous radiation to the chest wall or collagen disorders such as scleroderma would make radiation treatment  impossible and therefore exclude breast conserving therapy as an option.    The patient really does not have much of an option.  This tumor is not one that would lend itself to breast conserving measures.  It is clearly locally advanced and I have ordered staging studies to rule out distant disease.  The HER-2 FISH analysis is pending and that could potentially have some role in neoadjuvant therapy.  If she has no evidence of distant disease, it would be nice if we could shrink the tumor prior to surgery and whether this would best be done with chemotherapy or endocrine therapy would need to be discussed.   I will present her at the breast cancer conference.  CPT coding:    Next Appointment:  No Follow-up on file.            EMR Dragon/transcription disclaimer:    Much of this encounter note is an electronic transcription/translocation of spoken language to printed text.  The electronic translation of spoken language may permit erroneous, or at times, nonsensical words or phrases to be inadvertently transcribed.  Although I have reviewed the note from such areas, some may still exist.

## 2019-08-14 ENCOUNTER — OFFICE VISIT (OUTPATIENT)
Dept: FAMILY MEDICINE CLINIC | Facility: CLINIC | Age: 79
End: 2019-08-14

## 2019-08-14 VITALS
BODY MASS INDEX: 19.05 KG/M2 | HEART RATE: 56 BPM | TEMPERATURE: 97.6 F | SYSTOLIC BLOOD PRESSURE: 132 MMHG | DIASTOLIC BLOOD PRESSURE: 62 MMHG | OXYGEN SATURATION: 98 % | WEIGHT: 111.6 LBS | HEIGHT: 64 IN

## 2019-08-14 DIAGNOSIS — C50.911 MALIGNANT NEOPLASM OF RIGHT FEMALE BREAST, UNSPECIFIED ESTROGEN RECEPTOR STATUS, UNSPECIFIED SITE OF BREAST (HCC): ICD-10-CM

## 2019-08-14 DIAGNOSIS — I10 ESSENTIAL HYPERTENSION: Primary | ICD-10-CM

## 2019-08-14 PROBLEM — K29.70 GASTRITIS: Status: RESOLVED | Noted: 2017-01-16 | Resolved: 2019-08-14

## 2019-08-14 PROBLEM — Z12.39 BREAST CANCER SCREENING: Status: RESOLVED | Noted: 2017-02-22 | Resolved: 2019-08-14

## 2019-08-14 PROBLEM — R22.32 LOCALIZED SWELLING ON LEFT HAND: Status: RESOLVED | Noted: 2019-03-14 | Resolved: 2019-08-14

## 2019-08-14 PROBLEM — Z13.820 OSTEOPOROSIS SCREENING: Status: RESOLVED | Noted: 2017-02-22 | Resolved: 2019-08-14

## 2019-08-14 PROCEDURE — 99213 OFFICE O/P EST LOW 20 MIN: CPT | Performed by: NURSE PRACTITIONER

## 2019-08-14 NOTE — PROGRESS NOTES
"Subjective   Mar Burgos is a 78 y.o. female.     History of Present Illness   Patient presented to the office today to follow-up on blood pressure.  Patient states that her blood pressure has been well controlled at home she is taking her medication daily as directed without any side effects.    She was recently diagnosed with breast cancer and the plan is to have some bone scans and lung scans and then to have a mastectomy.  Patient is comfortable with the treatment that she has chosen and she has no questions regarding the therapy at this time.    The following portions of the patient's history were reviewed and updated as appropriate: allergies, current medications, past social history and problem list.    Review of Systems   Constitutional: Negative for fever.   Respiratory: Negative for cough and shortness of breath.    Cardiovascular: Negative for chest pain.   Gastrointestinal: Negative for abdominal pain.   Neurological: Negative for dizziness.       Objective   /62 (BP Location: Right arm, Patient Position: Sitting)   Pulse 56   Temp 97.6 °F (36.4 °C)   Ht 162.6 cm (64\")   Wt 50.6 kg (111 lb 9.6 oz)   SpO2 98%   BMI 19.16 kg/m²   Physical Exam   Constitutional: She is oriented to person, place, and time. Vital signs are normal. She appears well-developed and well-nourished. No distress.   HENT:   Head: Normocephalic.   Cardiovascular: Normal rate, regular rhythm and normal heart sounds.   Pulmonary/Chest: Effort normal and breath sounds normal.   Neurological: She is alert and oriented to person, place, and time. Gait normal.   Psychiatric: She has a normal mood and affect. Her behavior is normal. Judgment and thought content normal.   Vitals reviewed.      Assessment/Plan      Diagnosis Plan   1. Essential hypertension     2. Malignant neoplasm of right female breast, unspecified estrogen receptor status, unspecified site of breast (CMS/HCC)       rto in 6 mons   Cont same with " meds  Vladimir Hernandez, APRN  8/14/2019

## 2019-08-15 ENCOUNTER — TELEPHONE (OUTPATIENT)
Dept: OTHER | Facility: HOSPITAL | Age: 79
End: 2019-08-15

## 2019-08-15 NOTE — TELEPHONE ENCOUNTER
Referral received from Dr. Gonzalez's office. I called Ms. Burgos and introduced myself and navigational services. She states she will be having staging studies done on Aug 23rd and should know more about her treatment plan after the results return. She has a good understanding of her pathology and treatment options presented to her by Dr. Gonzalez and is comfortable with this plan.    We discussed integrative therapies and other [facility]. She verbalized interest in receiving a navigation folder outlining services. I verified her mailing address and will send out a navigation folder with the following information:     [facility],  [facility] Breast Cancer Support Group, Cancer and Restorative Exercise (CARE), [facility], [facility] for Breast Cancer Survival,  For Women Facing Breast Cancer, Road to Recovery, [employer], [facility], Massage Therapy, Reiki Therapy, [facility] [place], Cancer Nutrition, and Survivorship [facility].     She verbalized appreciation for navigational services and she has my contact information and will call with any questions that arise.

## 2019-08-16 ENCOUNTER — APPOINTMENT (OUTPATIENT)
Dept: SPEECH THERAPY | Facility: HOSPITAL | Age: 79
End: 2019-08-16

## 2019-08-19 ENCOUNTER — APPOINTMENT (OUTPATIENT)
Dept: SPEECH THERAPY | Facility: HOSPITAL | Age: 79
End: 2019-08-19

## 2019-08-23 ENCOUNTER — HOSPITAL ENCOUNTER (OUTPATIENT)
Dept: CT IMAGING | Facility: HOSPITAL | Age: 79
Discharge: HOME | End: 2019-08-23
Admitting: SURGERY

## 2019-08-23 ENCOUNTER — APPOINTMENT (OUTPATIENT)
Dept: SPEECH THERAPY | Facility: HOSPITAL | Age: 79
End: 2019-08-23

## 2019-08-23 ENCOUNTER — HOSPITAL ENCOUNTER (OUTPATIENT)
Dept: NUCLEAR MEDICINE | Facility: HOSPITAL | Age: 79
Discharge: HOME | End: 2019-08-23

## 2019-08-23 DIAGNOSIS — Z17.0 MALIGNANT NEOPLASM OF CENTRAL PORTION OF RIGHT BREAST IN FEMALE, ESTROGEN RECEPTOR POSITIVE (HCC): ICD-10-CM

## 2019-08-23 DIAGNOSIS — C50.111 MALIGNANT NEOPLASM OF CENTRAL PORTION OF RIGHT BREAST IN FEMALE, ESTROGEN RECEPTOR POSITIVE (HCC): ICD-10-CM

## 2019-08-23 LAB — CREAT BLDA-MCNC: 1.5 MG/DL (ref 0.6–1.3)

## 2019-08-23 PROCEDURE — 0 TECHNETIUM MEDRONATE KIT: Performed by: SURGERY

## 2019-08-23 PROCEDURE — 82565 ASSAY OF CREATININE: CPT

## 2019-08-23 PROCEDURE — 71250 CT THORAX DX C-: CPT

## 2019-08-23 PROCEDURE — 78306 BONE IMAGING WHOLE BODY: CPT

## 2019-08-23 PROCEDURE — 74176 CT ABD & PELVIS W/O CONTRAST: CPT

## 2019-08-23 PROCEDURE — A9503 TC99M MEDRONATE: HCPCS | Performed by: SURGERY

## 2019-08-23 RX ORDER — TC 99M MEDRONATE 20 MG/10ML
22.5 INJECTION, POWDER, LYOPHILIZED, FOR SOLUTION INTRAVENOUS
Status: COMPLETED | OUTPATIENT
Start: 2019-08-23 | End: 2019-08-23

## 2019-08-23 RX ADMIN — Medication 22.5 MILLICURIE: at 10:25

## 2019-08-26 ENCOUNTER — APPOINTMENT (OUTPATIENT)
Dept: SPEECH THERAPY | Facility: HOSPITAL | Age: 79
End: 2019-08-26

## 2019-08-27 ENCOUNTER — TELEPHONE (OUTPATIENT)
Dept: MAMMOGRAPHY | Facility: CLINIC | Age: 79
End: 2019-08-27

## 2019-08-27 DIAGNOSIS — Z17.0 MALIGNANT NEOPLASM OF CENTRAL PORTION OF RIGHT BREAST IN FEMALE, ESTROGEN RECEPTOR POSITIVE (HCC): Primary | ICD-10-CM

## 2019-08-27 DIAGNOSIS — C50.111 MALIGNANT NEOPLASM OF CENTRAL PORTION OF RIGHT BREAST IN FEMALE, ESTROGEN RECEPTOR POSITIVE (HCC): Primary | ICD-10-CM

## 2019-08-27 NOTE — TELEPHONE ENCOUNTER
I spoke with her this evening.  The bone scan shows 1 area that slightly not but it is likely related to her fracture from her fall.  On her CT scans the patient has axillary lymphadenopathy and a right breast mass but otherwise no abnormalities there.  In the abdomen there was one very small sclerotic area in the left iliac bone which was too small to characterize.  Otherwise there was no evidence of metastatic disease in the abdomen.  She was presented at breast cancer conference and the idea was to try neoadjuvant endocrine therapy.  I have placed a referral to the medical oncologist.  Dr. Marcus was at the conference and I referred to her.

## 2019-08-30 ENCOUNTER — APPOINTMENT (OUTPATIENT)
Dept: SPEECH THERAPY | Facility: HOSPITAL | Age: 79
End: 2019-08-30

## 2019-09-07 ENCOUNTER — HOSPITAL ENCOUNTER (EMERGENCY)
Facility: HOSPITAL | Age: 79
Discharge: HOME | End: 2019-09-07
Attending: EMERGENCY MEDICINE | Admitting: EMERGENCY MEDICINE

## 2019-09-07 ENCOUNTER — APPOINTMENT (OUTPATIENT)
Dept: CT IMAGING | Facility: HOSPITAL | Age: 79
End: 2019-09-07

## 2019-09-07 ENCOUNTER — APPOINTMENT (OUTPATIENT)
Dept: MRI IMAGING | Facility: HOSPITAL | Age: 79
End: 2019-09-07

## 2019-09-07 VITALS
WEIGHT: 108 LBS | BODY MASS INDEX: 18.44 KG/M2 | OXYGEN SATURATION: 96 % | DIASTOLIC BLOOD PRESSURE: 104 MMHG | HEART RATE: 70 BPM | HEIGHT: 64 IN | SYSTOLIC BLOOD PRESSURE: 184 MMHG | RESPIRATION RATE: 16 BRPM | TEMPERATURE: 97.1 F

## 2019-09-07 DIAGNOSIS — R51.9 ACUTE NONINTRACTABLE HEADACHE, UNSPECIFIED HEADACHE TYPE: Primary | ICD-10-CM

## 2019-09-07 LAB
ALBUMIN SERPL-MCNC: 4.5 G/DL (ref 3.5–5.2)
ALBUMIN/GLOB SERPL: 1.4 G/DL
ALP SERPL-CCNC: 86 U/L (ref 39–117)
ALT SERPL W P-5'-P-CCNC: 8 U/L (ref 1–33)
ANION GAP SERPL CALCULATED.3IONS-SCNC: 14 MMOL/L (ref 5–15)
AST SERPL-CCNC: 16 U/L (ref 1–32)
BASOPHILS # BLD AUTO: 0.04 10*3/MM3 (ref 0–0.2)
BASOPHILS NFR BLD AUTO: 0.6 % (ref 0–1.5)
BILIRUB SERPL-MCNC: 0.3 MG/DL (ref 0.2–1.2)
BUN BLD-MCNC: 18 MG/DL (ref 8–23)
BUN/CREAT SERPL: 18 (ref 7–25)
CALCIUM SPEC-SCNC: 9.6 MG/DL (ref 8.6–10.5)
CHLORIDE SERPL-SCNC: 107 MMOL/L (ref 98–107)
CO2 SERPL-SCNC: 24 MMOL/L (ref 22–29)
CREAT BLD-MCNC: 1 MG/DL (ref 0.57–1)
DEPRECATED RDW RBC AUTO: 49.5 FL (ref 37–54)
EOSINOPHIL # BLD AUTO: 0.16 10*3/MM3 (ref 0–0.4)
EOSINOPHIL NFR BLD AUTO: 2.6 % (ref 0.3–6.2)
ERYTHROCYTE [DISTWIDTH] IN BLOOD BY AUTOMATED COUNT: 14.9 % (ref 12.3–15.4)
GFR SERPL CREATININE-BSD FRML MDRD: 53 ML/MIN/1.73
GLOBULIN UR ELPH-MCNC: 3.2 GM/DL
GLUCOSE BLD-MCNC: 191 MG/DL (ref 65–99)
HCT VFR BLD AUTO: 36.5 % (ref 34–46.6)
HGB BLD-MCNC: 11.3 G/DL (ref 12–15.9)
IMM GRANULOCYTES # BLD AUTO: 0.01 10*3/MM3 (ref 0–0.05)
IMM GRANULOCYTES NFR BLD AUTO: 0.2 % (ref 0–0.5)
LYMPHOCYTES # BLD AUTO: 1.25 10*3/MM3 (ref 0.7–3.1)
LYMPHOCYTES NFR BLD AUTO: 20.2 % (ref 19.6–45.3)
MCH RBC QN AUTO: 27.8 PG (ref 26.6–33)
MCHC RBC AUTO-ENTMCNC: 31 G/DL (ref 31.5–35.7)
MCV RBC AUTO: 89.9 FL (ref 79–97)
MONOCYTES # BLD AUTO: 0.51 10*3/MM3 (ref 0.1–0.9)
MONOCYTES NFR BLD AUTO: 8.2 % (ref 5–12)
NEUTROPHILS # BLD AUTO: 4.22 10*3/MM3 (ref 1.7–7)
NEUTROPHILS NFR BLD AUTO: 68.2 % (ref 42.7–76)
NRBC BLD AUTO-RTO: 0.2 /100 WBC (ref 0–0.2)
PLATELET # BLD AUTO: 265 10*3/MM3 (ref 140–450)
PMV BLD AUTO: 9.2 FL (ref 6–12)
POTASSIUM BLD-SCNC: 4.1 MMOL/L (ref 3.5–5.2)
PROT SERPL-MCNC: 7.7 G/DL (ref 6–8.5)
RBC # BLD AUTO: 4.06 10*6/MM3 (ref 3.77–5.28)
SODIUM BLD-SCNC: 145 MMOL/L (ref 136–145)
WBC NRBC COR # BLD: 6.19 10*3/MM3 (ref 3.4–10.8)

## 2019-09-07 PROCEDURE — 80053 COMPREHEN METABOLIC PANEL: CPT | Performed by: EMERGENCY MEDICINE

## 2019-09-07 PROCEDURE — 25010000002 METOCLOPRAMIDE PER 10 MG: Performed by: EMERGENCY MEDICINE

## 2019-09-07 PROCEDURE — 70450 CT HEAD/BRAIN W/O DYE: CPT

## 2019-09-07 PROCEDURE — 0 GADOBENATE DIMEGLUMINE 529 MG/ML SOLUTION: Performed by: EMERGENCY MEDICINE

## 2019-09-07 PROCEDURE — 85025 COMPLETE CBC W/AUTO DIFF WBC: CPT | Performed by: EMERGENCY MEDICINE

## 2019-09-07 PROCEDURE — 25010000002 DIPHENHYDRAMINE PER 50 MG: Performed by: EMERGENCY MEDICINE

## 2019-09-07 PROCEDURE — 70553 MRI BRAIN STEM W/O & W/DYE: CPT

## 2019-09-07 PROCEDURE — 99284 EMERGENCY DEPT VISIT MOD MDM: CPT

## 2019-09-07 PROCEDURE — A9577 INJ MULTIHANCE: HCPCS | Performed by: EMERGENCY MEDICINE

## 2019-09-07 PROCEDURE — 96374 THER/PROPH/DIAG INJ IV PUSH: CPT

## 2019-09-07 PROCEDURE — 96375 TX/PRO/DX INJ NEW DRUG ADDON: CPT

## 2019-09-07 RX ORDER — DIPHENHYDRAMINE HYDROCHLORIDE 50 MG/ML
25 INJECTION INTRAMUSCULAR; INTRAVENOUS ONCE
Status: COMPLETED | OUTPATIENT
Start: 2019-09-07 | End: 2019-09-07

## 2019-09-07 RX ORDER — CLONIDINE HYDROCHLORIDE 0.1 MG/1
0.2 TABLET ORAL ONCE
Status: COMPLETED | OUTPATIENT
Start: 2019-09-07 | End: 2019-09-07

## 2019-09-07 RX ORDER — SODIUM CHLORIDE 0.9 % (FLUSH) 0.9 %
10 SYRINGE (ML) INJECTION AS NEEDED
Status: DISCONTINUED | OUTPATIENT
Start: 2019-09-07 | End: 2019-09-07 | Stop reason: HOSPADM

## 2019-09-07 RX ORDER — METOCLOPRAMIDE HYDROCHLORIDE 5 MG/ML
5 INJECTION INTRAMUSCULAR; INTRAVENOUS ONCE
Status: COMPLETED | OUTPATIENT
Start: 2019-09-07 | End: 2019-09-07

## 2019-09-07 RX ADMIN — DIPHENHYDRAMINE HYDROCHLORIDE 25 MG: 50 INJECTION, SOLUTION INTRAMUSCULAR; INTRAVENOUS at 10:31

## 2019-09-07 RX ADMIN — GADOBENATE DIMEGLUMINE 9 ML: 529 INJECTION, SOLUTION INTRAVENOUS at 14:48

## 2019-09-07 RX ADMIN — METOCLOPRAMIDE 5 MG: 5 INJECTION, SOLUTION INTRAMUSCULAR; INTRAVENOUS at 10:31

## 2019-09-07 RX ADMIN — CLONIDINE HYDROCHLORIDE 0.2 MG: 0.1 TABLET ORAL at 12:50

## 2019-09-07 NOTE — ED TRIAGE NOTES
Pt reports frontal headache started  Yesterday afternoon states BP was also elevated. Pt reports dizziness that has been mostly constant since stroke 6 months ago. Pt also reports new dx of right breast cancer supposed to start chemo 9/11/19

## 2019-09-07 NOTE — ED PROVIDER NOTES
EMERGENCY DEPARTMENT ENCOUNTER    Room Number:  06/06  Date seen:  9/7/2019  Time seen: 10:12 AM  PCP: Vladimir Hernandez APRN  Historian: patient      HPI:  Chief Complaint: headache  A complete HPI/ROS/PMH/PSH/SH/FH are unobtainable due to: none  Context: Mar Burgos is a 78 y.o. female who presents to the ED c/o headache that started last night and worsened this morning when she woke up at 0500. Pt also c/o nausea and vomiting this morning along with some abdominal cramping. Pt reports chronic dizziness secondary to hx of stroke that is unchanged from baseline. Pt denies photohobia, neck pain, and neck stiffness. Pt with hx of R breast CA for which she has not started chemotherapy or radiation tx. Pt has not had an MRI Brain performed to r/o metastasis to the brain. Pt was not able to keep her medications down this morning secondary to N/V. Family at bedside.     PAST MEDICAL HISTORY  Active Ambulatory Problems     Diagnosis Date Noted   • Anemia 07/01/2013   • Uncontrolled type 2 diabetes mellitus (CMS/[facility]) 01/16/2017   • Diabetic neuropathy (CMS/[facility]) 01/16/2017   • Gastroesophageal reflux disease 01/16/2017   • Hearing loss 01/16/2017   • Hyperlipidemia 01/16/2017   • Hypertension 01/16/2017   • Thoracic back pain 01/16/2017   • Peripheral arterial occlusive disease (CMS/[facility]) 01/16/2017   • Personal history of other malignant neoplasm of skin 07/01/2013   • Seasonal allergic rhinitis 01/16/2017   • Type 2 diabetes mellitus, without long-term current use of insulin (CMS/[facility]) 07/01/2013   • Cobalamin deficiency 01/16/2017   • Cerebrovascular accident (CVA) (CMS/[facility]) 02/15/2019   • Left-sided weakness 02/15/2019   • Dysphagia due to recent cerebral infarction 02/16/2019   • Smoker 02/22/2019   • Right ankle swelling 03/14/2019   • Breast mass 07/16/2019   • Nipple discharge 07/16/2019   • Malignant neoplasm of right female breast (CMS/[facility]) 08/14/2019     Resolved Ambulatory Problems     Diagnosis Date Noted   •  Diverticulitis of colon 01/16/2017   • Gastritis 01/16/2017   • Vitamin B-complex deficiency 07/01/2013   • History of breast cancer 07/01/2013   • History of tobacco use 07/01/2013   • Pneumonia 01/16/2017   • Osteoporosis screening 02/22/2017   • Breast cancer screening 02/22/2017   • Sinusitis 03/15/2018   • Localized swelling on left hand 03/14/2019     Past Medical History:   Diagnosis Date   • Anemia    • Arthritis    • Cancer (CMS/[facility])    • Clostridium difficile colitis    • Diabetes mellitus (CMS/[facility])    • Difficulty walking    • Diverticulitis    • Environmental allergies    • History of malignant neoplasm of breast 07/01/2013   • History of malignant neoplasm of skin 07/01/2013   • History of tobacco use 7/1/2013   • HL (hearing loss)    • Hyperlipidemia    • Hypertension    • Kidney disease    • Neuropathy in diabetes (CMS/[facility])    • Peripheral neuropathy    • Shingles    • Skin cancer    • Stroke (CMS/[facility])    • Vision loss          PAST SURGICAL HISTORY  Past Surgical History:   Procedure Laterality Date   • CATARACT EXTRACTION, BILATERAL     • EAR TUBES     • MASTECTOMY Bilateral     radical   • TUMOR REMOVAL Left     large lipoma removed from hip         FAMILY HISTORY  Family History   Problem Relation Age of Onset   • Stroke Mother 76   • Diabetes Mother    • Hypertension Mother    • Arthritis Mother    • Cancer Father         metastatic unknown cause   • Heart attack Father 70   • Cancer Brother         bladder   • Arthritis Sister          SOCIAL HISTORY  Social History     Socioeconomic History   • Marital status:      Spouse name: Not on file   • Number of children: 3   • Years of education: 9   • Highest education level: Not on file   Occupational History   • Occupation: Retired   Tobacco Use   • Smoking status: Former Smoker     Packs/day: 0.25     Years: 10.00     Pack years: 2.50     Types: Cigarettes   • Smokeless tobacco: Former User   • Tobacco comment: she quit for 8 years and  started back about a year ago   Substance and Sexual Activity   • Alcohol use: No   • Drug use: No   • Sexual activity: No         ALLERGIES  Lipitor [atorvastatin]        REVIEW OF SYSTEMS  Review of Systems   Constitutional: Negative for diaphoresis and fever.   HENT: Negative for congestion.    Eyes: Negative for photophobia and visual disturbance.   Respiratory: Negative for shortness of breath.    Cardiovascular: Negative for palpitations.   Gastrointestinal: Positive for abdominal pain (cramping), nausea and vomiting (this morning). Negative for blood in stool.   Endocrine: Negative for polyuria.   Genitourinary: Negative for flank pain.   Musculoskeletal: Negative for joint swelling, neck pain and neck stiffness.   Skin: Negative for wound.   Neurological: Positive for dizziness (chronic, unchanged from baseline) and headaches. Negative for seizures.   Hematological: Negative for adenopathy.   Psychiatric/Behavioral: Negative for sleep disturbance.            PHYSICAL EXAM  ED Triage Vitals   Temp Heart Rate Resp BP SpO2   09/07/19 0920 09/07/19 0920 09/07/19 0920 09/07/19 0930 09/07/19 0920   97.1 °F (36.2 °C) 106 18 (!) 221/157 96 %      Temp src Heart Rate Source Patient Position BP Location FiO2 (%)   09/07/19 0920 09/07/19 0920 -- -- --   Tympanic Monitor            GENERAL: no acute distress  HENT: nares patent  EYES: no scleral icterus, pupils 3 mm reactive bilaterally  CV: regular rhythm, normal rate  RESPIRATORY: normal effort, CTA bilaterally  ABDOMEN: soft, non-tender throughout  MUSCULOSKELETAL: no deformity  NEURO: awake, alert, moves all extremities, follows commands  SKIN: warm, dry    Vital signs and nursing notes reviewed.      LAB RESULTS  Recent Results (from the past 24 hour(s))   Comprehensive Metabolic Panel    Collection Time: 09/07/19  9:40 AM   Result Value Ref Range    Glucose 191 (H) 65 - 99 mg/dL    BUN 18 8 - 23 mg/dL    Creatinine 1.00 0.57 - 1.00 mg/dL    Sodium 145 136 - 145  mmol/L    Potassium 4.1 3.5 - 5.2 mmol/L    Chloride 107 98 - 107 mmol/L    CO2 24.0 22.0 - 29.0 mmol/L    Calcium 9.6 8.6 - 10.5 mg/dL    Total Protein 7.7 6.0 - 8.5 g/dL    Albumin 4.50 3.50 - 5.20 g/dL    ALT (SGPT) 8 1 - 33 U/L    AST (SGOT) 16 1 - 32 U/L    Alkaline Phosphatase 86 39 - 117 U/L    Total Bilirubin 0.3 0.2 - 1.2 mg/dL    eGFR Non African Amer 53 (L) >60 mL/min/1.73    Globulin 3.2 gm/dL    A/G Ratio 1.4 g/dL    BUN/Creatinine Ratio 18.0 7.0 - 25.0    Anion Gap 14.0 5.0 - 15.0 mmol/L   CBC Auto Differential    Collection Time: 09/07/19  9:40 AM   Result Value Ref Range    WBC 6.19 3.40 - 10.80 10*3/mm3    RBC 4.06 3.77 - 5.28 10*6/mm3    Hemoglobin 11.3 (L) 12.0 - 15.9 g/dL    Hematocrit 36.5 34.0 - 46.6 %    MCV 89.9 79.0 - 97.0 fL    MCH 27.8 26.6 - 33.0 pg    MCHC 31.0 (L) 31.5 - 35.7 g/dL    RDW 14.9 12.3 - 15.4 %    RDW-SD 49.5 37.0 - 54.0 fl    MPV 9.2 6.0 - 12.0 fL    Platelets 265 140 - 450 10*3/mm3    Neutrophil % 68.2 42.7 - 76.0 %    Lymphocyte % 20.2 19.6 - 45.3 %    Monocyte % 8.2 5.0 - 12.0 %    Eosinophil % 2.6 0.3 - 6.2 %    Basophil % 0.6 0.0 - 1.5 %    Immature Grans % 0.2 0.0 - 0.5 %    Neutrophils, Absolute 4.22 1.70 - 7.00 10*3/mm3    Lymphocytes, Absolute 1.25 0.70 - 3.10 10*3/mm3    Monocytes, Absolute 0.51 0.10 - 0.90 10*3/mm3    Eosinophils, Absolute 0.16 0.00 - 0.40 10*3/mm3    Basophils, Absolute 0.04 0.00 - 0.20 10*3/mm3    Immature Grans, Absolute 0.01 0.00 - 0.05 10*3/mm3    nRBC 0.2 0.0 - 0.2 /100 WBC       Ordered the above labs and reviewed the results.        RADIOLOGY  Ct Head Without Contrast    Result Date: 9/7/2019  CT SCAN BRAIN WITHOUT CONTRAST  HISTORY: Breast cancer. Headache.  TECHNIQUE/FINDINGS: The CT scan was performed as an emergency procedure through the brain without contrast. There is prominent diffuse atrophy and chronic small vessel ischemic change. There is an old lacunar infarct in the right basal ganglia and these findings are unchanged from  07/08/2019. There is no evidence of acute hemorrhage or mass effect and the visualized sinuses are clear.  Radiation dose reduction techniques were utilized, including automated exposure control and exposure modulation based on body size.  This report was finalized on 9/7/2019 12:02 PM by Dr. Randy Lafleur M.D.      Mri Brain With & Without Contrast    Result Date: 9/7/2019  EMERGENCY MRI BRAIN WITH AND WITHOUT CONTRAST  HISTORY: Headache. Recently diagnosed breast cancer.  TECHNIQUE: MRI brain with and without contrast is provided. Head CT was performed earlier today and reported separately. This is also correlated with previous brain MRI 02/15/2019.  FINDINGS: The ventricles are normal in caliber. There is high T2 signal at the site of the right basal ganglia infarction seen on MRI earlier this year. This is unchanged in size, measuring about 17 mm AP. There is no acute ischemic change. Extensive periventricular white matter high T2 signal is observed and unchanged, consistent with small vessel disease. No other parenchymal abnormality is identified. There is no abnormal contrast enhancement. Extra-axial spaces appear normal.      Old right basal ganglia infarct. No acute abnormality identified. No metastatic disease is identified at the brain or the head.  I called the findings to Dr. Espinosa in the emergency department at 3:50 PM.        Ordered the above noted radiological studies. Reviewed by me in PACS.  Spoke with Dr. Dodd (radiologist) regarding results.          PROCEDURES  Procedures                MEDICATIONS GIVEN IN ER  Medications   sodium chloride 0.9 % flush 10 mL (not administered)   metoclopramide (REGLAN) injection 5 mg (5 mg Intravenous Given 9/7/19 1031)   diphenhydrAMINE (BENADRYL) injection 25 mg (25 mg Intravenous Given 9/7/19 1031)   CloNIDine (CATAPRES) tablet 0.2 mg (0.2 mg Oral Given 9/7/19 1250)   gadobenate dimeglumine (MULTIHANCE) injection 9 mL (9 mL Intravenous Given by Other  9/7/19 1448)                   PROGRESS AND CONSULTS  ED Course as of Sep 07 1610   Sat Sep 07, 2019   1554 Dr. Dodd called and reports MRI brain neg acute.   [JR]   1554 Patient and family updated on negative MRI, plan to discharge home with PCP follow up.   [JR]      ED Course User Index  [JR] Amrit Espinosa MD     1204 Rechecked pt. Pt is resting comfortably, but her BP is elevated. Pt's family states that the pt threw up her BP medications this morning.  Notified pt of CT Head results (negative acute). Discussed the plan to obtain MRI brain to r/o brain metastasies. Pt and family understand and agree with the plan, all questions answered.      MEDICAL DECISION MAKING    78-year-old female with recent diagnosis of right breast cancer presents today with severe headache.  She denies fever or chills.  Her blood pressure is elevated however she did vomit her clonidine this morning.  CT brain is negative acute on arrival.  She was given her home dose of clonidine with some reduction in her blood pressure.  I recommended obtaining MRI brain with and without contrast to exclude intracranial metastasis as it does not appear in her chart that she has had any neuroimaging since her recent diagnosis.  This was obtained and fortunately is negative for any evidence of intracranial metastasis.  She can therefore be discharged home safely with PCP follow-up for blood pressure recheck and further evaluation of her headache as warranted.    MDM  Number of Diagnoses or Management Options     Amount and/or Complexity of Data Reviewed  Clinical lab tests: reviewed and ordered (Glucose - 191)  Tests in the radiology section of CPT®: reviewed and ordered (CT Head -  no evidence of acute hemorrhage or mass effect and the visualized sinuses are clear. )  Independent visualization of images, tracings, or specimens: yes               DIAGNOSIS  Final diagnoses:   Acute nonintractable headache, unspecified headache type          DISPOSITION  Discharge            Latest Documented Vital Signs:  As of 4:10 PM  BP- (!) 205/88 HR- 74 Temp- 97.1 °F (36.2 °C) (Tympanic) O2 sat- 96%        --  Documentation assistance provided by mayda Kerr for Dr. MIGUEL Espinosa MD.  Information recorded by the dianeibe was done at my direction and has been verified and validated by me.      Please note that portions of this were completed with a voice recognition program.                Eric Kerr  09/07/19 1023       Eric Kerr  09/07/19 1241       Eric Kerr  09/07/19 1503       Amrit Espinosa MD  09/07/19 1612

## 2019-09-11 ENCOUNTER — APPOINTMENT (OUTPATIENT)
Dept: LAB | Facility: HOSPITAL | Age: 79
End: 2019-09-11

## 2019-09-11 ENCOUNTER — CONSULT (OUTPATIENT)
Dept: ONCOLOGY | Facility: CLINIC | Age: 79
End: 2019-09-11

## 2019-09-11 VITALS
HEIGHT: 65 IN | WEIGHT: 110.4 LBS | DIASTOLIC BLOOD PRESSURE: 67 MMHG | BODY MASS INDEX: 18.39 KG/M2 | HEART RATE: 53 BPM | SYSTOLIC BLOOD PRESSURE: 178 MMHG | OXYGEN SATURATION: 98 % | RESPIRATION RATE: 16 BRPM | TEMPERATURE: 97.8 F

## 2019-09-11 DIAGNOSIS — C50.911 MALIGNANT NEOPLASM OF RIGHT FEMALE BREAST, UNSPECIFIED ESTROGEN RECEPTOR STATUS, UNSPECIFIED SITE OF BREAST (HCC): Primary | ICD-10-CM

## 2019-09-11 LAB
BASOPHILS # BLD AUTO: 0.07 10*3/MM3 (ref 0–0.2)
BASOPHILS NFR BLD AUTO: 1.2 % (ref 0–1.5)
CANCER AG15-3 SERPL-ACNC: 53.4 U/ML
DEPRECATED RDW RBC AUTO: 50.1 FL (ref 37–54)
EOSINOPHIL # BLD AUTO: 0.21 10*3/MM3 (ref 0–0.4)
EOSINOPHIL NFR BLD AUTO: 3.5 % (ref 0.3–6.2)
ERYTHROCYTE [DISTWIDTH] IN BLOOD BY AUTOMATED COUNT: 15 % (ref 12.3–15.4)
HCT VFR BLD AUTO: 30.4 % (ref 34–46.6)
HGB BLD-MCNC: 9.5 G/DL (ref 12–15.9)
IMM GRANULOCYTES # BLD AUTO: 0.02 10*3/MM3 (ref 0–0.05)
IMM GRANULOCYTES NFR BLD AUTO: 0.3 % (ref 0–0.5)
LYMPHOCYTES # BLD AUTO: 1.67 10*3/MM3 (ref 0.7–3.1)
LYMPHOCYTES NFR BLD AUTO: 28.2 % (ref 19.6–45.3)
MCH RBC QN AUTO: 28.4 PG (ref 26.6–33)
MCHC RBC AUTO-ENTMCNC: 31.3 G/DL (ref 31.5–35.7)
MCV RBC AUTO: 91 FL (ref 79–97)
MONOCYTES # BLD AUTO: 0.84 10*3/MM3 (ref 0.1–0.9)
MONOCYTES NFR BLD AUTO: 14.2 % (ref 5–12)
NEUTROPHILS # BLD AUTO: 3.11 10*3/MM3 (ref 1.7–7)
NEUTROPHILS NFR BLD AUTO: 52.6 % (ref 42.7–76)
NRBC BLD AUTO-RTO: 0 /100 WBC (ref 0–0.2)
PLATELET # BLD AUTO: 220 10*3/MM3 (ref 140–450)
PMV BLD AUTO: 9.4 FL (ref 6–12)
RBC # BLD AUTO: 3.34 10*6/MM3 (ref 3.77–5.28)
WBC NRBC COR # BLD: 5.92 10*3/MM3 (ref 3.4–10.8)

## 2019-09-11 PROCEDURE — 86300 IMMUNOASSAY TUMOR CA 15-3: CPT | Performed by: INTERNAL MEDICINE

## 2019-09-11 PROCEDURE — 36415 COLL VENOUS BLD VENIPUNCTURE: CPT | Performed by: INTERNAL MEDICINE

## 2019-09-11 PROCEDURE — 99205 OFFICE O/P NEW HI 60 MIN: CPT | Performed by: INTERNAL MEDICINE

## 2019-09-11 PROCEDURE — 85025 COMPLETE CBC W/AUTO DIFF WBC: CPT | Performed by: INTERNAL MEDICINE

## 2019-09-11 PROCEDURE — 36416 COLLJ CAPILLARY BLOOD SPEC: CPT | Performed by: INTERNAL MEDICINE

## 2019-09-11 PROCEDURE — G0463 HOSPITAL OUTPT CLINIC VISIT: HCPCS | Performed by: INTERNAL MEDICINE

## 2019-09-11 RX ORDER — ANASTROZOLE 1 MG/1
1 TABLET ORAL DAILY
Qty: 90 TABLET | Refills: 3 | Status: SHIPPED | OUTPATIENT
Start: 2019-09-11 | End: 2019-10-11

## 2019-09-11 NOTE — PROGRESS NOTES
Subjective     REASON FOR CONSULTATION: Locally advanced right breast cancer ER OK positive HER-2 positive  Remote history of left breast cancer 1978 treated with mastectomy alone  Provide an opinion on any further workup or treatment                             REQUESTING PHYSICIAN: MD Vladimir Cowart [facility]    RECORDS OBTAINED:  Records of the patients history including those obtained from the referring provider were reviewed and summarized in detail.    HISTORY OF PRESENT ILLNESS:  The patient is a 78 y.o. year old female who is here for an opinion about the above issue.    History of Present Illness patient is an 79-year-old female with multiple medical issues including hypertension diabetes hypercholesterolemia with a recent stroke in February of this year which is resulted in left-sided weakness and difficulty swallowing.  The patient reports that for about a year she has noted changes in her nipple and then in the last 2 months she is noted redness in the right breast along with a lump in the breast and under her right arm.  Patient brought it to the attention of her family physician who sent her for imaging and biopsy for an obvious tumor in the right breast.  Mammogram revealed a large irregular mass measuring 6 cm with skin retraction nipple retraction and skin thickening  At the 10 o'clock position there appeared to be some skin involvement.  I also made note of some large axillary lymph nodes.  An ultrasound was then obtained and it revealed an irregular solid mass with indistinct margins measuring about 64 x 51 mm.  On ultrasound there were also some enlarged axillary lymph nodes measuring about 30 mm in greatest size.     A biopsy was recommended and has revealed a grade 2 invasive ductal cancer that is ER positive at 98%, OK positive at 82%, and HER-2 equivocal.  The Ki-67 is 20.81%.     Patient was then referred to Dr. Gonzalez who  examined her and did not feel surgical excision was possible because there would be a large defect from her tumor and was wanting to consider neoadjuvant therapy in an effort to shrink the tumor.  The patient was presented at the multidisciplinary conference and because of her comorbidities her recent stroke and her overall general condition we did not think she was a good candidate for neoadjuvant chemotherapy and she was referred to us to discuss endocrine therapy  In the interim additional testing on her tumor was done at [facility] because of the indeterminate HER-2 FISH and this revealed that there was 3+ HER-2 activity by IHC and therefore this is considered to be HER-2 positive  The patient had staging work-up at the recommendation of the multidisciplinary clinic And CT of the chest showed calcified granulomatous disease with mild AP window adenopathy which is increased from previous CAT scan in 2016 precarinal adenopathy also increased.  It showed the breast mass as well as right axillary adenopathy.  In the abdomen there was a left adrenal nodule which is increased in size from 1.4 to 1.8 cm felt to be an adenoma in addition there was a small sclerotic lesion in the left iliac crest and metastasis could not be completely excluded although the bone scan showed no uptake in this area.  The bone scan did show a fracture and uptake in the left greater trochanter and uptake of the left superior pubic ramus that represents a superior ramus fracture of with no other signs of metastatic disease-patient did fall in July of this year but no obvious fractures were seen on x-ray    Patient is  3 para 3 with 2 miscarriages menarche was at age 9 menopause at age 49st childbirth was at age 24 she breast-fed for at least 6 months she did not take hormone replacement therapy because at age 38 she developed breast cancer in the left breast was treated at [facility] [place] with a modified radical mastectomy in   no radiation or hormonal blockade was given in details of the scans were not available at this time.  Family history is positive for father dying at age 84 with disseminated abdominal cancer of unknown type mother  at 77 of natural causes she has a brother with kidney cancer at age 65 who  of this and a sister with melanoma at age 30    Patient currently lives with her daughter does not smoke currently but smoked for 70 years 1 pack of cigarettes is not a drinker.  She uses a cane to walk because her balance is poor she has issues with swallowing from her stroke        Past Medical History:   Diagnosis Date   • Anemia     required prior blood transfusions   • Arthritis    • Cancer (CMS/HCC)     breast cancer    • Clostridium difficile colitis    • Diabetes mellitus (CMS/HCC)    • Difficulty walking    • Diverticulitis    • Environmental allergies    • History of malignant neoplasm of breast 2013    denies any chemo or radiation   • History of malignant neoplasm of skin 2013    possibly basal   • History of tobacco use 2013   • HL (hearing loss)    • Hyperlipidemia    • Hypertension    • Kidney disease    • 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     • aspirin 81 MG EC tablet Take 81 mg by mouth Daily.     • cetirizine (zyrTEC) 10 MG tablet Take 10 mg by mouth Daily.     • CloNIDine (CATAPRES) 0.2 MG tablet TAKE 1 TABLET BY MOUTH EVERY 12 HOURS 180 tablet 0   • fluticasone (FLONASE) 50 MCG/ACT nasal  spray 2 sprays into the nostril(s) as directed by provider Every Night.     • lisinopril (PRINIVIL,ZESTRIL) 40 MG tablet Take 1 tablet by mouth Daily. 90 tablet 0   • metFORMIN (GLUCOPHAGE) 1000 MG tablet TAKE 1 TABLET BY MOUTH TWICE DAILY WITH MEALS 180 tablet 0   • metoprolol succinate XL (TOPROL-XL) 50 MG 24 hr tablet Take 1 tablet by mouth Daily. 90 tablet 3   • pravastatin (PRAVACHOL) 20 MG tablet TAKE 1 TABLET BY MOUTH EVERY NIGHT 30 tablet 11     No current facility-administered medications on file prior to visit.         ALLERGIES:    Allergies   Allergen Reactions   • Lipitor [Atorvastatin] Other (See Comments)     Leg weakness         Social History     Socioeconomic History   • Marital status:      Spouse name: Not on file   • Number of children: 3   • Years of education: 9   • Highest education level: Not on file   Occupational History   • Occupation: Retired   Tobacco Use   • Smoking status: Former Smoker     Packs/day: 0.25     Years: 10.00     Pack years: 2.50     Types: Cigarettes   • Smokeless tobacco: Former User   • Tobacco comment: she quit for 8 years and started back about a year ago   Substance and Sexual Activity   • Alcohol use: No   • Drug use: No   • Sexual activity: No        Family History   Problem Relation Age of Onset   • Stroke Mother 76   • Diabetes Mother    • Hypertension Mother    • Arthritis Mother    • Cancer Father         metastatic unknown cause   • Heart attack Father 70   • Cancer Brother         bladder   • Arthritis Sister         Review of Systems   Constitutional: Positive for activity change (From stroke in February).   HENT: Positive for trouble swallowing (From stroke).    Musculoskeletal: Positive for gait problem (Left sided weakness from stroke).   Neurological: Positive for dizziness (Gait and balance issues from stroke) and weakness (Left-sided).        Objective     Vitals:    09/11/19 1136   BP: 178/67   Pulse: 53   Resp: 16   Temp: 97.8 °F (36.6 °C)  "  SpO2: 98%   Weight: 50.1 kg (110 lb 6.4 oz)   Height: 164 cm (64.57\")  Comment: new ht w/shoes   PainSc:   4   PainLoc: Breast  Comment: right breast (open sores)     Current Status 9/11/2019   ECOG score 0       Physical Exam    GENERAL:  Well-developed, well-nourished in no acute distress.   SKIN:  Warm, dry without rashes, purpura or petechiae.  EYES:  Pupils equal, round and reactive to light.  EOMs intact.  Conjunctivae normal.  EARS:  Hearing intact.  NOSE:  Septum midline.  No excoriations or nasal discharge.  MOUTH:  Tongue is well-papillated; no stomatitis or ulcers.  Lips normal.  THROAT:  Oropharynx without lesions or exudates.  NECK:  Supple with good range of motion; no thyromegaly or masses, no JVD.  LYMPHATICS:  No cervical, supraclavicular, axillary or inguinal adenopathy.  CHEST:  Lungs clear to auscultation. Good airflow.  BREASTS: Left chest wall is benign the right breast shows a locally advanced breast cancer with cutaneous involvement multifocally-no mass is involving the whole breast but the breast and mass are freely mobile.  There is a 3 x 4 similar right axillary node that is also mobile-see photograph in the record  CARDIAC:  Regular rate and rhythm without murmurs, rubs or gallops. Normal S1,S2.  ABDOMEN:  Soft, nontender with no hepatosplenomegaly or masses.-Prominent bony nodule on the left greater trochanter  EXTREMITIES:  No clubbing, cyanosis or edema.  NEUROLOGICAL:  Cranial Nerves II-XII grossly intact.  Mild left-sided weakness with a hemiparetic gait   pSYCHIATRIC:  Normal affect and mood.        RECENT LABS:  Hematology WBC   Date Value Ref Range Status   09/11/2019 5.92 3.40 - 10.80 10*3/mm3 Final     RBC   Date Value Ref Range Status   09/11/2019 3.34 (L) 3.77 - 5.28 10*6/mm3 Final     Hemoglobin   Date Value Ref Range Status   09/11/2019 9.5 (L) 12.0 - 15.9 g/dL Final     Hematocrit   Date Value Ref Range Status   09/11/2019 30.4 (L) 34.0 - 46.6 % Final     Platelets   Date " Value Ref Range Status   09/11/2019 220 140 - 450 10*3/mm3 Final                          MRI BRAIN     IMPRESSION:  Old right basal ganglia infarct. No acute abnormality  identified. No metastatic disease is identified at the brain or the  head.    Bone scan  IMPRESSION:  Abnormal uptake left greater trochanter where there is a  fracture demonstrated with CT (patient has a history of a fall and there  is no CT evidence that this is a pathologic fracture). There is also  abnormal uptake at the left superior pubic ramus that most likely  represents a superior ramus fracture though a fracture is not evident  with CT. This uptake left superior pubic ramus is technically  indeterminate though there is overall no convincing evidence for bony  metastatic disease. Follow-up CT could be obtained.     This report was finalized on 8/23/2019 1:    CT chest abdomen pelvis  IMPRESSION CHEST CT:    1. A 2.2 cm right breast mass laterally presumably related to the  patient's neoplasm.  2. Right axillary mediastinal adenopathy and metastatic disease cannot  be excluded. PET/CT may be helpful.     IMPRESSION ABDOMEN & PELVIS CT:    1. Low-density renal lesions as discussed, favor cysts.  2. Enlarging left adrenal nodule, likely adenoma.  3. Extensive diverticulosis.  4. A 5 mm sclerotic left iliac lesion as discussed.          Assessment/Plan   1.  Locally advanced right breast cancer strongly ER RI positive with indeterminate HER-2 with repeat testing showing 3+ IHC consistent with positive result  · Staging work-up probably negative although indeterminate bone lesions without obvious fractures on bone scan  · Abnormal mediastinal adenopathy which is been present since 2016 likely granulomatous disease  · Enlarging left adrenal mass felt to be an adenoma present since 2015  · History of left breast cancer 1978 at age 38 treated with modified radical mastectomy alone?  Pathology    2.  Granulomatous mediastinal lymph nodes  chronic    3.  Recent stroke in 2/19 with left hemiparesis and dysphagia     4.  Hypertension diabetes and hypercholesterolemia    5.  Anemia?  CKD 3    Plan  I discussed that length with the patient and her daughter treatment options for her.  She has told me that she has never wanted to take chemotherapy and is not wanting to do so now and therefore we will initiate therapy with Arimidex and see how she responds.  She realizes that this is unlikely to be curative therapy but if we can get any amount of shrinkage we can proceed with surgery    I am not completely sure she is not already metastatic based on the unusual changes on her bone scan but I do not think this will change her therapy and therefore we will continue with hormonal blockade and reevaluate her closely    Her echocardiogram in February shows an ejection fraction of 55% but she is not interested in chemo and even if she did take therapy I do not think she can get away without a taxane to go with her Herceptin and she is not a candidate for Taxol based on her frailty and age    Radiation may be another option if she does not have a good response to treatment and we discussed living will and DNR status and she and her daughter are going to get this documented as she does not want to be resuscitated    Scription for Arimidex given and the side effect profile discussed see the NP in 1 month and see me in 2 months    45 minutes, over half of that time counseling.

## 2019-09-23 DIAGNOSIS — IMO0002 UNCONTROLLED TYPE 2 DIABETES MELLITUS WITH COMPLICATION, WITH LONG-TERM CURRENT USE OF INSULIN: ICD-10-CM

## 2019-09-26 ENCOUNTER — HOSPITAL ENCOUNTER (INPATIENT)
Facility: HOSPITAL | Age: 79
LOS: 3 days | Discharge: HOME | End: 2019-09-29
Attending: EMERGENCY MEDICINE | Admitting: HOSPITALIST

## 2019-09-26 ENCOUNTER — APPOINTMENT (OUTPATIENT)
Dept: CT IMAGING | Facility: HOSPITAL | Age: 79
End: 2019-09-26

## 2019-09-26 DIAGNOSIS — Z85.3 HX: BREAST CANCER: ICD-10-CM

## 2019-09-26 DIAGNOSIS — I16.0 HYPERTENSIVE URGENCY: ICD-10-CM

## 2019-09-26 DIAGNOSIS — I10 ESSENTIAL HYPERTENSION: ICD-10-CM

## 2019-09-26 DIAGNOSIS — G45.9 TIA (TRANSIENT ISCHEMIC ATTACK): Primary | ICD-10-CM

## 2019-09-26 PROBLEM — N18.30 CKD (CHRONIC KIDNEY DISEASE) STAGE 3, GFR 30-59 ML/MIN (HCC): Chronic | Status: ACTIVE | Noted: 2019-09-26

## 2019-09-26 PROBLEM — R51.9 HEADACHE: Status: ACTIVE | Noted: 2019-09-26

## 2019-09-26 LAB
ALBUMIN SERPL-MCNC: 4.2 G/DL (ref 3.5–5.2)
ALBUMIN/GLOB SERPL: 1.4 G/DL
ALP SERPL-CCNC: 70 U/L (ref 39–117)
ALT SERPL W P-5'-P-CCNC: 12 U/L (ref 1–33)
ANION GAP SERPL CALCULATED.3IONS-SCNC: 14.8 MMOL/L (ref 5–15)
AST SERPL-CCNC: 21 U/L (ref 1–32)
BASOPHILS # BLD AUTO: 0.03 10*3/MM3 (ref 0–0.2)
BASOPHILS NFR BLD AUTO: 0.7 % (ref 0–1.5)
BILIRUB SERPL-MCNC: <0.2 MG/DL (ref 0.2–1.2)
BUN BLD-MCNC: 24 MG/DL (ref 8–23)
BUN/CREAT SERPL: 19.5 (ref 7–25)
CALCIUM SPEC-SCNC: 9.3 MG/DL (ref 8.6–10.5)
CHLORIDE SERPL-SCNC: 105 MMOL/L (ref 98–107)
CO2 SERPL-SCNC: 22.2 MMOL/L (ref 22–29)
CREAT BLD-MCNC: 1.23 MG/DL (ref 0.57–1)
DEPRECATED RDW RBC AUTO: 47 FL (ref 37–54)
EOSINOPHIL # BLD AUTO: 0.12 10*3/MM3 (ref 0–0.4)
EOSINOPHIL NFR BLD AUTO: 2.7 % (ref 0.3–6.2)
ERYTHROCYTE [DISTWIDTH] IN BLOOD BY AUTOMATED COUNT: 15 % (ref 12.3–15.4)
GFR SERPL CREATININE-BSD FRML MDRD: 42 ML/MIN/1.73
GLOBULIN UR ELPH-MCNC: 3 GM/DL
GLUCOSE BLD-MCNC: 152 MG/DL (ref 65–99)
GLUCOSE BLDC GLUCOMTR-MCNC: 211 MG/DL (ref 70–130)
HCT VFR BLD AUTO: 30.6 % (ref 34–46.6)
HGB BLD-MCNC: 9.9 G/DL (ref 12–15.9)
IMM GRANULOCYTES # BLD AUTO: 0.01 10*3/MM3 (ref 0–0.05)
IMM GRANULOCYTES NFR BLD AUTO: 0.2 % (ref 0–0.5)
INR PPP: 0.98 (ref 0.9–1.1)
LYMPHOCYTES # BLD AUTO: 0.97 10*3/MM3 (ref 0.7–3.1)
LYMPHOCYTES NFR BLD AUTO: 21.8 % (ref 19.6–45.3)
MCH RBC QN AUTO: 28 PG (ref 26.6–33)
MCHC RBC AUTO-ENTMCNC: 32.4 G/DL (ref 31.5–35.7)
MCV RBC AUTO: 86.7 FL (ref 79–97)
MONOCYTES # BLD AUTO: 0.46 10*3/MM3 (ref 0.1–0.9)
MONOCYTES NFR BLD AUTO: 10.4 % (ref 5–12)
NEUTROPHILS # BLD AUTO: 2.85 10*3/MM3 (ref 1.7–7)
NEUTROPHILS NFR BLD AUTO: 64.2 % (ref 42.7–76)
NRBC BLD AUTO-RTO: 0 /100 WBC (ref 0–0.2)
PLATELET # BLD AUTO: 250 10*3/MM3 (ref 140–450)
PMV BLD AUTO: 9 FL (ref 6–12)
POTASSIUM BLD-SCNC: 4.4 MMOL/L (ref 3.5–5.2)
PROT SERPL-MCNC: 7.2 G/DL (ref 6–8.5)
PROTHROMBIN TIME: 12.7 SECONDS (ref 11.7–14.2)
RBC # BLD AUTO: 3.53 10*6/MM3 (ref 3.77–5.28)
SODIUM BLD-SCNC: 142 MMOL/L (ref 136–145)
TROPONIN T SERPL-MCNC: <0.01 NG/ML (ref 0–0.03)
WBC NRBC COR # BLD: 4.44 10*3/MM3 (ref 3.4–10.8)

## 2019-09-26 PROCEDURE — 85025 COMPLETE CBC W/AUTO DIFF WBC: CPT | Performed by: EMERGENCY MEDICINE

## 2019-09-26 PROCEDURE — 85610 PROTHROMBIN TIME: CPT | Performed by: EMERGENCY MEDICINE

## 2019-09-26 PROCEDURE — 63710000001 INSULIN LISPRO (HUMAN) PER 5 UNITS: Performed by: HOSPITALIST

## 2019-09-26 PROCEDURE — 80053 COMPREHEN METABOLIC PANEL: CPT | Performed by: EMERGENCY MEDICINE

## 2019-09-26 PROCEDURE — 99284 EMERGENCY DEPT VISIT MOD MDM: CPT

## 2019-09-26 PROCEDURE — 93010 ELECTROCARDIOGRAM REPORT: CPT | Performed by: INTERNAL MEDICINE

## 2019-09-26 PROCEDURE — 70450 CT HEAD/BRAIN W/O DYE: CPT

## 2019-09-26 PROCEDURE — 93005 ELECTROCARDIOGRAM TRACING: CPT | Performed by: EMERGENCY MEDICINE

## 2019-09-26 PROCEDURE — 82962 GLUCOSE BLOOD TEST: CPT

## 2019-09-26 PROCEDURE — 84484 ASSAY OF TROPONIN QUANT: CPT | Performed by: EMERGENCY MEDICINE

## 2019-09-26 RX ORDER — SODIUM CHLORIDE 0.9 % (FLUSH) 0.9 %
10 SYRINGE (ML) INJECTION AS NEEDED
Status: DISCONTINUED | OUTPATIENT
Start: 2019-09-26 | End: 2019-09-29 | Stop reason: HOSPADM

## 2019-09-26 RX ORDER — ACETAMINOPHEN 500 MG
500 TABLET ORAL EVERY 6 HOURS PRN
Status: DISCONTINUED | OUTPATIENT
Start: 2019-09-26 | End: 2019-09-29 | Stop reason: HOSPADM

## 2019-09-26 RX ORDER — NICOTINE POLACRILEX 4 MG
15 LOZENGE BUCCAL
Status: DISCONTINUED | OUTPATIENT
Start: 2019-09-26 | End: 2019-09-29 | Stop reason: HOSPADM

## 2019-09-26 RX ORDER — LABETALOL HYDROCHLORIDE 5 MG/ML
20 INJECTION, SOLUTION INTRAVENOUS ONCE
Status: COMPLETED | OUTPATIENT
Start: 2019-09-26 | End: 2019-09-26

## 2019-09-26 RX ORDER — ASPIRIN 81 MG/1
81 TABLET ORAL DAILY
Status: DISCONTINUED | OUTPATIENT
Start: 2019-09-27 | End: 2019-09-29 | Stop reason: HOSPADM

## 2019-09-26 RX ORDER — GUAIFENESIN 600 MG/1
600 TABLET, EXTENDED RELEASE ORAL 2 TIMES DAILY PRN
COMMUNITY

## 2019-09-26 RX ORDER — CETIRIZINE HYDROCHLORIDE 10 MG/1
10 TABLET ORAL DAILY
Status: DISCONTINUED | OUTPATIENT
Start: 2019-09-27 | End: 2019-09-29 | Stop reason: HOSPADM

## 2019-09-26 RX ORDER — ANASTROZOLE 1 MG/1
1 TABLET ORAL DAILY
Status: DISCONTINUED | OUTPATIENT
Start: 2019-09-27 | End: 2019-09-29 | Stop reason: HOSPADM

## 2019-09-26 RX ORDER — ONDANSETRON 2 MG/ML
4 INJECTION INTRAMUSCULAR; INTRAVENOUS EVERY 6 HOURS PRN
Status: DISCONTINUED | OUTPATIENT
Start: 2019-09-26 | End: 2019-09-29 | Stop reason: HOSPADM

## 2019-09-26 RX ORDER — PRAVASTATIN SODIUM 20 MG
20 TABLET ORAL NIGHTLY
Status: DISCONTINUED | OUTPATIENT
Start: 2019-09-26 | End: 2019-09-29 | Stop reason: HOSPADM

## 2019-09-26 RX ORDER — DEXTROSE MONOHYDRATE 25 G/50ML
25 INJECTION, SOLUTION INTRAVENOUS
Status: DISCONTINUED | OUTPATIENT
Start: 2019-09-26 | End: 2019-09-29 | Stop reason: HOSPADM

## 2019-09-26 RX ORDER — SODIUM CHLORIDE 0.9 % (FLUSH) 0.9 %
10 SYRINGE (ML) INJECTION EVERY 12 HOURS SCHEDULED
Status: DISCONTINUED | OUTPATIENT
Start: 2019-09-26 | End: 2019-09-29 | Stop reason: HOSPADM

## 2019-09-26 RX ORDER — METOPROLOL SUCCINATE 50 MG/1
50 TABLET, EXTENDED RELEASE ORAL
Status: DISCONTINUED | OUTPATIENT
Start: 2019-09-27 | End: 2019-09-29 | Stop reason: HOSPADM

## 2019-09-26 RX ORDER — FLUTICASONE PROPIONATE 50 MCG
2 SPRAY, SUSPENSION (ML) NASAL NIGHTLY
Status: DISCONTINUED | OUTPATIENT
Start: 2019-09-26 | End: 2019-09-29 | Stop reason: HOSPADM

## 2019-09-26 RX ORDER — CLONIDINE HYDROCHLORIDE 0.2 MG/1
0.2 TABLET ORAL 2 TIMES DAILY
COMMUNITY
End: 2020-01-06

## 2019-09-26 RX ORDER — ENALAPRILAT 2.5 MG/2ML
0.62 INJECTION INTRAVENOUS EVERY 6 HOURS PRN
Status: DISCONTINUED | OUTPATIENT
Start: 2019-09-26 | End: 2019-09-29 | Stop reason: HOSPADM

## 2019-09-26 RX ORDER — CLONIDINE HYDROCHLORIDE 0.1 MG/1
0.2 TABLET ORAL EVERY 12 HOURS SCHEDULED
Status: DISCONTINUED | OUTPATIENT
Start: 2019-09-26 | End: 2019-09-29 | Stop reason: HOSPADM

## 2019-09-26 RX ORDER — LISINOPRIL 40 MG/1
40 TABLET ORAL DAILY
Status: DISCONTINUED | OUTPATIENT
Start: 2019-09-27 | End: 2019-09-29 | Stop reason: HOSPADM

## 2019-09-26 RX ORDER — PRAVASTATIN SODIUM 20 MG
20 TABLET ORAL NIGHTLY
COMMUNITY
End: 2020-04-07

## 2019-09-26 RX ADMIN — LABETALOL 20 MG/4 ML (5 MG/ML) INTRAVENOUS SYRINGE 20 MG: at 18:37

## 2019-09-26 RX ADMIN — CLONIDINE HYDROCHLORIDE 0.2 MG: 0.1 TABLET ORAL at 23:01

## 2019-09-26 RX ADMIN — FLUTICASONE PROPIONATE 2 SPRAY: 50 SPRAY, METERED NASAL at 23:01

## 2019-09-26 RX ADMIN — ACETAMINOPHEN 500 MG: 500 TABLET, FILM COATED ORAL at 23:01

## 2019-09-26 RX ADMIN — INSULIN LISPRO 4 UNITS: 100 INJECTION, SOLUTION INTRAVENOUS; SUBCUTANEOUS at 23:01

## 2019-09-26 RX ADMIN — PRAVASTATIN SODIUM 20 MG: 20 TABLET ORAL at 23:01

## 2019-09-26 RX ADMIN — LABETALOL 20 MG/4 ML (5 MG/ML) INTRAVENOUS SYRINGE 20 MG: at 16:29

## 2019-09-27 LAB
ALBUMIN SERPL-MCNC: 3.9 G/DL (ref 3.5–5.2)
ALBUMIN/GLOB SERPL: 1.7 G/DL
ALP SERPL-CCNC: 66 U/L (ref 39–117)
ALT SERPL W P-5'-P-CCNC: 10 U/L (ref 1–33)
ANION GAP SERPL CALCULATED.3IONS-SCNC: 13 MMOL/L (ref 5–15)
AST SERPL-CCNC: 16 U/L (ref 1–32)
BASOPHILS # BLD AUTO: 0.04 10*3/MM3 (ref 0–0.2)
BASOPHILS NFR BLD AUTO: 1 % (ref 0–1.5)
BILIRUB SERPL-MCNC: <0.2 MG/DL (ref 0.2–1.2)
BUN BLD-MCNC: 21 MG/DL (ref 8–23)
BUN/CREAT SERPL: 17.9 (ref 7–25)
CALCIUM SPEC-SCNC: 9 MG/DL (ref 8.6–10.5)
CHLORIDE SERPL-SCNC: 105 MMOL/L (ref 98–107)
CHOLEST SERPL-MCNC: 132 MG/DL (ref 0–200)
CO2 SERPL-SCNC: 25 MMOL/L (ref 22–29)
CREAT BLD-MCNC: 1.17 MG/DL (ref 0.57–1)
DEPRECATED RDW RBC AUTO: 48.3 FL (ref 37–54)
EOSINOPHIL # BLD AUTO: 0.14 10*3/MM3 (ref 0–0.4)
EOSINOPHIL NFR BLD AUTO: 3.4 % (ref 0.3–6.2)
ERYTHROCYTE [DISTWIDTH] IN BLOOD BY AUTOMATED COUNT: 14.9 % (ref 12.3–15.4)
FERRITIN SERPL-MCNC: 38.9 NG/ML (ref 13–150)
FOLATE SERPL-MCNC: 11.4 NG/ML (ref 4.78–24.2)
GFR SERPL CREATININE-BSD FRML MDRD: 44 ML/MIN/1.73
GLOBULIN UR ELPH-MCNC: 2.3 GM/DL
GLUCOSE BLD-MCNC: 120 MG/DL (ref 65–99)
GLUCOSE BLDC GLUCOMTR-MCNC: 126 MG/DL (ref 70–130)
GLUCOSE BLDC GLUCOMTR-MCNC: 147 MG/DL (ref 70–130)
GLUCOSE BLDC GLUCOMTR-MCNC: 155 MG/DL (ref 70–130)
GLUCOSE BLDC GLUCOMTR-MCNC: 156 MG/DL (ref 70–130)
HBA1C MFR BLD: 6.3 % (ref 4.8–5.6)
HCT VFR BLD AUTO: 28.2 % (ref 34–46.6)
HDLC SERPL-MCNC: 44 MG/DL (ref 40–60)
HGB BLD-MCNC: 9 G/DL (ref 12–15.9)
IMM GRANULOCYTES # BLD AUTO: 0.02 10*3/MM3 (ref 0–0.05)
IMM GRANULOCYTES NFR BLD AUTO: 0.5 % (ref 0–0.5)
IRON 24H UR-MRATE: 25 MCG/DL (ref 37–145)
IRON SATN MFR SERPL: 7 % (ref 20–50)
LDLC SERPL CALC-MCNC: 71 MG/DL (ref 0–100)
LDLC/HDLC SERPL: 1.61 {RATIO}
LYMPHOCYTES # BLD AUTO: 1.09 10*3/MM3 (ref 0.7–3.1)
LYMPHOCYTES NFR BLD AUTO: 26.2 % (ref 19.6–45.3)
MCH RBC QN AUTO: 28.2 PG (ref 26.6–33)
MCHC RBC AUTO-ENTMCNC: 31.9 G/DL (ref 31.5–35.7)
MCV RBC AUTO: 88.4 FL (ref 79–97)
MONOCYTES # BLD AUTO: 0.63 10*3/MM3 (ref 0.1–0.9)
MONOCYTES NFR BLD AUTO: 15.1 % (ref 5–12)
NEUTROPHILS # BLD AUTO: 2.24 10*3/MM3 (ref 1.7–7)
NEUTROPHILS NFR BLD AUTO: 53.8 % (ref 42.7–76)
NRBC BLD AUTO-RTO: 0 /100 WBC (ref 0–0.2)
PLATELET # BLD AUTO: 238 10*3/MM3 (ref 140–450)
PMV BLD AUTO: 9.4 FL (ref 6–12)
POTASSIUM BLD-SCNC: 4.2 MMOL/L (ref 3.5–5.2)
PROT SERPL-MCNC: 6.2 G/DL (ref 6–8.5)
RBC # BLD AUTO: 3.19 10*6/MM3 (ref 3.77–5.28)
SODIUM BLD-SCNC: 143 MMOL/L (ref 136–145)
TIBC SERPL-MCNC: 350 MCG/DL (ref 298–536)
TRANSFERRIN SERPL-MCNC: 235 MG/DL (ref 200–360)
TRIGL SERPL-MCNC: 85 MG/DL (ref 0–150)
VIT B12 BLD-MCNC: 158 PG/ML (ref 211–946)
VLDLC SERPL-MCNC: 17 MG/DL (ref 5–40)
WBC NRBC COR # BLD: 4.16 10*3/MM3 (ref 3.4–10.8)

## 2019-09-27 PROCEDURE — 92610 EVALUATE SWALLOWING FUNCTION: CPT | Performed by: SPEECH-LANGUAGE PATHOLOGIST

## 2019-09-27 PROCEDURE — 82962 GLUCOSE BLOOD TEST: CPT

## 2019-09-27 PROCEDURE — 97110 THERAPEUTIC EXERCISES: CPT | Performed by: PHYSICAL THERAPIST

## 2019-09-27 PROCEDURE — 82728 ASSAY OF FERRITIN: CPT | Performed by: HOSPITALIST

## 2019-09-27 PROCEDURE — 83036 HEMOGLOBIN GLYCOSYLATED A1C: CPT | Performed by: HOSPITALIST

## 2019-09-27 PROCEDURE — 97161 PT EVAL LOW COMPLEX 20 MIN: CPT | Performed by: PHYSICAL THERAPIST

## 2019-09-27 PROCEDURE — 84466 ASSAY OF TRANSFERRIN: CPT | Performed by: HOSPITALIST

## 2019-09-27 PROCEDURE — 82746 ASSAY OF FOLIC ACID SERUM: CPT | Performed by: HOSPITALIST

## 2019-09-27 PROCEDURE — 85025 COMPLETE CBC W/AUTO DIFF WBC: CPT | Performed by: HOSPITALIST

## 2019-09-27 PROCEDURE — 80061 LIPID PANEL: CPT | Performed by: HOSPITALIST

## 2019-09-27 PROCEDURE — 99221 1ST HOSP IP/OBS SF/LOW 40: CPT | Performed by: PSYCHIATRY & NEUROLOGY

## 2019-09-27 PROCEDURE — 97165 OT EVAL LOW COMPLEX 30 MIN: CPT

## 2019-09-27 PROCEDURE — 80053 COMPREHEN METABOLIC PANEL: CPT | Performed by: HOSPITALIST

## 2019-09-27 PROCEDURE — 82607 VITAMIN B-12: CPT | Performed by: HOSPITALIST

## 2019-09-27 PROCEDURE — 83540 ASSAY OF IRON: CPT | Performed by: HOSPITALIST

## 2019-09-27 RX ORDER — CLOPIDOGREL BISULFATE 75 MG/1
300 TABLET ORAL ONCE
Status: COMPLETED | OUTPATIENT
Start: 2019-09-27 | End: 2019-09-27

## 2019-09-27 RX ORDER — HYDROCHLOROTHIAZIDE 12.5 MG/1
12.5 CAPSULE, GELATIN COATED ORAL DAILY
Status: DISCONTINUED | OUTPATIENT
Start: 2019-09-27 | End: 2019-09-29 | Stop reason: HOSPADM

## 2019-09-27 RX ORDER — LABETALOL HYDROCHLORIDE 5 MG/ML
10 INJECTION, SOLUTION INTRAVENOUS EVERY 4 HOURS PRN
Status: DISCONTINUED | OUTPATIENT
Start: 2019-09-27 | End: 2019-09-29 | Stop reason: HOSPADM

## 2019-09-27 RX ORDER — CHOLECALCIFEROL (VITAMIN D3) 125 MCG
2000 CAPSULE ORAL DAILY
Status: DISCONTINUED | OUTPATIENT
Start: 2019-09-27 | End: 2019-09-29 | Stop reason: HOSPADM

## 2019-09-27 RX ORDER — CLOPIDOGREL BISULFATE 75 MG/1
75 TABLET ORAL DAILY
Status: DISCONTINUED | OUTPATIENT
Start: 2019-09-28 | End: 2019-09-29 | Stop reason: HOSPADM

## 2019-09-27 RX ADMIN — ANASTROZOLE 1 MG: 1 TABLET ORAL at 08:22

## 2019-09-27 RX ADMIN — HYDROCHLOROTHIAZIDE 12.5 MG: 12.5 CAPSULE ORAL at 18:32

## 2019-09-27 RX ADMIN — ENALAPRILAT 0.62 MG: 1.25 INJECTION INTRAVENOUS at 19:39

## 2019-09-27 RX ADMIN — SODIUM CHLORIDE, PRESERVATIVE FREE 10 ML: 5 INJECTION INTRAVENOUS at 20:16

## 2019-09-27 RX ADMIN — FLUTICASONE PROPIONATE 2 SPRAY: 50 SPRAY, METERED NASAL at 20:17

## 2019-09-27 RX ADMIN — SODIUM CHLORIDE, PRESERVATIVE FREE 10 ML: 5 INJECTION INTRAVENOUS at 08:22

## 2019-09-27 RX ADMIN — CLONIDINE HYDROCHLORIDE 0.2 MG: 0.1 TABLET ORAL at 08:22

## 2019-09-27 RX ADMIN — LISINOPRIL 40 MG: 40 TABLET ORAL at 08:22

## 2019-09-27 RX ADMIN — SODIUM CHLORIDE, PRESERVATIVE FREE 10 ML: 5 INJECTION INTRAVENOUS at 00:16

## 2019-09-27 RX ADMIN — CETIRIZINE HYDROCHLORIDE 10 MG: 10 TABLET, FILM COATED ORAL at 08:22

## 2019-09-27 RX ADMIN — CLOPIDOGREL 300 MG: 75 TABLET, FILM COATED ORAL at 12:53

## 2019-09-27 RX ADMIN — CLONIDINE HYDROCHLORIDE 0.2 MG: 0.1 TABLET ORAL at 20:16

## 2019-09-27 RX ADMIN — Medication 2000 MCG: at 18:32

## 2019-09-27 RX ADMIN — METOPROLOL SUCCINATE 50 MG: 50 TABLET, FILM COATED, EXTENDED RELEASE ORAL at 08:22

## 2019-09-27 RX ADMIN — ASPIRIN 81 MG: 81 TABLET, COATED ORAL at 08:22

## 2019-09-27 RX ADMIN — PRAVASTATIN SODIUM 20 MG: 20 TABLET ORAL at 20:16

## 2019-09-28 LAB
GLUCOSE BLDC GLUCOMTR-MCNC: 124 MG/DL (ref 70–130)
GLUCOSE BLDC GLUCOMTR-MCNC: 141 MG/DL (ref 70–130)
GLUCOSE BLDC GLUCOMTR-MCNC: 212 MG/DL (ref 70–130)
GLUCOSE BLDC GLUCOMTR-MCNC: 91 MG/DL (ref 70–130)

## 2019-09-28 PROCEDURE — 82962 GLUCOSE BLOOD TEST: CPT

## 2019-09-28 PROCEDURE — 63710000001 INSULIN LISPRO (HUMAN) PER 5 UNITS: Performed by: HOSPITALIST

## 2019-09-28 RX ORDER — AMLODIPINE BESYLATE 5 MG/1
5 TABLET ORAL
Status: DISCONTINUED | OUTPATIENT
Start: 2019-09-28 | End: 2019-09-29 | Stop reason: HOSPADM

## 2019-09-28 RX ADMIN — SODIUM CHLORIDE, PRESERVATIVE FREE 10 ML: 5 INJECTION INTRAVENOUS at 08:12

## 2019-09-28 RX ADMIN — ANASTROZOLE 1 MG: 1 TABLET ORAL at 08:15

## 2019-09-28 RX ADMIN — CLONIDINE HYDROCHLORIDE 0.2 MG: 0.1 TABLET ORAL at 20:16

## 2019-09-28 RX ADMIN — ASPIRIN 81 MG: 81 TABLET, COATED ORAL at 08:12

## 2019-09-28 RX ADMIN — Medication 2000 MCG: at 08:12

## 2019-09-28 RX ADMIN — LISINOPRIL 40 MG: 40 TABLET ORAL at 08:12

## 2019-09-28 RX ADMIN — SODIUM CHLORIDE, PRESERVATIVE FREE 10 ML: 5 INJECTION INTRAVENOUS at 20:17

## 2019-09-28 RX ADMIN — PRAVASTATIN SODIUM 20 MG: 20 TABLET ORAL at 20:16

## 2019-09-28 RX ADMIN — LABETALOL 20 MG/4 ML (5 MG/ML) INTRAVENOUS SYRINGE 10 MG: at 00:01

## 2019-09-28 RX ADMIN — METOPROLOL SUCCINATE 50 MG: 50 TABLET, FILM COATED, EXTENDED RELEASE ORAL at 08:12

## 2019-09-28 RX ADMIN — FLUTICASONE PROPIONATE 2 SPRAY: 50 SPRAY, METERED NASAL at 20:17

## 2019-09-28 RX ADMIN — INSULIN LISPRO 4 UNITS: 100 INJECTION, SOLUTION INTRAVENOUS; SUBCUTANEOUS at 17:20

## 2019-09-28 RX ADMIN — HYDROCHLOROTHIAZIDE 12.5 MG: 12.5 CAPSULE ORAL at 08:12

## 2019-09-28 RX ADMIN — LABETALOL 20 MG/4 ML (5 MG/ML) INTRAVENOUS SYRINGE 10 MG: at 04:02

## 2019-09-28 RX ADMIN — ACETAMINOPHEN 500 MG: 500 TABLET, FILM COATED ORAL at 15:15

## 2019-09-28 RX ADMIN — CLONIDINE HYDROCHLORIDE 0.2 MG: 0.1 TABLET ORAL at 08:12

## 2019-09-28 RX ADMIN — CLOPIDOGREL 75 MG: 75 TABLET, FILM COATED ORAL at 08:12

## 2019-09-28 RX ADMIN — CETIRIZINE HYDROCHLORIDE 10 MG: 10 TABLET, FILM COATED ORAL at 08:12

## 2019-09-28 RX ADMIN — AMLODIPINE BESYLATE 5 MG: 5 TABLET ORAL at 15:12

## 2019-09-29 VITALS
TEMPERATURE: 98.1 F | OXYGEN SATURATION: 96 % | HEART RATE: 74 BPM | SYSTOLIC BLOOD PRESSURE: 152 MMHG | RESPIRATION RATE: 16 BRPM | BODY MASS INDEX: 18.33 KG/M2 | DIASTOLIC BLOOD PRESSURE: 88 MMHG | WEIGHT: 110 LBS | HEIGHT: 65 IN

## 2019-09-29 LAB
GLUCOSE BLDC GLUCOMTR-MCNC: 119 MG/DL (ref 70–130)
GLUCOSE BLDC GLUCOMTR-MCNC: 201 MG/DL (ref 70–130)

## 2019-09-29 PROCEDURE — 63710000001 INSULIN LISPRO (HUMAN) PER 5 UNITS: Performed by: HOSPITALIST

## 2019-09-29 PROCEDURE — 82962 GLUCOSE BLOOD TEST: CPT

## 2019-09-29 RX ORDER — CLOPIDOGREL BISULFATE 75 MG/1
75 TABLET ORAL DAILY
Qty: 30 TABLET | Refills: 0 | Status: SHIPPED | OUTPATIENT
Start: 2019-09-30 | End: 2020-01-15 | Stop reason: SDUPTHER

## 2019-09-29 RX ORDER — AMOXICILLIN 250 MG
1 CAPSULE ORAL DAILY
Qty: 30 TABLET | Refills: 0 | Status: SHIPPED | OUTPATIENT
Start: 2019-09-29 | End: 2019-10-10

## 2019-09-29 RX ORDER — FERROUS SULFATE 325(65) MG
325 TABLET ORAL
Qty: 60 TABLET | Refills: 0 | Status: SHIPPED | OUTPATIENT
Start: 2019-09-29 | End: 2021-10-31 | Stop reason: HOSPADM

## 2019-09-29 RX ORDER — ASPIRIN 81 MG/1
81 TABLET ORAL DAILY
Start: 2019-09-29 | End: 2020-01-15

## 2019-09-29 RX ORDER — HYDROCHLOROTHIAZIDE 12.5 MG/1
12.5 CAPSULE, GELATIN COATED ORAL DAILY
Qty: 30 CAPSULE | Refills: 0 | Status: SHIPPED | OUTPATIENT
Start: 2019-09-30 | End: 2019-10-25 | Stop reason: SDUPTHER

## 2019-09-29 RX ORDER — AMLODIPINE BESYLATE 5 MG/1
5 TABLET ORAL
Qty: 30 TABLET | Refills: 0 | Status: SHIPPED | OUTPATIENT
Start: 2019-09-30 | End: 2019-10-03

## 2019-09-29 RX ADMIN — CLOPIDOGREL 75 MG: 75 TABLET, FILM COATED ORAL at 08:36

## 2019-09-29 RX ADMIN — LISINOPRIL 40 MG: 40 TABLET ORAL at 08:36

## 2019-09-29 RX ADMIN — SODIUM CHLORIDE, PRESERVATIVE FREE 10 ML: 5 INJECTION INTRAVENOUS at 08:37

## 2019-09-29 RX ADMIN — CLONIDINE HYDROCHLORIDE 0.2 MG: 0.1 TABLET ORAL at 08:36

## 2019-09-29 RX ADMIN — HYDROCHLOROTHIAZIDE 12.5 MG: 12.5 CAPSULE ORAL at 08:36

## 2019-09-29 RX ADMIN — Medication 2000 MCG: at 08:36

## 2019-09-29 RX ADMIN — INSULIN LISPRO 4 UNITS: 100 INJECTION, SOLUTION INTRAVENOUS; SUBCUTANEOUS at 12:23

## 2019-09-29 RX ADMIN — ASPIRIN 81 MG: 81 TABLET, COATED ORAL at 08:36

## 2019-09-29 RX ADMIN — AMLODIPINE BESYLATE 5 MG: 5 TABLET ORAL at 08:36

## 2019-09-29 RX ADMIN — METOPROLOL SUCCINATE 50 MG: 50 TABLET, FILM COATED, EXTENDED RELEASE ORAL at 08:36

## 2019-09-29 RX ADMIN — ANASTROZOLE 1 MG: 1 TABLET ORAL at 08:36

## 2019-09-29 RX ADMIN — CETIRIZINE HYDROCHLORIDE 10 MG: 10 TABLET, FILM COATED ORAL at 08:36

## 2019-09-30 ENCOUNTER — TRANSITIONAL CARE MANAGEMENT TELEPHONE ENCOUNTER (OUTPATIENT)
Dept: FAMILY MEDICINE CLINIC | Facility: CLINIC | Age: 79
End: 2019-09-30

## 2019-09-30 ENCOUNTER — READMISSION MANAGEMENT (OUTPATIENT)
Dept: CALL CENTER | Facility: HOSPITAL | Age: 79
End: 2019-09-30

## 2019-09-30 NOTE — OUTREACH NOTE
Spoke with pt, states she is feeling better. No further episodes of slurred speech, fever, chills, headaches. Confirms receipt and understanding of d/c orders and all new medications and changes. Appt sched with pt today for TCM hosp fwp with Vladimir MURPHY on 10/03/19.

## 2019-10-02 ENCOUNTER — READMISSION MANAGEMENT (OUTPATIENT)
Dept: CALL CENTER | Facility: HOSPITAL | Age: 79
End: 2019-10-02

## 2019-10-02 NOTE — OUTREACH NOTE
Stroke Week 1 Survey      Responses   Facility patient discharged from?  [facility]   Does the patient have one of the following disease processes/diagnoses(primary or secondary)?  Stroke (TIA)   Is there a successful TCM telephone encounter documented?  Yes          Bennett Salmeron RN

## 2019-10-03 ENCOUNTER — OFFICE VISIT (OUTPATIENT)
Dept: FAMILY MEDICINE CLINIC | Facility: CLINIC | Age: 79
End: 2019-10-03

## 2019-10-03 VITALS
WEIGHT: 109.8 LBS | BODY MASS INDEX: 18.29 KG/M2 | DIASTOLIC BLOOD PRESSURE: 68 MMHG | OXYGEN SATURATION: 98 % | TEMPERATURE: 98.1 F | SYSTOLIC BLOOD PRESSURE: 150 MMHG | HEART RATE: 48 BPM | HEIGHT: 65 IN

## 2019-10-03 DIAGNOSIS — D50.9 IRON DEFICIENCY ANEMIA, UNSPECIFIED IRON DEFICIENCY ANEMIA TYPE: ICD-10-CM

## 2019-10-03 DIAGNOSIS — C50.911 MALIGNANT NEOPLASM OF RIGHT FEMALE BREAST, UNSPECIFIED ESTROGEN RECEPTOR STATUS, UNSPECIFIED SITE OF BREAST (HCC): ICD-10-CM

## 2019-10-03 DIAGNOSIS — G45.9 TIA (TRANSIENT ISCHEMIC ATTACK): ICD-10-CM

## 2019-10-03 DIAGNOSIS — E53.8 B12 DEFICIENCY: ICD-10-CM

## 2019-10-03 DIAGNOSIS — I10 ESSENTIAL HYPERTENSION: Primary | ICD-10-CM

## 2019-10-03 DIAGNOSIS — I16.0 HYPERTENSIVE URGENCY: ICD-10-CM

## 2019-10-03 PROBLEM — N63.0 BREAST MASS: Status: RESOLVED | Noted: 2019-07-16 | Resolved: 2019-10-03

## 2019-10-03 PROBLEM — I69.391 DYSPHAGIA DUE TO RECENT CEREBRAL INFARCTION: Status: RESOLVED | Noted: 2019-02-16 | Resolved: 2019-10-03

## 2019-10-03 PROBLEM — M25.471 RIGHT ANKLE SWELLING: Status: RESOLVED | Noted: 2019-03-14 | Resolved: 2019-10-03

## 2019-10-03 PROBLEM — N64.52 NIPPLE DISCHARGE: Status: RESOLVED | Noted: 2019-07-16 | Resolved: 2019-10-03

## 2019-10-03 PROBLEM — R53.1 LEFT-SIDED WEAKNESS: Status: RESOLVED | Noted: 2019-02-15 | Resolved: 2019-10-03

## 2019-10-03 PROBLEM — R51.9 HEADACHE: Status: RESOLVED | Noted: 2019-09-26 | Resolved: 2019-10-03

## 2019-10-03 PROCEDURE — 99496 TRANSJ CARE MGMT HIGH F2F 7D: CPT | Performed by: NURSE PRACTITIONER

## 2019-10-03 RX ORDER — WITCH HAZEL 50 %
PADS, MEDICATED (EA) TOPICAL DAILY
Refills: 0 | COMMUNITY
Start: 2019-09-29 | End: 2019-10-10 | Stop reason: SDUPTHER

## 2019-10-03 RX ORDER — SENNOSIDES 8.6 MG
TABLET ORAL
Refills: 0 | COMMUNITY
Start: 2019-09-29 | End: 2021-03-26

## 2019-10-03 RX ORDER — AMLODIPINE BESYLATE 10 MG/1
10 TABLET ORAL DAILY
Qty: 90 TABLET | Refills: 3 | Status: SHIPPED | OUTPATIENT
Start: 2019-10-03 | End: 2019-11-05 | Stop reason: SDUPTHER

## 2019-10-03 NOTE — PROGRESS NOTES
Transitional Care Follow Up Visit  Subjective     Mar Burgos is a 79 y.o. female who presents for a transitional care management visit.    Within 48 business hours after discharge our office contacted her via telephone to coordinate her care and needs.      I reviewed and discussed the details of that call along with the discharge summary, hospital problems, inpatient lab results, inpatient diagnostic studies, and consultation reports with Mar.     Current outpatient and discharge medications have been reconciled for the patient.  Reviewed by: JEFFREY Rushing      Date of TCM Phone Call 9/30/2019   [facility]   Date of Admission 9/26/2019   Date of Discharge 9/29/2019   Discharge Disposition Home or Self Care     Risk for Readmission (LACE) Score: 16 (9/29/2019  6:00 AM)      History of Present Illness   Course During Hospital Stay: Patient was admitted to the hospital on 9/26/2019 and discharged on 9/29/2019.  She was admitted with garbled speech and elevated blood pressure.  Neurology felt that she was having a TIA related to hypertensive urgency and small vessel disease.  Neurology recommends that she be on dual antiplatelet therapy for 3 months and then Plavix alone.  To follow-up with neurology in 3 months.  Her B12 in the hospital was low and she was started with oral replacement for that.  Blood pressure goal should be less than 130/80 her Norvasc was restarted along with hydrochlorthiazide.  Her iron was low and doing oral iron until sees hematology appointment on Oct 10th.   Today she is on 5 HTN meds and BP is still not at goal.  She states that she is feeing good without complaints    The following portions of the patient's history were reviewed and updated as appropriate: allergies, current medications, past family history, past medical history, past social history, past surgical history and problem list.    Review of Systems   Cardiovascular: Negative for chest pain and  palpitations.       Objective   Physical Exam   Constitutional: She is oriented to person, place, and time. Vital signs are normal. She appears well-developed and well-nourished. No distress.   HENT:   Head: Normocephalic.   Cardiovascular: Normal rate, regular rhythm and normal heart sounds.   Pulmonary/Chest: Effort normal and breath sounds normal.   Neurological: She is alert and oriented to person, place, and time. Gait normal.   Psychiatric: She has a normal mood and affect. Her behavior is normal. Judgment and thought content normal.   Vitals reviewed.      Assessment/Plan   Mar was seen today for transitional care management.    Diagnoses and all orders for this visit:    Essential hypertension  -     amLODIPine (NORVASC) 10 MG tablet; Take 1 tablet by mouth Daily.    Iron deficiency anemia, unspecified iron deficiency anemia type    B12 deficiency    Malignant neoplasm of right female breast, unspecified estrogen receptor status, unspecified site of breast (CMS/HCC)    TIA (transient ischemic attack)    Hypertensive urgency        Increase norvasc to 10mg daily  rto in 1 week

## 2019-10-07 ENCOUNTER — READMISSION MANAGEMENT (OUTPATIENT)
Dept: CALL CENTER | Facility: HOSPITAL | Age: 79
End: 2019-10-07

## 2019-10-07 NOTE — OUTREACH NOTE
Stroke Week 2 Survey      Responses   Facility patient discharged from?  [facility]   Does the patient have one of the following disease processes/diagnoses(primary or secondary)?  Stroke (TIA)   Week 2 attempt successful?  No   Unsuccessful attempts  Attempt 1          Leatha Crump RN

## 2019-10-09 ENCOUNTER — READMISSION MANAGEMENT (OUTPATIENT)
Dept: CALL CENTER | Facility: HOSPITAL | Age: 79
End: 2019-10-09

## 2019-10-09 NOTE — OUTREACH NOTE
Stroke Week 2 Survey      Responses   Facility patient discharged from?  [facility]   Does the patient have one of the following disease processes/diagnoses(primary or secondary)?  Stroke (TIA)   Week 2 attempt successful?  No   Unsuccessful attempts  Attempt 2          Mae Chavez RN

## 2019-10-10 ENCOUNTER — APPOINTMENT (OUTPATIENT)
Dept: LAB | Facility: HOSPITAL | Age: 79
End: 2019-10-10

## 2019-10-10 ENCOUNTER — OFFICE VISIT (OUTPATIENT)
Dept: ONCOLOGY | Facility: CLINIC | Age: 79
End: 2019-10-10

## 2019-10-10 VITALS
WEIGHT: 112.8 LBS | BODY MASS INDEX: 18.8 KG/M2 | HEART RATE: 57 BPM | OXYGEN SATURATION: 99 % | TEMPERATURE: 97.5 F | SYSTOLIC BLOOD PRESSURE: 120 MMHG | HEIGHT: 65 IN | DIASTOLIC BLOOD PRESSURE: 57 MMHG | RESPIRATION RATE: 16 BRPM

## 2019-10-10 DIAGNOSIS — C50.911 MALIGNANT NEOPLASM OF RIGHT FEMALE BREAST, UNSPECIFIED ESTROGEN RECEPTOR STATUS, UNSPECIFIED SITE OF BREAST (HCC): ICD-10-CM

## 2019-10-10 DIAGNOSIS — D50.9 IRON DEFICIENCY ANEMIA, UNSPECIFIED IRON DEFICIENCY ANEMIA TYPE: ICD-10-CM

## 2019-10-10 DIAGNOSIS — N18.30 CKD (CHRONIC KIDNEY DISEASE) STAGE 3, GFR 30-59 ML/MIN (HCC): Chronic | ICD-10-CM

## 2019-10-10 DIAGNOSIS — E53.8 B12 DEFICIENCY: Primary | ICD-10-CM

## 2019-10-10 DIAGNOSIS — C50.911 MALIGNANT NEOPLASM OF RIGHT FEMALE BREAST, UNSPECIFIED ESTROGEN RECEPTOR STATUS, UNSPECIFIED SITE OF BREAST (HCC): Primary | ICD-10-CM

## 2019-10-10 LAB
BASOPHILS # BLD AUTO: 0.07 10*3/MM3 (ref 0–0.2)
BASOPHILS NFR BLD AUTO: 1.2 % (ref 0–1.5)
DEPRECATED RDW RBC AUTO: 46.8 FL (ref 37–54)
EOSINOPHIL # BLD AUTO: 0.21 10*3/MM3 (ref 0–0.4)
EOSINOPHIL NFR BLD AUTO: 3.5 % (ref 0.3–6.2)
ERYTHROCYTE [DISTWIDTH] IN BLOOD BY AUTOMATED COUNT: 14.4 % (ref 12.3–15.4)
HCT VFR BLD AUTO: 30.8 % (ref 34–46.6)
HGB BLD-MCNC: 9.7 G/DL (ref 12–15.9)
IMM GRANULOCYTES # BLD AUTO: 0.02 10*3/MM3 (ref 0–0.05)
IMM GRANULOCYTES NFR BLD AUTO: 0.3 % (ref 0–0.5)
LYMPHOCYTES # BLD AUTO: 1.72 10*3/MM3 (ref 0.7–3.1)
LYMPHOCYTES NFR BLD AUTO: 29.1 % (ref 19.6–45.3)
MCH RBC QN AUTO: 28.4 PG (ref 26.6–33)
MCHC RBC AUTO-ENTMCNC: 31.5 G/DL (ref 31.5–35.7)
MCV RBC AUTO: 90.1 FL (ref 79–97)
MONOCYTES # BLD AUTO: 0.75 10*3/MM3 (ref 0.1–0.9)
MONOCYTES NFR BLD AUTO: 12.7 % (ref 5–12)
NEUTROPHILS # BLD AUTO: 3.15 10*3/MM3 (ref 1.7–7)
NEUTROPHILS NFR BLD AUTO: 53.2 % (ref 42.7–76)
NRBC BLD AUTO-RTO: 0 /100 WBC (ref 0–0.2)
PLATELET # BLD AUTO: 243 10*3/MM3 (ref 140–450)
PMV BLD AUTO: 9.3 FL (ref 6–12)
RBC # BLD AUTO: 3.42 10*6/MM3 (ref 3.77–5.28)
WBC NRBC COR # BLD: 5.92 10*3/MM3 (ref 3.4–10.8)

## 2019-10-10 PROCEDURE — G0463 HOSPITAL OUTPT CLINIC VISIT: HCPCS | Performed by: NURSE PRACTITIONER

## 2019-10-10 PROCEDURE — 99215 OFFICE O/P EST HI 40 MIN: CPT | Performed by: NURSE PRACTITIONER

## 2019-10-10 PROCEDURE — 36416 COLLJ CAPILLARY BLOOD SPEC: CPT

## 2019-10-10 PROCEDURE — 85025 COMPLETE CBC W/AUTO DIFF WBC: CPT

## 2019-10-10 RX ORDER — CLONIDINE HYDROCHLORIDE 0.2 MG/1
TABLET ORAL
Qty: 180 TABLET | Refills: 0 | Status: SHIPPED | OUTPATIENT
Start: 2019-10-10 | End: 2019-10-10 | Stop reason: SDUPTHER

## 2019-10-10 NOTE — PROGRESS NOTES
Subjective     REASON FOR CONSULTATION: Locally advanced right breast cancer ER TX positive HER-2 positive  Remote history of left breast cancer 1978 treated with mastectomy alone  Provide an opinion on any further workup or treatment                             REQUESTING PHYSICIAN: MD Vladimir Cowart [facility]    RECORDS OBTAINED:  Records of the patients history including those obtained from the referring provider were reviewed and summarized in detail.    HISTORY OF PRESENT ILLNESS:  The patient is a 79 y.o. year old female who is here for an opinion about the above issue.    History of Present Illness Ms. Burgos returns to the office today accompanied by her daughter for 1 month review now on Arimidex therapy.  Thankfully she is tolerating the medication well without any obvious side effects.  She denies any significant arthralgias.  She continues with normal appetite and stable weight.  She reports normal bowel bladder function.    She does feel that the right large breast mass is getting softer and there are areas that reportedly were previously using that are now beginning to crust over.  This area is somewhat sensitive to palpation but not overly painful.    Finally, the patient was hospitalized in late September, presenting with garbled speech and elevated blood pressure, felt to ultimately have had a TIA related to hypertensive urgency and small vessel disease.  Neurology recommended dual antiplatelet therapy for 3 months followed by Plavix alone.  Her antihypertensives were adjusted.  At that time she was also found to be B12 deficient with a level of 158 and iron deficient with an iron percent of 7%.  She was started on oral B12 and iron.    She states that the iron does cause some constipation but it is manageable with stool softeners at this time.  There was some discussion in the hospital record that if she could not tolerate oral iron IV  iron should be considered.  Hemoglobin is improved up to 9.7 from discharge hemoglobin of 9.0.    She and her daughter denies other concerns today.    Past Medical History:   Diagnosis Date   • Anemia     required prior blood transfusions   • Arthritis    • Cancer (CMS/HCC)     right breast cancer    • Clostridium difficile colitis    • Diabetes mellitus (CMS/HCC)    • Difficulty walking    • Diverticulitis    • Environmental allergies    • History of malignant neoplasm of breast 2013    denies any chemo or radiation   • History of malignant neoplasm of skin 2013    possibly basal   • History of tobacco use 2013   • HL (hearing loss)    • Hyperlipidemia    • Hypertension    • Kidney disease    • Neuropathy in diabetes (CMS/HCC)    • Peripheral neuropathy    • Shingles    • Skin cancer    • Stroke (CMS/HCC) 2019   • Vision loss       Patient is  3 para 3 with 2 miscarriages menarche was at age 9 menopause at age 49st childbirth was at age 24 she breast-fed for at least 6 months she did not take hormone replacement therapy because at age 38 she developed breast cancer in the left breast was treated at the [facility] with a modified radical mastectomy in  no radiation or hormonal blockade was given in details of the scans were not available at this time.    Family history is positive for father dying at age 84 with disseminated abdominal cancer of unknown type mother  at 77 of natural causes she has a brother with kidney cancer at age 65 who  of this and a sister with melanoma at age 30    Patient currently lives with her daughter does not smoke currently but smoked for 70 years 1 pack of cigarettes is not a drinker.  She uses a cane to walk because her balance is poor she has issues with swallowing from her stroke      Past Surgical History:   Procedure Laterality Date   • CATARACT EXTRACTION, BILATERAL     • EAR TUBES     • MASTECTOMY Bilateral     radical   • TUMOR  REMOVAL Left     large lipoma removed from hip      HEME/ONC HISTORY:  patient is an 79-year-old female with multiple medical issues including hypertension diabetes hypercholesterolemia with a recent stroke in February of this year which is resulted in left-sided weakness and difficulty swallowing.  The patient noted changes in her breast over the last year and the last few months changes to the nipple specifically with developing lump in both the right breast and under the right arm.  She brought to the attention of her family physician who sent her for imaging and biopsy of obvious tumor.  Mammogram revealed a large irregular mass measuring 6 cm with skin retraction nipple retraction and skin thickening.  At the 10 o'clock position there appeared to be some skin involvement.    She was also noted clinically to have large axillary lymph nodes.  Ultrasound was obtained revealing an irregular solid mass with indistinct margins measuring approximately 64 x 51 mm.  On ultrasound there were also some enlarged axillary lymph nodes measuring about 30 mm in greatest size.     A biopsy was recommended and has revealed a grade 2 invasive ductal cancer that is ER positive at 98%, OR positive at 82%, and HER-2 equivocal.  The Ki-67 is 20.81%.     Patient was then referred to Dr. Gonzalez who examined her and did not feel surgical excision was possible because there would be a large defect from her tumor and was wanting to consider neoadjuvant therapy in an effort to shrink the tumor.  The patient was presented at the multidisciplinary conference and because of her comorbidities her recent stroke and her overall general condition we did not think she was a good candidate for neoadjuvant chemotherapy and she was referred to us to discuss endocrine therapy.    In the interim additional testing on her tumor was done at [facility] because of the indeterminate HER-2 FISH and this revealed that there was 3+ HER-2 activity by IHC and  therefore this is considered to be HER-2 positive  The patient had staging work-up at the recommendation of the multidisciplinary clinic And CT of the chest showed calcified granulomatous disease with mild AP window adenopathy which is increased from previous CAT scan in 2016 precarinal adenopathy also increased.  It showed the breast mass as well as right axillary adenopathy.  In the abdomen there was a left adrenal nodule which is increased in size from 1.4 to 1.8 cm felt to be an adenoma in addition there was a small sclerotic lesion in the left iliac crest and metastasis could not be completely excluded although the bone scan showed no uptake in this area.  The bone scan did show a fracture and uptake in the left greater trochanter and uptake of the left superior pubic ramus that represents a superior ramus fracture of with no other signs of metastatic disease-patient did fall in July of this year but no obvious fractures were seen on x-ray.    Patient initiating Arimidex therapy in mid September 2019.    Current Outpatient Medications on File Prior to Visit   Medication Sig Dispense Refill   • acetaminophen (TYLENOL) 500 MG tablet Take 500 mg by mouth Every 6 (Six) Hours As Needed for Mild Pain .     • amLODIPine (NORVASC) 10 MG tablet Take 1 tablet by mouth Daily. 90 tablet 3   • anastrozole (ARIMIDEX) 1 MG tablet Take 1 tablet by mouth Daily for 30 days. 90 tablet 3   • aspirin 81 MG EC tablet Take 1 tablet by mouth Daily. Take for 3 months.     • cetirizine (zyrTEC) 10 MG tablet Take 10 mg by mouth Daily.     • cloNIDine (CATAPRES) 0.2 MG tablet Take 0.2 mg by mouth 2 (Two) Times a Day.     • clopidogrel (PLAVIX) 75 MG tablet Take 1 tablet by mouth Daily. 30 tablet 0   • ferrous sulfate (IRON SUPPLEMENT) 325 (65 FE) MG tablet Take 1 tablet by mouth 2 (Two) Times a Day Before Meals. 60 tablet 0   • fluticasone (FLONASE) 50 MCG/ACT nasal spray 2 sprays into the nostril(s) as directed by provider Every Night.      • guaiFENesin (MUCINEX) 600 MG 12 hr tablet Take 600 mg by mouth 2 (Two) Times a Day As Needed for Cough.     • hydroCHLOROthiazide (MICROZIDE) 12.5 MG capsule Take 1 capsule by mouth Daily. 30 capsule 0   • lisinopril (PRINIVIL,ZESTRIL) 40 MG tablet Take 1 tablet by mouth Daily. 90 tablet 0   • metFORMIN (GLUCOPHAGE) 1000 MG tablet Take 1,000 mg by mouth 2 (Two) Times a Day With Meals.     • metoprolol succinate XL (TOPROL-XL) 50 MG 24 hr tablet Take 1 tablet by mouth Daily. 90 tablet 3   • pravastatin (PRAVACHOL) 20 MG tablet Take 20 mg by mouth Every Night.     • senna (SENOKOT) 8.6 MG tablet tablet TK 1 T PO QD  0   • vitamin B-12 (VITAMIN B-12) 2000 MCG tablet Take 1 tablet by mouth Daily. 30 tablet 0   • [DISCONTINUED] B-12 TR 2000 MCG tablet controlled-release Take  by mouth Daily.  0   • [DISCONTINUED] senna-docusate (SENOKOT S) 8.6-50 MG per tablet Take 1 tablet by mouth Daily. 30 tablet 0     No current facility-administered medications on file prior to visit.         ALLERGIES:    Allergies   Allergen Reactions   • Lipitor [Atorvastatin] Other (See Comments)     Leg weakness         Social History     Socioeconomic History   • Marital status:      Spouse name: Not on file   • Number of children: 3   • Years of education: High school   • Highest education level: Not on file   Occupational History     Employer: RETIRED   Tobacco Use   • Smoking status: Former Smoker     Packs/day: 0.25     Years: 10.00     Pack years: 2.50     Types: Cigarettes   • Smokeless tobacco: Former User   • Tobacco comment: she quit for 8 years and started back about a year ago then quit again   Substance and Sexual Activity   • Alcohol use: No   • Drug use: No   • Sexual activity: No        Family History   Problem Relation Age of Onset   • Stroke Mother 76   • Diabetes Mother    • Hypertension Mother    • Arthritis Mother    • Cancer Father         metastatic unknown cause   • Heart attack Father 70   • Colon cancer  "Father    • Lung cancer Father    • Cancer Brother         bladder   • Arthritis Sister    • Melanoma Sister    • Hypertension Daughter    • Diabetes Daughter         Review of Systems   Constitutional: Positive for activity change (From stroke in February).   HENT: Positive for trouble swallowing (From stroke).    Respiratory: Negative.    Cardiovascular: Negative.    Gastrointestinal: Negative.    Genitourinary: Negative.    Musculoskeletal: Positive for gait problem (Left sided weakness from stroke).   Neurological: Positive for dizziness (Gait and balance issues from stroke) and weakness (Left-sided).   Hematological: Negative.    Psychiatric/Behavioral: Negative.       All of the above stable to improved as of  10/10/19    Objective     Vitals:    10/10/19 1355   BP: 120/57   Pulse: 57   Resp: 16   Temp: 97.5 °F (36.4 °C)   SpO2: 99%   Weight: 51.2 kg (112 lb 12.8 oz)   Height: 164 cm (64.57\")   PainSc: 0-No pain     Current Status 10/10/2019   ECOG score 1       Physical Exam    GENERAL:  Well-developed, well-nourished in no acute distress.   SKIN:  Warm, dry without rashes, purpura or petechiae.  EYES:  Pupils equal, round and reactive to light.  EOMs intact.  Conjunctivae normal.  EARS:  Hearing intact.  NOSE:  Septum midline.  No excoriations or nasal discharge.  MOUTH:  Tongue is well-papillated; no stomatitis or ulcers.  Lips normal.  THROAT:  Oropharynx without lesions or exudates.  NECK:  Supple with good range of motion; no thyromegaly or masses, no JVD.  LYMPHATICS:  No cervical, supraclavicular, axillary or inguinal adenopathy.  CHEST:  Lungs clear to auscultation. Good airflow.  BREASTS: Left chest wall is benign; the right breast shows a locally advanced breast cancer with cutaneous involvement multifocally-continue 3 x 4 right breast mass with central area of necrosis/crusting of the skin.  Mild erythema but no warmth.  Only mildly tender.  Right axillary node palpable but reduced.   CARDIAC:  " Regular rate and rhythm without murmurs, rubs or gallops. Normal S1,S2.  ABDOMEN:  Soft, nontender with no hepatosplenomegaly or masses.-Prominent bony nodule on the left greater trochanter  EXTREMITIES:  No clubbing, cyanosis or edema.  NEUROLOGICAL:  Cranial Nerves II-XII grossly intact.  Mild left-sided weakness with a hemiparetic gait   pSYCHIATRIC:  Normal affect and mood.        RECENT LABS:  Results from last 7 days   Lab Units 10/10/19  1341   WBC 10*3/mm3 5.92   NEUTROS ABS 10*3/mm3 3.15   HEMOGLOBIN g/dL 9.7*   HEMATOCRIT % 30.8*   PLATELETS 10*3/mm3 243                                   MRI BRAIN     IMPRESSION:  Old right basal ganglia infarct. No acute abnormality  identified. No metastatic disease is identified at the brain or the  head.    Bone scan  IMPRESSION:  Abnormal uptake left greater trochanter where there is a  fracture demonstrated with CT (patient has a history of a fall and there  is no CT evidence that this is a pathologic fracture). There is also  abnormal uptake at the left superior pubic ramus that most likely  represents a superior ramus fracture though a fracture is not evident  with CT. This uptake left superior pubic ramus is technically  indeterminate though there is overall no convincing evidence for bony  metastatic disease. Follow-up CT could be obtained.     This report was finalized on 8/23/2019 1:    CT chest abdomen pelvis  IMPRESSION CHEST CT:    1. A 2.2 cm right breast mass laterally presumably related to the  patient's neoplasm.  2. Right axillary mediastinal adenopathy and metastatic disease cannot  be excluded. PET/CT may be helpful.     IMPRESSION ABDOMEN & PELVIS CT:    1. Low-density renal lesions as discussed, favor cysts.  2. Enlarging left adrenal nodule, likely adenoma.  3. Extensive diverticulosis.  4. A 5 mm sclerotic left iliac lesion as discussed.          Assessment/Plan   1.  Locally advanced right breast cancer strongly ER MS positive with indeterminate  HER-2 with repeat testing showing 3+ IHC consistent with positive result  · Staging work-up probably negative although indeterminate bone lesions without obvious fractures on bone scan  · Abnormal mediastinal adenopathy which is been present since 2016 likely granulomatous disease  · Enlarging left adrenal mass felt to be an adenoma present since 2015  · History of left breast cancer 1978 at age 38 treated with modified radical mastectomy alone? Pathology  · [place] to be a poor candidate for surgery or chemotherapy.  · Initiated Arimidex around 9/11/2019.  Radiation may be a possibility for additional therapy if she does not get a good response from Arimidex.    2.  Granulomatous mediastinal lymph nodes chronic    3.  Recent stroke in 2/19 with left hemiparesis and dysphagia  · Patient hospitalized again 9/26 and 9/29/2019 presenting with garbled speech and elevated blood pressure, felt to have had a TIA.  Neurology recommended dual antiplatelet therapy for 3 months and then Plavix alone.    4.  Hypertension diabetes and hypercholesterolemia    5.  Anemia, multifactorial:  · Patient has stage III CKD.  · Patient also found during recent hospitalization to have low B12 and low iron percent.  Though she had previously received B12 injections she was started on oral B12.  She was also started on oral iron.    · Patient continues on oral iron at this time with some constipation though tolerable with stool softeners.  Plan to continue to monitor her blood work and if develops intolerance to oral iron consider IV iron.  · Plan to recheck iron studies when she returns in 1 month.    PLAN:  1. Continue Arimidex.  2. Continue oral B12 and oral iron.  If patient should become intolerant to oral iron consider IV iron therapy.  3. Patient scheduled to return in 1 month for follow-up with Dr. Marcus.  We will add a ferritin, B12 and iron profile at that time to follow-up on her tolerance and absorption to oral  replacement.  4. Consider radiation therapy down the road if suboptimal response to Arimidex.

## 2019-10-12 DIAGNOSIS — I10 ESSENTIAL HYPERTENSION: ICD-10-CM

## 2019-10-14 RX ORDER — LISINOPRIL 40 MG/1
40 TABLET ORAL DAILY
Qty: 90 TABLET | Refills: 0 | Status: SHIPPED | OUTPATIENT
Start: 2019-10-14 | End: 2020-01-13

## 2019-10-16 ENCOUNTER — OFFICE VISIT (OUTPATIENT)
Dept: FAMILY MEDICINE CLINIC | Facility: CLINIC | Age: 79
End: 2019-10-16

## 2019-10-16 VITALS
TEMPERATURE: 97.9 F | HEIGHT: 65 IN | WEIGHT: 114.8 LBS | OXYGEN SATURATION: 99 % | DIASTOLIC BLOOD PRESSURE: 56 MMHG | SYSTOLIC BLOOD PRESSURE: 142 MMHG | HEART RATE: 57 BPM | BODY MASS INDEX: 19.13 KG/M2

## 2019-10-16 DIAGNOSIS — I10 ESSENTIAL HYPERTENSION: Primary | ICD-10-CM

## 2019-10-16 PROCEDURE — 99213 OFFICE O/P EST LOW 20 MIN: CPT | Performed by: NURSE PRACTITIONER

## 2019-10-16 NOTE — PROGRESS NOTES
"Subjective   Mar Burgos is a 79 y.o. female.     History of Present Illness   Pt presenting to the office today to follow up on BP and adjusting meds.  At home it has been consistently in the 120s over 50s-60s.  She is taking her meds as directed without any side effects.   The following portions of the patient's history were reviewed and updated as appropriate: allergies, current medications, past social history and problem list.    Review of Systems   Constitutional: Negative for fever.   Respiratory: Negative for cough and shortness of breath.    Cardiovascular: Negative for chest pain.   Gastrointestinal: Negative for abdominal pain.   Neurological: Negative for dizziness.       Objective   /56 (BP Location: Right arm, Patient Position: Sitting)   Pulse 57   Temp 97.9 °F (36.6 °C)   Ht 164 cm (64.57\")   Wt 52.1 kg (114 lb 12.8 oz)   SpO2 99%   BMI 19.36 kg/m²   Physical Exam   Constitutional: She is oriented to person, place, and time. Vital signs are normal. She appears well-developed and well-nourished. No distress.   HENT:   Head: Normocephalic.   Cardiovascular: Normal rate, regular rhythm and normal heart sounds.   Pulmonary/Chest: Effort normal and breath sounds normal.   Neurological: She is alert and oriented to person, place, and time. Gait normal.   Psychiatric: She has a normal mood and affect. Her behavior is normal. Judgment and thought content normal.   Vitals reviewed.      Assessment/Plan      Diagnosis Plan   1. Essential hypertension       Continue same with medications return to the office in 3 months unless the blood pressure goes back up and return to the office sooner  JEFFREY Rushing  10/16/2019  "

## 2019-10-28 RX ORDER — HYDROCHLOROTHIAZIDE 12.5 MG/1
CAPSULE, GELATIN COATED ORAL
Qty: 90 CAPSULE | Refills: 0 | Status: SHIPPED | OUTPATIENT
Start: 2019-10-28 | End: 2020-01-15 | Stop reason: SDUPTHER

## 2019-10-28 RX ORDER — HYDROCHLOROTHIAZIDE 12.5 MG/1
CAPSULE, GELATIN COATED ORAL
Qty: 30 CAPSULE | Refills: 0 | Status: SHIPPED | OUTPATIENT
Start: 2019-10-28 | End: 2019-10-28 | Stop reason: SDUPTHER

## 2019-11-05 DIAGNOSIS — I10 ESSENTIAL HYPERTENSION: ICD-10-CM

## 2019-11-05 RX ORDER — AMLODIPINE BESYLATE 10 MG/1
10 TABLET ORAL DAILY
Qty: 90 TABLET | Refills: 3 | Status: SHIPPED | OUTPATIENT
Start: 2019-11-05 | End: 2020-03-06

## 2019-11-13 ENCOUNTER — LAB (OUTPATIENT)
Dept: LAB | Facility: HOSPITAL | Age: 79
End: 2019-11-13

## 2019-11-13 ENCOUNTER — OFFICE VISIT (OUTPATIENT)
Dept: ONCOLOGY | Facility: CLINIC | Age: 79
End: 2019-11-13

## 2019-11-13 ENCOUNTER — TRANSCRIBE ORDERS (OUTPATIENT)
Dept: ADMINISTRATIVE | Facility: HOSPITAL | Age: 79
End: 2019-11-13

## 2019-11-13 VITALS
HEART RATE: 59 BPM | SYSTOLIC BLOOD PRESSURE: 132 MMHG | TEMPERATURE: 97.5 F | RESPIRATION RATE: 16 BRPM | OXYGEN SATURATION: 98 % | HEIGHT: 65 IN | DIASTOLIC BLOOD PRESSURE: 66 MMHG | BODY MASS INDEX: 19.01 KG/M2 | WEIGHT: 114.1 LBS

## 2019-11-13 DIAGNOSIS — Z17.0 MALIGNANT NEOPLASM OF RIGHT BREAST IN FEMALE, ESTROGEN RECEPTOR POSITIVE, UNSPECIFIED SITE OF BREAST (HCC): Primary | ICD-10-CM

## 2019-11-13 DIAGNOSIS — C50.911 MALIGNANT NEOPLASM OF RIGHT FEMALE BREAST, UNSPECIFIED ESTROGEN RECEPTOR STATUS, UNSPECIFIED SITE OF BREAST (HCC): Primary | ICD-10-CM

## 2019-11-13 DIAGNOSIS — C50.911 MALIGNANT NEOPLASM OF RIGHT BREAST IN FEMALE, ESTROGEN RECEPTOR POSITIVE, UNSPECIFIED SITE OF BREAST (HCC): Primary | ICD-10-CM

## 2019-11-13 DIAGNOSIS — Z09 ENCOUNTER FOR FOLLOW-UP EXAMINATION AFTER COMPLETED TREATMENT FOR CONDITIONS OTHER THAN MALIGNANT NEOPLASM: ICD-10-CM

## 2019-11-13 LAB
ALBUMIN SERPL-MCNC: 4.4 G/DL (ref 3.5–5.2)
ALBUMIN/GLOB SERPL: 1.4 G/DL (ref 1.1–2.4)
ALP SERPL-CCNC: 72 U/L (ref 38–116)
ALT SERPL W P-5'-P-CCNC: 9 U/L (ref 0–33)
ANION GAP SERPL CALCULATED.3IONS-SCNC: 14.1 MMOL/L (ref 5–15)
AST SERPL-CCNC: 16 U/L (ref 0–32)
BASOPHILS # BLD AUTO: 0.05 10*3/MM3 (ref 0–0.2)
BASOPHILS NFR BLD AUTO: 1.1 % (ref 0–1.5)
BILIRUB SERPL-MCNC: 0.2 MG/DL (ref 0.2–1.2)
BUN BLD-MCNC: 26 MG/DL (ref 6–20)
BUN/CREAT SERPL: 19.5 (ref 7.3–30)
CALCIUM SPEC-SCNC: 9.7 MG/DL (ref 8.5–10.2)
CHLORIDE SERPL-SCNC: 104 MMOL/L (ref 98–107)
CO2 SERPL-SCNC: 24.9 MMOL/L (ref 22–29)
CREAT BLD-MCNC: 1.33 MG/DL (ref 0.6–1.1)
DEPRECATED RDW RBC AUTO: 49 FL (ref 37–54)
EOSINOPHIL # BLD AUTO: 0.16 10*3/MM3 (ref 0–0.4)
EOSINOPHIL NFR BLD AUTO: 3.5 % (ref 0.3–6.2)
ERYTHROCYTE [DISTWIDTH] IN BLOOD BY AUTOMATED COUNT: 14.3 % (ref 12.3–15.4)
FERRITIN SERPL-MCNC: 41.7 NG/ML (ref 13–150)
GFR SERPL CREATININE-BSD FRML MDRD: 38 ML/MIN/1.73
GLOBULIN UR ELPH-MCNC: 3.2 GM/DL (ref 1.8–3.5)
GLUCOSE BLD-MCNC: 106 MG/DL (ref 74–124)
HCT VFR BLD AUTO: 31.4 % (ref 34–46.6)
HGB BLD-MCNC: 9.7 G/DL (ref 12–15.9)
IMM GRANULOCYTES # BLD AUTO: 0.02 10*3/MM3 (ref 0–0.05)
IMM GRANULOCYTES NFR BLD AUTO: 0.4 % (ref 0–0.5)
IRON 24H UR-MRATE: 44 MCG/DL (ref 37–145)
IRON SATN MFR SERPL: 12 % (ref 14–48)
LYMPHOCYTES # BLD AUTO: 1.02 10*3/MM3 (ref 0.7–3.1)
LYMPHOCYTES NFR BLD AUTO: 22.4 % (ref 19.6–45.3)
MCH RBC QN AUTO: 29 PG (ref 26.6–33)
MCHC RBC AUTO-ENTMCNC: 30.9 G/DL (ref 31.5–35.7)
MCV RBC AUTO: 94 FL (ref 79–97)
MONOCYTES # BLD AUTO: 0.48 10*3/MM3 (ref 0.1–0.9)
MONOCYTES NFR BLD AUTO: 10.5 % (ref 5–12)
NEUTROPHILS # BLD AUTO: 2.83 10*3/MM3 (ref 1.7–7)
NEUTROPHILS NFR BLD AUTO: 62.1 % (ref 42.7–76)
NRBC BLD AUTO-RTO: 0 /100 WBC (ref 0–0.2)
PLATELET # BLD AUTO: 249 10*3/MM3 (ref 140–450)
PMV BLD AUTO: 9.2 FL (ref 6–12)
POTASSIUM BLD-SCNC: 4.3 MMOL/L (ref 3.5–4.7)
PROT SERPL-MCNC: 7.6 G/DL (ref 6.3–8)
RBC # BLD AUTO: 3.34 10*6/MM3 (ref 3.77–5.28)
SODIUM BLD-SCNC: 143 MMOL/L (ref 134–145)
TIBC SERPL-MCNC: 367 MCG/DL (ref 249–505)
TRANSFERRIN SERPL-MCNC: 262 MG/DL (ref 200–360)
VIT B12 BLD-MCNC: 542 PG/ML (ref 211–946)
WBC NRBC COR # BLD: 4.56 10*3/MM3 (ref 3.4–10.8)

## 2019-11-13 PROCEDURE — 84466 ASSAY OF TRANSFERRIN: CPT

## 2019-11-13 PROCEDURE — 80053 COMPREHEN METABOLIC PANEL: CPT

## 2019-11-13 PROCEDURE — 83540 ASSAY OF IRON: CPT

## 2019-11-13 PROCEDURE — G0463 HOSPITAL OUTPT CLINIC VISIT: HCPCS | Performed by: INTERNAL MEDICINE

## 2019-11-13 PROCEDURE — 99214 OFFICE O/P EST MOD 30 MIN: CPT | Performed by: INTERNAL MEDICINE

## 2019-11-13 PROCEDURE — 85025 COMPLETE CBC W/AUTO DIFF WBC: CPT

## 2019-11-13 PROCEDURE — 82607 VITAMIN B-12: CPT | Performed by: INTERNAL MEDICINE

## 2019-11-13 PROCEDURE — 82728 ASSAY OF FERRITIN: CPT

## 2019-11-13 PROCEDURE — 36415 COLL VENOUS BLD VENIPUNCTURE: CPT

## 2019-11-13 NOTE — PROGRESS NOTES
Subjective     REASON FOR CONSULTATION:   1.Locally advanced right breast cancer ER [place] positive HER-2 positive  2. Remote history of left breast cancer  treated with mastectomy alone                               REQUESTING PHYSICIAN: MD Vladimir Cowart    History of Present Illness Ms. Burgos returns to the office today accompanied by her daughter for 1 month review now on Arimidex therapy.  Thankfully she is tolerating the medication well without any obvious side effects.  She denies any significant arthralgias.  She continues with normal appetite and stable weight.  She reports normal bowel bladder function.    She does feel that the right large breast mass is getting softer and there are areas that reportedly were previously using that are now beginning to crust over.  This area is somewhat sensitive to palpation but not overly painful.    She remains anemic despite being on iron B12 and we will recheck the levels today  She is happy with the improvement in her breast mass    She has not had a DEXA scan in many years we will recheck this also  She and her daughter denies other concerns today.    Past Medical History:   Diagnosis Date   • Anemia     required prior blood transfusions   • Arthritis    • Cancer (CMS/HCC)     right breast cancer    • Clostridium difficile colitis    • Diabetes mellitus (CMS/HCC)    • Difficulty walking    • Diverticulitis    • Environmental allergies    • History of malignant neoplasm of breast 2013    denies any chemo or radiation   • History of malignant neoplasm of skin 2013    possibly basal   • History of tobacco use 2013   • HL (hearing loss)    • Hyperlipidemia    • Hypertension    • Kidney disease    • Neuropathy in diabetes (CMS/HCC)    • Peripheral neuropathy    • Shingles    • Skin cancer    • Stroke (CMS/HCC) 2019   • Vision loss       Patient is  3 para 3 with 2  miscarriages menarche was at age 9 menopause at age 49st childbirth was at age 24 she breast-fed for at least 6 months she did not take hormone replacement therapy because at age 38 she developed breast cancer in the left breast was treated at the [facility] [employer] with a modified radical mastectomy in  no radiation or hormonal blockade was given in details of the scans were not available at this time.    Family history is positive for father dying at age 84 with disseminated abdominal cancer of unknown type mother  at 77 of natural causes she has a brother with kidney cancer at age 65 who  of this and a sister with melanoma at age 30    Patient currently lives with her daughter does not smoke currently but smoked for 70 years 1 pack of cigarettes is not a drinker.  She uses a cane to walk because her balance is poor she has issues with swallowing from her stroke      Past Surgical History:   Procedure Laterality Date   • CATARACT EXTRACTION, BILATERAL     • EAR TUBES     • MASTECTOMY Bilateral     radical   • TUMOR REMOVAL Left     large lipoma removed from hip      HEME/ONC HISTORY:  patient is an 79-year-old female with multiple medical issues including hypertension diabetes hypercholesterolemia with a recent stroke in February of this year which is resulted in left-sided weakness and difficulty swallowing.  The patient noted changes in her breast over the last year and the last few months changes to the nipple specifically with developing lump in both the right breast and under the right arm.  She brought to the attention of her family physician who sent her for imaging and biopsy of obvious tumor.  Mammogram revealed a large irregular mass measuring 6 cm with skin retraction nipple retraction and skin thickening.  At the 10 o'clock position there appeared to be some skin involvement.    She was also noted clinically to have large axillary lymph nodes.  Ultrasound was obtained revealing an  irregular solid mass with indistinct margins measuring approximately 64 x 51 mm.  On ultrasound there were also some enlarged axillary lymph nodes measuring about 30 mm in greatest size.     A biopsy was recommended and has revealed a grade 2 invasive ductal cancer that is ER positive at 98%, IL positive at 82%, and HER-2 equivocal.  The Ki-67 is 20.81%.     Patient was then referred to Dr. Gonzalez who examined her and did not feel surgical excision was possible because there would be a large defect from her tumor and was wanting to consider neoadjuvant therapy in an effort to shrink the tumor.  The patient was presented at the multidisciplinary conference and because of her comorbidities her recent stroke and her overall general condition we did not think she was a good candidate for neoadjuvant chemotherapy and she was referred to us to discuss endocrine therapy.    In the interim additional testing on her tumor was done at [facility] because of the indeterminate HER-2 FISH and this revealed that there was 3+ HER-2 activity by IHC and therefore this is considered to be HER-2 positive  The patient had staging work-up at the recommendation of the multidisciplinary clinic And CT of the chest showed calcified granulomatous disease with mild AP window adenopathy which is increased from previous CAT scan in 2016 precarinal adenopathy also increased.  It showed the breast mass as well as right axillary adenopathy.  In the abdomen there was a left adrenal nodule which is increased in size from 1.4 to 1.8 cm felt to be an adenoma in addition there was a small sclerotic lesion in the left iliac crest and metastasis could not be completely excluded although the bone scan showed no uptake in this area.  The bone scan did show a fracture and uptake in the left greater trochanter and uptake of the left superior pubic ramus that represents a superior ramus fracture of with no other signs of metastatic disease-patient did fall in  July of this year but no obvious fractures were seen on x-ray.    Patient initiating Arimidex therapy in mid September 2019.     the patient was hospitalized in late September, presenting with garbled speech and elevated blood pressure, felt to ultimately have had a TIA related to hypertensive urgency and small vessel disease.  Neurology recommended dual antiplatelet therapy for 3 months followed by Plavix alone.  Her antihypertensives were adjusted.  At that time she was also found to be B12 deficient with a level of 158 and iron deficient with an iron percent of 7%.  She was started on oral B12 and iron.    She states that the iron does cause some constipation but it is manageable with stool softeners at this time.  There was some discussion in the hospital record that if she could not tolerate oral iron IV iron should be considered.  Hemoglobin is improved up to 9.7 from discharge hemoglobin of 9.0.        Current Outpatient Medications on File Prior to Visit   Medication Sig Dispense Refill   • acetaminophen (TYLENOL) 500 MG tablet Take 500 mg by mouth Every 6 (Six) Hours As Needed for Mild Pain .     • amLODIPine (NORVASC) 10 MG tablet Take 1 tablet by mouth Daily. 90 tablet 3   • aspirin 81 MG EC tablet Take 1 tablet by mouth Daily. Take for 3 months.     • cetirizine (zyrTEC) 10 MG tablet Take 10 mg by mouth Daily.     • cloNIDine (CATAPRES) 0.2 MG tablet Take 0.2 mg by mouth 2 (Two) Times a Day.     • clopidogrel (PLAVIX) 75 MG tablet Take 1 tablet by mouth Daily. 30 tablet 0   • ferrous sulfate (IRON SUPPLEMENT) 325 (65 FE) MG tablet Take 1 tablet by mouth 2 (Two) Times a Day Before Meals. 60 tablet 0   • fluticasone (FLONASE) 50 MCG/ACT nasal spray 2 sprays into the nostril(s) as directed by provider Every Night.     • guaiFENesin (MUCINEX) 600 MG 12 hr tablet Take 600 mg by mouth 2 (Two) Times a Day As Needed for Cough.     • hydroCHLOROthiazide (MICROZIDE) 12.5 MG capsule TAKE 1 CAPSULE BY MOUTH EVERY  DAY 90 capsule 0   • lisinopril (PRINIVIL,ZESTRIL) 40 MG tablet TAKE 1 TABLET BY MOUTH DAILY 90 tablet 0   • metFORMIN (GLUCOPHAGE) 1000 MG tablet Take 1,000 mg by mouth 2 (Two) Times a Day With Meals.     • metoprolol succinate XL (TOPROL-XL) 50 MG 24 hr tablet Take 1 tablet by mouth Daily. 90 tablet 3   • pravastatin (PRAVACHOL) 20 MG tablet Take 20 mg by mouth Every Night.     • senna (SENOKOT) 8.6 MG tablet tablet TK 1 T PO QD  0   • vitamin B-12 (VITAMIN B-12) 2000 MCG tablet Take 1 tablet by mouth Daily. 30 tablet 0     No current facility-administered medications on file prior to visit.         ALLERGIES:    Allergies   Allergen Reactions   • Lipitor [Atorvastatin] Other (See Comments)     Leg weakness         Social History     Socioeconomic History   • Marital status:      Spouse name: Not on file   • Number of children: 3   • Years of education: High school   • Highest education level: Not on file   Occupational History     Employer: RETIRED   Tobacco Use   • Smoking status: Former Smoker     Packs/day: 0.25     Years: 10.00     Pack years: 2.50     Types: Cigarettes   • Smokeless tobacco: Former User   • Tobacco comment: she quit for 8 years and started back about a year ago then quit again   Substance and Sexual Activity   • Alcohol use: No   • Drug use: No   • Sexual activity: No        Family History   Problem Relation Age of Onset   • Stroke Mother 76   • Diabetes Mother    • Hypertension Mother    • Arthritis Mother    • Cancer Father         metastatic unknown cause   • Heart attack Father 70   • Colon cancer Father    • Lung cancer Father    • Cancer Brother         bladder   • Arthritis Sister    • Melanoma Sister    • Hypertension Daughter    • Diabetes Daughter         Review of Systems   Constitutional: Positive for activity change (better 11/13/19).   HENT: Positive for hearing loss (11/13/19) and trouble swallowing (better 11/13/19).    Respiratory: Negative.    Cardiovascular:  "Negative.    Gastrointestinal: Negative.    Genitourinary: Negative.    Musculoskeletal: Positive for gait problem (Left sided weakness from stroke same 11/13/19).   Neurological: Positive for dizziness (Gait and balance issues from stroke same 11/13/19) and weakness (Left-sided same 11/13/19).   Hematological: Negative.    Psychiatric/Behavioral: Negative.       All of the above stable to improved as of  10/10/19    Objective     Vitals:    11/13/19 1111   BP: 132/66   Pulse: 59   Resp: 16   Temp: 97.5 °F (36.4 °C)   SpO2: 98%   Weight: 51.8 kg (114 lb 1.6 oz)   Height: 164 cm (64.57\")   PainSc: 0-No pain     Current Status 11/13/2019   ECOG score 0       Physical Exam   Pulmonary/Chest:           GENERAL:  Well-developed, well-nourished in no acute distress.   SKIN:  Warm, dry without rashes, purpura or petechiae.  EYES:  Pupils equal, round and reactive to light.  EOMs intact.  Conjunctivae normal.  EARS:  Hearing intact.  NOSE:  Septum midline.  No excoriations or nasal discharge.  MOUTH:  Tongue is well-papillated; no stomatitis or ulcers.  Lips normal.  THROAT:  Oropharynx without lesions or exudates.  NECK:  Supple with good range of motion; no thyromegaly or masses, no JVD.  LYMPHATICS:  No cervical, supraclavicular, axillary or inguinal adenopathy.  CHEST:  Lungs clear to auscultation. Good airflow.  BREASTS: Left chest wall is benign; the right breast shows a locally advanced breast cancer with cutaneous involvement multifocally-.  Mild erythema but no warmth.  Only mildly tender.  Right axillary node palpable see photograph and compare from pretreatment to today with improvement  CARDIAC:  Regular rate and rhythm without murmurs, rubs or gallops. Normal S1,S2.  ABDOMEN:  Soft, nontender with no hepatosplenomegaly or masses.-Prominent bony nodule on the left greater trochanter  EXTREMITIES:  No clubbing, cyanosis or edema.  NEUROLOGICAL:  Cranial Nerves II-XII grossly intact.  Mild left-sided weakness with " a hemiparetic gait   pSYCHIATRIC:  Normal affect and mood.        RECENT LABS:  Results from last 7 days   Lab Units 11/13/19  1102   WBC 10*3/mm3 4.56   NEUTROS ABS 10*3/mm3 2.83   HEMOGLOBIN g/dL 9.7*   HEMATOCRIT % 31.4*   PLATELETS 10*3/mm3 249                                   MRI BRAIN     IMPRESSION:  Old right basal ganglia infarct. No acute abnormality  identified. No metastatic disease is identified at the brain or the  head.    Bone scan  IMPRESSION:  Abnormal uptake left greater trochanter where there is a  fracture demonstrated with CT (patient has a history of a fall and there  is no CT evidence that this is a pathologic fracture). There is also  abnormal uptake at the left superior pubic ramus that most likely  represents a superior ramus fracture though a fracture is not evident  with CT. This uptake left superior pubic ramus is technically  indeterminate though there is overall no convincing evidence for bony  metastatic disease. Follow-up CT could be obtained.     This report was finalized on 8/23/2019 1:    CT chest abdomen pelvis  IMPRESSION CHEST CT:    1. A 2.2 cm right breast mass laterally presumably related to the  patient's neoplasm.  2. Right axillary mediastinal adenopathy and metastatic disease cannot  be excluded. PET/CT may be helpful.     IMPRESSION ABDOMEN & PELVIS CT:    1. Low-density renal lesions as discussed, favor cysts.  2. Enlarging left adrenal nodule, likely adenoma.  3. Extensive diverticulosis.  4. A 5 mm sclerotic left iliac lesion as discussed.          Assessment/Plan   1.  Locally advanced right breast cancer strongly ER [place] positive with indeterminate HER-2 with repeat testing showing 3+ IHC consistent with positive result  · Staging work-up probably negative although indeterminate bone lesions without obvious fractures on bone scan  · Abnormal mediastinal adenopathy which is been present since 2016 likely granulomatous disease  · Enlarging left adrenal mass felt to  be an adenoma present since 2015  · History of left breast cancer 1978 at age 38 treated with modified radical mastectomy alone? Pathology  · [place] to be a poor candidate for surgery or chemotherapy.  · Initiated Arimidex around 9/11/2019.  Radiation may be a possibility for additional therapy if she does not get a good response from Arimidex.    2.  Granulomatous mediastinal lymph nodes chronic    3.  Recent stroke in 2/19 with left hemiparesis and dysphagia  · Patient hospitalized again 9/26 and 9/29/2019 presenting with garbled speech and elevated blood pressure, felt to have had a TIA.  Neurology recommended dual antiplatelet therapy for 3 months and then Plavix alone.    4.  Hypertension diabetes and hypercholesterolemia    5.  Anemia, multifactorial:  · Patient has stage III CKD.  · Patient also found during recent hospitalization to have low B12 and low iron percent.  Though she had previously received B12 injections she was started on oral B12.  She was also started on oral iron.    · Patient continues on oral iron at this time with some constipation though tolerable with stool softeners.  Plan to continue to monitor her blood work and if develops intolerance to oral iron consider IV iron.  Plan to recheck iron studies today and consider IV iron if still low    PLAN:  1. Continue Arimidex.  2. Continue oral B12 and oral iron.  If patient should become intolerant to oral iron consider IV iron therapy.  3. Patient scheduled to return in 3 month for follow-up with Dr. Marcus.  With a DEXA scan  4. Consider radiation therapy down the road if suboptimal response to Arimidex.

## 2019-11-16 ENCOUNTER — HOSPITAL ENCOUNTER (EMERGENCY)
Facility: HOSPITAL | Age: 79
Discharge: HOME | End: 2019-11-16
Attending: EMERGENCY MEDICINE | Admitting: EMERGENCY MEDICINE

## 2019-11-16 ENCOUNTER — APPOINTMENT (OUTPATIENT)
Dept: GENERAL RADIOLOGY | Facility: HOSPITAL | Age: 79
End: 2019-11-16

## 2019-11-16 VITALS
SYSTOLIC BLOOD PRESSURE: 156 MMHG | OXYGEN SATURATION: 99 % | WEIGHT: 114 LBS | RESPIRATION RATE: 16 BRPM | HEART RATE: 87 BPM | BODY MASS INDEX: 19.46 KG/M2 | HEIGHT: 64 IN | TEMPERATURE: 99.8 F | DIASTOLIC BLOOD PRESSURE: 89 MMHG

## 2019-11-16 DIAGNOSIS — M10.072 ACUTE IDIOPATHIC GOUT OF LEFT FOOT: Primary | ICD-10-CM

## 2019-11-16 LAB
ANION GAP SERPL CALCULATED.3IONS-SCNC: 16.4 MMOL/L (ref 5–15)
BASOPHILS # BLD AUTO: 0.06 10*3/MM3 (ref 0–0.2)
BASOPHILS NFR BLD AUTO: 0.8 % (ref 0–1.5)
BUN BLD-MCNC: 22 MG/DL (ref 8–23)
BUN/CREAT SERPL: 19.5 (ref 7–25)
CALCIUM SPEC-SCNC: 9.8 MG/DL (ref 8.6–10.5)
CHLORIDE SERPL-SCNC: 104 MMOL/L (ref 98–107)
CO2 SERPL-SCNC: 23.6 MMOL/L (ref 22–29)
CREAT BLD-MCNC: 1.13 MG/DL (ref 0.57–1)
CRP SERPL-MCNC: 1.14 MG/DL (ref 0–0.5)
DEPRECATED RDW RBC AUTO: 46.3 FL (ref 37–54)
EOSINOPHIL # BLD AUTO: 0.14 10*3/MM3 (ref 0–0.4)
EOSINOPHIL NFR BLD AUTO: 1.9 % (ref 0.3–6.2)
ERYTHROCYTE [DISTWIDTH] IN BLOOD BY AUTOMATED COUNT: 14.3 % (ref 12.3–15.4)
ERYTHROCYTE [SEDIMENTATION RATE] IN BLOOD: 63 MM/HR (ref 0–30)
GFR SERPL CREATININE-BSD FRML MDRD: 46 ML/MIN/1.73
GLUCOSE BLD-MCNC: 191 MG/DL (ref 65–99)
HCT VFR BLD AUTO: 28.6 % (ref 34–46.6)
HGB BLD-MCNC: 9.3 G/DL (ref 12–15.9)
IMM GRANULOCYTES # BLD AUTO: 0.03 10*3/MM3 (ref 0–0.05)
IMM GRANULOCYTES NFR BLD AUTO: 0.4 % (ref 0–0.5)
LYMPHOCYTES # BLD AUTO: 0.88 10*3/MM3 (ref 0.7–3.1)
LYMPHOCYTES NFR BLD AUTO: 11.7 % (ref 19.6–45.3)
MCH RBC QN AUTO: 29 PG (ref 26.6–33)
MCHC RBC AUTO-ENTMCNC: 32.5 G/DL (ref 31.5–35.7)
MCV RBC AUTO: 89.1 FL (ref 79–97)
MONOCYTES # BLD AUTO: 0.65 10*3/MM3 (ref 0.1–0.9)
MONOCYTES NFR BLD AUTO: 8.6 % (ref 5–12)
NEUTROPHILS # BLD AUTO: 5.78 10*3/MM3 (ref 1.7–7)
NEUTROPHILS NFR BLD AUTO: 76.6 % (ref 42.7–76)
NRBC BLD AUTO-RTO: 0 /100 WBC (ref 0–0.2)
PLATELET # BLD AUTO: 261 10*3/MM3 (ref 140–450)
PMV BLD AUTO: 9.3 FL (ref 6–12)
POTASSIUM BLD-SCNC: 3.9 MMOL/L (ref 3.5–5.2)
RBC # BLD AUTO: 3.21 10*6/MM3 (ref 3.77–5.28)
SODIUM BLD-SCNC: 144 MMOL/L (ref 136–145)
URATE SERPL-MCNC: 8.1 MG/DL (ref 2.4–5.7)
WBC NRBC COR # BLD: 7.54 10*3/MM3 (ref 3.4–10.8)

## 2019-11-16 PROCEDURE — 99283 EMERGENCY DEPT VISIT LOW MDM: CPT

## 2019-11-16 PROCEDURE — 85652 RBC SED RATE AUTOMATED: CPT | Performed by: EMERGENCY MEDICINE

## 2019-11-16 PROCEDURE — 73630 X-RAY EXAM OF FOOT: CPT

## 2019-11-16 PROCEDURE — 96374 THER/PROPH/DIAG INJ IV PUSH: CPT

## 2019-11-16 PROCEDURE — 25010000002 METHYLPREDNISOLONE PER 125 MG: Performed by: EMERGENCY MEDICINE

## 2019-11-16 PROCEDURE — 84550 ASSAY OF BLOOD/URIC ACID: CPT | Performed by: EMERGENCY MEDICINE

## 2019-11-16 PROCEDURE — 86140 C-REACTIVE PROTEIN: CPT | Performed by: EMERGENCY MEDICINE

## 2019-11-16 PROCEDURE — 80048 BASIC METABOLIC PNL TOTAL CA: CPT | Performed by: EMERGENCY MEDICINE

## 2019-11-16 PROCEDURE — 85025 COMPLETE CBC W/AUTO DIFF WBC: CPT | Performed by: EMERGENCY MEDICINE

## 2019-11-16 RX ORDER — SODIUM CHLORIDE 0.9 % (FLUSH) 0.9 %
10 SYRINGE (ML) INJECTION AS NEEDED
Status: DISCONTINUED | OUTPATIENT
Start: 2019-11-16 | End: 2019-11-16 | Stop reason: HOSPADM

## 2019-11-16 RX ORDER — COLCHICINE 0.6 MG/1
0.6 TABLET ORAL 2 TIMES DAILY
Qty: 14 TABLET | Refills: 0 | Status: SHIPPED | OUTPATIENT
Start: 2019-11-16 | End: 2020-01-15

## 2019-11-16 RX ORDER — HYDROCODONE BITARTRATE AND ACETAMINOPHEN 7.5; 325 MG/1; MG/1
1 TABLET ORAL ONCE
Status: COMPLETED | OUTPATIENT
Start: 2019-11-16 | End: 2019-11-16

## 2019-11-16 RX ORDER — CEPHALEXIN 500 MG/1
500 CAPSULE ORAL 2 TIMES DAILY
Qty: 14 CAPSULE | Refills: 0 | Status: SHIPPED | OUTPATIENT
Start: 2019-11-16 | End: 2020-01-15

## 2019-11-16 RX ORDER — METHYLPREDNISOLONE SODIUM SUCCINATE 125 MG/2ML
125 INJECTION, POWDER, LYOPHILIZED, FOR SOLUTION INTRAMUSCULAR; INTRAVENOUS ONCE
Status: COMPLETED | OUTPATIENT
Start: 2019-11-16 | End: 2019-11-16

## 2019-11-16 RX ORDER — HYDROCODONE BITARTRATE AND ACETAMINOPHEN 5; 325 MG/1; MG/1
1 TABLET ORAL 4 TIMES DAILY PRN
Qty: 10 TABLET | Refills: 0 | Status: SHIPPED | OUTPATIENT
Start: 2019-11-16 | End: 2020-01-15

## 2019-11-16 RX ADMIN — METHYLPREDNISOLONE SODIUM SUCCINATE 125 MG: 125 INJECTION, POWDER, FOR SOLUTION INTRAMUSCULAR; INTRAVENOUS at 11:07

## 2019-11-16 RX ADMIN — HYDROCODONE BITARTRATE AND ACETAMINOPHEN 1 TABLET: 7.5; 325 TABLET ORAL at 09:53

## 2019-11-16 NOTE — ED TRIAGE NOTES
Her left foot has been hurting since Monday.  It cleared up but then started hurting yesterday.  Jorge.

## 2019-11-16 NOTE — ED PROVIDER NOTES
EMERGENCY DEPARTMENT ENCOUNTER    CHIEF COMPLAINT  Chief Complaint: Foot pain  History given by: Patient  History limited by: None  Room Number: 06/06  PMD: Vladimir Hernandez APRN      HPI:  Pt is a 79 y.o. female who presents complaining of constant left foot pain that began 5 days ago, resolved, and returned yesterday with worsened pain than the first episode. Pt reports pain with ambulation. Pt denies known fevers, but reports hx of diabetes mellitus. Pt reports hx of breast cancer and states she takes Arimidex. Pt reports she had a stroke in February 2019 affecting the right hemisphere. Patient reports she takes Plavix and 81 mg ASA.    Duration:  5 days  Onset: gradual  Timing: constant  Location: left foot  Radiation: none  Quality: pain  Intensity/Severity: moderate  Progression: worsened  Associated Symptoms: none  Aggravating Factors: ambulation  Alleviating Factors: none  Previous Episodes: no  Treatment before arrival: none    PAST MEDICAL HISTORY  Active Ambulatory Problems     Diagnosis Date Noted   • Anemia 07/01/2013   • Uncontrolled type 2 diabetes mellitus (CMS/[facility]) 01/16/2017   • Diabetic neuropathy (CMS/[facility]) 01/16/2017   • Gastroesophageal reflux disease 01/16/2017   • Hearing loss 01/16/2017   • Hyperlipidemia 01/16/2017   • Hypertension 01/16/2017   • Thoracic back pain 01/16/2017   • Peripheral arterial occlusive disease (CMS/[facility]) 01/16/2017   • Seasonal allergic rhinitis 01/16/2017   • Type 2 diabetes mellitus, without long-term current use of insulin (CMS/[facility]) 07/01/2013   • B12 deficiency 01/16/2017   • Cerebrovascular accident in February 2019 (CVA) (CMS/[facility]) 02/15/2019   • Smoker 02/22/2019   • Malignant neoplasm of right female breast (CMS/[facility]) 08/14/2019   • TIA (transient ischemic attack) 09/26/2019   • Hypertensive urgency 09/26/2019   • CKD (chronic kidney disease) stage 3, GFR 30-59 ml/min (CMS/[facility]) 09/26/2019     Resolved Ambulatory Problems     Diagnosis Date Noted   • Diverticulitis  of colon 01/16/2017   • Gastritis 01/16/2017   • Vitamin B-complex deficiency 07/01/2013   • History of breast cancer 07/01/2013   • Personal history of other malignant neoplasm of skin 07/01/2013   • History of tobacco use 07/01/2013   • Pneumonia 01/16/2017   • Osteoporosis screening 02/22/2017   • Breast cancer screening 02/22/2017   • Sinusitis 03/15/2018   • Left-sided weakness 02/15/2019   • Dysphagia due to recent cerebral infarction 02/16/2019   • Right ankle swelling 03/14/2019   • Localized swelling on left hand 03/14/2019   • Breast mass 07/16/2019   • Nipple discharge 07/16/2019   • Headache 09/26/2019     Past Medical History:   Diagnosis Date   • Anemia    • Arthritis    • Cancer (CMS/HCC)    • Clostridium difficile colitis    • Diabetes mellitus (CMS/HCC)    • Difficulty walking    • Diverticulitis    • Environmental allergies    • History of malignant neoplasm of breast 07/01/2013   • History of malignant neoplasm of skin 07/01/2013   • History of tobacco use 7/1/2013   • HL (hearing loss)    • Hyperlipidemia    • Hypertension    • Kidney disease    • Neuropathy in diabetes (CMS/HCC)    • Peripheral neuropathy    • Shingles    • Skin cancer    • Stroke (CMS/HCC) 2019   • Vision loss        PAST SURGICAL HISTORY  Past Surgical History:   Procedure Laterality Date   • CATARACT EXTRACTION, BILATERAL     • EAR TUBES     • MASTECTOMY Bilateral     radical   • TUMOR REMOVAL Left     large lipoma removed from hip       FAMILY HISTORY  Family History   Problem Relation Age of Onset   • Stroke Mother 76   • Diabetes Mother    • Hypertension Mother    • Arthritis Mother    • Cancer Father         metastatic unknown cause   • Heart attack Father 70   • Colon cancer Father    • Lung cancer Father    • Cancer Brother         bladder   • Arthritis Sister    • Melanoma Sister    • Hypertension Daughter    • Diabetes Daughter        SOCIAL HISTORY  Social History     Socioeconomic History   • Marital status:       Spouse name: Not on file   • Number of children: 3   • Years of education: High school   • Highest education level: Not on file   Occupational History     Employer: RETIRED   Tobacco Use   • Smoking status: Former Smoker     Packs/day: 0.25     Years: 10.00     Pack years: 2.50     Types: Cigarettes   • Smokeless tobacco: Former User   • Tobacco comment: she quit for 8 years and started back about a year ago then quit again   Substance and Sexual Activity   • Alcohol use: No   • Drug use: No   • Sexual activity: No       ALLERGIES  Lipitor [atorvastatin]    REVIEW OF SYSTEMS  Review of Systems   Constitutional: Negative for chills and fever.   HENT: Negative for sore throat.    Eyes: Negative.    Respiratory: Negative for cough and shortness of breath.    Cardiovascular: Negative for chest pain.   Gastrointestinal: Negative for abdominal pain, diarrhea and vomiting.   Genitourinary: Negative for dysuria.   Musculoskeletal: Negative for neck pain.        + left foot pain   Skin: Negative for rash.   Allergic/Immunologic: Negative.    Neurological: Negative for weakness, numbness and headaches.   Hematological: Negative.    Psychiatric/Behavioral: Negative.    All other systems reviewed and are negative.      PHYSICAL EXAM  ED Triage Vitals   Temp Heart Rate Resp BP SpO2   11/16/19 0929 11/16/19 0929 11/16/19 0929 11/16/19 0936 11/16/19 0929   99.8 °F (37.7 °C) 87 16 156/89 99 %      Temp src Heart Rate Source Patient Position BP Location FiO2 (%)   11/16/19 0929 11/16/19 0929 11/16/19 0936 11/16/19 0936 --   Tympanic Monitor Sitting Right arm        Physical Exam   Constitutional: She is oriented to person, place, and time. No distress.   HENT:   Head: Normocephalic and atraumatic.   Eyes: EOM are normal. Pupils are equal, round, and reactive to light.   Neck: Normal range of motion. Neck supple.   Cardiovascular: Normal rate, regular rhythm and normal heart sounds.   Pulmonary/Chest: Effort normal and  breath sounds normal. No respiratory distress.   Abdominal: Soft. There is no tenderness. There is no rebound and no guarding.   Musculoskeletal: Normal range of motion. She exhibits no edema or tenderness.   No calf tenderness, no leg swelling or ankle swelling. There is slight erythema and warmth to the proximal mid foot and laterally. There is point tenderness over the base of the 5th metatarsal. There is no tenderness to the sole of the foot, and she is able to move toes without pain; no swelling or redness of the toes.   Neurological: She is alert and oriented to person, place, and time. She has normal sensation and normal strength.   Skin: Skin is warm and dry. No rash noted. There is erythema.   Psychiatric: Mood and affect normal.   Nursing note and vitals reviewed.      LAB RESULTS  Lab Results (last 24 hours)     Procedure Component Value Units Date/Time    CBC & Differential [[id number]] Collected:  11/16/19 0951    Specimen:  Blood Updated:  11/16/19 1005    Narrative:       The following orders were created for panel order CBC & Differential.  Procedure                               Abnormality         Status                     ---------                               -----------         ------                     CBC Auto Differential[[id number]]        Abnormal            Final result                 Please view results for these tests on the individual orders.    Basic Metabolic Panel [[id number]]  (Abnormal) Collected:  11/16/19 0951    Specimen:  Blood Updated:  11/16/19 1032     Glucose 191 mg/dL      BUN 22 mg/dL      Creatinine 1.13 mg/dL      Sodium 144 mmol/L      Potassium 3.9 mmol/L      Chloride 104 mmol/L      CO2 23.6 mmol/L      Calcium 9.8 mg/dL      eGFR Non African Amer 46 mL/min/1.73      BUN/Creatinine Ratio 19.5     Anion Gap 16.4 mmol/L     Narrative:       GFR Normal >60  Chronic Kidney Disease <60  Kidney Failure <15    Sedimentation Rate [[id number]]  (Abnormal) Collected:   11/16/19 0951    Specimen:  Blood Updated:  11/16/19 1038     Sed Rate 63 mm/hr     C-reactive Protein [[id number]]  (Abnormal) Collected:  11/16/19 0951    Specimen:  Blood Updated:  11/16/19 1027     C-Reactive Protein 1.14 mg/dL     Uric Acid [[id number]]  (Abnormal) Collected:  11/16/19 0951    Specimen:  Blood Updated:  11/16/19 1027     Uric Acid 8.1 mg/dL     CBC Auto Differential [[id number]]  (Abnormal) Collected:  11/16/19 0951    Specimen:  Blood Updated:  11/16/19 1005     WBC 7.54 10*3/mm3      RBC 3.21 10*6/mm3      Hemoglobin 9.3 g/dL      Hematocrit 28.6 %      MCV 89.1 fL      MCH 29.0 pg      MCHC 32.5 g/dL      RDW 14.3 %      RDW-SD 46.3 fl      MPV 9.3 fL      Platelets 261 10*3/mm3      Neutrophil % 76.6 %      Lymphocyte % 11.7 %      Monocyte % 8.6 %      Eosinophil % 1.9 %      Basophil % 0.8 %      Immature Grans % 0.4 %      Neutrophils, Absolute 5.78 10*3/mm3      Lymphocytes, Absolute 0.88 10*3/mm3      Monocytes, Absolute 0.65 10*3/mm3      Eosinophils, Absolute 0.14 10*3/mm3      Basophils, Absolute 0.06 10*3/mm3      Immature Grans, Absolute 0.03 10*3/mm3      nRBC 0.0 /100 WBC           I ordered the above labs and reviewed the results    RADIOLOGY  XR Foot 3+ View Left              I ordered the above noted radiological studies. Interpreted by radiologist. Discussed with radiologist (Dr. Miquel MD). Reviewed by me in PACS.       PROCEDURES  Procedures      PROGRESS AND CONSULTS     0945 Labs ordered and XR foot (left) ordered for evaluation. Norco ordered for pain medication.    1048 Spoke with Dr. Lafleur, radiology, who reports the XR is negative.    1053 Patient's labs are consistent with gout. Will order one dose of IV steroids for treatment in the ED. Will not discharge with Rx for steroids due to patient being diabetic and no NSAIDS since she is on aspirin and Plavix already.  I will use colchicine for her gout and cover her with Keflex just to ensure there is not a  secondary cellulitis    1056 Rechecked the patient who is resting comfortably and in NAD. The patient is stable.  BP- 156/89 HR- 87 Temp- 99.8 °F (37.7 °C) (Tympanic) O2 sat- 99%. Informed the patient of all labs including elevated uric acid, sed rate, and C-reactive protein consistent with gout. Discussed the plan for discharge with a prescription for Colchicine and instructions to f/u with PCP for further evaluation and management. Pt understands and agrees with the plan, all questions answered.      MEDICAL DECISION MAKING  Results were reviewed/discussed with the patient and they were also made aware of online access. Pt also made aware that some labs, such as cultures, will not be resulted during ER visit and follow up with PMD is necessary.     MDM  Number of Diagnoses or Management Options     Amount and/or Complexity of Data Reviewed  Clinical lab tests: ordered and reviewed (Uric acid: 8.1; sed rate: 63; c-reactive protein: 1.14)  Tests in the radiology section of CPT®: ordered and reviewed (XR foot: negative)  Discussion of test results with the performing providers: yes (Dr. Miquel MD)  Decide to obtain previous medical records or to obtain history from someone other than the patient: yes (Epic)  Review and summarize past medical records: yes (Patient has right breast cancer, and she was seen by oncology on 11/13/19. She has a hx of a stroke in February 2019.)  Independent visualization of images, tracings, or specimens: yes (Dr. Miquel MD)    Patient Progress  Patient progress: stable         DIAGNOSIS  Final diagnoses:   Acute idiopathic gout of left foot       DISPOSITION  DISCHARGE    Patient discharged in stable condition.    Reviewed implications of results, diagnosis, meds, responsibility to follow up, warning signs and symptoms of possible worsening, potential complications and reasons to return to ER, including any new or worsening symptoms.    Patient/Family voiced understanding of above  instructions.    Discussed plan for discharge, as there is no emergent indication for admission. Patient referred to primary care provider for BP management due to today's BP. Pt/family is agreeable and understands need for follow up and repeat testing.  Pt is aware that discharge does not mean that nothing is wrong but it indicates no emergency is present that requires admission and they must continue care with follow-up as given below or physician of their choice.     FOLLOW-UP  DavidDimasi, APRN  [address] [address]  [phone]    In 3 days  For recheck         Medication List      New Prescriptions    cephalexin 500 MG capsule  Commonly known as:  KEFLEX  Take 1 capsule by mouth 2 (Two) Times a Day.     colchicine 0.6 MG tablet  Take 1 tablet by mouth 2 (Two) Times a Day.     HYDROcodone-acetaminophen 5-325 MG per tablet  Commonly known as:  NORCO  Take 1 tablet by mouth 4 (Four) Times a Day As Needed for Moderate Pain .              Latest Documented Vital Signs:  As of 11:10 AM  BP- 156/89 HR- 87 Temp- 99.8 °F (37.7 °C) (Tympanic) O2 sat- 99%    --  Documentation assistance provided by mayda Bermudez for Dr. Izabella MD.  Information recorded by the scribe was done at my direction and has been verified and validated by me.           Maryana Bermudez  11/16/19 1111       Carlos Parekh MD  11/16/19 9452

## 2019-12-02 RX ORDER — HYDROCHLOROTHIAZIDE 12.5 MG/1
CAPSULE, GELATIN COATED ORAL
Qty: 30 CAPSULE | Refills: 0 | Status: SHIPPED | OUTPATIENT
Start: 2019-12-02 | End: 2020-02-26

## 2019-12-13 RX ORDER — CLOPIDOGREL BISULFATE 75 MG/1
75 TABLET ORAL DAILY
Qty: 30 TABLET | Refills: 6 | Status: SHIPPED | OUTPATIENT
Start: 2019-12-13 | End: 2020-07-13

## 2020-01-06 RX ORDER — CLONIDINE HYDROCHLORIDE 0.2 MG/1
TABLET ORAL
Qty: 180 TABLET | Refills: 0 | Status: SHIPPED | OUTPATIENT
Start: 2020-01-06 | End: 2020-04-06

## 2020-01-11 DIAGNOSIS — I10 ESSENTIAL HYPERTENSION: ICD-10-CM

## 2020-01-13 RX ORDER — LISINOPRIL 40 MG/1
40 TABLET ORAL DAILY
Qty: 90 TABLET | Refills: 0 | Status: SHIPPED | OUTPATIENT
Start: 2020-01-13 | End: 2020-04-14

## 2020-01-15 ENCOUNTER — OFFICE VISIT (OUTPATIENT)
Dept: FAMILY MEDICINE CLINIC | Facility: CLINIC | Age: 80
End: 2020-01-15

## 2020-01-15 VITALS
RESPIRATION RATE: 18 BRPM | DIASTOLIC BLOOD PRESSURE: 60 MMHG | TEMPERATURE: 97.7 F | BODY MASS INDEX: 19.66 KG/M2 | WEIGHT: 118 LBS | HEIGHT: 65 IN | SYSTOLIC BLOOD PRESSURE: 144 MMHG | HEART RATE: 61 BPM | OXYGEN SATURATION: 96 %

## 2020-01-15 DIAGNOSIS — E11.9 TYPE 2 DIABETES MELLITUS WITHOUT COMPLICATION, WITHOUT LONG-TERM CURRENT USE OF INSULIN (HCC): ICD-10-CM

## 2020-01-15 DIAGNOSIS — E78.2 MIXED HYPERLIPIDEMIA: Primary | ICD-10-CM

## 2020-01-15 DIAGNOSIS — I10 ESSENTIAL HYPERTENSION: ICD-10-CM

## 2020-01-15 PROCEDURE — 99214 OFFICE O/P EST MOD 30 MIN: CPT | Performed by: NURSE PRACTITIONER

## 2020-01-15 RX ORDER — ANASTROZOLE 1 MG/1
1 TABLET ORAL DAILY
COMMUNITY
Start: 2019-12-07 | End: 2020-09-02

## 2020-01-15 NOTE — PROGRESS NOTES
"Subjective   Mar Burgos is a 79 y.o. female.     History of Present Illness    Since the last visit, she has overall felt well.  She has Essential Hypertension and well controlled on current medication, DMII well controlled on medication and will continue regimen and Hyperlipidemia with goals met with current Rx.  she has been compliant with current medications have reviewed them.  The patient denies medication side effects.  Will refill medications. /60   Pulse 61   Temp 97.7 °F (36.5 °C)   Resp 18   Ht 163.8 cm (64.5\")   Wt 53.5 kg (118 lb)   SpO2 96%   BMI 19.94 kg/m²     Results for orders placed or performed during the hospital encounter of 11/16/19   Basic Metabolic Panel   Result Value Ref Range    Glucose 191 (H) 65 - 99 mg/dL    BUN 22 8 - 23 mg/dL    Creatinine 1.13 (H) 0.57 - 1.00 mg/dL    Sodium 144 136 - 145 mmol/L    Potassium 3.9 3.5 - 5.2 mmol/L    Chloride 104 98 - 107 mmol/L    CO2 23.6 22.0 - 29.0 mmol/L    Calcium 9.8 8.6 - 10.5 mg/dL    eGFR Non African Amer 46 (L) >60 mL/min/1.73    BUN/Creatinine Ratio 19.5 7.0 - 25.0    Anion Gap 16.4 (H) 5.0 - 15.0 mmol/L   Sedimentation Rate   Result Value Ref Range    Sed Rate 63 (H) 0 - 30 mm/hr   C-reactive Protein   Result Value Ref Range    C-Reactive Protein 1.14 (H) 0.00 - 0.50 mg/dL   Uric Acid   Result Value Ref Range    Uric Acid 8.1 (H) 2.4 - 5.7 mg/dL   CBC Auto Differential   Result Value Ref Range    WBC 7.54 3.40 - 10.80 10*3/mm3    RBC 3.21 (L) 3.77 - 5.28 10*6/mm3    Hemoglobin 9.3 (L) 12.0 - 15.9 g/dL    Hematocrit 28.6 (L) 34.0 - 46.6 %    MCV 89.1 79.0 - 97.0 fL    MCH 29.0 26.6 - 33.0 pg    MCHC 32.5 31.5 - 35.7 g/dL    RDW 14.3 12.3 - 15.4 %    RDW-SD 46.3 37.0 - 54.0 fl    MPV 9.3 6.0 - 12.0 fL    Platelets 261 140 - 450 10*3/mm3    Neutrophil % 76.6 (H) 42.7 - 76.0 %    Lymphocyte % 11.7 (L) 19.6 - 45.3 %    Monocyte % 8.6 5.0 - 12.0 %    Eosinophil % 1.9 0.3 - 6.2 %    Basophil % 0.8 0.0 - 1.5 %    Immature Grans " "% 0.4 0.0 - 0.5 %    Neutrophils, Absolute 5.78 1.70 - 7.00 10*3/mm3    Lymphocytes, Absolute 0.88 0.70 - 3.10 10*3/mm3    Monocytes, Absolute 0.65 0.10 - 0.90 10*3/mm3    Eosinophils, Absolute 0.14 0.00 - 0.40 10*3/mm3    Basophils, Absolute 0.06 0.00 - 0.20 10*3/mm3    Immature Grans, Absolute 0.03 0.00 - 0.05 10*3/mm3    nRBC 0.0 0.0 - 0.2 /100 WBC         The following portions of the patient's history were reviewed and updated as appropriate: allergies, current medications, past social history and problem list.    Review of Systems   Constitutional: Negative for fever.   Respiratory: Negative for cough and shortness of breath.    Cardiovascular: Negative for chest pain.   Gastrointestinal: Negative for abdominal pain.   Neurological: Negative for dizziness.       Objective   /60   Pulse 61   Temp 97.7 °F (36.5 °C)   Resp 18   Ht 163.8 cm (64.5\")   Wt 53.5 kg (118 lb)   SpO2 96%   BMI 19.94 kg/m²   Physical Exam   Constitutional: She is oriented to person, place, and time. Vital signs are normal. She appears well-developed and well-nourished. No distress.   HENT:   Head: Normocephalic.   Cardiovascular: Normal rate, regular rhythm and normal heart sounds.   Pulmonary/Chest: Effort normal and breath sounds normal.   Neurological: She is alert and oriented to person, place, and time. Gait normal.   Psychiatric: She has a normal mood and affect. Her behavior is normal. Judgment and thought content normal.   Vitals reviewed.      Assessment/Plan      Diagnosis Plan   1. Mixed hyperlipidemia     2. Essential hypertension     3. Type 2 diabetes mellitus without complication, without long-term current use of insulin (CMS/[facility])       Cont same with meds  rto in 3 sridhar Hernandez, APRN  1/15/2020  "

## 2020-02-12 NOTE — PROGRESS NOTES
Subjective     REASON FOR CONSULTATION:   1.Locally advanced right breast cancer ER [place] positive HER-2 positive  2. Remote history of left breast cancer  treated with mastectomy alone                               REQUESTING PHYSICIAN: MD Vladimir Cowart    History of Present Illness Ms. Burgos returns to the office today accompanied by her daughter for 1 month review now on Arimidex therapy.  Thankfully she is tolerating the medication well without any obvious side effects.  She denies any significant arthralgias.  She continues with normal appetite and stable weight.  She reports normal bowel bladder function.    She does feel that the right large breast mass is getting softer and there are areas that reportedly were previously using that are now beginning to crust over.  This area is somewhat sensitive to palpation but not overly painful.    She remains anemic despite being on iron B12 and we will recheck the levels today  She is happy with the improvement in her breast mass    She has not had a DEXA scan in many years we will recheck this also  She and her daughter denies other concerns today.    Past Medical History:   Diagnosis Date   • Anemia     required prior blood transfusions   • Arthritis    • Cancer (CMS/HCC)     right breast cancer    • Clostridium difficile colitis    • Diabetes mellitus (CMS/HCC)    • Difficulty walking    • Diverticulitis    • Environmental allergies    • History of malignant neoplasm of breast 2013    denies any chemo or radiation   • History of malignant neoplasm of skin 2013    possibly basal   • History of tobacco use 2013   • HL (hearing loss)    • Hyperlipidemia    • Hypertension    • Kidney disease    • Neuropathy in diabetes (CMS/HCC)    • Peripheral neuropathy    • Shingles    • Skin cancer    • Stroke (CMS/HCC) 2019   • Vision loss       Patient is  3 para 3 with 2  miscarriages menarche was at age 9 menopause at age 49st childbirth was at age 24 she breast-fed for at least 6 months she did not take hormone replacement therapy because at age 38 she developed breast cancer in the left breast was treated at the [facility] [place] with a modified radical mastectomy in  no radiation or hormonal blockade was given in details of the scans were not available at this time.    Family history is positive for father dying at age 84 with disseminated abdominal cancer of unknown type mother  at 77 of natural causes she has a brother with kidney cancer at age 65 who  of this and a sister with melanoma at age 30    Patient currently lives with her daughter does not smoke currently but smoked for 70 years 1 pack of cigarettes is not a drinker.  She uses a cane to walk because her balance is poor she has issues with swallowing from her stroke      Past Surgical History:   Procedure Laterality Date   • CATARACT EXTRACTION, BILATERAL     • EAR TUBES     • MASTECTOMY Bilateral     radical   • TUMOR REMOVAL Left     large lipoma removed from hip      HEME/ONC HISTORY:  patient is an 79-year-old female with multiple medical issues including hypertension diabetes hypercholesterolemia with a recent stroke in February of this year which is resulted in left-sided weakness and difficulty swallowing.  The patient noted changes in her breast over the last year and the last few months changes to the nipple specifically with developing lump in both the right breast and under the right arm.  She brought to the attention of her family physician who sent her for imaging and biopsy of obvious tumor.  Mammogram revealed a large irregular mass measuring 6 cm with skin retraction nipple retraction and skin thickening.  At the 10 o'clock position there appeared to be some skin involvement.    She was also noted clinically to have large axillary lymph nodes.  Ultrasound was obtained revealing an  irregular solid mass with indistinct margins measuring approximately 64 x 51 mm.  On ultrasound there were also some enlarged axillary lymph nodes measuring about 30 mm in greatest size.     A biopsy was recommended and has revealed a grade 2 invasive ductal cancer that is ER positive at 98%, FL positive at 82%, and HER-2 equivocal.  The Ki-67 is 20.81%.     Patient was then referred to Dr. Gonzalez who examined her and did not feel surgical excision was possible because there would be a large defect from her tumor and was wanting to consider neoadjuvant therapy in an effort to shrink the tumor.  The patient was presented at the multidisciplinary conference and because of her comorbidities her recent stroke and her overall general condition we did not think she was a good candidate for neoadjuvant chemotherapy and she was referred to us to discuss endocrine therapy.    In the interim additional testing on her tumor was done at [facility] because of the indeterminate HER-2 FISH and this revealed that there was 3+ HER-2 activity by IHC and therefore this is considered to be HER-2 positive  The patient had staging work-up at the recommendation of the multidisciplinary clinic And CT of the chest showed calcified granulomatous disease with mild AP window adenopathy which is increased from previous CAT scan in 2016 precarinal adenopathy also increased.  It showed the breast mass as well as right axillary adenopathy.  In the abdomen there was a left adrenal nodule which is increased in size from 1.4 to 1.8 cm felt to be an adenoma in addition there was a small sclerotic lesion in the left iliac crest and metastasis could not be completely excluded although the bone scan showed no uptake in this area.  The bone scan did show a fracture and uptake in the left greater trochanter and uptake of the left superior pubic ramus that represents a superior ramus fracture of with no other signs of metastatic disease-patient did fall in  July of this year but no obvious fractures were seen on x-ray.    Pt did not want chemotherapy-Patient initiating Arimidex therapy in mid September 2019.     the patient was hospitalized in late September, presenting with garbled speech and elevated blood pressure, felt to ultimately have had a TIA related to hypertensive urgency and small vessel disease.  Neurology recommended dual antiplatelet therapy for 3 months followed by Plavix alone.  Her antihypertensives were adjusted.  At that time she was also found to be B12 deficient with a level of 158 and iron deficient with an iron percent of 7%.  She was started on oral B12 and iron.    She states that the iron does cause some constipation but it is manageable with stool softeners at this time.  There was some discussion in the hospital record that if she could not tolerate oral iron IV iron should be considered.  Hemoglobin is improved up to 9.7 from discharge hemoglobin of 9.0.        Current Outpatient Medications on File Prior to Visit   Medication Sig Dispense Refill   • acetaminophen (TYLENOL) 500 MG tablet Take 500 mg by mouth Every 6 (Six) Hours As Needed for Mild Pain .     • amLODIPine (NORVASC) 10 MG tablet Take 1 tablet by mouth Daily. 90 tablet 3   • anastrozole (ARIMIDEX) 1 MG tablet Take 1 mg by mouth Daily.     • cetirizine (zyrTEC) 10 MG tablet Take 10 mg by mouth Daily.     • cloNIDine (CATAPRES) 0.2 MG tablet TAKE 1 TABLET BY MOUTH EVERY 12 HOURS 180 tablet 0   • clopidogrel (PLAVIX) 75 MG tablet TAKE 1 TABLET BY MOUTH DAILY 30 tablet 6   • ferrous sulfate (IRON SUPPLEMENT) 325 (65 FE) MG tablet Take 1 tablet by mouth 2 (Two) Times a Day Before Meals. 60 tablet 0   • fluticasone (FLONASE) 50 MCG/ACT nasal spray 2 sprays into the nostril(s) as directed by provider Every Night.     • guaiFENesin (MUCINEX) 600 MG 12 hr tablet Take 600 mg by mouth 2 (Two) Times a Day As Needed for Cough.     • hydroCHLOROthiazide (MICROZIDE) 12.5 MG capsule TAKE 1  CAPSULE BY MOUTH EVERY DAY 30 capsule 0   • lisinopril (PRINIVIL,ZESTRIL) 40 MG tablet TAKE 1 TABLET BY MOUTH DAILY 90 tablet 0   • metFORMIN (GLUCOPHAGE) 1000 MG tablet TAKE 1 TABLET BY MOUTH TWICE DAILY WITH MEALS 180 tablet 0   • metoprolol succinate XL (TOPROL-XL) 50 MG 24 hr tablet Take 1 tablet by mouth Daily. 90 tablet 3   • pravastatin (PRAVACHOL) 20 MG tablet Take 20 mg by mouth Every Night.     • senna (SENOKOT) 8.6 MG tablet tablet TK 1 T PO QD  0   • vitamin B-12 (VITAMIN B-12) 2000 MCG tablet Take 1 tablet by mouth Daily. 30 tablet 0     No current facility-administered medications on file prior to visit.         ALLERGIES:    Allergies   Allergen Reactions   • Lipitor [Atorvastatin] Other (See Comments)     Leg weakness         Social History     Socioeconomic History   • Marital status:      Spouse name: Not on file   • Number of children: 3   • Years of education: High school   • Highest education level: Not on file   Occupational History     Employer: RETIRED   Tobacco Use   • Smoking status: Former Smoker     Packs/day: 0.25     Years: 10.00     Pack years: 2.50     Types: Cigarettes   • Smokeless tobacco: Former User   • Tobacco comment: she quit for 8 years and started back about a year ago then quit again   Substance and Sexual Activity   • Alcohol use: No   • Drug use: No   • Sexual activity: Never        Family History   Problem Relation Age of Onset   • Stroke Mother 76   • Diabetes Mother    • Hypertension Mother    • Arthritis Mother    • Cancer Father         metastatic unknown cause   • Heart attack Father 70   • Colon cancer Father    • Lung cancer Father    • Cancer Brother         bladder   • Arthritis Sister    • Melanoma Sister    • Hypertension Daughter    • Diabetes Daughter         Review of Systems   Constitutional: Positive for activity change (same 2/18/2020).   HENT: Positive for hearing loss (same 2/18/2020), sinus pain (drainage 2/18/2020) and trouble swallowing  (same 2/18/2020).    Respiratory: Negative.    Cardiovascular: Negative.    Gastrointestinal: Positive for abdominal pain (low right side 2/18/2020).   Genitourinary: Negative.    Musculoskeletal: Positive for gait problem (left side weakness same 2/18/2020) and joint swelling (right arm elbow swelling 2/18/2020).   Neurological: Positive for dizziness (better 2/18/2020) and weakness (Left-sided same 2/18/2020).   Hematological: Negative.    Psychiatric/Behavioral: Negative.       All of the above stable to improved as of  10/10/19    Objective     There were no vitals filed for this visit.  Current Status 11/13/2019   ECOG score 0       Physical Exam   Pulmonary/Chest:           GENERAL:  Well-developed, well-nourished in no acute distress.   SKIN:  Warm, dry without rashes, purpura or petechiae.  EYES:  Pupils equal, round and reactive to light.  EOMs intact.  Conjunctivae normal.  EARS:  Hearing intact.  NOSE:  Septum midline.  No excoriations or nasal discharge.  MOUTH:  Tongue is well-papillated; no stomatitis or ulcers.  Lips normal.  THROAT:  Oropharynx without lesions or exudates.  NECK:  Supple with good range of motion; no thyromegaly or masses, no JVD.  LYMPHATICS:  No cervical, supraclavicular, axillary or inguinal adenopathy.  CHEST:  Lungs clear to auscultation. Good airflow.  BREASTS: Left chest wall is benign; the right breast shows a locally advanced breast cancer with cutaneous involvement multifocally-.  Mild erythema but no warmth.  Only mildly tender.  Right axillary node palpable see photograph NO CHANGE  CARDIAC:  Regular rate and rhythm without murmurs, rubs or gallops. Normal S1,S2.  ABDOMEN:  Soft, nontender with no hepatosplenomegaly or masses.-Prominent bony nodule on the left greater trochanter  EXTREMITIES:  No clubbing, cyanosis 2+lymphedema right arm  NEUROLOGICAL:  Cranial Nerves II-XII grossly intact.  Mild left-sided weakness with a hemiparetic gait   pSYCHIATRIC:  Normal affect  and mood.        RECENT LABS:  Lab Results   Component Value Date    WBC 6.52 02/18/2020    HGB 10.9 (L) 02/18/2020    HCT 33.4 (L) 02/18/2020    MCV 88.6 02/18/2020     02/18/2020                                         MRI BRAIN     IMPRESSION:  Old right basal ganglia infarct. No acute abnormality  identified. No metastatic disease is identified at the brain or the  head.    Bone scan  IMPRESSION:  Abnormal uptake left greater trochanter where there is a  fracture demonstrated with CT (patient has a history of a fall and there  is no CT evidence that this is a pathologic fracture). There is also  abnormal uptake at the left superior pubic ramus that most likely  represents a superior ramus fracture though a fracture is not evident  with CT. This uptake left superior pubic ramus is technically  indeterminate though there is overall no convincing evidence for bony  metastatic disease. Follow-up CT could be obtained.     This report was finalized on 8/23/2019 1:    CT chest abdomen pelvis  IMPRESSION CHEST CT:    1. A 2.2 cm right breast mass laterally presumably related to the  patient's neoplasm.  2. Right axillary mediastinal adenopathy and metastatic disease cannot  be excluded. PET/CT may be helpful.     IMPRESSION ABDOMEN & PELVIS CT:    1. Low-density renal lesions as discussed, favor cysts.  2. Enlarging left adrenal nodule, likely adenoma.  3. Extensive diverticulosis.  4. A 5 mm sclerotic left iliac lesion as discussed.    BONE MINERAL DENSITOMETRY  IMPRESSION:  Osteoporosis at the left hip. Interval decrease in bone  density.     This report was finalized on 2/14/2020 12:10 PM by Dr. Osmin Dodd M.D.    Assessment/Plan   1.  Locally advanced right breast cancer strongly ER MS positive with indeterminate HER-2 with repeat testing showing 3+ IHC consistent with positive result  · Staging work-up probably negative although indeterminate bone lesions without obvious fractures on bone  scan  · Abnormal mediastinal adenopathy which is been present since 2016 likely granulomatous disease  · Enlarging left adrenal mass felt to be an adenoma present since 2015  · History of left breast cancer 1978 at age 38 treated with modified radical mastectomy alone? Pathology  · [place] to be a poor candidate for surgery or chemotherapy.PT DECLINED CHEMO  · Initiated Arimidex around 9/11/2019.  Radiation may be a possibility for additional therapy if she does not get a good response from Arimidex  · New lymphedem right arm vs DVT.    2.  Granulomatous mediastinal lymph nodes chronic    3.  Recent stroke in 2/19 with left hemiparesis and dysphagia  · Patient hospitalized again 9/26 and 9/29/2019 presenting with garbled speech and elevated blood pressure, felt to have had a TIA.  Neurology recommended dual antiplatelet therapy for 3 months and then Plavix alone.    4.  Hypertension diabetes and hypercholesterolemia    5.  Anemia, multifactorial:  · Patient has stage III CKD.  · Patient also found during recent hospitalization to have low B12 and low iron percent.  Though she had previously received B12 injections she was started on oral B12.  She was also started on oral iron.    · Patient continues on oral iron at this time with some constipation though tolerable with stool softeners.  Plan to continue to monitor her blood work and if develops intolerance to oral iron consider IV iron.  Plan to recheck iron studies today and consider IV iron if still low    PLAN:  1. Continue Arimidex. For now  2. US Breast and axilla and CXR  3. Consider radiation therapy   4. Doppler right arm if neg refer to lymphedema clinic  5. Return in 1 mo- patient again declined chemotherapy of any kind can switch to aromasin -

## 2020-02-13 ENCOUNTER — HOSPITAL ENCOUNTER (OUTPATIENT)
Dept: BONE DENSITY | Facility: HOSPITAL | Age: 80
Discharge: HOME | End: 2020-02-13
Admitting: INTERNAL MEDICINE

## 2020-02-13 DIAGNOSIS — Z17.0 MALIGNANT NEOPLASM OF RIGHT BREAST IN FEMALE, ESTROGEN RECEPTOR POSITIVE, UNSPECIFIED SITE OF BREAST (HCC): ICD-10-CM

## 2020-02-13 DIAGNOSIS — C50.911 MALIGNANT NEOPLASM OF RIGHT BREAST IN FEMALE, ESTROGEN RECEPTOR POSITIVE, UNSPECIFIED SITE OF BREAST (HCC): ICD-10-CM

## 2020-02-13 DIAGNOSIS — Z09 ENCOUNTER FOR FOLLOW-UP EXAMINATION AFTER COMPLETED TREATMENT FOR CONDITIONS OTHER THAN MALIGNANT NEOPLASM: ICD-10-CM

## 2020-02-13 PROCEDURE — 77080 DXA BONE DENSITY AXIAL: CPT

## 2020-02-18 ENCOUNTER — LAB (OUTPATIENT)
Dept: LAB | Facility: HOSPITAL | Age: 80
End: 2020-02-18

## 2020-02-18 ENCOUNTER — OFFICE VISIT (OUTPATIENT)
Dept: ONCOLOGY | Facility: CLINIC | Age: 80
End: 2020-02-18

## 2020-02-18 VITALS
SYSTOLIC BLOOD PRESSURE: 172 MMHG | RESPIRATION RATE: 18 BRPM | OXYGEN SATURATION: 97 % | BODY MASS INDEX: 20.41 KG/M2 | HEIGHT: 65 IN | HEART RATE: 71 BPM | WEIGHT: 122.5 LBS | DIASTOLIC BLOOD PRESSURE: 66 MMHG | TEMPERATURE: 98.6 F

## 2020-02-18 DIAGNOSIS — C50.911 MALIGNANT NEOPLASM OF RIGHT BREAST IN FEMALE, ESTROGEN RECEPTOR POSITIVE, UNSPECIFIED SITE OF BREAST (HCC): ICD-10-CM

## 2020-02-18 DIAGNOSIS — Z17.0 MALIGNANT NEOPLASM OF RIGHT BREAST IN FEMALE, ESTROGEN RECEPTOR POSITIVE, UNSPECIFIED SITE OF BREAST (HCC): ICD-10-CM

## 2020-02-18 DIAGNOSIS — Z17.0 MALIGNANT NEOPLASM OF RIGHT BREAST IN FEMALE, ESTROGEN RECEPTOR POSITIVE, UNSPECIFIED SITE OF BREAST (HCC): Primary | ICD-10-CM

## 2020-02-18 DIAGNOSIS — C50.911 MALIGNANT NEOPLASM OF RIGHT BREAST IN FEMALE, ESTROGEN RECEPTOR POSITIVE, UNSPECIFIED SITE OF BREAST (HCC): Primary | ICD-10-CM

## 2020-02-18 DIAGNOSIS — I82.621 ACUTE EMBOLISM AND THROMBOSIS OF DEEP VEINS OF RIGHT UPPER EXTREMITY (HCC): ICD-10-CM

## 2020-02-18 LAB
BASOPHILS # BLD AUTO: 0.06 10*3/MM3 (ref 0–0.2)
BASOPHILS NFR BLD AUTO: 0.9 % (ref 0–1.5)
DEPRECATED RDW RBC AUTO: 44 FL (ref 37–54)
EOSINOPHIL # BLD AUTO: 0.22 10*3/MM3 (ref 0–0.4)
EOSINOPHIL NFR BLD AUTO: 3.4 % (ref 0.3–6.2)
ERYTHROCYTE [DISTWIDTH] IN BLOOD BY AUTOMATED COUNT: 13.6 % (ref 12.3–15.4)
HCT VFR BLD AUTO: 33.4 % (ref 34–46.6)
HGB BLD-MCNC: 10.9 G/DL (ref 12–15.9)
IMM GRANULOCYTES # BLD AUTO: 0.03 10*3/MM3 (ref 0–0.05)
IMM GRANULOCYTES NFR BLD AUTO: 0.5 % (ref 0–0.5)
LYMPHOCYTES # BLD AUTO: 1.49 10*3/MM3 (ref 0.7–3.1)
LYMPHOCYTES NFR BLD AUTO: 22.9 % (ref 19.6–45.3)
MCH RBC QN AUTO: 28.9 PG (ref 26.6–33)
MCHC RBC AUTO-ENTMCNC: 32.6 G/DL (ref 31.5–35.7)
MCV RBC AUTO: 88.6 FL (ref 79–97)
MONOCYTES # BLD AUTO: 0.75 10*3/MM3 (ref 0.1–0.9)
MONOCYTES NFR BLD AUTO: 11.5 % (ref 5–12)
NEUTROPHILS # BLD AUTO: 3.97 10*3/MM3 (ref 1.7–7)
NEUTROPHILS NFR BLD AUTO: 60.8 % (ref 42.7–76)
NRBC BLD AUTO-RTO: 0 /100 WBC (ref 0–0.2)
PLATELET # BLD AUTO: 214 10*3/MM3 (ref 140–450)
PMV BLD AUTO: 9 FL (ref 6–12)
RBC # BLD AUTO: 3.77 10*6/MM3 (ref 3.77–5.28)
WBC NRBC COR # BLD: 6.52 10*3/MM3 (ref 3.4–10.8)

## 2020-02-18 PROCEDURE — 99214 OFFICE O/P EST MOD 30 MIN: CPT | Performed by: INTERNAL MEDICINE

## 2020-02-18 PROCEDURE — 36415 COLL VENOUS BLD VENIPUNCTURE: CPT

## 2020-02-18 PROCEDURE — 85025 COMPLETE CBC W/AUTO DIFF WBC: CPT

## 2020-02-19 ENCOUNTER — HOSPITAL ENCOUNTER (OUTPATIENT)
Dept: CARDIOLOGY | Facility: HOSPITAL | Age: 80
Discharge: HOME | End: 2020-02-19
Admitting: INTERNAL MEDICINE

## 2020-02-19 ENCOUNTER — HOSPITAL ENCOUNTER (OUTPATIENT)
Dept: GENERAL RADIOLOGY | Facility: HOSPITAL | Age: 80
Discharge: HOME | End: 2020-02-19

## 2020-02-19 DIAGNOSIS — Z17.0 MALIGNANT NEOPLASM OF RIGHT BREAST IN FEMALE, ESTROGEN RECEPTOR POSITIVE, UNSPECIFIED SITE OF BREAST (HCC): ICD-10-CM

## 2020-02-19 DIAGNOSIS — C50.911 MALIGNANT NEOPLASM OF RIGHT BREAST IN FEMALE, ESTROGEN RECEPTOR POSITIVE, UNSPECIFIED SITE OF BREAST (HCC): ICD-10-CM

## 2020-02-19 LAB
BH CV UPPER VENOUS LEFT INTERNAL JUGULAR AUGMENT: NORMAL
BH CV UPPER VENOUS LEFT INTERNAL JUGULAR COMPETENT: NORMAL
BH CV UPPER VENOUS LEFT INTERNAL JUGULAR COMPRESS: NORMAL
BH CV UPPER VENOUS LEFT INTERNAL JUGULAR PHASIC: NORMAL
BH CV UPPER VENOUS LEFT INTERNAL JUGULAR SPONT: NORMAL
BH CV UPPER VENOUS LEFT SUBCLAVIAN AUGMENT: NORMAL
BH CV UPPER VENOUS LEFT SUBCLAVIAN COMPETENT: NORMAL
BH CV UPPER VENOUS LEFT SUBCLAVIAN COMPRESS: NORMAL
BH CV UPPER VENOUS LEFT SUBCLAVIAN PHASIC: NORMAL
BH CV UPPER VENOUS LEFT SUBCLAVIAN SPONT: NORMAL
BH CV UPPER VENOUS RIGHT AXILLARY AUGMENT: NORMAL
BH CV UPPER VENOUS RIGHT AXILLARY COMPETENT: NORMAL
BH CV UPPER VENOUS RIGHT AXILLARY COMPRESS: NORMAL
BH CV UPPER VENOUS RIGHT AXILLARY PHASIC: NORMAL
BH CV UPPER VENOUS RIGHT AXILLARY SPONT: NORMAL
BH CV UPPER VENOUS RIGHT BASILIC FOREARM COMPRESS: NORMAL
BH CV UPPER VENOUS RIGHT BASILIC UPPER COMPRESS: NORMAL
BH CV UPPER VENOUS RIGHT BRACHIAL COMPRESS: NORMAL
BH CV UPPER VENOUS RIGHT CEPHALIC FOREARM COMPRESS: NORMAL
BH CV UPPER VENOUS RIGHT CEPHALIC UPPER COMPRESS: NORMAL
BH CV UPPER VENOUS RIGHT INTERNAL JUGULAR AUGMENT: NORMAL
BH CV UPPER VENOUS RIGHT INTERNAL JUGULAR COMPETENT: NORMAL
BH CV UPPER VENOUS RIGHT INTERNAL JUGULAR COMPRESS: NORMAL
BH CV UPPER VENOUS RIGHT INTERNAL JUGULAR PHASIC: NORMAL
BH CV UPPER VENOUS RIGHT INTERNAL JUGULAR SPONT: NORMAL
BH CV UPPER VENOUS RIGHT RADIAL COMPRESS: NORMAL
BH CV UPPER VENOUS RIGHT SUBCLAVIAN AUGMENT: NORMAL
BH CV UPPER VENOUS RIGHT SUBCLAVIAN COMPETENT: NORMAL
BH CV UPPER VENOUS RIGHT SUBCLAVIAN COMPRESS: NORMAL
BH CV UPPER VENOUS RIGHT SUBCLAVIAN PHASIC: NORMAL
BH CV UPPER VENOUS RIGHT SUBCLAVIAN SPONT: NORMAL
BH CV UPPER VENOUS RIGHT ULNAR COMPRESS: NORMAL

## 2020-02-19 PROCEDURE — 71046 X-RAY EXAM CHEST 2 VIEWS: CPT

## 2020-02-19 PROCEDURE — 93971 EXTREMITY STUDY: CPT

## 2020-02-26 RX ORDER — HYDROCHLOROTHIAZIDE 12.5 MG/1
CAPSULE, GELATIN COATED ORAL
Qty: 90 CAPSULE | Refills: 3 | Status: SHIPPED | OUTPATIENT
Start: 2020-02-26 | End: 2021-02-19 | Stop reason: SDUPTHER

## 2020-02-27 ENCOUNTER — APPOINTMENT (OUTPATIENT)
Dept: WOMENS IMAGING | Facility: HOSPITAL | Age: 80
End: 2020-02-27

## 2020-02-27 PROCEDURE — 76641 ULTRASOUND BREAST COMPLETE: CPT | Performed by: RADIOLOGY

## 2020-03-02 ENCOUNTER — CONSULT (OUTPATIENT)
Dept: RADIATION ONCOLOGY | Facility: HOSPITAL | Age: 80
End: 2020-03-02

## 2020-03-02 ENCOUNTER — APPOINTMENT (OUTPATIENT)
Dept: RADIATION ONCOLOGY | Facility: HOSPITAL | Age: 80
End: 2020-03-02

## 2020-03-02 VITALS
BODY MASS INDEX: 21.08 KG/M2 | WEIGHT: 125 LBS | SYSTOLIC BLOOD PRESSURE: 149 MMHG | OXYGEN SATURATION: 98 % | HEART RATE: 57 BPM | DIASTOLIC BLOOD PRESSURE: 72 MMHG

## 2020-03-02 DIAGNOSIS — C50.811 MALIGNANT NEOPLASM OF OVERLAPPING SITES OF RIGHT BREAST IN FEMALE, ESTROGEN RECEPTOR POSITIVE (HCC): Primary | ICD-10-CM

## 2020-03-02 DIAGNOSIS — Z17.0 MALIGNANT NEOPLASM OF OVERLAPPING SITES OF RIGHT BREAST IN FEMALE, ESTROGEN RECEPTOR POSITIVE (HCC): Primary | ICD-10-CM

## 2020-03-02 PROCEDURE — 77290 THER RAD SIMULAJ FIELD CPLX: CPT | Performed by: RADIOLOGY

## 2020-03-02 PROCEDURE — 77333 RADIATION TREATMENT AID(S): CPT | Performed by: RADIOLOGY

## 2020-03-02 PROCEDURE — G0463 HOSPITAL OUTPT CLINIC VISIT: HCPCS | Performed by: RADIOLOGY

## 2020-03-02 PROCEDURE — 99203 OFFICE O/P NEW LOW 30 MIN: CPT | Performed by: RADIOLOGY

## 2020-03-02 PROCEDURE — 77263 THER RADIOLOGY TX PLNG CPLX: CPT | Performed by: RADIOLOGY

## 2020-03-02 NOTE — PROGRESS NOTES
Subjective     Linda Marcus MD    No diagnosis found.    Ms. Burgos is a very pleasant 79-year-old white female who prior to diagnosis, approximately 1 year of slow changes in the nipple of the right breast with progression to some lumps in the right axilla as well as changes in the texture of the right breast.  Mammogram in July 2019 described a 6.4 x 2.7 x 5.1 cm mass in the right breast extending to the skin and noted several several enlarged  axillary lymph nodes .  Diagnosis was established at needle biopsy on 8/1/2019 of both the axilla and the breast returning grade 2 infiltrating ductal carcinoma.  Estrogen receptors were 98% progestin receptors 82% and HER-2/[place] was positive.  Ki-67 was 21%.  She was deemed not to be a surgical candidate per Dr. Tonny Gonzalez and the patient has declined chemotherapy.  Work-up included a CT scan of the chest abdomen and pelvis which described some mild AP window and precarinal lymphadenopathy, likely  granulomatous disease, a likely adrenal adenoma, there was also noted a small sclerotic 5 mm lesion in the posterior left iliac bone as well as a small sclerotic lesion in the femoral head - stable.  Bone scan described uptake in the left greater trochanter compatible with a fracture noted on CT -   there evidently was a fall as etiology -  and there is no evidence that this is pathological fracture.  Additional spot on bone scan at the left superior pubic ramus -  no fracture on CT, indeterminate.  MRI of the brain was negative.  She was started on Arimidex in September 2019.    We are asked to evaluate her for radiation therapy to the breast and axilla in this setting.                                 Review of Systems   Constitutional: Positive for chills.   HENT: Positive for hearing loss.    Eyes: Positive for visual disturbance.   Cardiovascular: Positive for leg swelling.   Gastrointestinal: Negative.    Endocrine: Negative.    Genitourinary: Negative.     Musculoskeletal: Positive for arthralgias.   Neurological: Positive for weakness.   Hematological: Positive for adenopathy. Bruises/bleeds easily.         Past Medical History:   Diagnosis Date   • Anemia     required prior blood transfusions   • Arthritis    • Cancer (CMS/HCC)     right breast cancer    • Clostridium difficile colitis    • Diabetes mellitus (CMS/HCC)    • Difficulty walking    • Diverticulitis    • Environmental allergies    • History of malignant neoplasm of breast 2013    denies any chemo or radiation   • History of malignant neoplasm of skin 2013    possibly basal   • History of tobacco use 2013   • HL (hearing loss)    • Hyperlipidemia    • Hypertension    • Kidney disease    • Neuropathy in diabetes (CMS/HCC)    • Peripheral neuropathy    • Shingles    • Skin cancer    • Stroke (CMS/HCC) 2019   • Vision loss          Past Surgical History:   Procedure Laterality Date   • CATARACT EXTRACTION, BILATERAL     • EAR TUBES     • MASTECTOMY Bilateral     radical   • TUMOR REMOVAL Left     large lipoma removed from hip         Social History     Socioeconomic History   • Marital status:      Spouse name: Not on file   • Number of children: 3   • Years of education: High school   • Highest education level: Not on file   Occupational History     Employer: RETIRED   Tobacco Use   • Smoking status: Former Smoker     Packs/day: 0.25     Years: 10.00     Pack years: 2.50     Types: Cigarettes     Last attempt to quit: 2019     Years since quittin.0   • Smokeless tobacco: Former User   • Tobacco comment: she quit for 8 years and started back about a year ago then quit again   Substance and Sexual Activity   • Alcohol use: No   • Drug use: No   • Sexual activity: Never         Family History   Problem Relation Age of Onset   • Stroke Mother 76   • Diabetes Mother    • Hypertension Mother    • Arthritis Mother    • Cancer Father         metastatic unknown cause   • Heart  attack Father 70   • Colon cancer Father    • Lung cancer Father    • Cancer Brother         bladder   • Arthritis Sister    • Melanoma Sister    • Hypertension Daughter    • Diabetes Daughter           Objective    Physical Exam      Large firm right breast mass with scattered erythematous nodular involvement of the skin.  Right axillary mass.    Current Outpatient Medications on File Prior to Visit   Medication Sig Dispense Refill   • acetaminophen (TYLENOL) 500 MG tablet Take 500 mg by mouth Every 6 (Six) Hours As Needed for Mild Pain .     • amLODIPine (NORVASC) 10 MG tablet Take 1 tablet by mouth Daily. 90 tablet 3   • anastrozole (ARIMIDEX) 1 MG tablet Take 1 mg by mouth Daily.     • cetirizine (zyrTEC) 10 MG tablet Take 10 mg by mouth Daily.     • cloNIDine (CATAPRES) 0.2 MG tablet TAKE 1 TABLET BY MOUTH EVERY 12 HOURS 180 tablet 0   • clopidogrel (PLAVIX) 75 MG tablet TAKE 1 TABLET BY MOUTH DAILY 30 tablet 6   • ferrous sulfate (IRON SUPPLEMENT) 325 (65 FE) MG tablet Take 1 tablet by mouth 2 (Two) Times a Day Before Meals. 60 tablet 0   • fluticasone (FLONASE) 50 MCG/ACT nasal spray 2 sprays into the nostril(s) as directed by provider Every Night.     • guaiFENesin (MUCINEX) 600 MG 12 hr tablet Take 600 mg by mouth 2 (Two) Times a Day As Needed for Cough.     • hydroCHLOROthiazide (MICROZIDE) 12.5 MG capsule TAKE 1 CAPSULE BY MOUTH EVERY DAY 90 capsule 3   • lisinopril (PRINIVIL,ZESTRIL) 40 MG tablet TAKE 1 TABLET BY MOUTH DAILY 90 tablet 0   • metFORMIN (GLUCOPHAGE) 1000 MG tablet TAKE 1 TABLET BY MOUTH TWICE DAILY WITH MEALS 180 tablet 0   • metoprolol succinate XL (TOPROL-XL) 50 MG 24 hr tablet Take 1 tablet by mouth Daily. 90 tablet 3   • pravastatin (PRAVACHOL) 20 MG tablet Take 20 mg by mouth Every Night.     • vitamin B-12 (VITAMIN B-12) 2000 MCG tablet Take 1 tablet by mouth Daily. 30 tablet 0   • senna (SENOKOT) 8.6 MG tablet tablet TK 1 T PO QD  0     No current facility-administered medications on  file prior to visit.        ALLERGIES:    Allergies   Allergen Reactions   • Lipitor [Atorvastatin] Unknown - High Severity     Leg weakness        /72   Pulse 57   Wt 56.7 kg (125 lb)   SpO2 98%   BMI 21.08 kg/m²      Current Status 2/18/2020   ECOG score 0         Assessment/Plan    We will plan to treat the intact right breast as well as the supraclavicular fossa with a posterior axillary boost to approximately 60 Gy in 30 fractions.  The patient and her daughter had many good questions which I believe were answered to their satisfaction and informed consent was obtained.  We started the planning process with immobilization and CT simulation in our department this afternoon and would anticipate beginning her treatments in another 1-1.5 weeks.    [facility] Onc ECOG Status: (0) Fully active, able to carry on all predisease performance without restriction       I spent greater than 30 minutes in face-to-face time with the patient and greater than 25 minutes that time was spent in counseling and coordination of care to include discussion of indications, goals, logistics, alternatives, and risks both common and rare.      Mandeep Shelton MD

## 2020-03-05 ENCOUNTER — HOSPITAL ENCOUNTER (OUTPATIENT)
Dept: OCCUPATIONAL THERAPY | Facility: HOSPITAL | Age: 80
Setting detail: THERAPIES SERIES
Discharge: HOME | End: 2020-03-05

## 2020-03-05 DIAGNOSIS — I89.0 LYMPHEDEMA OF UPPER EXTREMITY: Primary | ICD-10-CM

## 2020-03-05 PROCEDURE — 77334 RADIATION TREATMENT AID(S): CPT | Performed by: RADIOLOGY

## 2020-03-05 PROCEDURE — 77300 RADIATION THERAPY DOSE PLAN: CPT | Performed by: RADIOLOGY

## 2020-03-05 PROCEDURE — 77295 3-D RADIOTHERAPY PLAN: CPT | Performed by: RADIOLOGY

## 2020-03-05 PROCEDURE — 97166 OT EVAL MOD COMPLEX 45 MIN: CPT

## 2020-03-05 PROCEDURE — 97535 SELF CARE MNGMENT TRAINING: CPT

## 2020-03-06 DIAGNOSIS — I10 ESSENTIAL HYPERTENSION: ICD-10-CM

## 2020-03-06 RX ORDER — AMLODIPINE BESYLATE 10 MG/1
10 TABLET ORAL DAILY
Qty: 90 TABLET | Refills: 3 | Status: SHIPPED | OUTPATIENT
Start: 2020-03-06 | End: 2021-03-19 | Stop reason: SDUPTHER

## 2020-03-06 NOTE — THERAPY EVALUATION
Outpatient Occupational Therapy Lymphedema Initial Evaluation  [facility]     Patient Name: Mar Burgos  : 1940  MRN: [id number]  Today's Date: 3/6/2020      Visit Date: 2020    Patient Active Problem List   Diagnosis   • Anemia   • Uncontrolled type 2 diabetes mellitus (CMS/HCC)   • Diabetic neuropathy (CMS/HCC)   • Gastroesophageal reflux disease   • Hearing loss   • Hyperlipidemia   • Hypertension   • Thoracic back pain   • Peripheral arterial occlusive disease (CMS/HCC)   • Seasonal allergic rhinitis   • Type 2 diabetes mellitus, without long-term current use of insulin (CMS/HCC)   • B12 deficiency   • Cerebrovascular accident in 2019 (CVA) (CMS/HCC)   • Smoker   • Malignant neoplasm of right female breast (CMS/HCC)   • TIA (transient ischemic attack)   • Hypertensive urgency   • CKD (chronic kidney disease) stage 3, GFR 30-59 ml/min (CMS/[facility])        Past Medical History:   Diagnosis Date   • Anemia     required prior blood transfusions   • Arthritis    • Cancer (CMS/[facility])     right breast cancer    • Clostridium difficile colitis    • Diabetes mellitus (CMS/HCC)    • Difficulty walking    • Diverticulitis    • Environmental allergies    • History of malignant neoplasm of breast 2013    denies any chemo or radiation   • History of malignant neoplasm of skin 2013    possibly basal   • History of tobacco use 2013   • HL (hearing loss)    • Hyperlipidemia    • Hypertension    • Kidney disease    • Neuropathy in diabetes (CMS/HCC)    • Peripheral neuropathy    • Shingles    • Skin cancer    • Stroke (CMS/HCC) 2019   • Vision loss         Past Surgical History:   Procedure Laterality Date   • CATARACT EXTRACTION, BILATERAL     • EAR TUBES     • MASTECTOMY Bilateral     radical   • TUMOR REMOVAL Left     large lipoma removed from hip         Visit Dx:     ICD-10-CM ICD-9-CM   1. Lymphedema of upper extremity I89.0 457.1       Patient History     Row Name 20 1300              History    Chief Complaint  Swelling;Tightness  -RE      Date Current Problem(s) Began  -- about 2 weeks ago  -RE      Brief Description of Current Complaint  Patient with R breast cancer and no surgery with planned radiation. About 2 weeks ago R arm started swelling.  It has not worsened.  -RE      Patient/Caregiver Goals  Know what to do to help the symptoms;Decrease swelling  -RE      What clinical tests have you had for this problem?  Other 1 (comment) Doppler  -RE      Results of Clinical Tests  negative  -RE      Are you or can you be pregnant  No  -RE         Pain     Pain at Present  0  -RE         Fall Risk Assessment    Any falls in the past year:  Yes  -RE      Number of falls reported in the last 12 months  3  -RE      Factors that contributed to the fall:  Tripped  -RE      Does patient have a fear of falling  No  -RE         Services    Are you currently receiving Home Health services  No  -RE         Daily Activities    Primary Language  English  -RE      Are you able to read  Yes  -RE      Are you able to write  Yes  -RE      How does patient learn best?  Listening;Reading;Demonstration  -RE      Teaching needs identified  Management of Condition;Home Exercise Program  -RE      Does patient have problems with the following?  None  -RE      Barriers to learning  None  -RE      Pt Participated in POC and Goals  Yes  -RE         Safety    Are you being hurt, hit, or frightened by anyone at home or in your life?  No  -RE      Are you being neglected by a caregiver  No  -RE        User Key  (r) = Recorded By, (t) = Taken By, (c) = Cosigned By    Initials Name Provider Type    RE Marina Santana OTR Occupational Therapist          Lymphedema     Row Name 03/05/20 1300             Subjective Pain    Able to rate subjective pain?  yes  -RE      Pre-Treatment Pain Level  0  -RE      Post-Treatment Pain Level  0  -RE         Lymphedema Assessment    Lymphedema Classification  RUE:;secondary;stage 2  (Spontaneously Irreversible)  -RE      Lymphedema Cancer Related Sx  left;modified radical mastectomy  -RE      Lymphedema Surgery Comments  no surgery planned for R side  -RE      Chemo Received  no  -RE      Radiation Therapy Received  -- pending  -RE      Infections or Cellulitis?  no  -RE         Posture/Observations    Alignment Options  Forward head;Rounded shoulders  -RE         General ROM    GENERAL ROM COMMENTS  Asif UE's WFL  -RE         MMT (Manual Muscle Testing)    General MMT Comments  strength is grossly >4/5  -RE         Lymphedema Edema Assessment    Ptting Edema Category  By grade out of 4  -RE      Pitting Edema  + 3/4;Moderate  -RE      Edema Assessment Comment  fingers to axilla  -RE         Lymphedema Sensation    Lymphedema Sensation Reports  RUE:;LUE:;normal  -RE         Lymphedema Measurements    Measurement Type(s)  Circumferential  -RE      Circumferential Areas  Upper extremities  -RE         BUE Circumferential (cm)    Measurement Location 1  Axilla   -RE      Left 1  27.5 cm  -RE      Right 1  27 cm  -RE      Measurement Location 2  15 cm above elbow  -RE      Left 2  24 cm  -RE      Right 2  26 cm  -RE      Measurement Location 3  10 cm above elbow  -RE      Left 3  21.5 cm  -RE      Right 3  24 cm  -RE      Measurement Location 4  5 cm above elbow  -RE      Left 4  19.5 cm  -RE      Right 4  23.5 cm  -RE      Measurement Location 5  Elbow  -RE      Left 5  21 cm  -RE      Right 5  25.5 cm  -RE      Measurement Location 6  5 cm below elbow  -RE      Left 6  19 cm  -RE      Right 6  24 cm  -RE      Measurement Location 7  10 cm below elbow  -RE      Left 7  17 cm  -RE      Right 7  21.5 cm  -RE      Measurement Location 8  15 cm belwo elbow  -RE      Left 8  15 cm  -RE      Right 8  17.2 cm  -RE      Measurement Location 9  20 cm below elbow  -RE      Left 9  14.5 cm  -RE      Right 9  16 cm  -RE      Measurement Location 10  Wrist  -RE      Left 10  15 cm  -RE      Right 10  17 cm  -RE  "     Measurement Location 11  mid palm  -RE      Left 11  17.5 cm  -RE      Right 11  19.5 cm  -RE      LUE Circumferential Total  211.5 cm  -RE      RUE Circumferential Total  241.2 cm  -RE        User Key  (r) = Recorded By, (t) = Taken By, (c) = Cosigned By    Initials Name Provider Type    Marina Martinez OTR Occupational Therapist                  Therapy Education  Education Details: Discussed options for lymphedema treatment including compression only with garments and compression with bandages as well as full CDT. Gave patient \"Healthy Habits for Lymphedema Patients\" and \"What is Lymphedema\" and reviewed with patient and her daughter. Discussed disease process and what to expect from therapy.    Given: Symptoms/condition management, Edema management  Program: New  How Provided: Verbal, Written  Provided to: Patient, Caregiver  Level of Understanding: Teach back education performed, Verbalized  53515 - OT Self Care/Mgmt Minutes: 15        OT Goals     Row Name 03/06/20 0800          OT Short Term Goals    STG Date to Achieve  03/20/20  -RE     STG 1  Patient will demonstrate proper awareness of “What is Lymphedema?” and “Healthy Habits” for improved prevention, management, care of symptoms and ease of transition to self-care of condition.   -RE     STG 1 Progress  New  -RE     STG 2  Patient independent and compliant with self-wrapping techniques of compression bandages with family member as indicated for improved self-management of condition.  -RE     STG 2 Progress  New  -RE     STG 3  Patient will demonstrate decreased net edema of right upper extremity >/= 5-7 cm for decrease in symptoms, decreased risk of infection and improved skin care/transition to self-care of condition.   -RE     STG 3 Progress  New  -RE        Long Term Goals    LTG Date to Achieve  04/03/20  -RE     LTG 1  Patient demonstrate decreased net edema of right upper extremity >/= 7-12 cm for decrease in edema, symptoms, decreased risk " of infection and improved skin care/transition to self-care of condition.   -RE     LTG 1 Progress  New  -RE     LTG 2  Patient independent and compliant with compression garments and night compression as indicated for self-management of edema flare ups.   -RE     LTG 2 Progress  New  -RE        Time Calculation    OT Goal Re-Cert Due Date  06/06/20  -RE       User Key  (r) = Recorded By, (t) = Taken By, (c) = Cosigned By    Initials Name Provider Type    Marina Martinez OTR Occupational Therapist          OT Assessment/Plan     Row Name 03/06/20 0817          OT Assessment    Functional Limitations  Impaired gait  -RE     Impairments  Impaired lymphatic circulation;Posture;Edema;Gait;Impaired postural alignment  -RE     Assessment Comments  Patient is a 79 y.o. female referred to lymphedema therapy for evaluation and treatment ofright UE and breast swelling.  She presents with evolving symptoms.  Signs and symptoms are consistent with R UE and trunk lymphedema.  Edema extends from her R chest/breast into her fingers.  AROM is not currently affected.  Edema is 3+.Pertinent comorbidities and personal factors that may affect progress include that she is not a candidate for surgery or chemotherapy, previous CVA.   She is unsure about any treatment at this time as she states her arm is not bothering her.  Will follow closely. Recommend skilled OT to decrease edema and to teach self care strategies.  -RE     Please refer to paper survey for additional self-reported information  Yes  -RE     OT Diagnosis  R UE lymphedema  -RE     OT Rehab Potential  Good  -RE     Patient/caregiver participated in establishment of treatment plan and goals  Yes  -RE     Patient would benefit from skilled therapy intervention  Yes  -RE        OT Plan    OT Frequency  3x/week  -RE     Predicted Duration of Therapy Intervention (Therapy Eval)  4 weeks   -RE     Planned CPT's?  OT EVAL MOD COMPLEXITY: 71595;OT THER PROC EA 15 MIN: 89219DG;OT  SELF CARE/MGMT/TRAIN 15 MIN: 87047;OT MANUAL THERAPY EA 15 MIN: 04546  -RE     Planned Therapy Interventions (Optional Details)  home exercise program;manual therapy techniques;patient/family education skin care and compression therapy  -RE     OT Plan Comments  schedule   -RE       User Key  (r) = Recorded By, (t) = Taken By, (c) = Cosigned By    Initials Name Provider Type    RE Marina Santana OTR Occupational Therapist                    Time Calculation:   OT Start Time: 1345  OT Stop Time: 1445  OT Time Calculation (min): 60 min  Total Timed Code Minutes- OT: 15 minute(s)     Therapy Charges for Today     Code Description Service Date Service Provider Modifiers Qty    [id number]  OT EVAL MOD COMPLEXITY 3 3/5/2020 Marina Santana OTR GO 1    [id number]  OT SELF CARE/MGMT/TRAIN EA 15 MIN 3/5/2020 Marina Santana OTR GO 1                    REBECCA Christensen  3/6/2020

## 2020-03-12 PROCEDURE — 77412 RADIATION TX DELIVERY LVL 3: CPT | Performed by: RADIOLOGY

## 2020-03-12 PROCEDURE — 77280 THER RAD SIMULAJ FIELD SMPL: CPT | Performed by: RADIOLOGY

## 2020-03-12 PROCEDURE — 77427 RADIATION TX MANAGEMENT X5: CPT | Performed by: RADIOLOGY

## 2020-03-13 PROCEDURE — 77412 RADIATION TX DELIVERY LVL 3: CPT | Performed by: RADIOLOGY

## 2020-03-13 PROCEDURE — 77387 GUIDANCE FOR RADJ TX DLVR: CPT | Performed by: RADIOLOGY

## 2020-03-16 PROCEDURE — 77412 RADIATION TX DELIVERY LVL 3: CPT | Performed by: RADIOLOGY

## 2020-03-16 PROCEDURE — 77387 GUIDANCE FOR RADJ TX DLVR: CPT | Performed by: RADIOLOGY

## 2020-03-17 ENCOUNTER — TELEPHONE (OUTPATIENT)
Dept: ONCOLOGY | Facility: CLINIC | Age: 80
End: 2020-03-17

## 2020-03-17 PROCEDURE — 77387 GUIDANCE FOR RADJ TX DLVR: CPT | Performed by: RADIOLOGY

## 2020-03-17 PROCEDURE — 77412 RADIATION TX DELIVERY LVL 3: CPT | Performed by: RADIOLOGY

## 2020-03-17 PROCEDURE — 77336 RADIATION PHYSICS CONSULT: CPT | Performed by: RADIOLOGY

## 2020-03-17 PROCEDURE — 77300 RADIATION THERAPY DOSE PLAN: CPT | Performed by: RADIOLOGY

## 2020-03-17 PROCEDURE — 77334 RADIATION TREATMENT AID(S): CPT | Performed by: RADIOLOGY

## 2020-03-18 ENCOUNTER — APPOINTMENT (OUTPATIENT)
Dept: LAB | Facility: HOSPITAL | Age: 80
End: 2020-03-18

## 2020-03-18 ENCOUNTER — RADIATION ONCOLOGY WEEKLY ASSESSMENT (OUTPATIENT)
Dept: RADIATION ONCOLOGY | Facility: HOSPITAL | Age: 80
End: 2020-03-18

## 2020-03-18 DIAGNOSIS — Z17.0 MALIGNANT NEOPLASM OF OVERLAPPING SITES OF RIGHT BREAST IN FEMALE, ESTROGEN RECEPTOR POSITIVE (HCC): Primary | ICD-10-CM

## 2020-03-18 DIAGNOSIS — C50.811 MALIGNANT NEOPLASM OF OVERLAPPING SITES OF RIGHT BREAST IN FEMALE, ESTROGEN RECEPTOR POSITIVE (HCC): Primary | ICD-10-CM

## 2020-03-18 PROCEDURE — 77387 GUIDANCE FOR RADJ TX DLVR: CPT | Performed by: RADIOLOGY

## 2020-03-18 PROCEDURE — 77412 RADIATION TX DELIVERY LVL 3: CPT | Performed by: RADIOLOGY

## 2020-03-18 NOTE — PROGRESS NOTES
Physician Weekly Management Note    Diagnosis:     Diagnosis Plan   1. 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 in female, estrogen receptor positive (CMS/HCC)         RT Details:  Treatment #5/33       Arimidex    Notes on Treatment course, Films, Medical progress:  Some minor fatigue otherwise no treatment-related issues continue as planned.      [facility] Onc ECOG Status: (1) Restricted in physically strenuous activity, ambulatory and able to do work of light nature      Weekly Management:  Medication reviewed?   Yes  New medications given?   No  Problemlist reviewed?   Yes  Problem added?   No      Technical aspects reviewed:  Weekly OBI approved?   Yes  Weekly port films approved?   Yes  Change requests noted on port film?   No  Patient setup and plan reviewed?   Yes    Chart Reviewed:  Continue current treatment plan?   Yes  Treatment plan change requested?   No  CBC reviewed?   No  Concurrent Chemo?   No    Objective     Toxicities:   As above     Review of Systems   As above    There were no vitals filed for this visit.    Current Status 2/18/2020   ECOG score 0       Physical Exam  As above      Problem Summary List    Diagnosis:     Diagnosis Plan   1. 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 in female, estrogen receptor positive (CMS/HCC)       Pathology:       Past Medical History:   Diagnosis Date   • Anemia     required prior blood transfusions   • Arthritis    • Cancer (CMS/HCC)     right breast cancer    • Clostridium difficile colitis    • Diabetes mellitus (CMS/HCC)    • Difficulty walking    • Diverticulitis    • Environmental allergies    • History of malignant neoplasm of breast 07/01/2013    denies any chemo or radiation   • History of malignant neoplasm of skin 07/01/2013    possibly basal   • History of tobacco use 7/1/2013   • HL (hearing loss)    • Hyperlipidemia    • Hypertension    • Kidney disease    • Neuropathy in diabetes (CMS/HCC)    • Peripheral  neuropathy    • Shingles    • Skin cancer    • Stroke (CMS/HCC) 2019   • Vision loss          Past Surgical History:   Procedure Laterality Date   • CATARACT EXTRACTION, BILATERAL     • EAR TUBES     • MASTECTOMY Bilateral     radical   • TUMOR REMOVAL Left     large lipoma removed from hip         Current Outpatient Medications on File Prior to Visit   Medication Sig Dispense Refill   • acetaminophen (TYLENOL) 500 MG tablet Take 500 mg by mouth Every 6 (Six) Hours As Needed for Mild Pain .     • amLODIPine (NORVASC) 10 MG tablet TAKE 1 TABLET BY MOUTH DAILY 90 tablet 3   • anastrozole (ARIMIDEX) 1 MG tablet Take 1 mg by mouth Daily.     • cetirizine (zyrTEC) 10 MG tablet Take 10 mg by mouth Daily.     • cloNIDine (CATAPRES) 0.2 MG tablet TAKE 1 TABLET BY MOUTH EVERY 12 HOURS 180 tablet 0   • clopidogrel (PLAVIX) 75 MG tablet TAKE 1 TABLET BY MOUTH DAILY 30 tablet 6   • ferrous sulfate (IRON SUPPLEMENT) 325 (65 FE) MG tablet Take 1 tablet by mouth 2 (Two) Times a Day Before Meals. 60 tablet 0   • fluticasone (FLONASE) 50 MCG/ACT nasal spray 2 sprays into the nostril(s) as directed by provider Every Night.     • guaiFENesin (MUCINEX) 600 MG 12 hr tablet Take 600 mg by mouth 2 (Two) Times a Day As Needed for Cough.     • hydroCHLOROthiazide (MICROZIDE) 12.5 MG capsule TAKE 1 CAPSULE BY MOUTH EVERY DAY 90 capsule 3   • lisinopril (PRINIVIL,ZESTRIL) 40 MG tablet TAKE 1 TABLET BY MOUTH DAILY 90 tablet 0   • metFORMIN (GLUCOPHAGE) 1000 MG tablet TAKE 1 TABLET BY MOUTH TWICE DAILY WITH MEALS 180 tablet 0   • metoprolol succinate XL (TOPROL-XL) 50 MG 24 hr tablet Take 1 tablet by mouth Daily. 90 tablet 3   • pravastatin (PRAVACHOL) 20 MG tablet Take 20 mg by mouth Every Night.     • senna (SENOKOT) 8.6 MG tablet tablet TK 1 T PO QD  0   • vitamin B-12 (VITAMIN B-12) 2000 MCG tablet Take 1 tablet by mouth Daily. 30 tablet 0     No current facility-administered medications on file prior to visit.        Allergies   Allergen  Reactions   • Lipitor [Atorvastatin] Unknown - High Severity     Leg weakness         Primary care MD:    Vladimir Hernandez APRN    Oncologist:  Patient Care Team:  Fabricio Gonzalez MD as Referring Physician (Breast Surgery)  Linda Marcus MD as Consulting Physician (Hematology and Oncology)  Mandeep Shelton MD as Consulting Physician (Radiation Oncology)       Seen and approved by:  Mandeep Shelton MD  03/18/2020

## 2020-03-19 PROCEDURE — 77427 RADIATION TX MANAGEMENT X5: CPT | Performed by: RADIOLOGY

## 2020-03-19 PROCEDURE — 77387 GUIDANCE FOR RADJ TX DLVR: CPT | Performed by: RADIOLOGY

## 2020-03-19 PROCEDURE — 77412 RADIATION TX DELIVERY LVL 3: CPT | Performed by: RADIOLOGY

## 2020-03-20 PROCEDURE — 77387 GUIDANCE FOR RADJ TX DLVR: CPT | Performed by: RADIOLOGY

## 2020-03-20 PROCEDURE — 77412 RADIATION TX DELIVERY LVL 3: CPT | Performed by: RADIOLOGY

## 2020-03-23 PROCEDURE — 77412 RADIATION TX DELIVERY LVL 3: CPT | Performed by: RADIOLOGY

## 2020-03-23 PROCEDURE — 77387 GUIDANCE FOR RADJ TX DLVR: CPT | Performed by: RADIOLOGY

## 2020-03-24 ENCOUNTER — RADIATION ONCOLOGY WEEKLY ASSESSMENT (OUTPATIENT)
Dept: RADIATION ONCOLOGY | Facility: HOSPITAL | Age: 80
End: 2020-03-24

## 2020-03-24 DIAGNOSIS — Z17.0 MALIGNANT NEOPLASM OF OVERLAPPING SITES OF RIGHT BREAST IN FEMALE, ESTROGEN RECEPTOR POSITIVE (HCC): Primary | ICD-10-CM

## 2020-03-24 DIAGNOSIS — C50.811 MALIGNANT NEOPLASM OF OVERLAPPING SITES OF RIGHT BREAST IN FEMALE, ESTROGEN RECEPTOR POSITIVE (HCC): Primary | ICD-10-CM

## 2020-03-24 PROCEDURE — 77412 RADIATION TX DELIVERY LVL 3: CPT | Performed by: RADIOLOGY

## 2020-03-24 PROCEDURE — 77336 RADIATION PHYSICS CONSULT: CPT | Performed by: RADIOLOGY

## 2020-03-25 PROCEDURE — 77412 RADIATION TX DELIVERY LVL 3: CPT | Performed by: RADIOLOGY

## 2020-03-26 PROCEDURE — 77412 RADIATION TX DELIVERY LVL 3: CPT | Performed by: RADIOLOGY

## 2020-03-26 PROCEDURE — 77387 GUIDANCE FOR RADJ TX DLVR: CPT | Performed by: RADIOLOGY

## 2020-03-26 NOTE — PROGRESS NOTES
Physician Weekly Management Note    Diagnosis:     Diagnosis Plan   1. 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 in female, estrogen receptor positive (CMS/HCC)         RT Details:  Treatment #9/33    Notes on Treatment course, Films, Medical progress:  No complaints or questions.  Moderate erythema in the treatment portal.  Continue as planned      [facility] Onc ECOG Status: (2) Ambulatory and capable of self care, unable to carry out work activity, up and about > 50% or waking hours      Weekly Management:  Medication reviewed?   Yes  New medications given?   No  Problemlist reviewed?   Yes  Problem added?   No      Technical aspects reviewed:  Weekly OBI approved?   Yes  Weekly port films approved?   Yes  Change requests noted on port film?   No  Patient setup and plan reviewed?   Yes    Chart Reviewed:  Continue current treatment plan?   Yes  Treatment plan change requested?   No  CBC reviewed?   No  Concurrent Chemo?   No    Objective     Toxicities:   + As above     Review of Systems   As above    There were no vitals filed for this visit.    Current Status 2/18/2020   ECOG score 0       Physical Exam  As above      Problem Summary List    Diagnosis:     Diagnosis Plan   1. 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 in female, estrogen receptor positive (CMS/HCC)       Pathology:       Past Medical History:   Diagnosis Date   • Anemia     required prior blood transfusions   • Arthritis    • Cancer (CMS/HCC)     right breast cancer    • Clostridium difficile colitis    • Diabetes mellitus (CMS/HCC)    • Difficulty walking    • Diverticulitis    • Environmental allergies    • History of malignant neoplasm of breast 07/01/2013    denies any chemo or radiation   • History of malignant neoplasm of skin 07/01/2013    possibly basal   • History of tobacco use 7/1/2013   • HL (hearing loss)    • Hyperlipidemia    • Hypertension    • Kidney disease    • Neuropathy in diabetes (CMS/HCC)     • Peripheral neuropathy    • Shingles    • Skin cancer    • Stroke (CMS/HCC) 2019   • Vision loss          Past Surgical History:   Procedure Laterality Date   • CATARACT EXTRACTION, BILATERAL     • EAR TUBES     • MASTECTOMY Bilateral     radical   • TUMOR REMOVAL Left     large lipoma removed from hip         Current Outpatient Medications on File Prior to Visit   Medication Sig Dispense Refill   • acetaminophen (TYLENOL) 500 MG tablet Take 500 mg by mouth Every 6 (Six) Hours As Needed for Mild Pain .     • amLODIPine (NORVASC) 10 MG tablet TAKE 1 TABLET BY MOUTH DAILY 90 tablet 3   • anastrozole (ARIMIDEX) 1 MG tablet Take 1 mg by mouth Daily.     • cetirizine (zyrTEC) 10 MG tablet Take 10 mg by mouth Daily.     • cloNIDine (CATAPRES) 0.2 MG tablet TAKE 1 TABLET BY MOUTH EVERY 12 HOURS 180 tablet 0   • clopidogrel (PLAVIX) 75 MG tablet TAKE 1 TABLET BY MOUTH DAILY 30 tablet 6   • ferrous sulfate (IRON SUPPLEMENT) 325 (65 FE) MG tablet Take 1 tablet by mouth 2 (Two) Times a Day Before Meals. 60 tablet 0   • fluticasone (FLONASE) 50 MCG/ACT nasal spray 2 sprays into the nostril(s) as directed by provider Every Night.     • guaiFENesin (MUCINEX) 600 MG 12 hr tablet Take 600 mg by mouth 2 (Two) Times a Day As Needed for Cough.     • hydroCHLOROthiazide (MICROZIDE) 12.5 MG capsule TAKE 1 CAPSULE BY MOUTH EVERY DAY 90 capsule 3   • lisinopril (PRINIVIL,ZESTRIL) 40 MG tablet TAKE 1 TABLET BY MOUTH DAILY 90 tablet 0   • metFORMIN (GLUCOPHAGE) 1000 MG tablet TAKE 1 TABLET BY MOUTH TWICE DAILY WITH MEALS 180 tablet 0   • metoprolol succinate XL (TOPROL-XL) 50 MG 24 hr tablet Take 1 tablet by mouth Daily. 90 tablet 3   • pravastatin (PRAVACHOL) 20 MG tablet Take 20 mg by mouth Every Night.     • senna (SENOKOT) 8.6 MG tablet tablet TK 1 T PO QD  0   • vitamin B-12 (VITAMIN B-12) 2000 MCG tablet Take 1 tablet by mouth Daily. 30 tablet 0     No current facility-administered medications on file prior to visit.         Allergies   Allergen Reactions   • Lipitor [Atorvastatin] Unknown - High Severity     Leg weakness         Primary care MD:    Vladimir Hernandez APRN    Oncologist:  Patient Care Team:  Fabricio Gonzalez MD as Referring Physician (Breast Surgery)  Linda Marcus MD as Consulting Physician (Hematology and Oncology)  Mandeep Shelton MD as Consulting Physician (Radiation Oncology)       Seen and approved by:  Mandeep Shelton MD  03/24/2020

## 2020-03-27 PROCEDURE — 77427 RADIATION TX MANAGEMENT X5: CPT | Performed by: RADIOLOGY

## 2020-03-27 PROCEDURE — 77412 RADIATION TX DELIVERY LVL 3: CPT | Performed by: RADIOLOGY

## 2020-03-27 PROCEDURE — 77387 GUIDANCE FOR RADJ TX DLVR: CPT | Performed by: RADIOLOGY

## 2020-03-30 ENCOUNTER — RADIATION ONCOLOGY WEEKLY ASSESSMENT (OUTPATIENT)
Dept: RADIATION ONCOLOGY | Facility: HOSPITAL | Age: 80
End: 2020-03-30

## 2020-03-30 VITALS — TEMPERATURE: 96.6 F

## 2020-03-30 DIAGNOSIS — Z17.0 MALIGNANT NEOPLASM OF OVERLAPPING SITES OF RIGHT BREAST IN FEMALE, ESTROGEN RECEPTOR POSITIVE (HCC): Primary | ICD-10-CM

## 2020-03-30 DIAGNOSIS — C50.811 MALIGNANT NEOPLASM OF OVERLAPPING SITES OF RIGHT BREAST IN FEMALE, ESTROGEN RECEPTOR POSITIVE (HCC): Primary | ICD-10-CM

## 2020-03-30 PROCEDURE — 77412 RADIATION TX DELIVERY LVL 3: CPT | Performed by: RADIOLOGY

## 2020-03-30 PROCEDURE — 77387 GUIDANCE FOR RADJ TX DLVR: CPT | Performed by: RADIOLOGY

## 2020-03-30 NOTE — PROGRESS NOTES
Physician Weekly Management Note    Diagnosis:     Diagnosis Plan   1. 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  Energy level is intact.  Continue as planned.      [facility] ECOG Status: (2) Ambulatory and capable of self care, unable to carry out work activity, up and about > 50% or waking hours      Weekly Management:  Medication reviewed?   Yes  New medications given?   Yes  Problemlist reviewed?   No  Problem added?   Yes      Technical aspects reviewed:  Weekly OBI approved?   Yes  Weekly port films approved?   No  Change requests noted on port film?   No  Patient setup and plan reviewed?   Yes    Chart Reviewed:  Continue current treatment plan?   Yes  Treatment plan change requested?   No  CBC reviewed?   No  Concurrent Chemo?   No    Objective     Toxicities:   As above     Review of Systems   As above    There were no vitals filed for this visit.    Current Status 2/18/2020   ECOG score 0       Physical Exam  As above      Problem Summary List    Diagnosis:     Diagnosis Plan   1. 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 in female, estrogen receptor positive (CMS/HCC)       Pathology:       Past Medical History:   Diagnosis Date   • Anemia     required prior blood transfusions   • Arthritis    • Cancer (CMS/HCC)     right breast cancer    • Clostridium difficile colitis    • Diabetes mellitus (CMS/HCC)    • Difficulty walking    • Diverticulitis    • Environmental allergies    • History of malignant neoplasm of breast 07/01/2013    denies any chemo or radiation   • History of malignant neoplasm of skin 07/01/2013    possibly basal   • History of tobacco use 7/1/2013   • HL (hearing loss)    •  Hyperlipidemia    • Hypertension    • Kidney disease    • Neuropathy in diabetes (CMS/[facility])    • Peripheral neuropathy    • Shingles    • Skin cancer    • Stroke (CMS/[facility]) 2019   • Vision loss          Past Surgical History:   Procedure Laterality Date   • CATARACT EXTRACTION, BILATERAL     • EAR TUBES     • MASTECTOMY Bilateral     radical   • TUMOR REMOVAL Left     large lipoma removed from hip         Current Outpatient Medications on File Prior to Visit   Medication Sig Dispense Refill   • acetaminophen (TYLENOL) 500 MG tablet Take 500 mg by mouth Every 6 (Six) Hours As Needed for Mild Pain .     • amLODIPine (NORVASC) 10 MG tablet TAKE 1 TABLET BY MOUTH DAILY 90 tablet 3   • anastrozole (ARIMIDEX) 1 MG tablet Take 1 mg by mouth Daily.     • cetirizine (zyrTEC) 10 MG tablet Take 10 mg by mouth Daily.     • cloNIDine (CATAPRES) 0.2 MG tablet TAKE 1 TABLET BY MOUTH EVERY 12 HOURS 180 tablet 0   • clopidogrel (PLAVIX) 75 MG tablet TAKE 1 TABLET BY MOUTH DAILY 30 tablet 6   • ferrous sulfate (IRON SUPPLEMENT) 325 (65 FE) MG tablet Take 1 tablet by mouth 2 (Two) Times a Day Before Meals. 60 tablet 0   • fluticasone (FLONASE) 50 MCG/ACT nasal spray 2 sprays into the nostril(s) as directed by provider Every Night.     • guaiFENesin (MUCINEX) 600 MG 12 hr tablet Take 600 mg by mouth 2 (Two) Times a Day As Needed for Cough.     • hydroCHLOROthiazide (MICROZIDE) 12.5 MG capsule TAKE 1 CAPSULE BY MOUTH EVERY DAY 90 capsule 3   • lisinopril (PRINIVIL,ZESTRIL) 40 MG tablet TAKE 1 TABLET BY MOUTH DAILY 90 tablet 0   • metFORMIN (GLUCOPHAGE) 1000 MG tablet TAKE 1 TABLET BY MOUTH TWICE DAILY WITH MEALS 180 tablet 0   • metoprolol succinate XL (TOPROL-XL) 50 MG 24 hr tablet Take 1 tablet by mouth Daily. 90 tablet 3   • pravastatin (PRAVACHOL) 20 MG tablet Take 20 mg by mouth Every Night.     • senna (SENOKOT) 8.6 MG tablet tablet TK 1 T PO QD  0   • vitamin B-12 (VITAMIN B-12) 2000 MCG tablet Take 1 tablet by mouth Daily. 30  tablet 0     No current facility-administered medications on file prior to visit.        Allergies   Allergen Reactions   • Lipitor [Atorvastatin] Unknown - High Severity     Leg weakness         Primary care MD:    Vladimir Hernandez APRN    Oncologist:  Patient Care Team:  Fabricio Gonzalez MD as Referring Physician (Breast Surgery)  Linda Marcus MD as Consulting Physician (Hematology and Oncology)  Mandeep Shelton MD as Consulting Physician (Radiation Oncology)       Seen and approved by:  Mandeep Shelton MD  03/30/2020

## 2020-03-31 PROCEDURE — 77412 RADIATION TX DELIVERY LVL 3: CPT | Performed by: RADIOLOGY

## 2020-03-31 PROCEDURE — 77387 GUIDANCE FOR RADJ TX DLVR: CPT | Performed by: RADIOLOGY

## 2020-03-31 PROCEDURE — 77336 RADIATION PHYSICS CONSULT: CPT | Performed by: RADIOLOGY

## 2020-04-01 ENCOUNTER — APPOINTMENT (OUTPATIENT)
Dept: RADIATION ONCOLOGY | Facility: HOSPITAL | Age: 80
End: 2020-04-01

## 2020-04-01 PROCEDURE — 77412 RADIATION TX DELIVERY LVL 3: CPT | Performed by: RADIOLOGY

## 2020-04-01 PROCEDURE — 77387 GUIDANCE FOR RADJ TX DLVR: CPT | Performed by: RADIOLOGY

## 2020-04-02 PROCEDURE — 77412 RADIATION TX DELIVERY LVL 3: CPT | Performed by: RADIOLOGY

## 2020-04-02 PROCEDURE — 77387 GUIDANCE FOR RADJ TX DLVR: CPT | Performed by: RADIOLOGY

## 2020-04-03 PROCEDURE — 77387 GUIDANCE FOR RADJ TX DLVR: CPT | Performed by: RADIOLOGY

## 2020-04-03 PROCEDURE — 77427 RADIATION TX MANAGEMENT X5: CPT | Performed by: RADIOLOGY

## 2020-04-03 PROCEDURE — 77412 RADIATION TX DELIVERY LVL 3: CPT | Performed by: RADIOLOGY

## 2020-04-06 PROCEDURE — 77412 RADIATION TX DELIVERY LVL 3: CPT | Performed by: RADIOLOGY

## 2020-04-06 PROCEDURE — 77387 GUIDANCE FOR RADJ TX DLVR: CPT | Performed by: RADIOLOGY

## 2020-04-06 RX ORDER — CLONIDINE HYDROCHLORIDE 0.2 MG/1
TABLET ORAL
Qty: 180 TABLET | Refills: 0 | Status: SHIPPED | OUTPATIENT
Start: 2020-04-06 | End: 2020-07-07

## 2020-04-07 ENCOUNTER — RADIATION ONCOLOGY WEEKLY ASSESSMENT (OUTPATIENT)
Dept: RADIATION ONCOLOGY | Facility: HOSPITAL | Age: 80
End: 2020-04-07

## 2020-04-07 DIAGNOSIS — Z17.0 MALIGNANT NEOPLASM OF OVERLAPPING SITES OF RIGHT BREAST IN FEMALE, ESTROGEN RECEPTOR POSITIVE (HCC): Primary | ICD-10-CM

## 2020-04-07 DIAGNOSIS — C50.811 MALIGNANT NEOPLASM OF OVERLAPPING SITES OF RIGHT BREAST IN FEMALE, ESTROGEN RECEPTOR POSITIVE (HCC): Primary | ICD-10-CM

## 2020-04-07 PROCEDURE — 77412 RADIATION TX DELIVERY LVL 3: CPT | Performed by: RADIOLOGY

## 2020-04-07 PROCEDURE — 77387 GUIDANCE FOR RADJ TX DLVR: CPT | Performed by: RADIOLOGY

## 2020-04-07 PROCEDURE — 77336 RADIATION PHYSICS CONSULT: CPT | Performed by: RADIOLOGY

## 2020-04-07 RX ORDER — PRAVASTATIN SODIUM 20 MG
TABLET ORAL
Qty: 30 TABLET | Refills: 11 | Status: SHIPPED | OUTPATIENT
Start: 2020-04-07 | End: 2021-03-15 | Stop reason: SDUPTHER

## 2020-04-07 NOTE — PROGRESS NOTES
Physician Weekly Management Note    Diagnosis:     Diagnosis Plan   1. 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 in female, estrogen receptor positive (CMS/HCC)         RT Details:  Treatment #19/33    Notes on Treatment course, Films, Medical progress:  Very minor erythema,  subjective decrease in size of palpable lump, no skin issues, no new pain, continue as planned.      [facility] ECOG Status: (1) Restricted in physically strenuous activity, ambulatory and able to do work of light nature      Weekly Management:  Medication reviewed?   Yes  New medications given?   No  Problemlist reviewed?   Yes  Problem added?   No      Technical aspects reviewed:  Weekly OBI approved?   Yes  Weekly port films approved?   Yes  Change requests noted on port film?   No  Patient setup and plan reviewed?   Yes    Chart Reviewed:  Continue current treatment plan?   Yes  Treatment plan change requested?   Yes  CBC reviewed?   No  Concurrent Chemo?   No    Objective     Toxicities:  As above     Review of Systems   As above    There were no vitals filed for this visit.    Current Status 2/18/2020   ECOG score 0       Physical Exam  As above      Problem Summary List    Diagnosis:     Diagnosis Plan   1. 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 in female, estrogen receptor positive (CMS/HCC)       Pathology:       Past Medical History:   Diagnosis Date   • Anemia     required prior blood transfusions   • Arthritis    • Cancer (CMS/HCC)     right breast cancer    • Clostridium difficile colitis    • Diabetes mellitus (CMS/HCC)    • Difficulty walking    • Diverticulitis    • Environmental allergies    • History of malignant neoplasm of breast 07/01/2013    denies any chemo or radiation   • History of malignant neoplasm of skin 07/01/2013    possibly basal   • History of tobacco use 7/1/2013   • HL (hearing loss)    • Hyperlipidemia    • Hypertension    • Kidney disease    • Neuropathy in diabetes  (CMS/[facility])    • Peripheral neuropathy    • Shingles    • Skin cancer    • Stroke (CMS/[facility]) 2019   • Vision loss          Past Surgical History:   Procedure Laterality Date   • CATARACT EXTRACTION, BILATERAL     • EAR TUBES     • MASTECTOMY Bilateral     radical   • TUMOR REMOVAL Left     large lipoma removed from hip         Current Outpatient Medications on File Prior to Visit   Medication Sig Dispense Refill   • acetaminophen (TYLENOL) 500 MG tablet Take 500 mg by mouth Every 6 (Six) Hours As Needed for Mild Pain .     • amLODIPine (NORVASC) 10 MG tablet TAKE 1 TABLET BY MOUTH DAILY 90 tablet 3   • anastrozole (ARIMIDEX) 1 MG tablet Take 1 mg by mouth Daily.     • cetirizine (zyrTEC) 10 MG tablet Take 10 mg by mouth Daily.     • cloNIDine (CATAPRES) 0.2 MG tablet TAKE 1 TABLET BY MOUTH EVERY 12 HOURS 180 tablet 0   • clopidogrel (PLAVIX) 75 MG tablet TAKE 1 TABLET BY MOUTH DAILY 30 tablet 6   • ferrous sulfate (IRON SUPPLEMENT) 325 (65 FE) MG tablet Take 1 tablet by mouth 2 (Two) Times a Day Before Meals. 60 tablet 0   • fluticasone (FLONASE) 50 MCG/ACT nasal spray 2 sprays into the nostril(s) as directed by provider Every Night.     • guaiFENesin (MUCINEX) 600 MG 12 hr tablet Take 600 mg by mouth 2 (Two) Times a Day As Needed for Cough.     • hydroCHLOROthiazide (MICROZIDE) 12.5 MG capsule TAKE 1 CAPSULE BY MOUTH EVERY DAY 90 capsule 3   • lisinopril (PRINIVIL,ZESTRIL) 40 MG tablet TAKE 1 TABLET BY MOUTH DAILY 90 tablet 0   • metFORMIN (GLUCOPHAGE) 1000 MG tablet TAKE 1 TABLET BY MOUTH TWICE DAILY WITH MEALS 180 tablet 0   • metoprolol succinate XL (TOPROL-XL) 50 MG 24 hr tablet Take 1 tablet by mouth Daily. 90 tablet 3   • senna (SENOKOT) 8.6 MG tablet tablet TK 1 T PO QD  0   • vitamin B-12 (VITAMIN B-12) 2000 MCG tablet Take 1 tablet by mouth Daily. 30 tablet 0   • [DISCONTINUED] pravastatin (PRAVACHOL) 20 MG tablet Take 20 mg by mouth Every Night.       No current facility-administered medications on file  prior to visit.        Allergies   Allergen Reactions   • Lipitor [Atorvastatin] Unknown - High Severity     Leg weakness        Primary care MD:    Vladimir Hernandez APRN    Oncologist:  Patient Care Team:  Fabricio Gonzalez MD as Referring Physician (Breast Surgery)  Linda Marcus MD as Consulting Physician (Hematology and Oncology)  Mandeep Shelton MD as Consulting Physician (Radiation Oncology)       Seen and approved by:  Mandeep Shelton MD  04/07/2020

## 2020-04-08 PROCEDURE — 77387 GUIDANCE FOR RADJ TX DLVR: CPT | Performed by: RADIOLOGY

## 2020-04-08 PROCEDURE — 77412 RADIATION TX DELIVERY LVL 3: CPT | Performed by: RADIOLOGY

## 2020-04-09 PROCEDURE — 77412 RADIATION TX DELIVERY LVL 3: CPT | Performed by: RADIOLOGY

## 2020-04-09 PROCEDURE — 77387 GUIDANCE FOR RADJ TX DLVR: CPT | Performed by: RADIOLOGY

## 2020-04-10 PROCEDURE — 77412 RADIATION TX DELIVERY LVL 3: CPT | Performed by: RADIOLOGY

## 2020-04-10 PROCEDURE — 77387 GUIDANCE FOR RADJ TX DLVR: CPT | Performed by: RADIOLOGY

## 2020-04-10 PROCEDURE — 77427 RADIATION TX MANAGEMENT X5: CPT | Performed by: RADIOLOGY

## 2020-04-13 PROCEDURE — 77387 GUIDANCE FOR RADJ TX DLVR: CPT | Performed by: RADIOLOGY

## 2020-04-13 PROCEDURE — 77412 RADIATION TX DELIVERY LVL 3: CPT | Performed by: RADIOLOGY

## 2020-04-14 DIAGNOSIS — I10 ESSENTIAL HYPERTENSION: ICD-10-CM

## 2020-04-14 PROCEDURE — 77412 RADIATION TX DELIVERY LVL 3: CPT | Performed by: RADIOLOGY

## 2020-04-14 PROCEDURE — 77387 GUIDANCE FOR RADJ TX DLVR: CPT | Performed by: RADIOLOGY

## 2020-04-14 PROCEDURE — 77336 RADIATION PHYSICS CONSULT: CPT | Performed by: RADIOLOGY

## 2020-04-14 RX ORDER — LISINOPRIL 40 MG/1
40 TABLET ORAL DAILY
Qty: 90 TABLET | Refills: 0 | Status: SHIPPED | OUTPATIENT
Start: 2020-04-14 | End: 2020-07-13

## 2020-04-15 ENCOUNTER — RADIATION ONCOLOGY WEEKLY ASSESSMENT (OUTPATIENT)
Dept: RADIATION ONCOLOGY | Facility: HOSPITAL | Age: 80
End: 2020-04-15

## 2020-04-15 DIAGNOSIS — C50.811 MALIGNANT NEOPLASM OF OVERLAPPING SITES OF RIGHT BREAST IN FEMALE, ESTROGEN RECEPTOR POSITIVE (HCC): Primary | ICD-10-CM

## 2020-04-15 DIAGNOSIS — Z17.0 MALIGNANT NEOPLASM OF OVERLAPPING SITES OF RIGHT BREAST IN FEMALE, ESTROGEN RECEPTOR POSITIVE (HCC): Primary | ICD-10-CM

## 2020-04-15 PROCEDURE — 77387 GUIDANCE FOR RADJ TX DLVR: CPT | Performed by: RADIOLOGY

## 2020-04-15 PROCEDURE — 77412 RADIATION TX DELIVERY LVL 3: CPT | Performed by: RADIOLOGY

## 2020-04-15 NOTE — PROGRESS NOTES
Physician Weekly Management Note    Diagnosis:     Diagnosis Plan   1. 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 in female, estrogen receptor positive (CMS/HCC)         RT Details:  Treatment over 25/33    Notes on Treatment course, Films, Medical progress:  Very minor erythema, no drainage, no new breast tenderness.  Demonstrating good tolerance.  Continue as planned.      [facility] Onc ECOG Status: (1) Restricted in physically strenuous activity, ambulatory and able to do work of light nature      Weekly Management:  Medication reviewed?   Yes  New medications given?   No  Problemlist reviewed?   Yes  Problem added?   No      Technical aspects reviewed:  Weekly OBI approved?   Yes  Weekly port films approved?   Yes  Change requests noted on port film?   No  Patient setup and plan reviewed?   Yes    Chart Reviewed:  Continue current treatment plan?   Yes  Treatment plan change requested?   No  CBC reviewed?   No  Concurrent Chemo?   Yes    Objective     Toxicities:   As above     Review of Systems   As above    There were no vitals filed for this visit.    Current Status 2/18/2020   ECOG score 0       Physical Exam  As above      Problem Summary List    Diagnosis:     Diagnosis Plan   1. 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 in female, estrogen receptor positive (CMS/HCC)       Pathology:       Past Medical History:   Diagnosis Date   • Anemia     required prior blood transfusions   • Arthritis    • Cancer (CMS/HCC)     right breast cancer    • Clostridium difficile colitis    • Diabetes mellitus (CMS/HCC)    • Difficulty walking    • Diverticulitis    • Environmental allergies    • History of malignant neoplasm of breast 07/01/2013    denies any chemo or radiation   • History of malignant neoplasm of skin 07/01/2013    possibly basal   • History of tobacco use 7/1/2013   • HL (hearing loss)    • Hyperlipidemia    • Hypertension    • Kidney disease    • Neuropathy in diabetes  (CMS/[facility])    • Peripheral neuropathy    • Shingles    • Skin cancer    • Stroke (CMS/[facility]) 2019   • Vision loss          Past Surgical History:   Procedure Laterality Date   • CATARACT EXTRACTION, BILATERAL     • EAR TUBES     • MASTECTOMY Bilateral     radical   • TUMOR REMOVAL Left     large lipoma removed from hip         Current Outpatient Medications on File Prior to Visit   Medication Sig Dispense Refill   • acetaminophen (TYLENOL) 500 MG tablet Take 500 mg by mouth Every 6 (Six) Hours As Needed for Mild Pain .     • amLODIPine (NORVASC) 10 MG tablet TAKE 1 TABLET BY MOUTH DAILY 90 tablet 3   • anastrozole (ARIMIDEX) 1 MG tablet Take 1 mg by mouth Daily.     • cetirizine (zyrTEC) 10 MG tablet Take 10 mg by mouth Daily.     • cloNIDine (CATAPRES) 0.2 MG tablet TAKE 1 TABLET BY MOUTH EVERY 12 HOURS 180 tablet 0   • clopidogrel (PLAVIX) 75 MG tablet TAKE 1 TABLET BY MOUTH DAILY 30 tablet 6   • ferrous sulfate (IRON SUPPLEMENT) 325 (65 FE) MG tablet Take 1 tablet by mouth 2 (Two) Times a Day Before Meals. 60 tablet 0   • fluticasone (FLONASE) 50 MCG/ACT nasal spray 2 sprays into the nostril(s) as directed by provider Every Night.     • guaiFENesin (MUCINEX) 600 MG 12 hr tablet Take 600 mg by mouth 2 (Two) Times a Day As Needed for Cough.     • hydroCHLOROthiazide (MICROZIDE) 12.5 MG capsule TAKE 1 CAPSULE BY MOUTH EVERY DAY 90 capsule 3   • lisinopril (PRINIVIL,ZESTRIL) 40 MG tablet TAKE 1 TABLET BY MOUTH DAILY 90 tablet 0   • metFORMIN (GLUCOPHAGE) 1000 MG tablet TAKE 1 TABLET BY MOUTH TWICE DAILY WITH MEALS 180 tablet 0   • metoprolol succinate XL (TOPROL-XL) 50 MG 24 hr tablet Take 1 tablet by mouth Daily. 90 tablet 3   • pravastatin (PRAVACHOL) 20 MG tablet TAKE 1 TABLET BY MOUTH EVERY NIGHT 30 tablet 11   • senna (SENOKOT) 8.6 MG tablet tablet TK 1 T PO QD  0   • vitamin B-12 (VITAMIN B-12) 2000 MCG tablet Take 1 tablet by mouth Daily. 30 tablet 0     No current facility-administered medications on file prior  to visit.        Allergies   Allergen Reactions   • Lipitor [Atorvastatin] Unknown - High Severity     Leg weakness         Primary care MD:    Vladimir Hernandez APRN    Oncologist:  Patient Care Team:  Fabricio Gonzalez MD as Referring Physician (Breast Surgery)  Linda Marcus MD as Consulting Physician (Hematology and Oncology)  Mandeep Shelton MD as Consulting Physician (Radiation Oncology)       Seen and approved by:  Mandeep Shelton MD  04/15/2020

## 2020-04-16 PROCEDURE — 77280 THER RAD SIMULAJ FIELD SMPL: CPT | Performed by: RADIOLOGY

## 2020-04-16 PROCEDURE — 77412 RADIATION TX DELIVERY LVL 3: CPT | Performed by: RADIOLOGY

## 2020-04-17 ENCOUNTER — OFFICE VISIT (OUTPATIENT)
Dept: ONCOLOGY | Facility: CLINIC | Age: 80
End: 2020-04-17

## 2020-04-17 ENCOUNTER — LAB (OUTPATIENT)
Dept: LAB | Facility: HOSPITAL | Age: 80
End: 2020-04-17

## 2020-04-17 VITALS
BODY MASS INDEX: 20.24 KG/M2 | WEIGHT: 121.5 LBS | TEMPERATURE: 98.1 F | RESPIRATION RATE: 16 BRPM | HEIGHT: 65 IN | HEART RATE: 70 BPM | DIASTOLIC BLOOD PRESSURE: 71 MMHG | OXYGEN SATURATION: 98 % | SYSTOLIC BLOOD PRESSURE: 138 MMHG

## 2020-04-17 DIAGNOSIS — Z17.0 MALIGNANT NEOPLASM OF RIGHT BREAST IN FEMALE, ESTROGEN RECEPTOR POSITIVE, UNSPECIFIED SITE OF BREAST (HCC): Primary | ICD-10-CM

## 2020-04-17 DIAGNOSIS — C50.911 MALIGNANT NEOPLASM OF RIGHT BREAST IN FEMALE, ESTROGEN RECEPTOR POSITIVE, UNSPECIFIED SITE OF BREAST (HCC): Primary | ICD-10-CM

## 2020-04-17 LAB
BASOPHILS # BLD AUTO: 0.04 10*3/MM3 (ref 0–0.2)
BASOPHILS NFR BLD AUTO: 0.8 % (ref 0–1.5)
DEPRECATED RDW RBC AUTO: 45.9 FL (ref 37–54)
EOSINOPHIL # BLD AUTO: 0.16 10*3/MM3 (ref 0–0.4)
EOSINOPHIL NFR BLD AUTO: 3 % (ref 0.3–6.2)
ERYTHROCYTE [DISTWIDTH] IN BLOOD BY AUTOMATED COUNT: 14.3 % (ref 12.3–15.4)
HCT VFR BLD AUTO: 31.7 % (ref 34–46.6)
HGB BLD-MCNC: 10.5 G/DL (ref 12–15.9)
IMM GRANULOCYTES # BLD AUTO: 0.02 10*3/MM3 (ref 0–0.05)
IMM GRANULOCYTES NFR BLD AUTO: 0.4 % (ref 0–0.5)
LYMPHOCYTES # BLD AUTO: 0.75 10*3/MM3 (ref 0.7–3.1)
LYMPHOCYTES NFR BLD AUTO: 14.3 % (ref 19.6–45.3)
MCH RBC QN AUTO: 29.2 PG (ref 26.6–33)
MCHC RBC AUTO-ENTMCNC: 33.1 G/DL (ref 31.5–35.7)
MCV RBC AUTO: 88.3 FL (ref 79–97)
MONOCYTES # BLD AUTO: 0.9 10*3/MM3 (ref 0.1–0.9)
MONOCYTES NFR BLD AUTO: 17.1 % (ref 5–12)
NEUTROPHILS # BLD AUTO: 3.39 10*3/MM3 (ref 1.7–7)
NEUTROPHILS NFR BLD AUTO: 64.4 % (ref 42.7–76)
NRBC BLD AUTO-RTO: 0 /100 WBC (ref 0–0.2)
PLATELET # BLD AUTO: 244 10*3/MM3 (ref 140–450)
PMV BLD AUTO: 8.9 FL (ref 6–12)
RBC # BLD AUTO: 3.59 10*6/MM3 (ref 3.77–5.28)
WBC NRBC COR # BLD: 5.26 10*3/MM3 (ref 3.4–10.8)

## 2020-04-17 PROCEDURE — 77427 RADIATION TX MANAGEMENT X5: CPT | Performed by: RADIOLOGY

## 2020-04-17 PROCEDURE — 77387 GUIDANCE FOR RADJ TX DLVR: CPT | Performed by: RADIOLOGY

## 2020-04-17 PROCEDURE — 99214 OFFICE O/P EST MOD 30 MIN: CPT | Performed by: INTERNAL MEDICINE

## 2020-04-17 PROCEDURE — 36415 COLL VENOUS BLD VENIPUNCTURE: CPT

## 2020-04-17 PROCEDURE — 85025 COMPLETE CBC W/AUTO DIFF WBC: CPT

## 2020-04-17 PROCEDURE — 77412 RADIATION TX DELIVERY LVL 3: CPT | Performed by: RADIOLOGY

## 2020-04-17 NOTE — PROGRESS NOTES
Subjective     REASON FOR CONSULTATION:   1.Locally advanced right breast cancer ER CA positive HER-2 positive  Patient declines chemotherapy on Arimidex since 11/19  2. Remote history of left breast cancer 1978 treated with mastectomy alone                               REQUESTING PHYSICIAN: MD Vladimir Cowart    History of Present Illness Ms. Burgos returns to the office today accompanied by her daughter for  review now on Arimidex therapy plus radiation because she was having a slow response in the chest wall area and there was a lot of tumor involving the skin and also developing lymphedema in the right arm-Doppler was negative for DVT    .  Thankfully she is tolerating the medication well without any obvious side effects and also the radiation and only has 5 treatments left    .  She denies any significant arthralgias.  She continues with normal appetite and stable weight.  She reports normal bowel bladder function.    She does feel that the right large breast mass is getting softer and the lymphedema in her right arm is somewhat better.    She remains anemic despite being on iron B12 and I suspect much of this is related to renal failure -ferritin is 41 and she is not a candidate for Procrit unless she drops below 10 g/dl    she has not had a DEXA scan in many years and we repeated in February and it shows moderate osteopenia in the spine osteoporosis and left hip and severe osteopenia in the right hip.  I am not inclined to switch her to tamoxifen at this point and leave her room at acceptable see her back in 3 months and decide whether to add Prolia    Past Medical History:   Diagnosis Date   • Anemia     required prior blood transfusions   • Arthritis    • Cancer (CMS/HCC)     right breast cancer    • Clostridium difficile colitis    • Diabetes mellitus (CMS/HCC)    • Difficulty walking    • Diverticulitis    • Environmental allergies     • History of malignant neoplasm of breast 2013    denies any chemo or radiation   • History of malignant neoplasm of skin 2013    possibly basal   • History of tobacco use 2013   • HL (hearing loss)    • Hyperlipidemia    • Hypertension    • Kidney disease    • Neuropathy in diabetes (CMS/[facility])    • Peripheral neuropathy    • Shingles    • Skin cancer    • Stroke (CMS/[facility]) 2019   • Vision loss       Patient is  3 para 3 with 2 miscarriages menarche was at age 9 menopause at age 49st childbirth was at age 24 she breast-fed for at least 6 months she did not take hormone replacement therapy because at age 38 she developed breast cancer in the left breast was treated at [email or web address] with a modified radical mastectomy in  no radiation or hormonal blockade was given in details of the scans were not available at this time.    Family history is positive for father dying at age 84 with disseminated abdominal cancer of unknown type mother  at 77 of natural causes she has a brother with kidney cancer at age 65 who  of this and a sister with melanoma at age 30    Patient currently lives with her daughter does not smoke currently but smoked for 70 years 1 pack of cigarettes is not a drinker.  She uses a cane to walk because her balance is poor she has issues with swallowing from her stroke      Past Surgical History:   Procedure Laterality Date   • CATARACT EXTRACTION, BILATERAL     • EAR TUBES     • MASTECTOMY Bilateral     radical   • TUMOR REMOVAL Left     large lipoma removed from hip      HEME/ONC HISTORY:  patient is an 79-year-old female with multiple medical issues including hypertension diabetes hypercholesterolemia with a recent stroke in February of this year which is resulted in left-sided weakness and difficulty swallowing.  The patient noted changes in her breast over the last year and the last few months changes to the nipple specifically with developing lump in both  the right breast and under the right arm.  She brought to the attention of her family physician who sent her for imaging and biopsy of obvious tumor.  Mammogram revealed a large irregular mass measuring 6 cm with skin retraction nipple retraction and skin thickening.  At the 10 o'clock position there appeared to be some skin involvement.    She was also noted clinically to have large axillary lymph nodes.  Ultrasound was obtained revealing an irregular solid mass with indistinct margins measuring approximately 64 x 51 mm.  On ultrasound there were also some enlarged axillary lymph nodes measuring about 30 mm in greatest size.     A biopsy was recommended and has revealed a grade 2 invasive ductal cancer that is ER positive at 98%, PA positive at 82%, and HER-2 equivocal.  The Ki-67 is 20.81%.     Patient was then referred to Dr. Gonzalez who examined her and did not feel surgical excision was possible because there would be a large defect from her tumor and was wanting to consider neoadjuvant therapy in an effort to shrink the tumor.  The patient was presented at the multidisciplinary conference and because of her comorbidities her recent stroke and her overall general condition we did not think she was a good candidate for neoadjuvant chemotherapy and she was referred to us to discuss endocrine therapy.    In the interim additional testing on her tumor was done at [facility] because of the indeterminate HER-2 FISH and this revealed that there was 3+ HER-2 activity by IHC and therefore this is considered to be HER-2 positive  The patient had staging work-up at the recommendation of the multidisciplinary clinic And CT of the chest showed calcified granulomatous disease with mild AP window adenopathy which is increased from previous CAT scan in 2016 precarinal adenopathy also increased.  It showed the breast mass as well as right axillary adenopathy.  In the abdomen there was a left adrenal nodule which is increased in  size from 1.4 to 1.8 cm felt to be an adenoma in addition there was a small sclerotic lesion in the left iliac crest and metastasis could not be completely excluded although the bone scan showed no uptake in this area.  The bone scan did show a fracture and uptake in the left greater trochanter and uptake of the left superior pubic ramus that represents a superior ramus fracture of with no other signs of metastatic disease-patient did fall in July of this year but no obvious fractures were seen on x-ray.    Pt did not want chemotherapy-Patient initiating Arimidex therapy in mid September 2019.     the patient was hospitalized in late September, presenting with garbled speech and elevated blood pressure, felt to ultimately have had a TIA related to hypertensive urgency and small vessel disease.  Neurology recommended dual antiplatelet therapy for 3 months followed by Plavix alone.  Her antihypertensives were adjusted.  At that time she was also found to be B12 deficient with a level of 158 and iron deficient with an iron percent of 7%.  She was started on oral B12 and iron.    She states that the iron does cause some constipation but it is manageable with stool softeners at this time.  There was some discussion in the hospital record that if she could not tolerate oral iron IV iron should be considered.  Hemoglobin is improved up to 9.7 from discharge hemoglobin of 9.0.        Current Outpatient Medications on File Prior to Visit   Medication Sig Dispense Refill   • acetaminophen (TYLENOL) 500 MG tablet Take 500 mg by mouth Every 6 (Six) Hours As Needed for Mild Pain .     • amLODIPine (NORVASC) 10 MG tablet TAKE 1 TABLET BY MOUTH DAILY 90 tablet 3   • anastrozole (ARIMIDEX) 1 MG tablet Take 1 mg by mouth Daily.     • cetirizine (zyrTEC) 10 MG tablet Take 10 mg by mouth Daily.     • cloNIDine (CATAPRES) 0.2 MG tablet TAKE 1 TABLET BY MOUTH EVERY 12 HOURS 180 tablet 0   • clopidogrel (PLAVIX) 75 MG tablet TAKE 1  TABLET BY MOUTH DAILY 30 tablet 6   • ferrous sulfate (IRON SUPPLEMENT) 325 (65 FE) MG tablet Take 1 tablet by mouth 2 (Two) Times a Day Before Meals. 60 tablet 0   • fluticasone (FLONASE) 50 MCG/ACT nasal spray 2 sprays into the nostril(s) as directed by provider Every Night.     • guaiFENesin (MUCINEX) 600 MG 12 hr tablet Take 600 mg by mouth 2 (Two) Times a Day As Needed for Cough.     • hydroCHLOROthiazide (MICROZIDE) 12.5 MG capsule TAKE 1 CAPSULE BY MOUTH EVERY DAY 90 capsule 3   • lisinopril (PRINIVIL,ZESTRIL) 40 MG tablet TAKE 1 TABLET BY MOUTH DAILY 90 tablet 0   • metFORMIN (GLUCOPHAGE) 1000 MG tablet TAKE 1 TABLET BY MOUTH TWICE DAILY WITH MEALS 180 tablet 0   • metoprolol succinate XL (TOPROL-XL) 50 MG 24 hr tablet Take 1 tablet by mouth Daily. 90 tablet 3   • pravastatin (PRAVACHOL) 20 MG tablet TAKE 1 TABLET BY MOUTH EVERY NIGHT 30 tablet 11   • vitamin B-12 (VITAMIN B-12) 2000 MCG tablet Take 1 tablet by mouth Daily. 30 tablet 0   • senna (SENOKOT) 8.6 MG tablet tablet TK 1 T PO QD  0     No current facility-administered medications on file prior to visit.         ALLERGIES:    Allergies   Allergen Reactions   • Lipitor [Atorvastatin] Unknown - High Severity     Leg weakness         Social History     Socioeconomic History   • Marital status:      Spouse name: Not on file   • Number of children: 3   • Years of education: High school   • Highest education level: Not on file   Occupational History     Employer: RETIRED   Tobacco Use   • Smoking status: Former Smoker     Packs/day: 0.25     Years: 10.00     Pack years: 2.50     Types: Cigarettes     Last attempt to quit: 2019     Years since quittin.1   • Smokeless tobacco: Former User   • Tobacco comment: she quit for 8 years and started back about a year ago then quit again   Substance and Sexual Activity   • Alcohol use: No   • Drug use: No   • Sexual activity: Never        Family History   Problem Relation Age of Onset   • Stroke  "Mother 76   • Diabetes Mother    • Hypertension Mother    • Arthritis Mother    • Cancer Father         metastatic unknown cause   • Heart attack Father 70   • Colon cancer Father    • Lung cancer Father    • Cancer Brother         bladder   • Arthritis Sister    • Melanoma Sister    • Hypertension Daughter    • Diabetes Daughter         Review of Systems   Constitutional: Positive for activity change (same 4/17/2020).   HENT: Positive for hearing loss (same 4/17/2020), sinus pain (same 4/17/2020) and trouble swallowing (same 4/17/2020).    Respiratory: Negative.    Cardiovascular: Negative.    Gastrointestinal: Negative for abdominal pain (stable 4/17/2020).   Genitourinary: Negative.    Musculoskeletal: Positive for gait problem (left side weakness same 4/17/2020) and joint swelling (right arm elbow swelling same 4/17/2020).   Neurological: Positive for dizziness (same 4/17/2020), weakness (Left-sided same 4/17/2020) and light-headedness (after radiation 4/17/2020).   Hematological: Negative.    Psychiatric/Behavioral: Negative.    All other systems reviewed and are negative.     All of the above stable to improved as of  10/10/19    Objective     Vitals:    04/17/20 1143   BP: 138/71   Pulse: 70   Resp: 16   Temp: 98.1 °F (36.7 °C)   SpO2: 98%   Weight: 55.1 kg (121 lb 8 oz)   Height: 164 cm (64.57\")   PainSc: 0-No pain     Current Status 4/17/2020   ECOG score 2       Physical Exam   Pulmonary/Chest:           GENERAL:  Well-developed, well-nourished in no acute distress.   SKIN:  Warm, dry without rashes, purpura or petechiae.  EYES:  Pupils equal, round and reactive to light.  EOMs intact.  Conjunctivae normal.  EARS:  Hearing intact.  NOSE:  Septum midline.  No excoriations or nasal discharge.  MOUTH:  Tongue is well-papillated; no stomatitis or ulcers.  Lips normal.  THROAT:  Oropharynx without lesions or exudates.  NECK:  Supple with good range of motion; no thyromegaly or masses, no JVD.  LYMPHATICS:  No " cervical, supraclavicular, axillary or inguinal adenopathy.  CHEST:  Lungs clear to auscultation. Good airflow.  BREASTS: Left chest wall is benign; the right breast shows a locally advanced breast cancer with cutaneous involvement multifocally much less prominent and mild redness the whole breast due to radiation-.    Right axillary node palpable 2 x 2 cm  CARDIAC:  Regular rate and rhythm without murmurs, rubs or gallops. Normal S1,S2.  ABDOMEN:  Soft, nontender with no hepatosplenomegaly or masses.-Prominent bony nodule on the left greater trochanter  EXTREMITIES:  No clubbing, cyanosis 2+lymphedema right arm  NEUROLOGICAL:  Cranial Nerves II-XII grossly intact.  Mild left-sided weakness with a hemiparetic gait   pSYCHIATRIC:  Normal affect and mood.        RECENT LABS:  Lab Results   Component Value Date    WBC 5.26 04/17/2020    HGB 10.5 (L) 04/17/2020    HCT 31.7 (L) 04/17/2020    MCV 88.3 04/17/2020     04/17/2020       Results from last 7 days   Lab Units 04/17/20  1136   WBC 10*3/mm3 5.26   NEUTROS ABS 10*3/mm3 3.39   HEMOGLOBIN g/dL 10.5*   HEMATOCRIT % 31.7*   PLATELETS 10*3/mm3 244                                   MRI BRAIN     IMPRESSION:  Old right basal ganglia infarct. No acute abnormality  identified. No metastatic disease is identified at the brain or the  head.    Bone scan  IMPRESSION:  Abnormal uptake left greater trochanter where there is a  fracture demonstrated with CT (patient has a history of a fall and there  is no CT evidence that this is a pathologic fracture). There is also  abnormal uptake at the left superior pubic ramus that most likely  represents a superior ramus fracture though a fracture is not evident  with CT. This uptake left superior pubic ramus is technically  indeterminate though there is overall no convincing evidence for bony  metastatic disease. Follow-up CT could be obtained.     This report was finalized on 8/23/2019 1:    CT chest abdomen pelvis  IMPRESSION  CHEST CT:    1. A 2.2 cm right breast mass laterally presumably related to the  patient's neoplasm.  2. Right axillary mediastinal adenopathy and metastatic disease cannot  be excluded. PET/CT may be helpful.     IMPRESSION ABDOMEN & PELVIS CT:    1. Low-density renal lesions as discussed, favor cysts.  2. Enlarging left adrenal nodule, likely adenoma.  3. Extensive diverticulosis.  4. A 5 mm sclerotic left iliac lesion as discussed.    BONE MINERAL DENSITOMETRY  IMPRESSION:  Osteoporosis at the left hip. Interval decrease in bone  density.     This report was finalized on 2/14/2020 12:10 PM by Dr. Osmin Dodd M.D.                          Assessment/Plan   1.  Locally advanced right breast cancer strongly ER KS positive with indeterminate HER-2 with repeat testing showing 3+ IHC consistent with positive result  · Staging work-up probably negative although indeterminate bone lesions without obvious fractures on bone scan  · Abnormal mediastinal adenopathy which is been present since 2016 likely granulomatous disease  · Enlarging left adrenal mass felt to be an adenoma present since 2015  · History of left breast cancer 1978 at age 38 treated with modified radical mastectomy alone? Pathology  · [place] to be a poor candidate for surgery or chemotherapy.PT DECLINED CHEMO  · Initiated Arimidex around 9/11/2019.  Radiation may be a possibility for additional therapy if she does not get a good response from Arimidex  · Radiation added in 3/20      2.  Granulomatous mediastinal lymph nodes chronic    3.  Recent stroke in 2/19 with left hemiparesis and dysphagia  · Patient hospitalized again 9/26 and 9/29/2019 presenting with garbled speech and elevated blood pressure, felt to have had a TIA.  Neurology recommended dual antiplatelet therapy for 3 months and then Plavix alone.    4.  Hypertension diabetes and hypercholesterolemia    5.  Anemia, multifactorial:  · Patient has stage III CKD.  · Patient also found during  recent hospitalization to have low B12 and low iron percent.  Though she had previously received B12 injections she was started on oral B12.  She was also started on oral iron.    · Patient continues on oral iron at this time with some constipation though tolerable with stool softeners.  Plan to continue to monitor her blood work and if develops intolerance to oral iron consider IV iron.  Plan to recheck iron studies ferritin 41    6.  Lymphedema right arm better after radiation-not get placed in a sleeve due to ongoing radiation    7.  Osteoporosis left hip -osteopenia spine observe for now    PLAN:  1. Continue Arimidex. For now  2. Continue radiation therapy   3.. Return in3 mo-   patient again declined chemotherapy of any kind     Standard precautions discussed regarding the Novel Coronavirus (COVID-19)  including patient to limit contact with others outside of home, frequent handwashing and using alcohol gel when unable to use soap and water, as well to monitoring for symptoms and notifying our office via telephone for any symptoms or exposures.

## 2020-04-20 ENCOUNTER — RADIATION ONCOLOGY WEEKLY ASSESSMENT (OUTPATIENT)
Dept: RADIATION ONCOLOGY | Facility: HOSPITAL | Age: 80
End: 2020-04-20

## 2020-04-20 DIAGNOSIS — Z17.0 MALIGNANT NEOPLASM OF OVERLAPPING SITES OF RIGHT BREAST IN FEMALE, ESTROGEN RECEPTOR POSITIVE (HCC): Primary | ICD-10-CM

## 2020-04-20 DIAGNOSIS — C50.811 MALIGNANT NEOPLASM OF OVERLAPPING SITES OF RIGHT BREAST IN FEMALE, ESTROGEN RECEPTOR POSITIVE (HCC): Primary | ICD-10-CM

## 2020-04-20 PROCEDURE — 77412 RADIATION TX DELIVERY LVL 3: CPT | Performed by: RADIOLOGY

## 2020-04-20 PROCEDURE — 77387 GUIDANCE FOR RADJ TX DLVR: CPT | Performed by: RADIOLOGY

## 2020-04-20 NOTE — PROGRESS NOTES
Physician Weekly Management Note    Diagnos  RT Details: Treatment #28/33 Whole breast and axilla    Notes on Treatment course, Films, Medical progress:  Continues to do well, minor erythema at best, occasional stinging pain in skin, not a major factor, no drainage, some very superficial dry desquamation in a small area of the anterior axilla.  Continue as planned.      [facility] ECOG Status: (1) Restricted in physically strenuous activity, ambulatory and able to do work of light nature      Weekly Management:  Medication reviewed?   Yes  New medications given?   No  Problemlist reviewed?   Yes  Problem added?   No      Technical aspects reviewed:  Weekly OBI approved?   Yes  Weekly port films approved?   Yes  Change requests noted on port film?   No  Patient setup and plan reviewed?   Yes    Chart Reviewed:  Continue current treatment plan?   Yes  Treatment plan change requested?   No  CBC reviewed?   No  Concurrent Chemo?   No    Objective     Toxicities:   As above     Review of Systems   As above    There were no vitals filed for this visit.    Current Status 4/17/2020   ECOG score 2       Physical Exam  As above      Problem Summary List    Diagnosis:     Diagnosis Plan   1. 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 in female, estrogen receptor positive (CMS/HCC)       Pathology:       Past Medical History:   Diagnosis Date   • Anemia     required prior blood transfusions   • Arthritis    • Cancer (CMS/HCC)     right breast cancer    • Clostridium difficile colitis    • Diabetes mellitus (CMS/HCC)    • Difficulty walking    • Diverticulitis    • Environmental allergies    • History of malignant neoplasm of breast 07/01/2013    denies any chemo or radiation   • History of malignant neoplasm of skin 07/01/2013    possibly basal   • History of tobacco use 7/1/2013   • HL (hearing loss)    • Hyperlipidemia    • Hypertension    • Kidney disease    • Neuropathy in diabetes (CMS/HCC)    •  Peripheral neuropathy    • Shingles    • Skin cancer    • Stroke (CMS/HCC) 2019   • Vision loss          Past Surgical History:   Procedure Laterality Date   • CATARACT EXTRACTION, BILATERAL     • EAR TUBES     • MASTECTOMY Bilateral     radical   • TUMOR REMOVAL Left     large lipoma removed from hip         Current Outpatient Medications on File Prior to Visit   Medication Sig Dispense Refill   • acetaminophen (TYLENOL) 500 MG tablet Take 500 mg by mouth Every 6 (Six) Hours As Needed for Mild Pain .     • amLODIPine (NORVASC) 10 MG tablet TAKE 1 TABLET BY MOUTH DAILY 90 tablet 3   • anastrozole (ARIMIDEX) 1 MG tablet Take 1 mg by mouth Daily.     • cetirizine (zyrTEC) 10 MG tablet Take 10 mg by mouth Daily.     • cloNIDine (CATAPRES) 0.2 MG tablet TAKE 1 TABLET BY MOUTH EVERY 12 HOURS 180 tablet 0   • clopidogrel (PLAVIX) 75 MG tablet TAKE 1 TABLET BY MOUTH DAILY 30 tablet 6   • ferrous sulfate (IRON SUPPLEMENT) 325 (65 FE) MG tablet Take 1 tablet by mouth 2 (Two) Times a Day Before Meals. 60 tablet 0   • fluticasone (FLONASE) 50 MCG/ACT nasal spray 2 sprays into the nostril(s) as directed by provider Every Night.     • guaiFENesin (MUCINEX) 600 MG 12 hr tablet Take 600 mg by mouth 2 (Two) Times a Day As Needed for Cough.     • hydroCHLOROthiazide (MICROZIDE) 12.5 MG capsule TAKE 1 CAPSULE BY MOUTH EVERY DAY 90 capsule 3   • lisinopril (PRINIVIL,ZESTRIL) 40 MG tablet TAKE 1 TABLET BY MOUTH DAILY 90 tablet 0   • metFORMIN (GLUCOPHAGE) 1000 MG tablet TAKE 1 TABLET BY MOUTH TWICE DAILY WITH MEALS 180 tablet 0   • metoprolol succinate XL (TOPROL-XL) 50 MG 24 hr tablet Take 1 tablet by mouth Daily. 90 tablet 3   • pravastatin (PRAVACHOL) 20 MG tablet TAKE 1 TABLET BY MOUTH EVERY NIGHT 30 tablet 11   • senna (SENOKOT) 8.6 MG tablet tablet TK 1 T PO QD  0   • vitamin B-12 (VITAMIN B-12) 2000 MCG tablet Take 1 tablet by mouth Daily. 30 tablet 0     No current facility-administered medications on file prior to visit.         Allergies   Allergen Reactions   • Lipitor [Atorvastatin] Unknown - High Severity     Leg weakness         Primary care MD:    Vladimir Hernandez APRN    Oncologist:  Patient Care Team:  Fabricio Gonzalez MD as Referring Physician (Breast Surgery)  Linda Marcus MD as Consulting Physician (Hematology and Oncology)  Mandeep Shelton MD as Consulting Physician (Radiation Oncology)       Seen and approved by:  Mandeep Shelton MD  04/20/2020

## 2020-04-21 PROCEDURE — 77412 RADIATION TX DELIVERY LVL 3: CPT | Performed by: RADIOLOGY

## 2020-04-21 PROCEDURE — 77387 GUIDANCE FOR RADJ TX DLVR: CPT | Performed by: RADIOLOGY

## 2020-04-21 PROCEDURE — 77336 RADIATION PHYSICS CONSULT: CPT | Performed by: RADIOLOGY

## 2020-04-22 PROCEDURE — 77412 RADIATION TX DELIVERY LVL 3: CPT | Performed by: RADIOLOGY

## 2020-04-22 PROCEDURE — 77387 GUIDANCE FOR RADJ TX DLVR: CPT | Performed by: RADIOLOGY

## 2020-04-23 PROCEDURE — 77412 RADIATION TX DELIVERY LVL 3: CPT | Performed by: RADIOLOGY

## 2020-04-23 PROCEDURE — 77387 GUIDANCE FOR RADJ TX DLVR: CPT | Performed by: RADIOLOGY

## 2020-04-24 PROCEDURE — 77387 GUIDANCE FOR RADJ TX DLVR: CPT | Performed by: RADIOLOGY

## 2020-04-24 PROCEDURE — 77412 RADIATION TX DELIVERY LVL 3: CPT | Performed by: RADIOLOGY

## 2020-04-27 ENCOUNTER — DOCUMENTATION (OUTPATIENT)
Dept: RADIATION ONCOLOGY | Facility: HOSPITAL | Age: 80
End: 2020-04-27

## 2020-04-27 DIAGNOSIS — Z17.0 MALIGNANT NEOPLASM OF OVERLAPPING SITES OF RIGHT BREAST IN FEMALE, ESTROGEN RECEPTOR POSITIVE (HCC): Primary | ICD-10-CM

## 2020-04-27 DIAGNOSIS — C50.811 MALIGNANT NEOPLASM OF OVERLAPPING SITES OF RIGHT BREAST IN FEMALE, ESTROGEN RECEPTOR POSITIVE (HCC): Primary | ICD-10-CM

## 2020-04-27 PROCEDURE — 77412 RADIATION TX DELIVERY LVL 3: CPT | Performed by: RADIOLOGY

## 2020-04-27 PROCEDURE — 77387 GUIDANCE FOR RADJ TX DLVR: CPT | Performed by: RADIOLOGY

## 2020-04-27 PROCEDURE — 77336 RADIATION PHYSICS CONSULT: CPT | Performed by: RADIOLOGY

## 2020-05-05 NOTE — PROGRESS NOTES
RADIATION THERAPY TREATMENT SUMMARY  Patient: Mar Burgos  YOB: 1940  Diagnosis: 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 in female, estrogen receptor positive (CMS/HCC)    Mar Burgos has recently completed the course of radiation therapy prescribed for the above-mentioned diagnosis.  Below, please find the specifics of the course of treatment delivered:    Radiation Details:    Tx Start Date  3/12/2020   Tx End date  4/27/2020  Intent   Curative    Prescription Name RT SC  Site   Breast, Right  Primary/Boost  PRIMARY  Dose Per Fraction 200 cGy/Frac  Number of Fractions 25  Prescribe To  IsodoseLine 100 % 5000 cGy  200 cGy/Frac  Mode    Photon  Technique  Single  Frequency  5 Times a week  Energy   6X  Prescription Note PRESCRIBED 100% TO [facility] PT    Prescription Name RT BREAST AXILLA  Site   Breast  Primary/Boost  PRIMARY  Dose Per Fraction 200 cGy/Frac  Number of Fractions 33  Prescribe To  IsodoseLine 100 % 6600 cGy  200 cGy/Frac  Mode    Photon  Technique  TANGENTS  Frequency  5 Times a week  Energy   6X/18X  Prescription Note PRESCRIBED 100% TO [facility] PT    Prescription Name RT PAB  Site   Breast, Right  Primary/Boost  BOOST  Dose Per Fraction 195 cGy/Frac  Number of Fractions 3  Prescribe To  IsodoseLine 100 % 585 cGy  195 cGy/Frac  Mode    Photon  Technique  3D CONFORMAL  Frequency  Once Daily  Energy   18X  Prescription Note PRESCRIBED 100% TO [facility] PT AT PATIENT DEPTH OF 5CM. TOTAL DOSE TO MIDPLANE AXILLA 4506.           Tolerance and Toxicities: she tolerated the treatments well with only minor erythema , requiring no treatment breaks. she completed the treatments in 46 elapsed days.    Follow-up Plans:   No scheduled follow-up in our department, however she has our card and knows that she should feel free to call if she has any questions or concerns.  She has ongoing follow-up with the [facility] group.

## 2020-07-07 RX ORDER — CLONIDINE HYDROCHLORIDE 0.2 MG/1
TABLET ORAL
Qty: 180 TABLET | Refills: 0 | Status: SHIPPED | OUTPATIENT
Start: 2020-07-07 | End: 2020-10-05

## 2020-07-10 ENCOUNTER — LAB (OUTPATIENT)
Dept: LAB | Facility: HOSPITAL | Age: 80
End: 2020-07-10

## 2020-07-10 ENCOUNTER — OFFICE VISIT (OUTPATIENT)
Dept: ONCOLOGY | Facility: CLINIC | Age: 80
End: 2020-07-10

## 2020-07-10 VITALS
OXYGEN SATURATION: 97 % | WEIGHT: 126 LBS | RESPIRATION RATE: 18 BRPM | DIASTOLIC BLOOD PRESSURE: 80 MMHG | BODY MASS INDEX: 20.99 KG/M2 | SYSTOLIC BLOOD PRESSURE: 177 MMHG | TEMPERATURE: 97.8 F | HEART RATE: 88 BPM | HEIGHT: 65 IN

## 2020-07-10 DIAGNOSIS — C50.811 MALIGNANT NEOPLASM OF OVERLAPPING SITES OF RIGHT BREAST IN FEMALE, ESTROGEN RECEPTOR POSITIVE (HCC): Primary | ICD-10-CM

## 2020-07-10 DIAGNOSIS — Z17.0 MALIGNANT NEOPLASM OF OVERLAPPING SITES OF RIGHT BREAST IN FEMALE, ESTROGEN RECEPTOR POSITIVE (HCC): Primary | ICD-10-CM

## 2020-07-10 DIAGNOSIS — Z17.0 MALIGNANT NEOPLASM OF RIGHT BREAST IN FEMALE, ESTROGEN RECEPTOR POSITIVE, UNSPECIFIED SITE OF BREAST (HCC): ICD-10-CM

## 2020-07-10 DIAGNOSIS — C50.911 MALIGNANT NEOPLASM OF RIGHT BREAST IN FEMALE, ESTROGEN RECEPTOR POSITIVE, UNSPECIFIED SITE OF BREAST (HCC): ICD-10-CM

## 2020-07-10 LAB
ALBUMIN SERPL-MCNC: 4.4 G/DL (ref 3.5–5.2)
ALBUMIN/GLOB SERPL: 1.3 G/DL (ref 1.1–2.4)
ALP SERPL-CCNC: 88 U/L (ref 38–116)
ALT SERPL W P-5'-P-CCNC: 11 U/L (ref 0–33)
ANION GAP SERPL CALCULATED.3IONS-SCNC: 15.1 MMOL/L (ref 5–15)
AST SERPL-CCNC: 17 U/L (ref 0–32)
BASOPHILS # BLD AUTO: 0.05 10*3/MM3 (ref 0–0.2)
BASOPHILS NFR BLD AUTO: 1.1 % (ref 0–1.5)
BILIRUB SERPL-MCNC: <0.2 MG/DL (ref 0.2–1.2)
BUN SERPL-MCNC: 31 MG/DL (ref 6–20)
BUN/CREAT SERPL: 21.2 (ref 7.3–30)
CALCIUM SPEC-SCNC: 9.7 MG/DL (ref 8.5–10.2)
CHLORIDE SERPL-SCNC: 109 MMOL/L (ref 98–107)
CO2 SERPL-SCNC: 18.9 MMOL/L (ref 22–29)
CREAT SERPL-MCNC: 1.46 MG/DL (ref 0.6–1.1)
DEPRECATED RDW RBC AUTO: 48.5 FL (ref 37–54)
EOSINOPHIL # BLD AUTO: 0.17 10*3/MM3 (ref 0–0.4)
EOSINOPHIL NFR BLD AUTO: 3.6 % (ref 0.3–6.2)
ERYTHROCYTE [DISTWIDTH] IN BLOOD BY AUTOMATED COUNT: 14.6 % (ref 12.3–15.4)
GFR SERPL CREATININE-BSD FRML MDRD: 34 ML/MIN/1.73
GLOBULIN UR ELPH-MCNC: 3.4 GM/DL (ref 1.8–3.5)
GLUCOSE SERPL-MCNC: 104 MG/DL (ref 74–124)
HCT VFR BLD AUTO: 32.8 % (ref 34–46.6)
HGB BLD-MCNC: 10.4 G/DL (ref 12–15.9)
IMM GRANULOCYTES # BLD AUTO: 0.03 10*3/MM3 (ref 0–0.05)
IMM GRANULOCYTES NFR BLD AUTO: 0.6 % (ref 0–0.5)
LYMPHOCYTES # BLD AUTO: 0.98 10*3/MM3 (ref 0.7–3.1)
LYMPHOCYTES NFR BLD AUTO: 20.8 % (ref 19.6–45.3)
MCH RBC QN AUTO: 28.7 PG (ref 26.6–33)
MCHC RBC AUTO-ENTMCNC: 31.7 G/DL (ref 31.5–35.7)
MCV RBC AUTO: 90.6 FL (ref 79–97)
MONOCYTES # BLD AUTO: 0.62 10*3/MM3 (ref 0.1–0.9)
MONOCYTES NFR BLD AUTO: 13.2 % (ref 5–12)
NEUTROPHILS NFR BLD AUTO: 2.86 10*3/MM3 (ref 1.7–7)
NEUTROPHILS NFR BLD AUTO: 60.7 % (ref 42.7–76)
NRBC BLD AUTO-RTO: 0 /100 WBC (ref 0–0.2)
PLATELET # BLD AUTO: 249 10*3/MM3 (ref 140–450)
PMV BLD AUTO: 8.8 FL (ref 6–12)
POTASSIUM SERPL-SCNC: 4.3 MMOL/L (ref 3.5–4.7)
PROT SERPL-MCNC: 7.8 G/DL (ref 6.3–8)
RBC # BLD AUTO: 3.62 10*6/MM3 (ref 3.77–5.28)
SODIUM SERPL-SCNC: 143 MMOL/L (ref 134–145)
WBC # BLD AUTO: 4.71 10*3/MM3 (ref 3.4–10.8)

## 2020-07-10 PROCEDURE — 80053 COMPREHEN METABOLIC PANEL: CPT

## 2020-07-10 PROCEDURE — 36415 COLL VENOUS BLD VENIPUNCTURE: CPT

## 2020-07-10 PROCEDURE — 85025 COMPLETE CBC W/AUTO DIFF WBC: CPT

## 2020-07-10 PROCEDURE — 99213 OFFICE O/P EST LOW 20 MIN: CPT | Performed by: INTERNAL MEDICINE

## 2020-07-11 DIAGNOSIS — I10 ESSENTIAL HYPERTENSION: ICD-10-CM

## 2020-07-13 RX ORDER — LISINOPRIL 40 MG/1
40 TABLET ORAL DAILY
Qty: 90 TABLET | Refills: 0 | OUTPATIENT
Start: 2020-07-13 | End: 2020-09-11

## 2020-07-13 RX ORDER — CLOPIDOGREL BISULFATE 75 MG/1
75 TABLET ORAL DAILY
Qty: 30 TABLET | Refills: 6 | Status: SHIPPED | OUTPATIENT
Start: 2020-07-13 | End: 2021-04-14

## 2020-07-18 DIAGNOSIS — I10 ESSENTIAL HYPERTENSION: ICD-10-CM

## 2020-07-20 RX ORDER — METOPROLOL SUCCINATE 50 MG/1
50 TABLET, EXTENDED RELEASE ORAL
Qty: 90 TABLET | Refills: 3 | Status: SHIPPED | OUTPATIENT
Start: 2020-07-20 | End: 2021-08-12 | Stop reason: SDUPTHER

## 2020-07-31 ENCOUNTER — TELEMEDICINE (OUTPATIENT)
Dept: FAMILY MEDICINE CLINIC | Facility: CLINIC | Age: 80
End: 2020-07-31

## 2020-07-31 VITALS — HEIGHT: 64 IN | BODY MASS INDEX: 21.51 KG/M2 | WEIGHT: 126 LBS

## 2020-07-31 DIAGNOSIS — H10.9 CONJUNCTIVITIS OF RIGHT EYE, UNSPECIFIED CONJUNCTIVITIS TYPE: Primary | ICD-10-CM

## 2020-07-31 PROCEDURE — 99213 OFFICE O/P EST LOW 20 MIN: CPT | Performed by: NURSE PRACTITIONER

## 2020-07-31 RX ORDER — TOBRAMYCIN 3 MG/ML
2 SOLUTION/ DROPS OPHTHALMIC
Qty: 1 BOTTLE | Refills: 0 | Status: SHIPPED | OUTPATIENT
Start: 2020-07-31 | End: 2020-09-11

## 2020-07-31 NOTE — PROGRESS NOTES
"Subjective   Mar Burgos is a 79 y.o. female.     History of Present Illness   Patient presenting with concerns over an eye infection in her right eye she has had it for several days it was very red and swollen but now that is gone away but she is having some yellow discharge from her eye and matted eyelashes in the morning.    The following portions of the patient's history were reviewed and updated as appropriate: allergies, current medications, past social history and problem list.    Review of Systems   Eyes: Positive for pain and redness.   All other systems reviewed and are negative.      Objective   Ht 162.6 cm (64\")   Wt 57.2 kg (126 lb)   BMI 21.63 kg/m²   Physical Exam    Assessment/Plan      Diagnosis Plan   1. Conjunctivitis of right eye, unspecified conjunctivitis type  tobramycin (Tobrex) 0.3 % solution ophthalmic solution   Visit lasted 15 minutes  Turn to the office if needed    JEFFREY Rushing  7/31/2020  "

## 2020-08-06 ENCOUNTER — TELEMEDICINE (OUTPATIENT)
Dept: FAMILY MEDICINE CLINIC | Facility: CLINIC | Age: 80
End: 2020-08-06

## 2020-08-06 VITALS — HEIGHT: 64 IN | WEIGHT: 126 LBS | BODY MASS INDEX: 21.51 KG/M2

## 2020-08-06 DIAGNOSIS — B02.9 HERPES ZOSTER WITHOUT COMPLICATION: Primary | ICD-10-CM

## 2020-08-06 PROCEDURE — 99214 OFFICE O/P EST MOD 30 MIN: CPT | Performed by: FAMILY MEDICINE

## 2020-08-06 RX ORDER — VALACYCLOVIR HYDROCHLORIDE 1 G/1
1000 TABLET, FILM COATED ORAL DAILY
Qty: 7 TABLET | Refills: 0 | Status: SHIPPED | OUTPATIENT
Start: 2020-08-06 | End: 2020-08-13

## 2020-08-06 RX ORDER — LIDOCAINE 50 MG/G
OINTMENT TOPICAL
Qty: 35 G | Refills: 0 | Status: SHIPPED | OUTPATIENT
Start: 2020-08-06 | End: 2020-09-11

## 2020-08-06 NOTE — PROGRESS NOTES
Mar Burgos is a 79 y.o. female.     Chief Complaint   Patient presents with   • Herpes Zoster     Patient thinks she may have shingles on her back she has rash in the middle of her back and it is painful        HPI     This visit was completed as a video visit secondary to the COVID-19 pandemic following the CDC recommendation  for social distancing and to limit interpersonal contact including in the office setting.    You have chosen to receive care through a telephone visit. Do you consent to use a telephone visit for your medical care today? Yes      Pt is a pleasant 79 y.o. YO female here for new symptoms of painful rash.  Patient is new to me, her PCP is another provider in the office.  Symptoms started last night on the right side of her back, states that she felt like 20-30 bees were stinging her. Has had shingle in the same location in the past and felt the same - then she noticed rash. She hasn't been able to look at it very closely due to location. Her daughter who is present with her describes it as red, scaly, and blistered. The pain has improved somewhat since initial onset.     The following portions of the patient's history were reviewed by me and updated as appropriate: allergies, current medications, past family history, past medical history, past social history, past surgical history, and problem list.     Review of Systems   Constitutional: Negative.    HENT: Negative.    Eyes: Negative.    Respiratory: Negative.    Cardiovascular: Negative for chest pain, palpitations and leg swelling.   Gastrointestinal: Negative.    Endocrine: Negative.    Genitourinary: Negative.    Musculoskeletal: Negative.    Skin: Positive for rash.   Allergic/Immunologic: Negative.    Neurological: Negative.    Hematological: Negative.    Psychiatric/Behavioral: Negative.    I have reviewed and confirmed the accuracy of the ROS as documented by the MA/LPN/RN Nancy Wynne MD      Objective  There were no  vitals filed for this visit.  Body mass index is 21.62 kg/m².       Physical Exam   Constitutional: No distress.   Frail elderly woman, seated   HENT:   Head: Normocephalic and atraumatic.   Pulmonary/Chest: Effort normal. No respiratory distress.   Neurological: She is alert.   Skin:   Irregular erythematous vesicular rash on right mid back   Psychiatric: Her behavior is normal. Thought content normal.         Current Outpatient Medications:   •  acetaminophen (TYLENOL) 500 MG tablet, Take 500 mg by mouth Every 6 (Six) Hours As Needed for Mild Pain ., Disp: , Rfl:   •  amLODIPine (NORVASC) 10 MG tablet, TAKE 1 TABLET BY MOUTH DAILY, Disp: 90 tablet, Rfl: 3  •  anastrozole (ARIMIDEX) 1 MG tablet, Take 1 mg by mouth Daily., Disp: , Rfl:   •  cetirizine (zyrTEC) 10 MG tablet, Take 10 mg by mouth Daily., Disp: , Rfl:   •  cloNIDine (CATAPRES) 0.2 MG tablet, TAKE 1 TABLET BY MOUTH EVERY 12 HOURS, Disp: 180 tablet, Rfl: 0  •  clopidogrel (PLAVIX) 75 MG tablet, TAKE 1 TABLET BY MOUTH DAILY, Disp: 30 tablet, Rfl: 6  •  ferrous sulfate (IRON SUPPLEMENT) 325 (65 FE) MG tablet, Take 1 tablet by mouth 2 (Two) Times a Day Before Meals., Disp: 60 tablet, Rfl: 0  •  fluticasone (FLONASE) 50 MCG/ACT nasal spray, 2 sprays into the nostril(s) as directed by provider Every Night., Disp: , Rfl:   •  guaiFENesin (MUCINEX) 600 MG 12 hr tablet, Take 600 mg by mouth 2 (Two) Times a Day As Needed for Cough., Disp: , Rfl:   •  hydroCHLOROthiazide (MICROZIDE) 12.5 MG capsule, TAKE 1 CAPSULE BY MOUTH EVERY DAY, Disp: 90 capsule, Rfl: 3  •  lisinopril (PRINIVIL,ZESTRIL) 40 MG tablet, TAKE 1 TABLET BY MOUTH DAILY, Disp: 90 tablet, Rfl: 0  •  metFORMIN (GLUCOPHAGE) 1000 MG tablet, TAKE 1 TABLET BY MOUTH TWICE DAILY WITH MEALS, Disp: 180 tablet, Rfl: 0  •  metoprolol succinate XL (TOPROL-XL) 50 MG 24 hr tablet, TAKE 1 TABLET BY MOUTH DAILY, Disp: 90 tablet, Rfl: 3  •  pravastatin (PRAVACHOL) 20 MG tablet, TAKE 1 TABLET BY MOUTH EVERY NIGHT, Disp:  30 tablet, Rfl: 11  •  senna (SENOKOT) 8.6 MG tablet tablet, TK 1 T PO QD, Disp: , Rfl: 0  •  tobramycin (Tobrex) 0.3 % solution ophthalmic solution, Administer 2 drops to the right eye Every 4 (Four) Hours., Disp: 1 bottle, Rfl: 0  •  vitamin B-12 (VITAMIN B-12) 2000 MCG tablet, Take 1 tablet by mouth Daily., Disp: 30 tablet, Rfl: 0  •  lidocaine (XYLOCAINE) 5 % ointment, Apply  topically to the appropriate area as directed Every 2 (Two) Hours As Needed for Mild Pain ., Disp: 35 g, Rfl: 0  •  valACYclovir (VALTREX) 1000 MG tablet, Take 1 tablet by mouth Daily for 7 days., Disp: 7 tablet, Rfl: 0    Procedures    Lab Results (most recent)     None              Mar was seen today for herpes zoster.    Diagnoses and all orders for this visit:    Herpes zoster without complication  -     valACYclovir (VALTREX) 1000 MG tablet; Take 1 tablet by mouth Daily for 7 days.  -     lidocaine (XYLOCAINE) 5 % ointment; Apply  topically to the appropriate area as directed Every 2 (Two) Hours As Needed for Mild Pain .    Pleasant 79-year-old patient presents today with new symptoms of painful rash.  It has been present for less than 24 hours.  Symptoms as well as appearance of rash on exam are consistent with herpes zoster.  Will send in treatment with valacyclovir x7 days.  Patient does have CKD, I calculated her creatinine clearance using the Cockcroft-Gault equation and it is 27.  Due to this I adjusted dosing of the valacyclovir for her kidney function and will only dose thousand milligrams once daily.    We will send in topical lidocaine for patient to apply for symptoms of pain.    I am hopeful that she will be able to start treatment so soon after symptom onset that she will not have any permanent neuropathy and that hopefully symptoms will resolve soon.    Return if symptoms worsen or fail to improve.      Nancy Wynne MD

## 2020-09-02 RX ORDER — ANASTROZOLE 1 MG/1
TABLET ORAL
Qty: 90 TABLET | Refills: 0 | Status: SHIPPED | OUTPATIENT
Start: 2020-09-02 | End: 2020-12-08 | Stop reason: SDUPTHER

## 2020-09-15 ENCOUNTER — OFFICE VISIT (OUTPATIENT)
Dept: FAMILY MEDICINE CLINIC | Facility: CLINIC | Age: 80
End: 2020-09-15

## 2020-09-15 VITALS
TEMPERATURE: 99.3 F | SYSTOLIC BLOOD PRESSURE: 148 MMHG | OXYGEN SATURATION: 98 % | HEIGHT: 64 IN | BODY MASS INDEX: 21.1 KG/M2 | HEART RATE: 72 BPM | DIASTOLIC BLOOD PRESSURE: 60 MMHG | WEIGHT: 123.6 LBS

## 2020-09-15 DIAGNOSIS — I10 ESSENTIAL HYPERTENSION: Primary | ICD-10-CM

## 2020-09-15 DIAGNOSIS — T78.3XXD ANGIOEDEMA, SUBSEQUENT ENCOUNTER: ICD-10-CM

## 2020-09-15 PROBLEM — T78.3XXA ANGIO-EDEMA: Status: ACTIVE | Noted: 2020-09-15

## 2020-09-15 PROCEDURE — 99213 OFFICE O/P EST LOW 20 MIN: CPT | Performed by: NURSE PRACTITIONER

## 2020-09-15 NOTE — PROGRESS NOTES
"Subjective   Mar Burgos is a 79 y.o. female.     History of Present Illness   Patient presented to the office to follow-up from going to the urgent care center on 9/11/2020.  She went to the urgent care due to facial swelling she was diagnosed with angioedema due to her lisinopril so they stopped her lisinopril and she was told to follow-up here.  They also gave her some steroids.She was not started on a different BP med.  She has not checked BP since being off BP med bc cuff doesn't work.  Feeling 100% better and swelling is gone.She is still currently on 4 BP meds.    The following portions of the patient's history were reviewed and updated as appropriate: allergies, current medications, past social history and problem list.    Review of Systems   Constitutional: Negative for fever.   Respiratory: Negative for cough and shortness of breath.    Cardiovascular: Negative for chest pain.   Gastrointestinal: Negative for abdominal pain.   Neurological: Negative for dizziness.       Objective   /60   Pulse 72   Temp 99.3 °F (37.4 °C)   Ht 162.6 cm (64.02\")   Wt 56.1 kg (123 lb 9.6 oz)   SpO2 98%   BMI 21.21 kg/m²   Physical Exam  Vitals signs reviewed.   Constitutional:       General: She is not in acute distress.     Appearance: She is well-developed.   HENT:      Head: Normocephalic.   Cardiovascular:      Rate and Rhythm: Normal rate and regular rhythm.      Heart sounds: Normal heart sounds.   Pulmonary:      Effort: Pulmonary effort is normal.      Breath sounds: Normal breath sounds.   Neurological:      Mental Status: She is alert and oriented to person, place, and time.      Gait: Gait normal.   Psychiatric:         Behavior: Behavior normal.         Thought Content: Thought content normal.         Judgment: Judgment normal.         Assessment/Plan      Diagnosis Plan   1. Essential hypertension     2. Angioedema, subsequent encounter       Cont same with BP meds  Monitor BP daily and if " starts to get bad she needs to rto    Mask and shield worn    Vladimir Hernandez, APRN  9/15/2020

## 2020-10-05 RX ORDER — CLONIDINE HYDROCHLORIDE 0.2 MG/1
TABLET ORAL
Qty: 180 TABLET | Refills: 0 | Status: SHIPPED | OUTPATIENT
Start: 2020-10-05 | End: 2021-01-07

## 2020-11-17 ENCOUNTER — OFFICE VISIT (OUTPATIENT)
Dept: ONCOLOGY | Facility: CLINIC | Age: 80
End: 2020-11-17

## 2020-11-17 ENCOUNTER — LAB (OUTPATIENT)
Dept: LAB | Facility: HOSPITAL | Age: 80
End: 2020-11-17

## 2020-11-17 VITALS
HEART RATE: 80 BPM | DIASTOLIC BLOOD PRESSURE: 71 MMHG | OXYGEN SATURATION: 94 % | HEIGHT: 64 IN | TEMPERATURE: 96.4 F | SYSTOLIC BLOOD PRESSURE: 166 MMHG | RESPIRATION RATE: 20 BRPM | BODY MASS INDEX: 19.87 KG/M2 | WEIGHT: 116.4 LBS

## 2020-11-17 DIAGNOSIS — Z17.0 MALIGNANT NEOPLASM OF OVERLAPPING SITES OF RIGHT BREAST IN FEMALE, ESTROGEN RECEPTOR POSITIVE (HCC): ICD-10-CM

## 2020-11-17 DIAGNOSIS — C50.811 MALIGNANT NEOPLASM OF OVERLAPPING SITES OF RIGHT BREAST IN FEMALE, ESTROGEN RECEPTOR POSITIVE (HCC): ICD-10-CM

## 2020-11-17 DIAGNOSIS — C50.811 MALIGNANT NEOPLASM OF OVERLAPPING SITES OF RIGHT BREAST IN FEMALE, ESTROGEN RECEPTOR POSITIVE (HCC): Primary | ICD-10-CM

## 2020-11-17 DIAGNOSIS — Z17.0 MALIGNANT NEOPLASM OF OVERLAPPING SITES OF RIGHT BREAST IN FEMALE, ESTROGEN RECEPTOR POSITIVE (HCC): Primary | ICD-10-CM

## 2020-11-17 DIAGNOSIS — D50.9 IRON DEFICIENCY ANEMIA, UNSPECIFIED IRON DEFICIENCY ANEMIA TYPE: ICD-10-CM

## 2020-11-17 LAB
ALBUMIN SERPL-MCNC: 4.6 G/DL (ref 3.5–5.2)
ALBUMIN/GLOB SERPL: 1.4 G/DL (ref 1.1–2.4)
ALP SERPL-CCNC: 92 U/L (ref 38–116)
ALT SERPL W P-5'-P-CCNC: 8 U/L (ref 0–33)
ANION GAP SERPL CALCULATED.3IONS-SCNC: 15.5 MMOL/L (ref 5–15)
AST SERPL-CCNC: 18 U/L (ref 0–32)
BASOPHILS # BLD AUTO: 0.07 10*3/MM3 (ref 0–0.2)
BASOPHILS NFR BLD AUTO: 0.8 % (ref 0–1.5)
BILIRUB SERPL-MCNC: 0.2 MG/DL (ref 0.2–1.2)
BUN SERPL-MCNC: 27 MG/DL (ref 6–20)
BUN/CREAT SERPL: 20.8 (ref 7.3–30)
CALCIUM SPEC-SCNC: 9.5 MG/DL (ref 8.5–10.2)
CHLORIDE SERPL-SCNC: 106 MMOL/L (ref 98–107)
CO2 SERPL-SCNC: 19.5 MMOL/L (ref 22–29)
CREAT SERPL-MCNC: 1.3 MG/DL (ref 0.6–1.1)
DEPRECATED RDW RBC AUTO: 53.6 FL (ref 37–54)
EOSINOPHIL # BLD AUTO: 0.2 10*3/MM3 (ref 0–0.4)
EOSINOPHIL NFR BLD AUTO: 2.3 % (ref 0.3–6.2)
ERYTHROCYTE [DISTWIDTH] IN BLOOD BY AUTOMATED COUNT: 16.7 % (ref 12.3–15.4)
GFR SERPL CREATININE-BSD FRML MDRD: 39 ML/MIN/1.73
GLOBULIN UR ELPH-MCNC: 3.4 GM/DL (ref 1.8–3.5)
GLUCOSE SERPL-MCNC: 138 MG/DL (ref 74–124)
HCT VFR BLD AUTO: 36.2 % (ref 34–46.6)
HGB BLD-MCNC: 11.2 G/DL (ref 12–15.9)
IMM GRANULOCYTES # BLD AUTO: 0.02 10*3/MM3 (ref 0–0.05)
IMM GRANULOCYTES NFR BLD AUTO: 0.2 % (ref 0–0.5)
LYMPHOCYTES # BLD AUTO: 1.26 10*3/MM3 (ref 0.7–3.1)
LYMPHOCYTES NFR BLD AUTO: 14.7 % (ref 19.6–45.3)
MCH RBC QN AUTO: 27.3 PG (ref 26.6–33)
MCHC RBC AUTO-ENTMCNC: 30.9 G/DL (ref 31.5–35.7)
MCV RBC AUTO: 88.1 FL (ref 79–97)
MONOCYTES # BLD AUTO: 0.77 10*3/MM3 (ref 0.1–0.9)
MONOCYTES NFR BLD AUTO: 9 % (ref 5–12)
NEUTROPHILS NFR BLD AUTO: 6.27 10*3/MM3 (ref 1.7–7)
NEUTROPHILS NFR BLD AUTO: 73 % (ref 42.7–76)
NRBC BLD AUTO-RTO: 0 /100 WBC (ref 0–0.2)
PLATELET # BLD AUTO: 301 10*3/MM3 (ref 140–450)
PMV BLD AUTO: 9.1 FL (ref 6–12)
POTASSIUM SERPL-SCNC: 4 MMOL/L (ref 3.5–4.7)
PROT SERPL-MCNC: 8 G/DL (ref 6.3–8)
RBC # BLD AUTO: 4.11 10*6/MM3 (ref 3.77–5.28)
SODIUM SERPL-SCNC: 141 MMOL/L (ref 134–145)
WBC # BLD AUTO: 8.59 10*3/MM3 (ref 3.4–10.8)

## 2020-11-17 PROCEDURE — 99213 OFFICE O/P EST LOW 20 MIN: CPT | Performed by: NURSE PRACTITIONER

## 2020-11-17 PROCEDURE — 36415 COLL VENOUS BLD VENIPUNCTURE: CPT

## 2020-11-17 PROCEDURE — 80053 COMPREHEN METABOLIC PANEL: CPT

## 2020-11-17 PROCEDURE — 85025 COMPLETE CBC W/AUTO DIFF WBC: CPT

## 2020-11-17 RX ORDER — INFLUENZA A VIRUS A/MICHIGAN/45/2015 X-275 (H1N1) ANTIGEN (FORMALDEHYDE INACTIVATED), INFLUENZA A VIRUS A/SINGAPORE/INFIMH-16-0019/2016 IVR-186 (H3N2) ANTIGEN (FORMALDEHYDE INACTIVATED), INFLUENZA B VIRUS B/PHUKET/3073/2013 ANTIGEN (FORMALDEHYDE INACTIVATED), AND INFLUENZA B VIRUS B/MARYLAND/15/2016 BX-69A ANTIGEN (FORMALDEHYDE INACTIVATED) 60; 60; 60; 60 UG/.7ML; UG/.7ML; UG/.7ML; UG/.7ML
INJECTION, SUSPENSION INTRAMUSCULAR
COMMUNITY
Start: 2020-10-12 | End: 2021-03-15

## 2020-11-17 NOTE — PROGRESS NOTES
Subjective     REASON FOR FOLLOW-UP:   1. Locally advanced right breast cancer ER ND positive HER-2 positive  · Initiated Arimidex 2019  · Radiation therapy added, completing a total of 25 fractions as of 2020    2. Remote history of left breast cancer 1978 treated with mastectomy alone                               REQUESTING PHYSICIAN: MD Vladimir Cowart    History of Present Illness Ms. Burgos is a 80 y.o. female with above medical history who returns today for 4-month follow-up accompanied by her daughter.  Patient doing fairly well overall.  Currently struggling with a gout attack in her left foot and walking in a boot.    Patient does feel that the involved area of her right breast is slightly larger though still much improved from when she initially presented.  She notes pruritus around the nipple and occasional drainage.  She denies any obvious cutaneous involvement as she previously had.    Patient remains anemic, felt to be related to CKD.  Hemoglobin is improved overall at 11.2 however.    They deny other concerns at this time.    Past Medical History:   Diagnosis Date   • Anemia     required prior blood transfusions   • Arthritis    • Cancer (CMS/HCC)     right breast cancer    • Clostridium difficile colitis    • Diabetes mellitus (CMS/HCC)    • Difficulty walking    • Diverticulitis    • Environmental allergies    • History of malignant neoplasm of breast 2013    denies any chemo or radiation   • History of malignant neoplasm of skin 2013    possibly basal   • History of tobacco use 2013   • HL (hearing loss)    • Hyperlipidemia    • Hypertension    • Kidney disease    • Neuropathy in diabetes (CMS/HCC)    • Peripheral neuropathy    • Shingles    • Skin cancer    • Stroke (CMS/HCC) 2019   • Vision loss       Patient is  3 para 3 with 2 miscarriages menarche was at age 9 menopause at age 49st  childbirth was at age 24 she breast-fed for at least 6 months she did not take hormone replacement therapy because at age 38 she developed breast cancer in the left breast was treated at the [facility] [place] with a modified radical mastectomy in  no radiation or hormonal blockade was given in details of the scans were not available at this time.    Family history is positive for father dying at age 84 with disseminated abdominal cancer of unknown type mother  at 77 of natural causes she has a brother with kidney cancer at age 65 who  of this and a sister with melanoma at age 30    Patient currently lives with her daughter does not smoke currently but smoked for 70 years 1 pack of cigarettes is not a drinker.  She uses a cane to walk because her balance is poor she has issues with swallowing from her stroke      Past Surgical History:   Procedure Laterality Date   • CATARACT EXTRACTION, BILATERAL     • EAR TUBES     • MASTECTOMY Bilateral     radical   • TUMOR REMOVAL Left     large lipoma removed from hip      HEME/ONC HISTORY:  patient is an 79-year-old female with multiple medical issues including hypertension diabetes hypercholesterolemia with a recent stroke in February of this year which is resulted in left-sided weakness and difficulty swallowing.  The patient noted changes in her breast over the last year and the last few months changes to the nipple specifically with developing lump in both the right breast and under the right arm.  She brought to the attention of her family physician who sent her for imaging and biopsy of obvious tumor.  Mammogram revealed a large irregular mass measuring 6 cm with skin retraction nipple retraction and skin thickening.  At the 10 o'clock position there appeared to be some skin involvement.    She was also noted clinically to have large axillary lymph nodes.  Ultrasound was obtained revealing an irregular solid mass with indistinct margins measuring  approximately 64 x 51 mm.  On ultrasound there were also some enlarged axillary lymph nodes measuring about 30 mm in greatest size.     A biopsy was recommended and has revealed a grade 2 invasive ductal cancer that is ER positive at 98%, FL positive at 82%, and HER-2 equivocal.  The Ki-67 is 20.81%.     Patient was then referred to Dr. Gonzalez who examined her and did not feel surgical excision was possible because there would be a large defect from her tumor and was wanting to consider neoadjuvant therapy in an effort to shrink the tumor.  The patient was presented at the multidisciplinary conference and because of her comorbidities her recent stroke and her overall general condition we did not think she was a good candidate for neoadjuvant chemotherapy and she was referred to us to discuss endocrine therapy.    In the interim additional testing on her tumor was done at [facility] because of the indeterminate HER-2 FISH and this revealed that there was 3+ HER-2 activity by IHC and therefore this is considered to be HER-2 positive  The patient had staging work-up at the recommendation of the multidisciplinary clinic And CT of the chest showed calcified granulomatous disease with mild AP window adenopathy which is increased from previous CAT scan in 2016 precarinal adenopathy also increased.  It showed the breast mass as well as right axillary adenopathy.  In the abdomen there was a left adrenal nodule which is increased in size from 1.4 to 1.8 cm felt to be an adenoma in addition there was a small sclerotic lesion in the left iliac crest and metastasis could not be completely excluded although the bone scan showed no uptake in this area.  The bone scan did show a fracture and uptake in the left greater trochanter and uptake of the left superior pubic ramus that represents a superior ramus fracture of with no other signs of metastatic disease-patient did fall in July of this year but no obvious fractures were seen on  x-ray.    Pt did not want chemotherapy-Patient initiating Arimidex therapy in mid September 2019.     the patient was hospitalized in late September, presenting with garbled speech and elevated blood pressure, felt to ultimately have had a TIA related to hypertensive urgency and small vessel disease.  Neurology recommended dual antiplatelet therapy for 3 months followed by Plavix alone.  Her antihypertensives were adjusted.  At that time she was also found to be B12 deficient with a level of 158 and iron deficient with an iron percent of 7%.  She was started on oral B12 and iron.    She states that the iron does cause some constipation but it is manageable with stool softeners at this time.  There was some discussion in the hospital record that if she could not tolerate oral iron IV iron should be considered.  Hemoglobin is improved up to 9.7 from discharge hemoglobin of 9.0.    2/20  he has not had a DEXA scan in many years and we repeated in February and it shows moderate osteopenia in the spine osteoporosis and left hip and severe osteopenia in the right hip.  I am not inclined to switch her to tamoxifen at this point and leave her room at acceptable see her back in 3 months and decide whether to add Prolia    Current Outpatient Medications on File Prior to Visit   Medication Sig Dispense Refill   • acetaminophen (TYLENOL) 500 MG tablet Take 500 mg by mouth Every 6 (Six) Hours As Needed for Mild Pain .     • amLODIPine (NORVASC) 10 MG tablet TAKE 1 TABLET BY MOUTH DAILY 90 tablet 3   • anastrozole (ARIMIDEX) 1 MG tablet TAKE 1 TABLET BY MOUTH DAILY 90 tablet 0   • cetirizine (zyrTEC) 10 MG tablet Take 10 mg by mouth Daily.     • cloNIDine (CATAPRES) 0.2 MG tablet TAKE 1 TABLET BY MOUTH EVERY 12 HOURS 180 tablet 0   • clopidogrel (PLAVIX) 75 MG tablet TAKE 1 TABLET BY MOUTH DAILY 30 tablet 6   • ferrous sulfate (IRON SUPPLEMENT) 325 (65 FE) MG tablet Take 1 tablet by mouth 2 (Two) Times a Day Before Meals. 60  tablet 0   • fluticasone (FLONASE) 50 MCG/ACT nasal spray 2 sprays into the nostril(s) as directed by provider Every Night.     • Fluzone High-Dose Quadrivalent 0.7 ML suspension prefilled syringe ADM 0.7ML IM UTD     • guaiFENesin (MUCINEX) 600 MG 12 hr tablet Take 600 mg by mouth 2 (Two) Times a Day As Needed for Cough.     • hydroCHLOROthiazide (MICROZIDE) 12.5 MG capsule TAKE 1 CAPSULE BY MOUTH EVERY DAY 90 capsule 3   • metFORMIN (GLUCOPHAGE) 1000 MG tablet TAKE 1 TABLET BY MOUTH TWICE DAILY WITH MEALS 180 tablet 0   • metoprolol succinate XL (TOPROL-XL) 50 MG 24 hr tablet TAKE 1 TABLET BY MOUTH DAILY 90 tablet 3   • pravastatin (PRAVACHOL) 20 MG tablet TAKE 1 TABLET BY MOUTH EVERY NIGHT 30 tablet 11   • predniSONE (DELTASONE) 20 MG tablet Take 1 tablet by mouth 2 (Two) Times a Day. 10 tablet 0   • senna (SENOKOT) 8.6 MG tablet tablet TK 1 T PO QD  0   • vitamin B-12 (VITAMIN B-12) 2000 MCG tablet Take 1 tablet by mouth Daily. 30 tablet 0     No current facility-administered medications on file prior to visit.         ALLERGIES:    Allergies   Allergen Reactions   • Lisinopril Angioedema   • Lipitor [Atorvastatin] Unknown - High Severity     Leg weakness         Social History     Socioeconomic History   • Marital status:      Spouse name: Not on file   • Number of children: 3   • Years of education: High school   • Highest education level: Not on file   Occupational History     Employer: RETIRED   Tobacco Use   • Smoking status: Former Smoker     Packs/day: 0.25     Years: 10.00     Pack years: 2.50     Types: Cigarettes     Quit date: 2019     Years since quittin.7   • Smokeless tobacco: Former User   • Tobacco comment: she quit for 8 years and started back about a year ago then quit again   Substance and Sexual Activity   • Alcohol use: No   • Drug use: No   • Sexual activity: Never        Family History   Problem Relation Age of Onset   • Stroke Mother 76   • Diabetes Mother    •  "Hypertension Mother    • Arthritis Mother    • Cancer Father         metastatic unknown cause   • Heart attack Father 70   • Colon cancer Father    • Lung cancer Father    • Cancer Brother         bladder   • Arthritis Sister    • Melanoma Sister    • Hypertension Daughter    • Diabetes Daughter         Review of Systems   Constitutional: Positive for fatigue.   HENT: Positive for hearing loss (same ).    Respiratory: Negative.    Cardiovascular: Negative.    Gastrointestinal: Negative for abdominal pain (stable 4/17/2020).   Genitourinary: Negative.    Musculoskeletal: Positive for arthralgias (left foot 11/17/2020), gait problem (left sided weakness, same) and joint swelling.   Neurological: Positive for weakness (left side).   Hematological: Negative.    Psychiatric/Behavioral: Negative.    All other systems reviewed and are negative.     All of the above stable to improved as of  10/10/19    Objective     Vitals:    11/17/20 1613   BP: 166/71   Pulse: 80   Resp: 20   Temp: 96.4 °F (35.8 °C)   TempSrc: Temporal   SpO2: 94%   Weight: 52.8 kg (116 lb 6.4 oz)   Height: 162.6 cm (64.02\")   PainSc: 0-No pain     Current Status 11/17/2020   ECOG score 1       Physical Exam   Pulmonary/Chest:           GENERAL:  Well-developed, well-nourished in no acute distress.   SKIN:  Warm, dry without rashes, purpura or petechiae.  EYES:  Pupils equal, round and reactive to light.  EOMs intact.  Conjunctivae normal.  EARS:  Hearing intact.  NOSE:  Septum midline.  No excoriations or nasal discharge.  MOUTH:  Tongue is well-papillated; no stomatitis or ulcers.  Lips normal.  THROAT:  Oropharynx without lesions or exudates.  NECK:  Supple with good range of motion; no thyromegaly or masses, no JVD.  LYMPHATICS:  No cervical, supraclavicular, axillary or inguinal adenopathy.  CHEST:  Lungs clear to auscultation. Good airflow.  BREASTS: Left chest wall is benign; the right breast has a partially fixed soft tissue mass roughly 9 cm " transverse x 5 cm. Nipple has slight crusting around it with slight skin irritation though no infection or heat appreciated. (SEE IMAGES TAKEN TODAY)  No cutaneous lesions.  No palpable adenopathy in the right axilla, neck or supraclavicular region.    CARDIAC:  Regular rate and rhythm without murmurs, rubs or gallops. Normal S1,S2.  ABDOMEN:  Soft, nontender with no hepatosplenomegaly or masses.-Prominent bony nodule on the left greater trochanter  EXTREMITIES:  No clubbing, cyanosis 2+lymphedema right arm  NEUROLOGICAL:  Cranial Nerves II-XII grossly intact.  Mild left-sided weakness with a hemiparetic gait   PSYCHIATRIC:  Normal affect and mood.        RECENT LABS:  Results from last 7 days   Lab Units 11/17/20  1617   WBC 10*3/mm3 8.59   NEUTROS ABS 10*3/mm3 6.27   HEMOGLOBIN g/dL 11.2*   HEMATOCRIT % 36.2   PLATELETS 10*3/mm3 301                                             MRI BRAIN     IMPRESSION:  Old right basal ganglia infarct. No acute abnormality  identified. No metastatic disease is identified at the brain or the  head.    Bone scan  IMPRESSION:  Abnormal uptake left greater trochanter where there is a  fracture demonstrated with CT (patient has a history of a fall and there  is no CT evidence that this is a pathologic fracture). There is also  abnormal uptake at the left superior pubic ramus that most likely  represents a superior ramus fracture though a fracture is not evident  with CT. This uptake left superior pubic ramus is technically  indeterminate though there is overall no convincing evidence for bony  metastatic disease. Follow-up CT could be obtained.     This report was finalized on 8/23/2019 1:    CT chest abdomen pelvis  IMPRESSION CHEST CT:    1. A 2.2 cm right breast mass laterally presumably related to the  patient's neoplasm.  2. Right axillary mediastinal adenopathy and metastatic disease cannot  be excluded. PET/CT may be helpful.     IMPRESSION ABDOMEN & PELVIS CT:    1. Low-density  renal lesions as discussed, favor cysts.  2. Enlarging left adrenal nodule, likely adenoma.  3. Extensive diverticulosis.  4. A 5 mm sclerotic left iliac lesion as discussed.    BONE MINERAL DENSITOMETRY  IMPRESSION:  Osteoporosis at the left hip. Interval decrease in bone  density.     This report was finalized on 2/14/2020 12:10 PM by Dr. Osmin Dodd M.D.                          Assessment/Plan   1.  Locally advanced right breast cancer strongly ER IL positive with indeterminate HER-2 with repeat testing showing 3+ IHC consistent with positive result  · Staging work-up probably negative although indeterminate bone lesions without obvious fractures on bone scan  · Abnormal mediastinal adenopathy which is been present since 2016 likely granulomatous disease  · Enlarging left adrenal mass felt to be an adenoma present since 2015  · History of left breast cancer 1978 at age 38 treated with modified radical mastectomy alone? Pathology  · [place] to be a poor candidate for surgery or chemotherapy.PT DECLINED CHEMO  · Initiated Arimidex around 9/11/2019.  Radiation may be a possibility for additional therapy if she does not get a good response from Arimidex  · Radiation added in 3/20  · Patient is reviewed back today, 11/17/2020, tolerating Arimidex well.  She does feel that the right breast mass is slightly larger though still overall improved compared to when she initially presented.  No cutaneous lesions or adenopathy appreciated today.  Mass measuring 9 cm transverse by 5 cm.  She does have occasional clear drainage from the nipple.  Also occasionally pruritic.     2.  Granulomatous mediastinal lymph nodes chronic    3.  Recent stroke in 2/19 with left hemiparesis and dysphagia  · Patient hospitalized again 9/26 and 9/29/2019 presenting with garbled speech and elevated blood pressure, felt to have had a TIA.  Neurology recommended dual antiplatelet therapy for 3 months and then Plavix alone.    4.  Hypertension  diabetes and hypercholesterolemia.  Patient currently off lisinopril per primary care due to angioedema.    5.  Anemia, multifactorial:  · Patient has stage III CKD.  · Patient also found during recent hospitalization to have low B12 and low iron percent.  Though she had previously received B12 injections she was started on oral B12.  She was also started on oral iron.    · Patient continues on oral iron at this time with some constipation though tolerable with stool softeners.  Plan to continue to monitor her blood work and if develops intolerance to oral iron consider IV iron.  · Was recent iron studies 11/20/2019 include a ferritin of 41, iron saturation 12%.   · Hemoglobin today actually somewhat improved at 11.2.  Continue to monitor.     6.  Lymphedema right arm better after radiation    7.  Osteoporosis left hip -osteopenia spine observe for now    PLAN:  1. Continue Arimidex.  2. Pictures taken of right breast today for comparison going forward.  Patient again refuses any type of chemotherapy.  We will have her return in 3 months for follow-up.  I educated the patient and her daughter to call should she have worsening breast involvement including further enlarging of mass or new lesions on the skin.  They verbalized understanding.  3. Patient return for follow-up with Dr. Marcus in 3 months.   4. Patient again declined chemotherapy of any kind today.      This patient is on drug therapy requiring intensive monitoring for toxicity.  We did more than just assess side effects of treatment today.

## 2020-12-08 RX ORDER — ANASTROZOLE 1 MG/1
1 TABLET ORAL DAILY
Qty: 90 TABLET | Refills: 0 | Status: SHIPPED | OUTPATIENT
Start: 2020-12-08 | End: 2021-03-26

## 2021-01-01 ENCOUNTER — TELEPHONE (OUTPATIENT)
Dept: ONCOLOGY | Facility: CLINIC | Age: 81
End: 2021-01-01

## 2021-01-07 RX ORDER — CLONIDINE HYDROCHLORIDE 0.2 MG/1
TABLET ORAL
Qty: 180 TABLET | Refills: 0 | Status: SHIPPED | OUTPATIENT
Start: 2021-01-07 | End: 2021-01-08

## 2021-01-08 RX ORDER — CLONIDINE HYDROCHLORIDE 0.2 MG/1
TABLET ORAL
Qty: 180 TABLET | Refills: 0 | Status: SHIPPED | OUTPATIENT
Start: 2021-01-08 | End: 2021-07-06

## 2021-02-15 DIAGNOSIS — Z17.0 MALIGNANT NEOPLASM OF OVERLAPPING SITES OF RIGHT BREAST IN FEMALE, ESTROGEN RECEPTOR POSITIVE (HCC): ICD-10-CM

## 2021-02-15 DIAGNOSIS — N18.30 STAGE 3 CHRONIC KIDNEY DISEASE, UNSPECIFIED WHETHER STAGE 3A OR 3B CKD (HCC): Chronic | ICD-10-CM

## 2021-02-15 DIAGNOSIS — C50.811 MALIGNANT NEOPLASM OF OVERLAPPING SITES OF RIGHT BREAST IN FEMALE, ESTROGEN RECEPTOR POSITIVE (HCC): ICD-10-CM

## 2021-02-15 DIAGNOSIS — D50.9 IRON DEFICIENCY ANEMIA, UNSPECIFIED IRON DEFICIENCY ANEMIA TYPE: Primary | ICD-10-CM

## 2021-02-17 NOTE — PROGRESS NOTES
Subjective     REASON FOR FOLLOW-UP:   1. Locally advanced right breast cancer ER VT positive HER-2 positive  · Initiated Arimidex 2019  · Radiation therapy added, completing a total of 25 fractions as of 2020    2. Remote history of left breast cancer  treated with mastectomy alone                               REQUESTING PHYSICIAN: MD Vladimir Cowart    History of Present Illness Ms. Burgos is a 80 y.o. female with above medical history who returns today for 4-month follow-up accompanied by her daughter.  Continues on Arimidex after radiation to the right breast mass in April which is slow down its growth    Patient does feel that the involved area of her right breast is slightly larger though still much improved from when she initially presented.  She notes some new skin nodules that are crossing the midline    Patient remains anemic, felt to be related to CKD.  Hemoglobin is improved overall at 11.6 however.    They deny other concerns at this time.  Specifically no bone pains chest pain shortness of breath or cough    Past Medical History:   Diagnosis Date   • Anemia     required prior blood transfusions   • Arthritis    • Cancer (CMS/HCC)     right breast cancer    • Clostridium difficile colitis    • Diabetes mellitus (CMS/HCC)    • Difficulty walking    • Diverticulitis    • Environmental allergies    • History of malignant neoplasm of breast 2013    denies any chemo or radiation   • History of malignant neoplasm of skin 2013    possibly basal   • History of tobacco use 2013   • HL (hearing loss)    • Hyperlipidemia    • Hypertension    • Kidney disease    • Neuropathy in diabetes (CMS/HCC)    • Peripheral neuropathy    • Shingles    • Skin cancer    • Stroke (CMS/HCC) 2019   • Vision loss       Patient is  3 para 3 with 2 miscarriages menarche was at age 9 menopause at age 49st childbirth was at  age 24 she breast-fed for at least 6 months she did not take hormone replacement therapy because at age 38 she developed breast cancer in the left breast was treated at the [facility] with a modified radical mastectomy in  no radiation or hormonal blockade was given in details of the scans were not available at this time.    Family history is positive for father dying at age 84 with disseminated abdominal cancer of unknown type mother  at 77 of natural causes she has a brother with kidney cancer at age 65 who  of this and a sister with melanoma at age 30    Patient currently lives with her daughter does not smoke currently but smoked for 70 years 1 pack of cigarettes is not a drinker.  She uses a cane to walk because her balance is poor she has issues with swallowing from her stroke      Past Surgical History:   Procedure Laterality Date   • CATARACT EXTRACTION, BILATERAL     • EAR TUBES     • MASTECTOMY Bilateral     radical   • TUMOR REMOVAL Left     large lipoma removed from hip      HEME/ONC HISTORY:  patient is an 79-year-old female with multiple medical issues including hypertension diabetes hypercholesterolemia with a recent stroke in February of this year which is resulted in left-sided weakness and difficulty swallowing.  The patient noted changes in her breast over the last year and the last few months changes to the nipple specifically with developing lump in both the right breast and under the right arm.  She brought to the attention of her family physician who sent her for imaging and biopsy of obvious tumor.  Mammogram revealed a large irregular mass measuring 6 cm with skin retraction nipple retraction and skin thickening.  At the 10 o'clock position there appeared to be some skin involvement.    She was also noted clinically to have large axillary lymph nodes.  Ultrasound was obtained revealing an irregular solid mass with indistinct margins measuring approximately 64 x 51 mm.   On ultrasound there were also some enlarged axillary lymph nodes measuring about 30 mm in greatest size.     A biopsy was recommended and has revealed a grade 2 invasive ductal cancer that is ER positive at 98%, VA positive at 82%, and HER-2 equivocal.  The Ki-67 is 20.81%.     Patient was then referred to Dr. Gonzalez who examined her and did not feel surgical excision was possible because there would be a large defect from her tumor and was wanting to consider neoadjuvant therapy in an effort to shrink the tumor.  The patient was presented at the multidisciplinary conference and because of her comorbidities her recent stroke and her overall general condition we did not think she was a good candidate for neoadjuvant chemotherapy and she was referred to us to discuss endocrine therapy.    In the interim additional testing on her tumor was done at [facility] because of the indeterminate HER-2 FISH and this revealed that there was 3+ HER-2 activity by IHC and therefore this is considered to be HER-2 positive  The patient had staging work-up at the recommendation of the multidisciplinary clinic And CT of the chest showed calcified granulomatous disease with mild AP window adenopathy which is increased from previous CAT scan in 2016 precarinal adenopathy also increased.  It showed the breast mass as well as right axillary adenopathy.  In the abdomen there was a left adrenal nodule which is increased in size from 1.4 to 1.8 cm felt to be an adenoma in addition there was a small sclerotic lesion in the left iliac crest and metastasis could not be completely excluded although the bone scan showed no uptake in this area.  The bone scan did show a fracture and uptake in the left greater trochanter and uptake of the left superior pubic ramus that represents a superior ramus fracture of with no other signs of metastatic disease-patient did fall in July of this year but no obvious fractures were seen on x-ray.    Pt did not want  chemotherapy-Patient initiating Arimidex therapy in mid September 2019.     the patient was hospitalized in late September, presenting with garbled speech and elevated blood pressure, felt to ultimately have had a TIA related to hypertensive urgency and small vessel disease.  Neurology recommended dual antiplatelet therapy for 3 months followed by Plavix alone.  Her antihypertensives were adjusted.  At that time she was also found to be B12 deficient with a level of 158 and iron deficient with an iron percent of 7%.  She was started on oral B12 and iron.    She states that the iron does cause some constipation but it is manageable with stool softeners at this time.  There was some discussion in the hospital record that if she could not tolerate oral iron IV iron should be considered.  Hemoglobin is improved up to 9.7 from discharge hemoglobin of 9.0.    2/20  he has not had a DEXA scan in many years and we repeated in February and it shows moderate osteopenia in the spine osteoporosis and left hip and severe osteopenia in the right hip.  I am not inclined to switch her to tamoxifen at this point and leave her room at acceptable see her back in 3 months and decide whether to add Prolia    Current Outpatient Medications on File Prior to Visit   Medication Sig Dispense Refill   • acetaminophen (TYLENOL) 500 MG tablet Take 500 mg by mouth Every 6 (Six) Hours As Needed for Mild Pain .     • amLODIPine (NORVASC) 10 MG tablet TAKE 1 TABLET BY MOUTH DAILY 90 tablet 3   • anastrozole (ARIMIDEX) 1 MG tablet Take 1 tablet by mouth Daily. 90 tablet 0   • cetirizine (zyrTEC) 10 MG tablet Take 10 mg by mouth Daily.     • cloNIDine (CATAPRES) 0.2 MG tablet TAKE 1 TABLET BY MOUTH EVERY 12 HOURS 180 tablet 0   • clopidogrel (PLAVIX) 75 MG tablet TAKE 1 TABLET BY MOUTH DAILY 30 tablet 6   • ferrous sulfate (IRON SUPPLEMENT) 325 (65 FE) MG tablet Take 1 tablet by mouth 2 (Two) Times a Day Before Meals. 60 tablet 0   • fluticasone  (FLONASE) 50 MCG/ACT nasal spray 2 sprays into the nostril(s) as directed by provider Every Night.     • Fluzone High-Dose Quadrivalent 0.7 ML suspension prefilled syringe ADM 0.7ML IM UTD     • guaiFENesin (MUCINEX) 600 MG 12 hr tablet Take 600 mg by mouth 2 (Two) Times a Day As Needed for Cough.     • hydroCHLOROthiazide (MICROZIDE) 12.5 MG capsule TAKE 1 CAPSULE BY MOUTH EVERY DAY 90 capsule 3   • metFORMIN (GLUCOPHAGE) 1000 MG tablet Take one tablet bid 180 tablet 0   • metoprolol succinate XL (TOPROL-XL) 50 MG 24 hr tablet TAKE 1 TABLET BY MOUTH DAILY 90 tablet 3   • pravastatin (PRAVACHOL) 20 MG tablet TAKE 1 TABLET BY MOUTH EVERY NIGHT 30 tablet 11   • predniSONE (DELTASONE) 20 MG tablet Take 1 tablet by mouth 2 (Two) Times a Day. 10 tablet 0   • senna (SENOKOT) 8.6 MG tablet tablet TK 1 T PO QD  0   • vitamin B-12 (VITAMIN B-12) 2000 MCG tablet Take 1 tablet by mouth Daily. 30 tablet 0     No current facility-administered medications on file prior to visit.         ALLERGIES:    Allergies   Allergen Reactions   • Lisinopril Angioedema   • Lipitor [Atorvastatin] Unknown - High Severity     Leg weakness         Social History     Socioeconomic History   • Marital status:      Spouse name: Not on file   • Number of children: 3   • Years of education: High school   • Highest education level: Not on file   Occupational History     Employer: RETIRED   Tobacco Use   • Smoking status: Former Smoker     Packs/day: 0.25     Years: 10.00     Pack years: 2.50     Types: Cigarettes     Quit date: 2019     Years since quittin.9   • Smokeless tobacco: Former User   • Tobacco comment: she quit for 8 years and started back about a year ago then quit again   Substance and Sexual Activity   • Alcohol use: No   • Drug use: No   • Sexual activity: Never        Family History   Problem Relation Age of Onset   • Stroke Mother 76   • Diabetes Mother    • Hypertension Mother    • Arthritis Mother    • Cancer Father          metastatic unknown cause   • Heart attack Father 70   • Colon cancer Father    • Lung cancer Father    • Cancer Brother         bladder   • Arthritis Sister    • Melanoma Sister    • Hypertension Daughter    • Diabetes Daughter         Review of Systems   Constitutional: Positive for fatigue.   HENT: Positive for hearing loss (same ).    Respiratory: Negative.    Cardiovascular: Negative.    Gastrointestinal: Negative for abdominal pain (stable 4/17/2020).   Genitourinary: Negative.    Musculoskeletal: Positive for arthralgias (left foot 11/17/2020), gait problem (left sided weakness, same) and joint swelling.   Neurological: Positive for weakness (left side).   Hematological: Negative.    Psychiatric/Behavioral: Negative.    All other systems reviewed and are negative.     All of the above stable to improved as of  10/10/19    Objective     There were no vitals filed for this visit.  Current Status 11/17/2020   ECOG score 1       Physical Exam   Pulmonary/Chest:           GENERAL:  Well-developed, well-nourished in no acute distress.   SKIN:  Warm, dry without rashes, purpura or petechiae.  EYES:  Pupils equal, round and reactive to light.  EOMs intact.  Conjunctivae normal.  EARS:  Hearing intact.  NOSE:  Septum midline.  No excoriations or nasal discharge.  MOUTH:  Tongue is well-papillated; no stomatitis or ulcers.  Lips normal.  THROAT:  Oropharynx without lesions or exudates.  NECK:  Supple with good range of motion; no thyromegaly or masses, no JVD.  LYMPHATICS:  No cervical, supraclavicular, axillary or inguinal adenopathy.  CHEST:  Lungs clear to auscultation. Good airflow.  BREASTS: Left chest wall is benign; the right breast has a mobile soft tissue mass roughly 6 cm transverse x 5 cm.  2 new subcutaneous nodules noted see picture above -No palpable adenopathy in the right axilla, neck or supraclavicular region.    CARDIAC:  Regular rate and rhythm without murmurs, rubs or gallops. Normal  S1,S2.  ABDOMEN:  Soft, nontender with no hepatosplenomegaly or masses.-Prominent bony nodule on the left greater trochanter  EXTREMITIES:  No clubbing, cyanosis 2+lymphedema right arm  NEUROLOGICAL:  Cranial Nerves II-XII grossly intact.  Mild left-sided weakness with a hemiparetic gait   PSYCHIATRIC:  Normal affect and mood.        RECENT LABS:  Lab Results   Component Value Date    WBC 7.46 02/18/2021    HGB 11.6 (L) 02/18/2021    HCT 37.4 02/18/2021    MCV 87.4 02/18/2021     02/18/2021     Lab Results   Component Value Date    GLUCOSE 126 (H) 02/18/2021    BUN 27 (H) 02/18/2021    CREATININE 1.61 (H) 02/18/2021    EGFRIFNONA 31 (L) 02/18/2021    EGFRIFAFRI 42 (L) 01/16/2017    BCR 16.8 02/18/2021    K 3.8 02/18/2021    CO2 23.1 02/18/2021    CALCIUM 9.9 02/18/2021    PROTENTOTREF 7.8 01/16/2017    ALBUMIN 4.70 02/18/2021    LABIL2 1.4 01/16/2017    AST 19 02/18/2021    ALT 6 02/18/2021                                                   MRI BRAIN     IMPRESSION:  Old right basal ganglia infarct. No acute abnormality  identified. No metastatic disease is identified at the brain or the  head.    Bone scan  IMPRESSION:  Abnormal uptake left greater trochanter where there is a  fracture demonstrated with CT (patient has a history of a fall and there  is no CT evidence that this is a pathologic fracture). There is also  abnormal uptake at the left superior pubic ramus that most likely  represents a superior ramus fracture though a fracture is not evident  with CT. This uptake left superior pubic ramus is technically  indeterminate though there is overall no convincing evidence for bony  metastatic disease. Follow-up CT could be obtained.     This report was finalized on 8/23/2019 1:    CT chest abdomen pelvis  IMPRESSION CHEST CT:    1. A 2.2 cm right breast mass laterally presumably related to the  patient's neoplasm.  2. Right axillary mediastinal adenopathy and metastatic disease cannot  be excluded. PET/CT  may be helpful.     IMPRESSION ABDOMEN & PELVIS CT:    1. Low-density renal lesions as discussed, favor cysts.  2. Enlarging left adrenal nodule, likely adenoma.  3. Extensive diverticulosis.  4. A 5 mm sclerotic left iliac lesion as discussed.    BONE MINERAL DENSITOMETRY  IMPRESSION:  Osteoporosis at the left hip. Interval decrease in bone  density.     This report was finalized on 2/14/2020 12:10 PM by Dr. Osmin Dodd M.D.                          Assessment/Plan   1.  Locally advanced right breast cancer strongly ER NC positive with indeterminate HER-2 with repeat testing showing 3+ IHC consistent with positive result  · Staging work-up probably negative although indeterminate bone lesions without obvious fractures on bone scan  · Abnormal mediastinal adenopathy which is been present since 2016 likely granulomatous disease  · Enlarging left adrenal mass felt to be an adenoma present since 2015  · History of left breast cancer 1978 at age 38 treated with modified radical mastectomy alone? Pathology  · White to be a poor candidate for surgery or chemotherapy.PT DECLINED CHEMO  · Initiated Arimidex around 9/11/2019.  Radiation may be a possibility for additional therapy if she does not get a good response from Arimidex  · Radiation added in 3/20  · Patient is reviewed back today, 11/17/2020, tolerating Arimidex well.  She does feel that the right breast mass is slightly larger though still overall improved compared to when she initially presented.    · Patient seen on 2/18/2021 with new cutaneous nodules restaging and switch to Faslodex planned    2.  Granulomatous mediastinal lymph nodes chronic    3.  Recent stroke in 2/19 with left hemiparesis and dysphagia  · Patient hospitalized again 9/26 and 9/29/2019 presenting with garbled speech and elevated blood pressure, felt to have had a TIA.  Neurology recommended dual antiplatelet therapy for 3 months and then Plavix alone.    4.  Hypertension diabetes and  hypercholesterolemia.  Patient currently off lisinopril per primary care due to angioedema.    5.  Anemia, multifactorial:  · Patient has stage III CKD.  · Patient also found during recent hospitalization to have low B12 and low iron percent.  Though she had previously received B12 injections she was started on oral B12.  She was also started on oral iron.    · Patient continues on oral iron at this time with some constipation though tolerable with stool softeners.  Plan to continue to monitor her blood work and if develops intolerance to oral iron consider IV iron.  · Was recent iron studies 11/20/2019 include a ferritin of 41, iron saturation 12%.   · Hemoglobin today actually somewhat improved at 11.2.  Continue to monitor.     6.  Lymphedema right arm better after radiation    7.  Osteoporosis left hip -osteopenia spine observe for now    PLAN:  1. Stop Arimidex.  2. None contrasted CAT scan and bone scan for restaging plus CA 15-3  3. patient again declined chemotherapy of any kind but is agreeable to Faslodex  4.  Return in 2 weeks for first dose of Faslodex and see me in a month to review her scans and tolerance of Faslodex    If she does not show good response will consider adding low-dose Ibrance 75 mg but doubt she would tolerate anything higher

## 2021-02-18 ENCOUNTER — LAB (OUTPATIENT)
Dept: LAB | Facility: HOSPITAL | Age: 81
End: 2021-02-18

## 2021-02-18 ENCOUNTER — OFFICE VISIT (OUTPATIENT)
Dept: ONCOLOGY | Facility: CLINIC | Age: 81
End: 2021-02-18

## 2021-02-18 VITALS
RESPIRATION RATE: 16 BRPM | DIASTOLIC BLOOD PRESSURE: 82 MMHG | OXYGEN SATURATION: 94 % | HEIGHT: 64 IN | TEMPERATURE: 97.8 F | WEIGHT: 111.6 LBS | HEART RATE: 78 BPM | SYSTOLIC BLOOD PRESSURE: 171 MMHG | BODY MASS INDEX: 19.05 KG/M2

## 2021-02-18 DIAGNOSIS — N18.30 STAGE 3 CHRONIC KIDNEY DISEASE, UNSPECIFIED WHETHER STAGE 3A OR 3B CKD (HCC): ICD-10-CM

## 2021-02-18 DIAGNOSIS — C50.811 MALIGNANT NEOPLASM OF OVERLAPPING SITES OF RIGHT BREAST IN FEMALE, ESTROGEN RECEPTOR POSITIVE (HCC): Primary | ICD-10-CM

## 2021-02-18 DIAGNOSIS — Z17.0 MALIGNANT NEOPLASM OF OVERLAPPING SITES OF RIGHT BREAST IN FEMALE, ESTROGEN RECEPTOR POSITIVE (HCC): ICD-10-CM

## 2021-02-18 DIAGNOSIS — C50.811 MALIGNANT NEOPLASM OF OVERLAPPING SITES OF RIGHT BREAST IN FEMALE, ESTROGEN RECEPTOR POSITIVE (HCC): ICD-10-CM

## 2021-02-18 DIAGNOSIS — D50.9 IRON DEFICIENCY ANEMIA, UNSPECIFIED IRON DEFICIENCY ANEMIA TYPE: ICD-10-CM

## 2021-02-18 DIAGNOSIS — Z17.0 MALIGNANT NEOPLASM OF OVERLAPPING SITES OF RIGHT BREAST IN FEMALE, ESTROGEN RECEPTOR POSITIVE (HCC): Primary | ICD-10-CM

## 2021-02-18 LAB
ALBUMIN SERPL-MCNC: 4.7 G/DL (ref 3.5–5.2)
ALBUMIN/GLOB SERPL: 1.3 G/DL (ref 1.1–2.4)
ALP SERPL-CCNC: 109 U/L (ref 38–116)
ALT SERPL W P-5'-P-CCNC: 6 U/L (ref 0–33)
ANION GAP SERPL CALCULATED.3IONS-SCNC: 15.9 MMOL/L (ref 5–15)
AST SERPL-CCNC: 19 U/L (ref 0–32)
BASOPHILS # BLD AUTO: 0.08 10*3/MM3 (ref 0–0.2)
BASOPHILS NFR BLD AUTO: 1.1 % (ref 0–1.5)
BILIRUB SERPL-MCNC: 0.2 MG/DL (ref 0.2–1.2)
BUN SERPL-MCNC: 27 MG/DL (ref 6–20)
BUN/CREAT SERPL: 16.8 (ref 7.3–30)
CALCIUM SPEC-SCNC: 9.9 MG/DL (ref 8.5–10.2)
CANCER AG15-3 SERPL-ACNC: 67.6 U/ML
CHLORIDE SERPL-SCNC: 103 MMOL/L (ref 98–107)
CO2 SERPL-SCNC: 23.1 MMOL/L (ref 22–29)
CREAT SERPL-MCNC: 1.61 MG/DL (ref 0.6–1.1)
DEPRECATED RDW RBC AUTO: 46.6 FL (ref 37–54)
EOSINOPHIL # BLD AUTO: 0.21 10*3/MM3 (ref 0–0.4)
EOSINOPHIL NFR BLD AUTO: 2.8 % (ref 0.3–6.2)
ERYTHROCYTE [DISTWIDTH] IN BLOOD BY AUTOMATED COUNT: 14.7 % (ref 12.3–15.4)
FERRITIN SERPL-MCNC: 70.1 NG/ML (ref 13–150)
GFR SERPL CREATININE-BSD FRML MDRD: 31 ML/MIN/1.73
GLOBULIN UR ELPH-MCNC: 3.7 GM/DL (ref 1.8–3.5)
GLUCOSE SERPL-MCNC: 126 MG/DL (ref 74–124)
HCT VFR BLD AUTO: 37.4 % (ref 34–46.6)
HGB BLD-MCNC: 11.6 G/DL (ref 12–15.9)
IMM GRANULOCYTES # BLD AUTO: 0.02 10*3/MM3 (ref 0–0.05)
IMM GRANULOCYTES NFR BLD AUTO: 0.3 % (ref 0–0.5)
IRON 24H UR-MRATE: 52 MCG/DL (ref 37–145)
IRON SATN MFR SERPL: 13 % (ref 14–48)
LYMPHOCYTES # BLD AUTO: 1.31 10*3/MM3 (ref 0.7–3.1)
LYMPHOCYTES NFR BLD AUTO: 17.6 % (ref 19.6–45.3)
MCH RBC QN AUTO: 27.1 PG (ref 26.6–33)
MCHC RBC AUTO-ENTMCNC: 31 G/DL (ref 31.5–35.7)
MCV RBC AUTO: 87.4 FL (ref 79–97)
MONOCYTES # BLD AUTO: 0.64 10*3/MM3 (ref 0.1–0.9)
MONOCYTES NFR BLD AUTO: 8.6 % (ref 5–12)
NEUTROPHILS NFR BLD AUTO: 5.2 10*3/MM3 (ref 1.7–7)
NEUTROPHILS NFR BLD AUTO: 69.6 % (ref 42.7–76)
NRBC BLD AUTO-RTO: 0 /100 WBC (ref 0–0.2)
PLATELET # BLD AUTO: 333 10*3/MM3 (ref 140–450)
PMV BLD AUTO: 9 FL (ref 6–12)
POTASSIUM SERPL-SCNC: 3.8 MMOL/L (ref 3.5–4.7)
PROT SERPL-MCNC: 8.4 G/DL (ref 6.3–8)
RBC # BLD AUTO: 4.28 10*6/MM3 (ref 3.77–5.28)
SODIUM SERPL-SCNC: 142 MMOL/L (ref 134–145)
TIBC SERPL-MCNC: 413 MCG/DL (ref 249–505)
TRANSFERRIN SERPL-MCNC: 295 MG/DL (ref 200–360)
WBC # BLD AUTO: 7.46 10*3/MM3 (ref 3.4–10.8)

## 2021-02-18 PROCEDURE — 99214 OFFICE O/P EST MOD 30 MIN: CPT | Performed by: INTERNAL MEDICINE

## 2021-02-18 PROCEDURE — 85025 COMPLETE CBC W/AUTO DIFF WBC: CPT

## 2021-02-18 PROCEDURE — 86300 IMMUNOASSAY TUMOR CA 15-3: CPT | Performed by: INTERNAL MEDICINE

## 2021-02-18 PROCEDURE — 82728 ASSAY OF FERRITIN: CPT

## 2021-02-18 PROCEDURE — 36415 COLL VENOUS BLD VENIPUNCTURE: CPT

## 2021-02-18 PROCEDURE — 83540 ASSAY OF IRON: CPT

## 2021-02-18 PROCEDURE — 84466 ASSAY OF TRANSFERRIN: CPT

## 2021-02-18 PROCEDURE — 80053 COMPREHEN METABOLIC PANEL: CPT

## 2021-02-19 RX ORDER — HYDROCHLOROTHIAZIDE 12.5 MG/1
12.5 CAPSULE, GELATIN COATED ORAL DAILY
Qty: 90 CAPSULE | Refills: 3 | Status: SHIPPED | OUTPATIENT
Start: 2021-02-19 | End: 2021-10-31 | Stop reason: HOSPADM

## 2021-03-02 DIAGNOSIS — Z23 IMMUNIZATION DUE: ICD-10-CM

## 2021-03-05 ENCOUNTER — LAB (OUTPATIENT)
Dept: LAB | Facility: HOSPITAL | Age: 81
End: 2021-03-05

## 2021-03-05 ENCOUNTER — INFUSION (OUTPATIENT)
Dept: ONCOLOGY | Facility: HOSPITAL | Age: 81
End: 2021-03-05

## 2021-03-05 DIAGNOSIS — Z17.0 MALIGNANT NEOPLASM OF OVERLAPPING SITES OF RIGHT BREAST IN FEMALE, ESTROGEN RECEPTOR POSITIVE (HCC): Primary | ICD-10-CM

## 2021-03-05 DIAGNOSIS — C50.811 MALIGNANT NEOPLASM OF OVERLAPPING SITES OF RIGHT BREAST IN FEMALE, ESTROGEN RECEPTOR POSITIVE (HCC): Primary | ICD-10-CM

## 2021-03-05 LAB
ALBUMIN SERPL-MCNC: 4.5 G/DL (ref 3.5–5.2)
ALBUMIN/GLOB SERPL: 1.2 G/DL (ref 1.1–2.4)
ALP SERPL-CCNC: 114 U/L (ref 38–116)
ALT SERPL W P-5'-P-CCNC: 7 U/L (ref 0–33)
ANION GAP SERPL CALCULATED.3IONS-SCNC: 14.7 MMOL/L (ref 5–15)
AST SERPL-CCNC: 15 U/L (ref 0–32)
BASOPHILS # BLD AUTO: 0.03 10*3/MM3 (ref 0–0.2)
BASOPHILS NFR BLD AUTO: 0.6 % (ref 0–1.5)
BILIRUB SERPL-MCNC: 0.2 MG/DL (ref 0.2–1.2)
BUN SERPL-MCNC: 28 MG/DL (ref 6–20)
BUN/CREAT SERPL: 18.5 (ref 7.3–30)
CALCIUM SPEC-SCNC: 9.9 MG/DL (ref 8.5–10.2)
CHLORIDE SERPL-SCNC: 104 MMOL/L (ref 98–107)
CO2 SERPL-SCNC: 23.3 MMOL/L (ref 22–29)
CREAT SERPL-MCNC: 1.51 MG/DL (ref 0.6–1.1)
DEPRECATED RDW RBC AUTO: 48.6 FL (ref 37–54)
EOSINOPHIL # BLD AUTO: 0.19 10*3/MM3 (ref 0–0.4)
EOSINOPHIL NFR BLD AUTO: 4 % (ref 0.3–6.2)
ERYTHROCYTE [DISTWIDTH] IN BLOOD BY AUTOMATED COUNT: 14.9 % (ref 12.3–15.4)
GFR SERPL CREATININE-BSD FRML MDRD: 33 ML/MIN/1.73
GLOBULIN UR ELPH-MCNC: 3.8 GM/DL (ref 1.8–3.5)
GLUCOSE SERPL-MCNC: 114 MG/DL (ref 74–124)
HCT VFR BLD AUTO: 36.1 % (ref 34–46.6)
HGB BLD-MCNC: 11.6 G/DL (ref 12–15.9)
IMM GRANULOCYTES # BLD AUTO: 0.01 10*3/MM3 (ref 0–0.05)
IMM GRANULOCYTES NFR BLD AUTO: 0.2 % (ref 0–0.5)
LYMPHOCYTES # BLD AUTO: 1.18 10*3/MM3 (ref 0.7–3.1)
LYMPHOCYTES NFR BLD AUTO: 24.6 % (ref 19.6–45.3)
MCH RBC QN AUTO: 28.6 PG (ref 26.6–33)
MCHC RBC AUTO-ENTMCNC: 32.1 G/DL (ref 31.5–35.7)
MCV RBC AUTO: 88.9 FL (ref 79–97)
MONOCYTES # BLD AUTO: 0.62 10*3/MM3 (ref 0.1–0.9)
MONOCYTES NFR BLD AUTO: 12.9 % (ref 5–12)
NEUTROPHILS NFR BLD AUTO: 2.76 10*3/MM3 (ref 1.7–7)
NEUTROPHILS NFR BLD AUTO: 57.7 % (ref 42.7–76)
NRBC BLD AUTO-RTO: 0 /100 WBC (ref 0–0.2)
PLATELET # BLD AUTO: 300 10*3/MM3 (ref 140–450)
PMV BLD AUTO: 9.3 FL (ref 6–12)
POTASSIUM SERPL-SCNC: 4.4 MMOL/L (ref 3.5–4.7)
PROT SERPL-MCNC: 8.3 G/DL (ref 6.3–8)
RBC # BLD AUTO: 4.06 10*6/MM3 (ref 3.77–5.28)
SODIUM SERPL-SCNC: 142 MMOL/L (ref 134–145)
WBC # BLD AUTO: 4.79 10*3/MM3 (ref 3.4–10.8)

## 2021-03-05 PROCEDURE — 36415 COLL VENOUS BLD VENIPUNCTURE: CPT

## 2021-03-05 PROCEDURE — 96402 CHEMO HORMON ANTINEOPL SQ/IM: CPT

## 2021-03-05 PROCEDURE — 25010000002 FULVESTRANT PER 25 MG: Performed by: NURSE PRACTITIONER

## 2021-03-05 PROCEDURE — 80053 COMPREHEN METABOLIC PANEL: CPT

## 2021-03-05 PROCEDURE — 85025 COMPLETE CBC W/AUTO DIFF WBC: CPT

## 2021-03-05 RX ADMIN — FULVESTRANT 500 MG: 50 INJECTION INTRAMUSCULAR at 16:03

## 2021-03-08 RX ORDER — ANASTROZOLE 1 MG/1
1 TABLET ORAL DAILY
Refills: 0 | OUTPATIENT
Start: 2021-03-08

## 2021-03-11 ENCOUNTER — TELEPHONE (OUTPATIENT)
Dept: OTHER | Facility: HOSPITAL | Age: 81
End: 2021-03-11

## 2021-03-11 NOTE — TELEPHONE ENCOUNTER
Contacted and LVM on patient daughters mobile number regarding appt for survivorship.  Will mail information to patients home with direct contact info if patient wishes to schedule.

## 2021-03-12 ENCOUNTER — HOSPITAL ENCOUNTER (OUTPATIENT)
Dept: NUCLEAR MEDICINE | Facility: HOSPITAL | Age: 81
Discharge: HOME | End: 2021-03-12

## 2021-03-12 ENCOUNTER — HOSPITAL ENCOUNTER (OUTPATIENT)
Dept: CT IMAGING | Facility: HOSPITAL | Age: 81
Discharge: HOME | End: 2021-03-12
Admitting: INTERNAL MEDICINE

## 2021-03-12 DIAGNOSIS — Z17.0 MALIGNANT NEOPLASM OF OVERLAPPING SITES OF RIGHT BREAST IN FEMALE, ESTROGEN RECEPTOR POSITIVE (HCC): ICD-10-CM

## 2021-03-12 DIAGNOSIS — C50.811 MALIGNANT NEOPLASM OF OVERLAPPING SITES OF RIGHT BREAST IN FEMALE, ESTROGEN RECEPTOR POSITIVE (HCC): ICD-10-CM

## 2021-03-12 PROCEDURE — 0 TECHNETIUM MEDRONATE KIT: Performed by: INTERNAL MEDICINE

## 2021-03-12 PROCEDURE — 74176 CT ABD & PELVIS W/O CONTRAST: CPT

## 2021-03-12 PROCEDURE — 78306 BONE IMAGING WHOLE BODY: CPT

## 2021-03-12 PROCEDURE — A9503 TC99M MEDRONATE: HCPCS | Performed by: INTERNAL MEDICINE

## 2021-03-12 PROCEDURE — 71250 CT THORAX DX C-: CPT

## 2021-03-12 RX ORDER — TC 99M MEDRONATE 20 MG/10ML
22 INJECTION, POWDER, LYOPHILIZED, FOR SOLUTION INTRAVENOUS
Status: COMPLETED | OUTPATIENT
Start: 2021-03-12 | End: 2021-03-12

## 2021-03-12 RX ADMIN — Medication 22 MILLICURIE: at 11:45

## 2021-03-15 ENCOUNTER — TRANSCRIBE ORDERS (OUTPATIENT)
Dept: ADMINISTRATIVE | Facility: HOSPITAL | Age: 81
End: 2021-03-15

## 2021-03-15 DIAGNOSIS — J90 PLEURAL EFFUSION: ICD-10-CM

## 2021-03-15 DIAGNOSIS — Z20.822 ENCOUNTER FOR PREPROCEDURE SCREENING LABORATORY TESTING FOR COVID-19: Primary | ICD-10-CM

## 2021-03-15 DIAGNOSIS — C50.911 MALIGNANT NEOPLASM OF RIGHT FEMALE BREAST, UNSPECIFIED ESTROGEN RECEPTOR STATUS, UNSPECIFIED SITE OF BREAST (HCC): Primary | ICD-10-CM

## 2021-03-15 DIAGNOSIS — Z01.812 ENCOUNTER FOR PREPROCEDURE SCREENING LABORATORY TESTING FOR COVID-19: Primary | ICD-10-CM

## 2021-03-15 RX ORDER — PRAVASTATIN SODIUM 20 MG
20 TABLET ORAL NIGHTLY
Qty: 90 TABLET | Refills: 1 | Status: SHIPPED | OUTPATIENT
Start: 2021-03-15 | End: 2021-08-19 | Stop reason: SDUPTHER

## 2021-03-15 NOTE — NURSING NOTE
Per Dr. Marcus, placed order for an ultrasound guided thoracentesis with cytology and message routed to scheduling to arrange appointment. Spoke with pt's daughter re: CT results and the need for thoracentesis and she v/u. States she will explain this all to her mother.

## 2021-03-16 NOTE — PROGRESS NOTES
03/19/21 0000   Pre-Procedure Phone Call   Procedure Time Verified Yes   Arrival Time 1230   Procedure Location Verified Yes   NPO Status Reinforced Yes   Ride and Caregiver Arranged Yes   Phone Number for Ride/Caregiver Yen Curry will accompany patient as she is extremely hard of hearing   Patient Knows to Bring Current Medications   (reviewed and updated with Dr. Amrit Curry told them to hold Plavix 5 days.)

## 2021-03-17 ENCOUNTER — LAB (OUTPATIENT)
Dept: LAB | Facility: HOSPITAL | Age: 81
End: 2021-03-17

## 2021-03-17 DIAGNOSIS — Z20.822 ENCOUNTER FOR PREPROCEDURE SCREENING LABORATORY TESTING FOR COVID-19: ICD-10-CM

## 2021-03-17 DIAGNOSIS — Z01.812 ENCOUNTER FOR PREPROCEDURE SCREENING LABORATORY TESTING FOR COVID-19: ICD-10-CM

## 2021-03-17 PROCEDURE — U0005 INFEC AGEN DETEC AMPLI PROBE: HCPCS

## 2021-03-17 PROCEDURE — U0004 COV-19 TEST NON-CDC HGH THRU: HCPCS

## 2021-03-17 PROCEDURE — C9803 HOPD COVID-19 SPEC COLLECT: HCPCS

## 2021-03-18 LAB — SARS-COV-2 RNA RESP QL NAA+PROBE: NOT DETECTED

## 2021-03-19 ENCOUNTER — APPOINTMENT (OUTPATIENT)
Dept: ONCOLOGY | Facility: HOSPITAL | Age: 81
End: 2021-03-19

## 2021-03-19 ENCOUNTER — APPOINTMENT (OUTPATIENT)
Dept: LAB | Facility: HOSPITAL | Age: 81
End: 2021-03-19

## 2021-03-19 ENCOUNTER — HOSPITAL ENCOUNTER (OUTPATIENT)
Dept: ULTRASOUND IMAGING | Facility: HOSPITAL | Age: 81
Discharge: HOME | End: 2021-03-19
Admitting: INTERNAL MEDICINE

## 2021-03-19 VITALS
BODY MASS INDEX: 18.95 KG/M2 | WEIGHT: 111 LBS | HEIGHT: 64 IN | TEMPERATURE: 97.7 F | OXYGEN SATURATION: 95 % | SYSTOLIC BLOOD PRESSURE: 150 MMHG | HEART RATE: 77 BPM | DIASTOLIC BLOOD PRESSURE: 81 MMHG | RESPIRATION RATE: 18 BRPM

## 2021-03-19 DIAGNOSIS — C50.911 MALIGNANT NEOPLASM OF RIGHT FEMALE BREAST, UNSPECIFIED ESTROGEN RECEPTOR STATUS, UNSPECIFIED SITE OF BREAST (HCC): ICD-10-CM

## 2021-03-19 DIAGNOSIS — J90 PLEURAL EFFUSION: ICD-10-CM

## 2021-03-19 DIAGNOSIS — I10 ESSENTIAL HYPERTENSION: ICD-10-CM

## 2021-03-19 PROCEDURE — 88112 CYTOPATH CELL ENHANCE TECH: CPT | Performed by: INTERNAL MEDICINE

## 2021-03-19 PROCEDURE — 76942 ECHO GUIDE FOR BIOPSY: CPT

## 2021-03-19 PROCEDURE — 88377 M/PHMTRC ALYS ISHQUANT/SEMIQ: CPT

## 2021-03-19 PROCEDURE — 88360 TUMOR IMMUNOHISTOCHEM/MANUAL: CPT | Performed by: INTERNAL MEDICINE

## 2021-03-19 PROCEDURE — 88305 TISSUE EXAM BY PATHOLOGIST: CPT | Performed by: INTERNAL MEDICINE

## 2021-03-19 PROCEDURE — 88342 IMHCHEM/IMCYTCHM 1ST ANTB: CPT | Performed by: INTERNAL MEDICINE

## 2021-03-19 PROCEDURE — 88341 IMHCHEM/IMCYTCHM EA ADD ANTB: CPT | Performed by: INTERNAL MEDICINE

## 2021-03-19 PROCEDURE — 25010000003 LIDOCAINE 1 % SOLUTION: Performed by: RADIOLOGY

## 2021-03-19 RX ORDER — LIDOCAINE HYDROCHLORIDE 10 MG/ML
10 INJECTION, SOLUTION INFILTRATION; PERINEURAL ONCE
Status: COMPLETED | OUTPATIENT
Start: 2021-03-19 | End: 2021-03-19

## 2021-03-19 RX ORDER — SODIUM CHLORIDE 0.9 % (FLUSH) 0.9 %
10 SYRINGE (ML) INJECTION AS NEEDED
Status: DISCONTINUED | OUTPATIENT
Start: 2021-03-19 | End: 2021-03-20 | Stop reason: HOSPADM

## 2021-03-19 RX ORDER — SODIUM CHLORIDE 0.9 % (FLUSH) 0.9 %
3 SYRINGE (ML) INJECTION EVERY 12 HOURS SCHEDULED
Status: DISCONTINUED | OUTPATIENT
Start: 2021-03-19 | End: 2021-03-20 | Stop reason: HOSPADM

## 2021-03-19 RX ADMIN — LIDOCAINE HYDROCHLORIDE 6 ML: 10 INJECTION, SOLUTION INFILTRATION; PERINEURAL at 13:38

## 2021-03-19 NOTE — NURSING NOTE
Pt in xray triage for Right Thoracentesis  Pt wearing mask; RN wearing mask and goggles for all interactions

## 2021-03-19 NOTE — DISCHARGE INSTRUCTIONS
EDUCATION /DISCHARGE INSTRUCTIONS Thoracentesis:  A thoracentesis is a procedure to remove fluid or air from the space between the lung and it's lining.  It is done to relieve lung compression and respiratory distress.  A sample can also be obtained to aid diagnosis.   Medications can be administered into the space.      During the procedure:  You will be seated comfortable leaning forward onto a pillow supported by a table.  The area will be cleaned with antiseptic soap and draped with a sterile towel.  The physician will administer a local antiseptic to numb the area.  Next, the physician will insert a needle with tubing attached into the space between the lung and lining.  Fluid is removed and a sample sent to the laboratory.   When completed a pressure dressing is applied to the site.    Risks of the procedure include but are not limited to:   *  Bleeding    *  Low blood pressure *  Infection     *  Lung collapse possibly requiring insertion of a tube to re-inflate the lung.    Benefits of the procedure:  Relief of respiratory distress and facilitation of a diagnosis.  Alternatives to the procedure:  Drug therapy with diuretics to remove fluid.  Risks of diuretic drug therapy include possible dehydration and renal failure.  The benefit of drug therapy is that it can be done at home under physician supervision.  THIS EDUCATION INFORMATION WAS REVIEWED PRIOR TO THE PROCEDURE AND CONSENT. Patient initials___________________Time___12:55_______________    Post Procedure:    *  Rest today (no pushing pulling, straining or heavy lifting).   *  Slowly increase activity tomorow.    *  If you received sedation do not drive for 24 hours.   *  Keep dressing clean and dry.   *  Leave dressing on puncture site for 24 hours.    *  You may shower when dressing removed.   *  Expect some coughing as the lung re-expands.    Call your doctor if experiencing:   *  If experiencing sudden / severe shortness of breath, chest pain or if  coughing       up blood go to the nearest emergency room.   *  Signs of infection such as redness, swelling, excessive pain and / or foul       smelling drainage from the puncture site.   *  Chills or fever over 101 degrees (by mouth).   *  Change in sputum color (yellow, green, red).   *  Unrelieved pain.   *  Any new or severe symptoms.  Following the procedure:     Follow-up with the ordering physician as directed.    Continue to take other medications as directed by your physician unless    otherwise instructed.   If applicable, resume taking your blood thinners or Aspirin on __March 20, 2021 after 2:00 PM_________.    If you have any concerns please call the Radiology Nurses Desk at [phone].  You are the most important factor in your recovery.  Follow the above instructions carefully.

## 2021-03-20 ENCOUNTER — TELEPHONE (OUTPATIENT)
Dept: INTERVENTIONAL RADIOLOGY/VASCULAR | Facility: HOSPITAL | Age: 81
End: 2021-03-20

## 2021-03-22 RX ORDER — AMLODIPINE BESYLATE 10 MG/1
10 TABLET ORAL DAILY
Qty: 90 TABLET | Refills: 3 | Status: SHIPPED | OUTPATIENT
Start: 2021-03-22 | End: 2021-10-29

## 2021-03-25 NOTE — PROGRESS NOTES
Subjective     REASON FOR FOLLOW-UP:   1. Locally advanced right breast cancer ER MT positive HER-2 positive  · Initiated Arimidex 9/11/2019  · Radiation therapy added, completing a total of 25 fractions as of 4/27/2020    2. Remote history of left breast cancer 1978 treated with mastectomy alone                               REQUESTING PHYSICIAN: MD Vladimir Cowart    History of Present Illness Ms. Burgos is a 80 y.o. female with above medical history who returns today for follow-up accompanied by her daughter.    Because of new skin nodules in the midline we are ordered imaging and this showed a New right pleural effusion and the patient was sent for thoracentesis with good relief after removing 1700 cc of fluid.  Pleural fluid cytology showed metastatic adenocarcinoma ER positive MT negative HER-2 2+ FISH pending    Patient feels significantly better after the thoracentesis we discussed treatment options.  We have stopped her Arimidex and start her on Faslodex and provided the HER-2 still positive she is now agreeable to subcutaneous Herceptin and possibly even Perjeta but we will start with the Herceptin if she tolerates this well we can add Perjeta also subcutaneously if the HER-2 was negative we will start low-dose Ibrance 75 mg daily and escalate as tolerated      Patient remains anemic, felt to be related to CKD.  Hemoglobin is improved overall at 10.4 however.    They deny other concerns at this time.  Specifically no bone pains chest pain shortness of breath or cough    She had her daughter realized now that she has metastatic disease which is not curable and she interested in mild therapy but again declines any IV chemotherapy    She needs another echocardiogram because it was more than a year since her last one    Past Medical History:   Diagnosis Date   • Anemia     required prior blood transfusions   • Arthritis    • Cancer  (CMS/Formerly [facility])     right breast cancer    • Clostridium difficile colitis    • Diabetes mellitus (CMS/Formerly [facility])    • Difficulty walking    • Diverticulitis    • Environmental allergies    • History of malignant neoplasm of breast 2013    denies any chemo or radiation   • History of malignant neoplasm of skin 2013    possibly basal   • History of tobacco use 2013   • HL (hearing loss)    • Hyperlipidemia    • Hypertension    • Kidney disease    • Neuropathy in diabetes (CMS/Formerly [facility])    • Peripheral neuropathy    • Shingles    • Skin cancer    • Stroke (CMS/Formerly [facility]) 2019   • Vision loss       Patient is  3 para 3 with 2 miscarriages menarche was at age 9 menopause at age 49st childbirth was at age 24 she breast-fed for at least 6 months she did not take hormone replacement therapy because at age 38 she developed breast cancer in the left breast was treated at the [facility] with a modified radical mastectomy in  no radiation or hormonal blockade was given in details of the scans were not available at this time.    Family history is positive for father dying at age 84 with disseminated abdominal cancer of unknown type mother  at 77 of natural causes she has a brother with kidney cancer at age 65 who  of this and a sister with melanoma at age 30    Patient currently lives with her daughter does not smoke currently but smoked for 70 years 1 pack of cigarettes is not a drinker.  She uses a cane to walk because her balance is poor she has issues with swallowing from her stroke      Past Surgical History:   Procedure Laterality Date   • CATARACT EXTRACTION, BILATERAL     • EAR TUBES     • MASTECTOMY Bilateral     radical   • TUMOR REMOVAL Left     large lipoma removed from hip      HEME/ONC HISTORY:  patient is an 79-year-old female with multiple medical issues including hypertension diabetes hypercholesterolemia with a recent stroke in February of this year which is resulted in left-sided  weakness and difficulty swallowing.  The patient noted changes in her breast over the last year and the last few months changes to the nipple specifically with developing lump in both the right breast and under the right arm.  She brought to the attention of her family physician who sent her for imaging and biopsy of obvious tumor.  Mammogram revealed a large irregular mass measuring 6 cm with skin retraction nipple retraction and skin thickening.  At the 10 o'clock position there appeared to be some skin involvement.    She was also noted clinically to have large axillary lymph nodes.  Ultrasound was obtained revealing an irregular solid mass with indistinct margins measuring approximately 64 x 51 mm.  On ultrasound there were also some enlarged axillary lymph nodes measuring about 30 mm in greatest size.     A biopsy was recommended and has revealed a grade 2 invasive ductal cancer that is ER positive at 98%, OK positive at 82%, and HER-2 equivocal.  The Ki-67 is 20.81%.     Patient was then referred to Dr. Gonzalez who examined her and did not feel surgical excision was possible because there would be a large defect from her tumor and was wanting to consider neoadjuvant therapy in an effort to shrink the tumor.  The patient was presented at the multidisciplinary conference and because of her comorbidities her recent stroke and her overall general condition we did not think she was a good candidate for neoadjuvant chemotherapy and she was referred to us to discuss endocrine therapy.    In the interim additional testing on her tumor was done at [facility] because of the indeterminate HER-2 FISH and this revealed that there was 3+ HER-2 activity by IHC and therefore this is considered to be HER-2 positive  The patient had staging work-up at the recommendation of the multidisciplinary clinic And CT of the chest showed calcified granulomatous disease with mild AP window adenopathy which is increased from previous CAT  scan in 2016 precarinal adenopathy also increased.  It showed the breast mass as well as right axillary adenopathy.  In the abdomen there was a left adrenal nodule which is increased in size from 1.4 to 1.8 cm felt to be an adenoma in addition there was a small sclerotic lesion in the left iliac crest and metastasis could not be completely excluded although the bone scan showed no uptake in this area.  The bone scan did show a fracture and uptake in the left greater trochanter and uptake of the left superior pubic ramus that represents a superior ramus fracture of with no other signs of metastatic disease-patient did fall in July of this year but no obvious fractures were seen on x-ray.    Pt did not want chemotherapy-Patient initiating Arimidex therapy in mid September 2019.     the patient was hospitalized in late September, presenting with garbled speech and elevated blood pressure, felt to ultimately have had a TIA related to hypertensive urgency and small vessel disease.  Neurology recommended dual antiplatelet therapy for 3 months followed by Plavix alone.  Her antihypertensives were adjusted.  At that time she was also found to be B12 deficient with a level of 158 and iron deficient with an iron percent of 7%.  She was started on oral B12 and iron.    She states that the iron does cause some constipation but it is manageable with stool softeners at this time.  There was some discussion in the hospital record that if she could not tolerate oral iron IV iron should be considered.  Hemoglobin is improved up to 9.7 from discharge hemoglobin of 9.0.    2/20  he has not had a DEXA scan in many years and we repeated in February and it shows moderate osteopenia in the spine osteoporosis and left hip and severe osteopenia in the right hip.  I am not inclined to switch her to tamoxifen at this point and leave her room at acceptable see her back in 3 months and decide whether to add Prolia    Current Outpatient  Medications on File Prior to Visit   Medication Sig Dispense Refill   • acetaminophen (TYLENOL) 500 MG tablet Take 500 mg by mouth Every 6 (Six) Hours As Needed for Mild Pain .     • amLODIPine (NORVASC) 10 MG tablet Take 1 tablet by mouth Daily. 90 tablet 3   • cetirizine (zyrTEC) 10 MG tablet Take 10 mg by mouth Daily.     • cloNIDine (CATAPRES) 0.2 MG tablet TAKE 1 TABLET BY MOUTH EVERY 12 HOURS 180 tablet 0   • clopidogrel (PLAVIX) 75 MG tablet TAKE 1 TABLET BY MOUTH DAILY 30 tablet 6   • ferrous sulfate (IRON SUPPLEMENT) 325 (65 FE) MG tablet Take 1 tablet by mouth 2 (Two) Times a Day Before Meals. 60 tablet 0   • fluticasone (FLONASE) 50 MCG/ACT nasal spray 2 sprays into the nostril(s) as directed by provider Every Night.     • guaiFENesin (MUCINEX) 600 MG 12 hr tablet Take 600 mg by mouth 2 (Two) Times a Day As Needed for Cough.     • hydroCHLOROthiazide (MICROZIDE) 12.5 MG capsule Take 1 capsule by mouth Daily. 90 capsule 3   • metFORMIN (GLUCOPHAGE) 1000 MG tablet Take one tablet bid 180 tablet 1   • metoprolol succinate XL (TOPROL-XL) 50 MG 24 hr tablet TAKE 1 TABLET BY MOUTH DAILY 90 tablet 3   • pravastatin (PRAVACHOL) 20 MG tablet Take 1 tablet by mouth Every Night. 90 tablet 1   • vitamin B-12 (VITAMIN B-12) 2000 MCG tablet Take 1 tablet by mouth Daily. 30 tablet 0   • anastrozole (ARIMIDEX) 1 MG tablet Take 1 tablet by mouth Daily. 90 tablet 0   • predniSONE (DELTASONE) 20 MG tablet Take 1 tablet by mouth 2 (Two) Times a Day. 10 tablet 0   • senna (SENOKOT) 8.6 MG tablet tablet TK 1 T PO QD  0     No current facility-administered medications on file prior to visit.        ALLERGIES:    Allergies   Allergen Reactions   • Lisinopril Angioedema   • Lipitor [Atorvastatin] Unknown - High Severity     Leg weakness         Social History     Socioeconomic History   • Marital status:      Spouse name: Not on file   • Number of children: 3   • Years of education: High school   • Highest education  "level: Not on file   Tobacco Use   • Smoking status: Former Smoker     Packs/day: 0.25     Years: 10.00     Pack years: 2.50     Types: Cigarettes     Quit date: 2019     Years since quittin.0   • Smokeless tobacco: Former User   • Tobacco comment: she quit for 8 years and started back about a year ago then quit again   Substance and Sexual Activity   • Alcohol use: No   • Drug use: No   • Sexual activity: Never        Family History   Problem Relation Age of Onset   • Stroke Mother 76   • Diabetes Mother    • Hypertension Mother    • Arthritis Mother    • Cancer Father         metastatic unknown cause   • Heart attack Father 70   • Colon cancer Father    • Lung cancer Father    • Cancer Brother         bladder   • Arthritis Sister    • Melanoma Sister    • Hypertension Daughter    • Diabetes Daughter         Review of Systems   Constitutional: Positive for fatigue.   HENT: Positive for hearing loss (same ).    Respiratory: Negative.    Cardiovascular: Negative.    Gastrointestinal: Negative for abdominal pain.   Genitourinary: Negative.    Musculoskeletal: Positive for arthralgias and gait problem. Negative for joint swelling.   Neurological: Positive for weakness (left side).   Hematological: Negative.    Psychiatric/Behavioral: Negative.    All other systems reviewed and are negative.      Objective     Vitals:    21 0745   BP: 160/85  Comment: w/o meds taken this am   Pulse: 79   Resp: 15   Temp: 96.8 °F (36 °C)   TempSrc: Skin   SpO2: 95%   Weight: 47.7 kg (105 lb 3.2 oz)   Height: 162.6 cm (64.02\")   PainSc: 0-No pain     Current Status 3/26/2021   ECOG score 1       Physical Exam   Pulmonary/Chest:           GENERAL:  Well-developed, well-nourished in no acute distress.   SKIN:  Warm, dry without rashes, purpura or petechiae.  EYES:  Pupils equal, round and reactive to light.  EOMs intact.  Conjunctivae normal.  EARS:  Hearing intact.  NOSE:  Septum midline.  No excoriations or nasal " discharge.  MOUTH:  Tongue is well-papillated; no stomatitis or ulcers.  Lips normal.  THROAT:  Oropharynx without lesions or exudates.  NECK:  Supple with good range of motion; no thyromegaly or masses, no JVD.  LYMPHATICS:  No cervical, supraclavicular, axillary or inguinal adenopathy.  CHEST:  Lungs clear to auscultation decreased breath sounds right base.  BREASTS: Left chest wall is benign; the right breast has a mobile soft tissue mass roughly 6 cm transverse x 5 cm.  2 new subcutaneous nodules noted see picture above -No palpable adenopathy in the right axilla, neck or supraclavicular region.    CARDIAC:  Regular rate and rhythm without murmurs, rubs or gallops. Normal S1,S2.  ABDOMEN:  Soft, nontender with no hepatosplenomegaly or masses.-Prominent bony nodule on the left greater trochanter  EXTREMITIES:  No clubbing, cyanosis 2+lymphedema right arm  NEUROLOGICAL:  Cranial Nerves II-XII grossly intact.  Mild left-sided weakness with a hemiparetic gait   PSYCHIATRIC:  Normal affect and mood.    I have reexamined the patient and the results are consistent with the previously documented exam. Noble Marcus MD       RECENT LABS:  Lab Results   Component Value Date    WBC 5.22 03/26/2021    HGB 10.4 (L) 03/26/2021    HCT 32.8 (L) 03/26/2021    MCV 87.2 03/26/2021     03/26/2021     Lab Results   Component Value Date    GLUCOSE 124 03/26/2021    BUN 32 (H) 03/26/2021    CREATININE 1.67 (H) 03/26/2021    EGFRIFNONA 29 (L) 03/26/2021    EGFRIFAFRI 42 (L) 01/16/2017    BCR 19.2 03/26/2021    K 4.6 03/26/2021    CO2 23.1 03/26/2021    CALCIUM 9.7 03/26/2021    PROTENTOTREF 7.8 01/16/2017    ALBUMIN 3.90 03/26/2021    LABIL2 1.4 01/16/2017    AST 11 03/26/2021    ALT <5 03/26/2021       Results from last 7 days   Lab Units 03/26/21  0759   WBC 10*3/mm3 5.22   NEUTROS ABS 10*3/mm3 3.41   HEMOGLOBIN g/dL 10.4*   HEMATOCRIT % 32.8*   PLATELETS 10*3/mm3 323     Results from last 7 days   Lab Units  03/26/21  0759   SODIUM mmol/L 143   POTASSIUM mmol/L 4.6   CHLORIDE mmol/L 108*   CO2 mmol/L 23.1   BUN mg/dL 32*   CREATININE mg/dL 1.67*   CALCIUM mg/dL 9.7   ALBUMIN g/dL 3.90   BILIRUBIN mg/dL <0.2*   ALK PHOS U/L 95   ALT (SGPT) U/L <5   AST (SGOT) U/L 11   GLUCOSE mg/dL 124           Results from last 7 days   Lab Units 03/26/21  0759   SODIUM mmol/L 143   POTASSIUM mmol/L 4.6   CHLORIDE mmol/L 108*   CO2 mmol/L 23.1   BUN mg/dL 32*   CREATININE mg/dL 1.67*   CALCIUM mg/dL 9.7   ALBUMIN g/dL 3.90   BILIRUBIN mg/dL <0.2*   ALK PHOS U/L 95   ALT (SGPT) U/L <5   AST (SGOT) U/L 11   GLUCOSE mg/dL 124                               MRI BRAIN     IMPRESSION:  Old right basal ganglia infarct. No acute abnormality  identified. No metastatic disease is identified at the brain or the  head.    Bone scan  IMPRESSION:  Abnormal uptake left greater trochanter where there is a  fracture demonstrated with CT (patient has a history of a fall and there  is no CT evidence that this is a pathologic fracture). There is also  abnormal uptake at the left superior pubic ramus that most likely  represents a superior ramus fracture though a fracture is not evident  with CT. This uptake left superior pubic ramus is technically  indeterminate though there is overall no convincing evidence for bony  metastatic disease. Follow-up CT could be obtained.     This report was finalized on 8/23/2019 1:    CT chest abdomen pelvis  IMPRESSION CHEST CT:    1. A 2.2 cm right breast mass laterally presumably related to the  patient's neoplasm.  2. Right axillary mediastinal adenopathy and metastatic disease cannot  be excluded. PET/CT may be helpful.     IMPRESSION ABDOMEN & PELVIS CT:    1. Low-density renal lesions as discussed, favor cysts.  2. Enlarging left adrenal nodule, likely adenoma.  3. Extensive diverticulosis.  4. A 5 mm sclerotic left iliac lesion as discussed.    BONE MINERAL DENSITOMETRY  IMPRESSION:  Osteoporosis at the left hip.  Interval decrease in bone  density.     This report was finalized on 2/14/2020 12:10 PM by Dr. Osmin Dodd M.D.                          Assessment/Plan   1.  Locally advanced right breast cancer strongly ER MN positive with indeterminate HER-2 with repeat testing showing 3+ IHC consistent with positive result  · Staging work-up probably negative although indeterminate bone lesions without obvious fractures on bone scan  · Abnormal mediastinal adenopathy which is been present since 2016 likely granulomatous disease  · Enlarging left adrenal mass felt to be an adenoma present since 2015  · History of left breast cancer 1978 at age 38 treated with modified radical mastectomy alone? Pathology  · [place] to be a poor candidate for surgery or chemotherapy.PT DECLINED CHEMO  · Initiated Arimidex around 9/11/2019.  Radiation may be a possibility for additional therapy if she does not get a good response from Arimidex  · Radiation added in 3/20  · Patient is reviewed back today, 11/17/2020, tolerating Arimidex well.  She does feel that the right breast mass is slightly larger though still overall improved compared to when she initially presented.    · Patient seen on 2/18/2021 with new cutaneous nodules restaging and switch to Faslodex planned  · Right pleural effusion positive cytology for metastatic breast cancer ER/MN positive HER-2 FISH pending= if FISH positive consider Herceptin subcu and if she tolerates this well Herceptin Perjeta subcu with Faslodex    2.  Granulomatous mediastinal lymph nodes chronic    3.  Recent stroke in 2/19 with left hemiparesis and dysphagia  · Patient hospitalized again 9/26 and 9/29/2019 presenting with garbled speech and elevated blood pressure, felt to have had a TIA.  Neurology recommended dual antiplatelet therapy for 3 months and then Plavix alone.    4.  Hypertension diabetes and hypercholesterolemia.  Patient currently off lisinopril per primary care due to angioedema.    5.   Anemia, multifactorial:  · Patient has stage III CKD.  · Patient also found during recent hospitalization to have low B12 and low iron percent.  Though she had previously received B12 injections she was started on oral B12.  She was also started on oral iron.    · Patient continues on oral iron at this time with some constipation though tolerable with stool softeners.  Plan to continue to monitor her blood work and if develops intolerance to oral iron consider IV iron.  · Was recent iron studies 11/20/2019 include a ferritin of 41, iron saturation 12%.   · Hemoglobin today 10.4.  Continue to monitor.     6.  Lymphedema right arm better after radiation    7.  Osteoporosis left hip -osteopenia spine observe for now    PLAN:  1. Stop Arimidex.  Faslodex every 2 weeks x3 and then monthly  2. Echocardiogram  3. patient again declined chemotherapy of any kind but is agreeable to Faslodex  4.  Return in 2 weeks for third dose of Faslodex and see me to review to and whether to add subcutaneous Herceptin  If HER-2 is negative will consider adding low-dose Ibrance 75 mg but doubt she would tolerate anything higher    Discussed the fact that she is no longer curable and she and her daughter both understand    She may need further thoracentesis or even Pleurx catheter if she does not respond quickly

## 2021-03-26 ENCOUNTER — INFUSION (OUTPATIENT)
Dept: ONCOLOGY | Facility: HOSPITAL | Age: 81
End: 2021-03-26

## 2021-03-26 ENCOUNTER — LAB (OUTPATIENT)
Dept: LAB | Facility: HOSPITAL | Age: 81
End: 2021-03-26

## 2021-03-26 ENCOUNTER — OFFICE VISIT (OUTPATIENT)
Dept: ONCOLOGY | Facility: CLINIC | Age: 81
End: 2021-03-26

## 2021-03-26 VITALS
DIASTOLIC BLOOD PRESSURE: 85 MMHG | BODY MASS INDEX: 17.96 KG/M2 | TEMPERATURE: 96.8 F | RESPIRATION RATE: 15 BRPM | SYSTOLIC BLOOD PRESSURE: 160 MMHG | WEIGHT: 105.2 LBS | OXYGEN SATURATION: 95 % | HEART RATE: 79 BPM | HEIGHT: 64 IN

## 2021-03-26 DIAGNOSIS — C50.811 MALIGNANT NEOPLASM OF OVERLAPPING SITES OF RIGHT BREAST IN FEMALE, ESTROGEN RECEPTOR POSITIVE (HCC): Primary | ICD-10-CM

## 2021-03-26 DIAGNOSIS — D50.9 IRON DEFICIENCY ANEMIA, UNSPECIFIED IRON DEFICIENCY ANEMIA TYPE: ICD-10-CM

## 2021-03-26 DIAGNOSIS — Z17.0 MALIGNANT NEOPLASM OF OVERLAPPING SITES OF RIGHT BREAST IN FEMALE, ESTROGEN RECEPTOR POSITIVE (HCC): Primary | ICD-10-CM

## 2021-03-26 DIAGNOSIS — G45.9 TIA (TRANSIENT ISCHEMIC ATTACK): ICD-10-CM

## 2021-03-26 DIAGNOSIS — N18.30 STAGE 3 CHRONIC KIDNEY DISEASE, UNSPECIFIED WHETHER STAGE 3A OR 3B CKD (HCC): Chronic | ICD-10-CM

## 2021-03-26 DIAGNOSIS — Z79.899 ENCOUNTER FOR LONG-TERM (CURRENT) USE OF HIGH-RISK MEDICATION: ICD-10-CM

## 2021-03-26 LAB
ALBUMIN SERPL-MCNC: 3.9 G/DL (ref 3.5–5.2)
ALBUMIN/GLOB SERPL: 1.1 G/DL (ref 1.1–2.4)
ALP SERPL-CCNC: 95 U/L (ref 38–116)
ALT SERPL W P-5'-P-CCNC: <5 U/L (ref 0–33)
ANION GAP SERPL CALCULATED.3IONS-SCNC: 11.9 MMOL/L (ref 5–15)
AST SERPL-CCNC: 11 U/L (ref 0–32)
BASOPHILS # BLD AUTO: 0.05 10*3/MM3 (ref 0–0.2)
BASOPHILS NFR BLD AUTO: 1 % (ref 0–1.5)
BILIRUB SERPL-MCNC: <0.2 MG/DL (ref 0.2–1.2)
BUN SERPL-MCNC: 32 MG/DL (ref 6–20)
BUN/CREAT SERPL: 19.2 (ref 7.3–30)
CALCIUM SPEC-SCNC: 9.7 MG/DL (ref 8.5–10.2)
CHLORIDE SERPL-SCNC: 108 MMOL/L (ref 98–107)
CO2 SERPL-SCNC: 23.1 MMOL/L (ref 22–29)
CREAT SERPL-MCNC: 1.67 MG/DL (ref 0.6–1.1)
DEPRECATED RDW RBC AUTO: 46.9 FL (ref 37–54)
EOSINOPHIL # BLD AUTO: 0.17 10*3/MM3 (ref 0–0.4)
EOSINOPHIL NFR BLD AUTO: 3.3 % (ref 0.3–6.2)
ERYTHROCYTE [DISTWIDTH] IN BLOOD BY AUTOMATED COUNT: 14.6 % (ref 12.3–15.4)
GFR SERPL CREATININE-BSD FRML MDRD: 29 ML/MIN/1.73
GLOBULIN UR ELPH-MCNC: 3.6 GM/DL (ref 1.8–3.5)
GLUCOSE SERPL-MCNC: 124 MG/DL (ref 74–124)
HCT VFR BLD AUTO: 32.8 % (ref 34–46.6)
HGB BLD-MCNC: 10.4 G/DL (ref 12–15.9)
IMM GRANULOCYTES # BLD AUTO: 0.02 10*3/MM3 (ref 0–0.05)
IMM GRANULOCYTES NFR BLD AUTO: 0.4 % (ref 0–0.5)
LYMPHOCYTES # BLD AUTO: 1.04 10*3/MM3 (ref 0.7–3.1)
LYMPHOCYTES NFR BLD AUTO: 19.9 % (ref 19.6–45.3)
MCH RBC QN AUTO: 27.7 PG (ref 26.6–33)
MCHC RBC AUTO-ENTMCNC: 31.7 G/DL (ref 31.5–35.7)
MCV RBC AUTO: 87.2 FL (ref 79–97)
MONOCYTES # BLD AUTO: 0.53 10*3/MM3 (ref 0.1–0.9)
MONOCYTES NFR BLD AUTO: 10.2 % (ref 5–12)
NEUTROPHILS NFR BLD AUTO: 3.41 10*3/MM3 (ref 1.7–7)
NEUTROPHILS NFR BLD AUTO: 65.2 % (ref 42.7–76)
NRBC BLD AUTO-RTO: 0 /100 WBC (ref 0–0.2)
PLATELET # BLD AUTO: 323 10*3/MM3 (ref 140–450)
PMV BLD AUTO: 8.9 FL (ref 6–12)
POTASSIUM SERPL-SCNC: 4.6 MMOL/L (ref 3.5–4.7)
PROT SERPL-MCNC: 7.5 G/DL (ref 6.3–8)
RBC # BLD AUTO: 3.76 10*6/MM3 (ref 3.77–5.28)
SODIUM SERPL-SCNC: 143 MMOL/L (ref 134–145)
WBC # BLD AUTO: 5.22 10*3/MM3 (ref 3.4–10.8)

## 2021-03-26 PROCEDURE — 85025 COMPLETE CBC W/AUTO DIFF WBC: CPT

## 2021-03-26 PROCEDURE — 25010000002 FULVESTRANT PER 25 MG: Performed by: INTERNAL MEDICINE

## 2021-03-26 PROCEDURE — 99215 OFFICE O/P EST HI 40 MIN: CPT | Performed by: INTERNAL MEDICINE

## 2021-03-26 PROCEDURE — 36415 COLL VENOUS BLD VENIPUNCTURE: CPT

## 2021-03-26 PROCEDURE — 80053 COMPREHEN METABOLIC PANEL: CPT

## 2021-03-26 PROCEDURE — 96402 CHEMO HORMON ANTINEOPL SQ/IM: CPT

## 2021-03-26 RX ADMIN — FULVESTRANT 500 MG: 250 INJECTION, SOLUTION INTRAMUSCULAR at 08:51

## 2021-03-29 LAB
CYTO UR: NORMAL
DX PRELIMINARY: NORMAL
LAB AP CASE REPORT: NORMAL
LAB AP DIAGNOSIS COMMENT: NORMAL
LAB AP INTRADEPARTMENTAL CONSULT: NORMAL
LAB AP SPECIAL STAINS: NORMAL
LAB AP SYNOPTIC CHECKLIST: NORMAL
PATH REPORT.ADDENDUM SPEC: NORMAL
PATH REPORT.FINAL DX SPEC: NORMAL
PATH REPORT.GROSS SPEC: NORMAL

## 2021-04-01 ENCOUNTER — TELEPHONE (OUTPATIENT)
Dept: ONCOLOGY | Facility: CLINIC | Age: 81
End: 2021-04-01

## 2021-04-01 NOTE — TELEPHONE ENCOUNTER
Call to Ms. Burgos, in response to message about appointment on 4/2/21.  After reviewing notes from visit on 4/26/21, patient to receive next Faslodex in 2 weeks. Spoke with patient's daughter, Antoinette, and let her know the appointment would be cancelled, as it was made previously and was not changed at last visit.  Patient's daughter verbalized understanding and agreement.   Message to scheduling to cancel appointment for 4/2/21.

## 2021-04-01 NOTE — TELEPHONE ENCOUNTER
Caller: REY OSEI    Relationship: DAUGHTER    Best call back number: [phone]    What is the best time to reach you: ANYTIME    Who are you requesting to speak with (clinical staff, provider,  specific staff member): CLINICAL      What was the call regarding: PT REC FASLODEX LAST FRI. CONFIRMING THEY DO NOT NEED TO KEEP APPT 4/2?    Do you require a callback: YES

## 2021-04-02 ENCOUNTER — APPOINTMENT (OUTPATIENT)
Dept: ONCOLOGY | Facility: HOSPITAL | Age: 81
End: 2021-04-02

## 2021-04-02 ENCOUNTER — APPOINTMENT (OUTPATIENT)
Dept: LAB | Facility: HOSPITAL | Age: 81
End: 2021-04-02

## 2021-04-06 ENCOUNTER — HOSPITAL ENCOUNTER (OUTPATIENT)
Dept: CARDIOLOGY | Facility: HOSPITAL | Age: 81
Discharge: HOME | End: 2021-04-06
Admitting: INTERNAL MEDICINE

## 2021-04-06 VITALS
BODY MASS INDEX: 17.93 KG/M2 | WEIGHT: 105 LBS | HEART RATE: 65 BPM | OXYGEN SATURATION: 94 % | SYSTOLIC BLOOD PRESSURE: 118 MMHG | HEIGHT: 64 IN | DIASTOLIC BLOOD PRESSURE: 70 MMHG

## 2021-04-06 DIAGNOSIS — C50.811 MALIGNANT NEOPLASM OF OVERLAPPING SITES OF RIGHT BREAST IN FEMALE, ESTROGEN RECEPTOR POSITIVE (HCC): ICD-10-CM

## 2021-04-06 DIAGNOSIS — Z17.0 MALIGNANT NEOPLASM OF OVERLAPPING SITES OF RIGHT BREAST IN FEMALE, ESTROGEN RECEPTOR POSITIVE (HCC): ICD-10-CM

## 2021-04-06 DIAGNOSIS — Z79.899 ENCOUNTER FOR LONG-TERM (CURRENT) USE OF HIGH-RISK MEDICATION: ICD-10-CM

## 2021-04-06 LAB
AORTIC ARCH: 2.1 CM
ASCENDING AORTA: 2.5 CM
BH CV ECHO MEAS - ACS: 1.5 CM
BH CV ECHO MEAS - AI MAX PG: 52.9 MMHG
BH CV ECHO MEAS - AI MAX VEL: 363.8 CM/SEC
BH CV ECHO MEAS - AO ARCH DIAM (PROXIMAL TRANS.): 2.1 CM
BH CV ECHO MEAS - AO MAX PG (FULL): 4.3 MMHG
BH CV ECHO MEAS - AO MAX PG: 6.8 MMHG
BH CV ECHO MEAS - AO MEAN PG (FULL): 2.4 MMHG
BH CV ECHO MEAS - AO MEAN PG: 3.6 MMHG
BH CV ECHO MEAS - AO ROOT AREA (BSA CORRECTED): 1.8
BH CV ECHO MEAS - AO ROOT AREA: 5.6 CM^2
BH CV ECHO MEAS - AO ROOT DIAM: 2.7 CM
BH CV ECHO MEAS - AO V2 MAX: 130.3 CM/SEC
BH CV ECHO MEAS - AO V2 MEAN: 91.2 CM/SEC
BH CV ECHO MEAS - AO V2 VTI: 28.2 CM
BH CV ECHO MEAS - ASC AORTA: 2.5 CM
BH CV ECHO MEAS - AVA(I,A): 1.8 CM^2
BH CV ECHO MEAS - AVA(I,D): 1.8 CM^2
BH CV ECHO MEAS - AVA(V,A): 1.4 CM^2
BH CV ECHO MEAS - AVA(V,D): 1.4 CM^2
BH CV ECHO MEAS - BSA(HAYCOCK): 1.5 M^2
BH CV ECHO MEAS - BSA: 1.5 M^2
BH CV ECHO MEAS - BZI_BMI: 18 KILOGRAMS/M^2
BH CV ECHO MEAS - BZI_METRIC_HEIGHT: 162.6 CM
BH CV ECHO MEAS - BZI_METRIC_WEIGHT: 47.6 KG
BH CV ECHO MEAS - EDV(MOD-SP2): 78 ML
BH CV ECHO MEAS - EDV(MOD-SP4): 81 ML
BH CV ECHO MEAS - EDV(TEICH): 42.6 ML
BH CV ECHO MEAS - EF(CUBED): 72.3 %
BH CV ECHO MEAS - EF(MOD-BP): 69 %
BH CV ECHO MEAS - EF(MOD-SP2): 69.2 %
BH CV ECHO MEAS - EF(MOD-SP4): 70.4 %
BH CV ECHO MEAS - EF(TEICH): 65.3 %
BH CV ECHO MEAS - EF_3D-VOL: 63 %
BH CV ECHO MEAS - ESV(MOD-SP2): 24 ML
BH CV ECHO MEAS - ESV(MOD-SP4): 24 ML
BH CV ECHO MEAS - ESV(TEICH): 14.8 ML
BH CV ECHO MEAS - FS: 34.8 %
BH CV ECHO MEAS - IVS/LVPW: 0.99
BH CV ECHO MEAS - IVSD: 1.1 CM
BH CV ECHO MEAS - LA 3D VOL INDEX: 36
BH CV ECHO MEAS - LAT PEAK E' VEL: 6.6 CM/SEC
BH CV ECHO MEAS - LV DIASTOLIC VOL/BSA (35-75): 54.5 ML/M^2
BH CV ECHO MEAS - LV MASS(C)D: 103.3 GRAMS
BH CV ECHO MEAS - LV MASS(C)DI: 69.4 GRAMS/M^2
BH CV ECHO MEAS - LV MAX PG: 2.5 MMHG
BH CV ECHO MEAS - LV MEAN PG: 1.2 MMHG
BH CV ECHO MEAS - LV SYSTOLIC VOL/BSA (12-30): 16.1 ML/M^2
BH CV ECHO MEAS - LV V1 MAX: 79.7 CM/SEC
BH CV ECHO MEAS - LV V1 MEAN: 51.1 CM/SEC
BH CV ECHO MEAS - LV V1 VTI: 21.5 CM
BH CV ECHO MEAS - LVIDD: 3.3 CM
BH CV ECHO MEAS - LVIDS: 2.1 CM
BH CV ECHO MEAS - LVLD AP2: 7 CM
BH CV ECHO MEAS - LVLD AP4: 6.5 CM
BH CV ECHO MEAS - LVLS AP2: 5.3 CM
BH CV ECHO MEAS - LVLS AP4: 5 CM
BH CV ECHO MEAS - LVOT AREA (M): 2.3 CM^2
BH CV ECHO MEAS - LVOT AREA: 2.3 CM^2
BH CV ECHO MEAS - LVOT DIAM: 1.7 CM
BH CV ECHO MEAS - LVPWD: 1.1 CM
BH CV ECHO MEAS - MED PEAK E' VEL: 5.7 CM/SEC
BH CV ECHO MEAS - MV A DUR: 0.15 SEC
BH CV ECHO MEAS - MV A MAX VEL: 114 CM/SEC
BH CV ECHO MEAS - MV DEC SLOPE: 416.1 CM/SEC^2
BH CV ECHO MEAS - MV DEC TIME: 0.17 SEC
BH CV ECHO MEAS - MV E MAX VEL: 82.7 CM/SEC
BH CV ECHO MEAS - MV E/A: 0.73
BH CV ECHO MEAS - MV MAX PG: 7.4 MMHG
BH CV ECHO MEAS - MV MEAN PG: 2.8 MMHG
BH CV ECHO MEAS - MV P1/2T MAX VEL: 90.9 CM/SEC
BH CV ECHO MEAS - MV P1/2T: 64 MSEC
BH CV ECHO MEAS - MV V2 MAX: 135.7 CM/SEC
BH CV ECHO MEAS - MV V2 MEAN: 78.3 CM/SEC
BH CV ECHO MEAS - MV V2 VTI: 32.5 CM
BH CV ECHO MEAS - MVA P1/2T LCG: 2.4 CM^2
BH CV ECHO MEAS - MVA(P1/2T): 3.4 CM^2
BH CV ECHO MEAS - MVA(VTI): 1.5 CM^2
BH CV ECHO MEAS - PA ACC TIME: 0.16 SEC
BH CV ECHO MEAS - PA MAX PG (FULL): 3.1 MMHG
BH CV ECHO MEAS - PA MAX PG: 4.5 MMHG
BH CV ECHO MEAS - PA PR(ACCEL): 7.7 MMHG
BH CV ECHO MEAS - PA V2 MAX: 105.9 CM/SEC
BH CV ECHO MEAS - PULM A REVS DUR: 0.12 SEC
BH CV ECHO MEAS - PULM A REVS VEL: 54.4 CM/SEC
BH CV ECHO MEAS - PULM DIAS VEL: 47.1 CM/SEC
BH CV ECHO MEAS - PULM S/D: 0.97
BH CV ECHO MEAS - PULM SYS VEL: 45.8 CM/SEC
BH CV ECHO MEAS - PVA(V,A): 1.4 CM^2
BH CV ECHO MEAS - PVA(V,D): 1.4 CM^2
BH CV ECHO MEAS - QP/QS: 0.76
BH CV ECHO MEAS - RAP SYSTOLE: 3 MMHG
BH CV ECHO MEAS - RV MAX PG: 1.4 MMHG
BH CV ECHO MEAS - RV MEAN PG: 0.95 MMHG
BH CV ECHO MEAS - RV V1 MAX: 58.7 CM/SEC
BH CV ECHO MEAS - RV V1 MEAN: 46.7 CM/SEC
BH CV ECHO MEAS - RV V1 VTI: 15 CM
BH CV ECHO MEAS - RVOT AREA: 2.5 CM^2
BH CV ECHO MEAS - RVOT DIAM: 1.8 CM
BH CV ECHO MEAS - RVSP: 42 MMHG
BH CV ECHO MEAS - SI(AO): 107.1 ML/M^2
BH CV ECHO MEAS - SI(CUBED): 16.7 ML/M^2
BH CV ECHO MEAS - SI(LVOT): 33.7 ML/M^2
BH CV ECHO MEAS - SI(MOD-SP2): 36.3 ML/M^2
BH CV ECHO MEAS - SI(MOD-SP4): 38.3 ML/M^2
BH CV ECHO MEAS - SI(TEICH): 18.7 ML/M^2
BH CV ECHO MEAS - SUP REN AO DIAM: 1.7 CM
BH CV ECHO MEAS - SV(AO): 159.3 ML
BH CV ECHO MEAS - SV(CUBED): 24.9 ML
BH CV ECHO MEAS - SV(LVOT): 50.2 ML
BH CV ECHO MEAS - SV(MOD-SP2): 54 ML
BH CV ECHO MEAS - SV(MOD-SP4): 57 ML
BH CV ECHO MEAS - SV(RVOT): 38.1 ML
BH CV ECHO MEAS - SV(TEICH): 27.8 ML
BH CV ECHO MEAS - TAPSE (>1.6): 1.7 CM
BH CV ECHO MEAS - TR MAX VEL: 312.2 CM/SEC
BH CV ECHO MEASUREMENTS AVERAGE E/E' RATIO: 13.45
BH CV XLRA - RV BASE: 3.2 CM
BH CV XLRA - RV LENGTH: 5.2 CM
BH CV XLRA - RV MID: 2.8 CM
BH CV XLRA - TDI S': 11.1 CM/SEC
LEFT ATRIUM VOLUME INDEX: 24 ML/M2
LV EF 2D ECHO EST: 69 %
MAXIMAL PREDICTED HEART RATE: 138 BPM
SINUS: 2.8 CM
STJ: 2.3 CM
STRESS TARGET HR: 117 BPM

## 2021-04-06 PROCEDURE — 93306 TTE W/DOPPLER COMPLETE: CPT

## 2021-04-06 PROCEDURE — 25010000002 PERFLUTREN (DEFINITY) 8.476 MG IN SODIUM CHLORIDE (PF) 0.9 % 10 ML INJECTION: Performed by: INTERNAL MEDICINE

## 2021-04-06 PROCEDURE — 93356 MYOCRD STRAIN IMG SPCKL TRCK: CPT | Performed by: INTERNAL MEDICINE

## 2021-04-06 PROCEDURE — 93306 TTE W/DOPPLER COMPLETE: CPT | Performed by: INTERNAL MEDICINE

## 2021-04-06 PROCEDURE — 93356 MYOCRD STRAIN IMG SPCKL TRCK: CPT

## 2021-04-06 RX ADMIN — PERFLUTREN 1.5 ML: 6.52 INJECTION, SUSPENSION INTRAVENOUS at 07:53

## 2021-04-08 NOTE — PROGRESS NOTES
Subjective     REASON FOR FOLLOW-UP:   1. Locally advanced right breast cancer ER OK positive HER-2 positive  · Initiated Arimidex 9/11/2019  · Radiation therapy added, completing a total of 25 fractions as of 4/27/2020    2. Remote history of left breast cancer 1978 treated with mastectomy alone                               REQUESTING PHYSICIAN: MD Vladimir Cowart    History of Present Illness Ms. Burgos is a 80 y.o. female with above medical history who returns today for follow-up accompanied by her daughter.    Because of new skin nodules in the midline we are ordered imaging and this showed a New right pleural effusion and the patient was sent for thoracentesis with good relief after removing 1700 cc of fluid.  Pleural fluid cytology showed metastatic adenocarcinoma ER positive OK negative HER-2 2+ FISH surprisingly negative for amplification which is different from her original tumor    Patient feels significantly better after the thoracentesis we discussed treatment options.  She started Faslodex and has had 2 injections is here for her third and is tolerating it well    Shortness of breath has not worsened and she does not need a thoracentesis currently.    We discussed Ibrance and I will start with 75 mg 2 weeks on 2 weeks off and escalate as tolerated      Patient remains anemic, felt to be related to CKD.  Hemoglobin is improved overall at 10.4 however.    They deny other concerns at this time.  Specifically no bone pains chest pain shortness of breath or cough    She had her daughter realized now that she has metastatic disease which is not curable and she interested in mild therapy but again declines any IV chemotherapy    She needs another echocardiogram because it was more than a year since her last one    Past Medical History:   Diagnosis Date   • Anemia     required prior blood transfusions   • Arthritis    • Cancer (CMS/HCC)      right breast cancer    • Clostridium difficile colitis    • Diabetes mellitus (CMS/HCC)    • Difficulty walking    • Diverticulitis    • Environmental allergies    • History of malignant neoplasm of breast 2013    denies any chemo or radiation   • History of malignant neoplasm of skin 2013    possibly basal   • History of tobacco use 2013   • HL (hearing loss)    • Hyperlipidemia    • Hypertension    • Kidney disease    • Neuropathy in diabetes (CMS/HCC)    • Peripheral neuropathy    • Shingles    • Skin cancer    • Stroke (CMS/[facility]) 2019   • Vision loss       Patient is  3 para 3 with 2 miscarriages menarche was at age 9 menopause at age 49st childbirth was at age 24 she breast-fed for at least 6 months she did not take hormone replacement therapy because at age 38 she developed breast cancer in the left breast was treated at the [facility] with a modified radical mastectomy in  no radiation or hormonal blockade was given in details of the scans were not available at this time.    Family history is positive for father dying at age 84 with disseminated abdominal cancer of unknown type mother  at 77 of natural causes she has a brother with kidney cancer at age 65 who  of this and a sister with melanoma at age 30    Patient currently lives with her daughter does not smoke currently but smoked for 70 years 1 pack of cigarettes is not a drinker.  She uses a cane to walk because her balance is poor she has issues with swallowing from her stroke      Past Surgical History:   Procedure Laterality Date   • CATARACT EXTRACTION, BILATERAL     • EAR TUBES     • MASTECTOMY Bilateral     radical   • TUMOR REMOVAL Left     large lipoma removed from hip      HEME/ONC HISTORY:  patient is an 79-year-old female with multiple medical issues including hypertension diabetes hypercholesterolemia with a recent stroke in February of this year which is resulted in left-sided weakness and  difficulty swallowing.  The patient noted changes in her breast over the last year and the last few months changes to the nipple specifically with developing lump in both the right breast and under the right arm.  She brought to the attention of her family physician who sent her for imaging and biopsy of obvious tumor.  Mammogram revealed a large irregular mass measuring 6 cm with skin retraction nipple retraction and skin thickening.  At the 10 o'clock position there appeared to be some skin involvement.    She was also noted clinically to have large axillary lymph nodes.  Ultrasound was obtained revealing an irregular solid mass with indistinct margins measuring approximately 64 x 51 mm.  On ultrasound there were also some enlarged axillary lymph nodes measuring about 30 mm in greatest size.     A biopsy was recommended and has revealed a grade 2 invasive ductal cancer that is ER positive at 98%, WV positive at 82%, and HER-2 equivocal.  The Ki-67 is 20.81%.     Patient was then referred to Dr. Gonzalez who examined her and did not feel surgical excision was possible because there would be a large defect from her tumor and was wanting to consider neoadjuvant therapy in an effort to shrink the tumor.  The patient was presented at the multidisciplinary conference and because of her comorbidities her recent stroke and her overall general condition we did not think she was a good candidate for neoadjuvant chemotherapy and she was referred to us to discuss endocrine therapy.    In the interim additional testing on her tumor was done at [facility] because of the indeterminate HER-2 FISH and this revealed that there was 3+ HER-2 activity by IHC and therefore this is considered to be HER-2 positive  The patient had staging work-up at the recommendation of the multidisciplinary clinic And CT of the chest showed calcified granulomatous disease with mild AP window adenopathy which is increased from previous CAT scan in 2016  precarinal adenopathy also increased.  It showed the breast mass as well as right axillary adenopathy.  In the abdomen there was a left adrenal nodule which is increased in size from 1.4 to 1.8 cm felt to be an adenoma in addition there was a small sclerotic lesion in the left iliac crest and metastasis could not be completely excluded although the bone scan showed no uptake in this area.  The bone scan did show a fracture and uptake in the left greater trochanter and uptake of the left superior pubic ramus that represents a superior ramus fracture of with no other signs of metastatic disease-patient did fall in July of this year but no obvious fractures were seen on x-ray.    Pt did not want chemotherapy-Patient initiating Arimidex therapy in mid September 2019.     the patient was hospitalized in late September, presenting with garbled speech and elevated blood pressure, felt to ultimately have had a TIA related to hypertensive urgency and small vessel disease.  Neurology recommended dual antiplatelet therapy for 3 months followed by Plavix alone.  Her antihypertensives were adjusted.  At that time she was also found to be B12 deficient with a level of 158 and iron deficient with an iron percent of 7%.  She was started on oral B12 and iron.    She states that the iron does cause some constipation but it is manageable with stool softeners at this time.  There was some discussion in the hospital record that if she could not tolerate oral iron IV iron should be considered.  Hemoglobin is improved up to 9.7 from discharge hemoglobin of 9.0.    2/20  he has not had a DEXA scan in many years and we repeated in February and it shows moderate osteopenia in the spine osteoporosis and left hip and severe osteopenia in the right hip.  I am not inclined to switch her to tamoxifen at this point and leave her room at acceptable see her back in 3 months and decide whether to add Prolia    Current Outpatient Medications on File  Prior to Visit   Medication Sig Dispense Refill   • acetaminophen (TYLENOL) 500 MG tablet Take 500 mg by mouth Every 6 (Six) Hours As Needed for Mild Pain .     • amLODIPine (NORVASC) 10 MG tablet Take 1 tablet by mouth Daily. 90 tablet 3   • cetirizine (zyrTEC) 10 MG tablet Take 10 mg by mouth Daily.     • cloNIDine (CATAPRES) 0.2 MG tablet TAKE 1 TABLET BY MOUTH EVERY 12 HOURS 180 tablet 0   • clopidogrel (PLAVIX) 75 MG tablet TAKE 1 TABLET BY MOUTH DAILY 30 tablet 6   • ferrous sulfate (IRON SUPPLEMENT) 325 (65 FE) MG tablet Take 1 tablet by mouth 2 (Two) Times a Day Before Meals. 60 tablet 0   • fluticasone (FLONASE) 50 MCG/ACT nasal spray 2 sprays into the nostril(s) as directed by provider Every Night.     • guaiFENesin (MUCINEX) 600 MG 12 hr tablet Take 600 mg by mouth 2 (Two) Times a Day As Needed for Cough.     • hydroCHLOROthiazide (MICROZIDE) 12.5 MG capsule Take 1 capsule by mouth Daily. 90 capsule 3   • metFORMIN (GLUCOPHAGE) 1000 MG tablet Take one tablet bid 180 tablet 1   • metoprolol succinate XL (TOPROL-XL) 50 MG 24 hr tablet TAKE 1 TABLET BY MOUTH DAILY 90 tablet 3   • pravastatin (PRAVACHOL) 20 MG tablet Take 1 tablet by mouth Every Night. 90 tablet 1   • vitamin B-12 (VITAMIN B-12) 2000 MCG tablet Take 1 tablet by mouth Daily. 30 tablet 0     No current facility-administered medications on file prior to visit.        ALLERGIES:    Allergies   Allergen Reactions   • Lisinopril Angioedema   • Lipitor [Atorvastatin] Unknown - High Severity     Leg weakness         Social History     Socioeconomic History   • Marital status:      Spouse name: Not on file   • Number of children: 3   • Years of education: High school   • Highest education level: Not on file   Tobacco Use   • Smoking status: Former Smoker     Packs/day: 0.25     Years: 10.00     Pack years: 2.50     Types: Cigarettes     Quit date: 2019     Years since quittin.1   • Smokeless tobacco: Former User   • Tobacco comment:  "she quit for 8 years and started back about a year ago then quit again   Substance and Sexual Activity   • Alcohol use: No   • Drug use: No   • Sexual activity: Never        Family History   Problem Relation Age of Onset   • Stroke Mother 76   • Diabetes Mother    • Hypertension Mother    • Arthritis Mother    • Cancer Father         metastatic unknown cause   • Heart attack Father 70   • Colon cancer Father    • Lung cancer Father    • Cancer Brother         bladder   • Arthritis Sister    • Melanoma Sister    • Hypertension Daughter    • Diabetes Daughter         Review of Systems   Constitutional: Positive for fatigue.   HENT: Positive for hearing loss (same ).    Respiratory: Negative.    Cardiovascular: Negative.    Gastrointestinal: Negative for abdominal pain.   Genitourinary: Negative.    Musculoskeletal: Positive for arthralgias and gait problem. Negative for joint swelling.   Neurological: Positive for weakness (left side).   Hematological: Negative.    Psychiatric/Behavioral: Negative.    All other systems reviewed and are negative.      Objective     Vitals:    04/09/21 0856   BP: 163/73  Comment: Pt has been off linsinopril for months made Pt swell   Pulse: 73   Resp: 16   Temp: 97.3 °F (36.3 °C)   TempSrc: Skin   SpO2: 92%   Weight: 49.7 kg (109 lb 9.6 oz)   Height: 162.6 cm (64.02\")   PainSc: 0-No pain     Current Status 4/9/2021   ECOG score 1       Physical Exam   Pulmonary/Chest:           GENERAL:  Well-developed, well-nourished in no acute distress.   SKIN:  Warm, dry without rashes, purpura or petechiae.  EYES:  Pupils equal, round and reactive to light.  EOMs intact.  Conjunctivae normal.  EARS:  Hearing intact.  NOSE:  Septum midline.  No excoriations or nasal discharge.  MOUTH:  Tongue is well-papillated; no stomatitis or ulcers.  Lips normal.  THROAT:  Oropharynx without lesions or exudates.  NECK:  Supple with good range of motion; no thyromegaly or masses, no JVD.  LYMPHATICS:  No " cervical, supraclavicular, axillary or inguinal adenopathy.  CHEST:  Lungs clear to auscultation decreased breath sounds right base CHCF up.  BREASTS: Left chest wall is benign; the right breast has a mobile soft tissue mass roughly 6 cm transverse x 5 cm.  2 new subcutaneous nodules noted see picture above -No palpable adenopathy in the right axilla, neck or supraclavicular region.    CARDIAC:  Regular rate and rhythm without murmurs, rubs or gallops. Normal S1,S2.  ABDOMEN:  Soft, nontender with no hepatosplenomegaly or masses.-Prominent bony nodule on the left greater trochanter  EXTREMITIES:  No clubbing, cyanosis 2+lymphedema right arm  NEUROLOGICAL:  Cranial Nerves II-XII grossly intact.  Mild left-sided weakness with a hemiparetic gait   PSYCHIATRIC:  Normal affect and mood.    I have reexamined the patient and the results are consistent with the previously documented exam. Noble Marcus MD       RECENT LABS:  Lab Results   Component Value Date    WBC 8.69 04/09/2021    HGB 10.7 (L) 04/09/2021    HCT 33.5 (L) 04/09/2021    MCV 86.1 04/09/2021     04/09/2021     Lab Results   Component Value Date    GLUCOSE 136 (H) 04/09/2021    BUN 25 (H) 04/09/2021    CREATININE 1.43 (H) 04/09/2021    EGFRIFNONA 35 (L) 04/09/2021    EGFRIFAFRI 42 (L) 01/16/2017    BCR 17.5 04/09/2021    K 3.9 04/09/2021    CO2 22.0 04/09/2021    CALCIUM 9.7 04/09/2021    PROTENTOTREF 7.8 01/16/2017    ALBUMIN 4.20 04/09/2021    LABIL2 1.4 01/16/2017    AST 12 04/09/2021    ALT <5 04/09/2021       Results from last 7 days   Lab Units 04/09/21  0810   WBC 10*3/mm3 8.69   NEUTROS ABS 10*3/mm3 6.57   HEMOGLOBIN g/dL 10.7*   HEMATOCRIT % 33.5*   PLATELETS 10*3/mm3 339     Results from last 7 days   Lab Units 04/09/21  0810   SODIUM mmol/L 142   POTASSIUM mmol/L 3.9   CHLORIDE mmol/L 105   CO2 mmol/L 22.0   BUN mg/dL 25*   CREATININE mg/dL 1.43*   CALCIUM mg/dL 9.7   ALBUMIN g/dL 4.20   BILIRUBIN mg/dL 0.2   ALK PHOS U/L 112   ALT  (SGPT) U/L <5   AST (SGOT) U/L 12   GLUCOSE mg/dL 136*           Results from last 7 days   Lab Units 04/09/21  0810   SODIUM mmol/L 142   POTASSIUM mmol/L 3.9   CHLORIDE mmol/L 105   CO2 mmol/L 22.0   BUN mg/dL 25*   CREATININE mg/dL 1.43*   CALCIUM mg/dL 9.7   ALBUMIN g/dL 4.20   BILIRUBIN mg/dL 0.2   ALK PHOS U/L 112   ALT (SGPT) U/L <5   AST (SGOT) U/L 12   GLUCOSE mg/dL 136*                               MRI BRAIN     IMPRESSION:  Old right basal ganglia infarct. No acute abnormality  identified. No metastatic disease is identified at the brain or the  head.    Bone scan  IMPRESSION:  Abnormal uptake left greater trochanter where there is a  fracture demonstrated with CT (patient has a history of a fall and there  is no CT evidence that this is a pathologic fracture). There is also  abnormal uptake at the left superior pubic ramus that most likely  represents a superior ramus fracture though a fracture is not evident  with CT. This uptake left superior pubic ramus is technically  indeterminate though there is overall no convincing evidence for bony  metastatic disease. Follow-up CT could be obtained.     This report was finalized on 8/23/2019 1:    CT chest abdomen pelvis  IMPRESSION CHEST CT:    1. A 2.2 cm right breast mass laterally presumably related to the  patient's neoplasm.  2. Right axillary mediastinal adenopathy and metastatic disease cannot  be excluded. PET/CT may be helpful.     IMPRESSION ABDOMEN & PELVIS CT:    1. Low-density renal lesions as discussed, favor cysts.  2. Enlarging left adrenal nodule, likely adenoma.  3. Extensive diverticulosis.  4. A 5 mm sclerotic left iliac lesion as discussed.    BONE MINERAL DENSITOMETRY  IMPRESSION:  Osteoporosis at the left hip. Interval decrease in bone  density.     This report was finalized on 2/14/2020 12:10 PM by Dr. Osmin Dodd M.D.        Assessment/Plan   1.  Locally advanced right breast cancer strongly ER PA positive with indeterminate  HER-2 with repeat testing showing 3+ IHC consistent with positive result  · Staging work-up probably negative although indeterminate bone lesions without obvious fractures on bone scan  · Abnormal mediastinal adenopathy which is been present since 2016 likely granulomatous disease  · Enlarging left adrenal mass felt to be an adenoma present since 2015  · History of left breast cancer 1978 at age 38 treated with modified radical mastectomy alone? Pathology  · Weinert to be a poor candidate for surgery or chemotherapy.PT DECLINED CHEMO  · Initiated Arimidex around 9/11/2019.  Radiation may be a possibility for additional therapy if she does not get a good response from Arimidex  · Radiation added in 3/20  · Patient is reviewed back today, 11/17/2020, tolerating Arimidex well.  She does feel that the right breast mass is slightly larger though still overall improved compared to when she initially presented.    · Patient seen on 2/18/2021 with new cutaneous nodules restaging and switch to Faslodex planned  · Right pleural effusion positive cytology for metastatic breast cancer ER/FL positive HER-2 FISH negative we will add Ibrance to the Faslodex 75 mg a day 2 weeks on 2 weeks off and escalate as tolerated    2.  Granulomatous mediastinal lymph nodes chronic    3.  Recent stroke in 2/19 with left hemiparesis and dysphagia  · Patient hospitalized again 9/26 and 9/29/2019 presenting with garbled speech and elevated blood pressure, felt to have had a TIA.  Neurology recommended dual antiplatelet therapy for 3 months and then Plavix alone.    4.  Hypertension diabetes and hypercholesterolemia.  Patient currently off lisinopril per primary care due to angioedema.    5.  Anemia, multifactorial:  · Patient has stage III CKD.  · Patient also found during recent hospitalization to have low B12 and low iron percent.  Though she had previously received B12 injections she was started on oral B12.  She was also started on oral iron.     · Patient continues on oral iron at this time with some constipation though tolerable with stool softeners.  Plan to continue to monitor her blood work and if develops intolerance to oral iron consider IV iron.  · Was recent iron studies 11/20/2019 include a ferritin of 41, iron saturation 12%.   · Hemoglobin today 10.4.  Continue to monitor.     6.  Lymphedema right arm better after radiation    7.  Osteoporosis left hip -osteopenia spine observe for now    8 DNR confirmed on 4/9/2021 with her daughter in attendance    PLAN:  1. Stop Arimidex.  Faslodex every 2 weeks x3 and then monthly  2. Ibrance 75 mg 2 weeks on 2 weeks off  3. patient again declined chemotherapy of any kind but is agreeable to Faslodex and Ibrance  4.  Return in 4 weeks for Faslodex and see me NP and see me in 2 months for Faslodex and to review her tolerance of Ibrance   There is thrombogenic risk with Ibrance and she has had a previous stroke and is on Plavix so I think we could try the Ibrance and see how she does    We discussed her DNR status and she does not want to be resuscitated at all and I told her I would document this for the record    Discussed the fact that she is no longer curable and she and her daughter both understand    She may need further thoracentesis or even Pleurx catheter if she does not respond quickly

## 2021-04-09 ENCOUNTER — TELEPHONE (OUTPATIENT)
Dept: ONCOLOGY | Facility: CLINIC | Age: 81
End: 2021-04-09

## 2021-04-09 ENCOUNTER — DOCUMENTATION (OUTPATIENT)
Dept: ONCOLOGY | Facility: CLINIC | Age: 81
End: 2021-04-09

## 2021-04-09 ENCOUNTER — OFFICE VISIT (OUTPATIENT)
Dept: ONCOLOGY | Facility: CLINIC | Age: 81
End: 2021-04-09

## 2021-04-09 ENCOUNTER — INFUSION (OUTPATIENT)
Dept: ONCOLOGY | Facility: HOSPITAL | Age: 81
End: 2021-04-09

## 2021-04-09 VITALS
BODY MASS INDEX: 18.71 KG/M2 | TEMPERATURE: 97.3 F | SYSTOLIC BLOOD PRESSURE: 163 MMHG | DIASTOLIC BLOOD PRESSURE: 73 MMHG | RESPIRATION RATE: 16 BRPM | OXYGEN SATURATION: 92 % | WEIGHT: 109.6 LBS | HEIGHT: 64 IN | HEART RATE: 73 BPM

## 2021-04-09 DIAGNOSIS — Z17.0 MALIGNANT NEOPLASM OF OVERLAPPING SITES OF RIGHT BREAST IN FEMALE, ESTROGEN RECEPTOR POSITIVE (HCC): ICD-10-CM

## 2021-04-09 DIAGNOSIS — Z17.0 MALIGNANT NEOPLASM OF OVERLAPPING SITES OF RIGHT BREAST IN FEMALE, ESTROGEN RECEPTOR POSITIVE (HCC): Primary | ICD-10-CM

## 2021-04-09 DIAGNOSIS — Z79.899 ENCOUNTER FOR LONG-TERM (CURRENT) USE OF HIGH-RISK MEDICATION: ICD-10-CM

## 2021-04-09 DIAGNOSIS — C50.811 MALIGNANT NEOPLASM OF OVERLAPPING SITES OF RIGHT BREAST IN FEMALE, ESTROGEN RECEPTOR POSITIVE (HCC): Primary | ICD-10-CM

## 2021-04-09 DIAGNOSIS — C50.811 MALIGNANT NEOPLASM OF OVERLAPPING SITES OF RIGHT BREAST IN FEMALE, ESTROGEN RECEPTOR POSITIVE (HCC): ICD-10-CM

## 2021-04-09 LAB
ALBUMIN SERPL-MCNC: 4.2 G/DL (ref 3.5–5.2)
ALBUMIN/GLOB SERPL: 1.1 G/DL (ref 1.1–2.4)
ALP SERPL-CCNC: 112 U/L (ref 38–116)
ALT SERPL W P-5'-P-CCNC: <5 U/L (ref 0–33)
ANION GAP SERPL CALCULATED.3IONS-SCNC: 15 MMOL/L (ref 5–15)
AST SERPL-CCNC: 12 U/L (ref 0–32)
BASOPHILS # BLD AUTO: 0.07 10*3/MM3 (ref 0–0.2)
BASOPHILS NFR BLD AUTO: 0.8 % (ref 0–1.5)
BILIRUB SERPL-MCNC: 0.2 MG/DL (ref 0.2–1.2)
BUN SERPL-MCNC: 25 MG/DL (ref 6–20)
BUN/CREAT SERPL: 17.5 (ref 7.3–30)
CALCIUM SPEC-SCNC: 9.7 MG/DL (ref 8.5–10.2)
CHLORIDE SERPL-SCNC: 105 MMOL/L (ref 98–107)
CO2 SERPL-SCNC: 22 MMOL/L (ref 22–29)
CREAT SERPL-MCNC: 1.43 MG/DL (ref 0.6–1.1)
DEPRECATED RDW RBC AUTO: 45.4 FL (ref 37–54)
EOSINOPHIL # BLD AUTO: 0.2 10*3/MM3 (ref 0–0.4)
EOSINOPHIL NFR BLD AUTO: 2.3 % (ref 0.3–6.2)
ERYTHROCYTE [DISTWIDTH] IN BLOOD BY AUTOMATED COUNT: 14.6 % (ref 12.3–15.4)
GFR SERPL CREATININE-BSD FRML MDRD: 35 ML/MIN/1.73
GLOBULIN UR ELPH-MCNC: 3.9 GM/DL (ref 1.8–3.5)
GLUCOSE SERPL-MCNC: 136 MG/DL (ref 74–124)
HCT VFR BLD AUTO: 33.5 % (ref 34–46.6)
HGB BLD-MCNC: 10.7 G/DL (ref 12–15.9)
IMM GRANULOCYTES # BLD AUTO: 0.03 10*3/MM3 (ref 0–0.05)
IMM GRANULOCYTES NFR BLD AUTO: 0.3 % (ref 0–0.5)
LYMPHOCYTES # BLD AUTO: 1.02 10*3/MM3 (ref 0.7–3.1)
LYMPHOCYTES NFR BLD AUTO: 11.7 % (ref 19.6–45.3)
MCH RBC QN AUTO: 27.5 PG (ref 26.6–33)
MCHC RBC AUTO-ENTMCNC: 31.9 G/DL (ref 31.5–35.7)
MCV RBC AUTO: 86.1 FL (ref 79–97)
MONOCYTES # BLD AUTO: 0.8 10*3/MM3 (ref 0.1–0.9)
MONOCYTES NFR BLD AUTO: 9.2 % (ref 5–12)
NEUTROPHILS NFR BLD AUTO: 6.57 10*3/MM3 (ref 1.7–7)
NEUTROPHILS NFR BLD AUTO: 75.7 % (ref 42.7–76)
NRBC BLD AUTO-RTO: 0 /100 WBC (ref 0–0.2)
PLATELET # BLD AUTO: 339 10*3/MM3 (ref 140–450)
PMV BLD AUTO: 8.9 FL (ref 6–12)
POTASSIUM SERPL-SCNC: 3.9 MMOL/L (ref 3.5–4.7)
PROT SERPL-MCNC: 8.1 G/DL (ref 6.3–8)
RBC # BLD AUTO: 3.89 10*6/MM3 (ref 3.77–5.28)
SODIUM SERPL-SCNC: 142 MMOL/L (ref 134–145)
WBC # BLD AUTO: 8.69 10*3/MM3 (ref 3.4–10.8)

## 2021-04-09 PROCEDURE — 80053 COMPREHEN METABOLIC PANEL: CPT

## 2021-04-09 PROCEDURE — 25010000002 FULVESTRANT PER 25 MG: Performed by: INTERNAL MEDICINE

## 2021-04-09 PROCEDURE — 85025 COMPLETE CBC W/AUTO DIFF WBC: CPT

## 2021-04-09 PROCEDURE — 96402 CHEMO HORMON ANTINEOPL SQ/IM: CPT

## 2021-04-09 PROCEDURE — 36415 COLL VENOUS BLD VENIPUNCTURE: CPT

## 2021-04-09 PROCEDURE — 99215 OFFICE O/P EST HI 40 MIN: CPT | Performed by: INTERNAL MEDICINE

## 2021-04-09 RX ADMIN — FULVESTRANT 500 MG: 250 INJECTION INTRAMUSCULAR at 09:39

## 2021-04-09 NOTE — TELEPHONE ENCOUNTER
----- Message from Tayla Cabello [facility] sent at 4/9/2021  2:47 PM EDT -----  Regarding: oral chemo education  Please schedule a full oral chemo education for this patient. In person or telemedicine ([employer] link sent to smart phone).     Thanks,   Tayla

## 2021-04-09 NOTE — PROGRESS NOTES
Staff message rec from Maryann CANTRELL RN for Dr Marcus-Pt is to start on Ibrance 75 mg 2 weeks on 2 weeks off.    We do not have her Rx plan information in her chart. I will research this and begin the process.    Maryann Oreilly RN sent to [facility] Oral Onc Pool; Marie Burr.  Dr. Marcus wants her to start ibrance 75mg 2 weeks on 2 weeks off.

## 2021-04-09 NOTE — PROGRESS NOTES
Pt has medicare part D plan through [employer].    I have submitted the Ibrance PA to [employer] through covermymeds.    Waiting for a decision.

## 2021-04-09 NOTE — TELEPHONE ENCOUNTER
Caller: SARAY    Relationship: [employer]    Best call back number: [phone]   CASE# [id number]    What is the best time to reach you:BUISNESS HOURS    Who are you requesting to speak with (clinical staff, provider,  specific staff member): CLINICAL    Do you know the name of the person who called: NA    What was the call regarding: THIS IS THE FINAL ATTEMPT FOR A PA ON IBRANCE.    NOT ON PT MEDICATION LIST.  IF NOT WANTED WILL NEED TO CALL TO AUTHORIZE CANCELLATION ALSO.    Do you require a callback: YES

## 2021-04-09 NOTE — TELEPHONE ENCOUNTER
I CHANGED THE ORAL CHEMO CLASS FROM 4-13 TO 4-14 PER THE PATIENTS DAUGHTER.   DAUGHTER WOULD ALSO LIKE FOR US TO KNOW SHE WILL BE WITH HER MOTHER AS SHE IS HAVING TROUBLE HEARING.   THANK YOU

## 2021-04-13 ENCOUNTER — APPOINTMENT (OUTPATIENT)
Dept: ONCOLOGY | Facility: HOSPITAL | Age: 81
End: 2021-04-13

## 2021-04-14 ENCOUNTER — HOSPITAL ENCOUNTER (EMERGENCY)
Facility: HOSPITAL | Age: 81
Discharge: HOME | End: 2021-04-14
Attending: EMERGENCY MEDICINE | Admitting: EMERGENCY MEDICINE

## 2021-04-14 ENCOUNTER — SPECIALTY PHARMACY (OUTPATIENT)
Dept: ONCOLOGY | Facility: HOSPITAL | Age: 81
End: 2021-04-14

## 2021-04-14 ENCOUNTER — APPOINTMENT (OUTPATIENT)
Dept: GENERAL RADIOLOGY | Facility: HOSPITAL | Age: 81
End: 2021-04-14

## 2021-04-14 ENCOUNTER — DOCUMENTATION (OUTPATIENT)
Dept: ONCOLOGY | Facility: CLINIC | Age: 81
End: 2021-04-14

## 2021-04-14 VITALS
TEMPERATURE: 97.8 F | SYSTOLIC BLOOD PRESSURE: 157 MMHG | WEIGHT: 109 LBS | DIASTOLIC BLOOD PRESSURE: 97 MMHG | OXYGEN SATURATION: 93 % | HEART RATE: 92 BPM | RESPIRATION RATE: 16 BRPM | HEIGHT: 64 IN | BODY MASS INDEX: 18.61 KG/M2

## 2021-04-14 DIAGNOSIS — Z17.0 MALIGNANT NEOPLASM OF OVERLAPPING SITES OF RIGHT BREAST IN FEMALE, ESTROGEN RECEPTOR POSITIVE (HCC): Primary | ICD-10-CM

## 2021-04-14 DIAGNOSIS — S50.01XA TRAUMATIC HEMATOMA OF RIGHT ELBOW, INITIAL ENCOUNTER: Primary | ICD-10-CM
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
## 2021-04-14 DIAGNOSIS — C50.811 MALIGNANT NEOPLASM OF OVERLAPPING SITES OF RIGHT BREAST IN FEMALE, ESTROGEN RECEPTOR POSITIVE (HCC): Primary | ICD-10-CM

## 2021-04-14 DIAGNOSIS — Z79.02 ANTIPLATELET OR ANTITHROMBOTIC LONG-TERM USE: ICD-10-CM

## 2021-04-14 PROCEDURE — 73130 X-RAY EXAM OF HAND: CPT

## 2021-04-14 PROCEDURE — 99283 EMERGENCY DEPT VISIT LOW MDM: CPT

## 2021-04-14 PROCEDURE — 73080 X-RAY EXAM OF ELBOW: CPT

## 2021-04-14 RX ORDER — CLOPIDOGREL BISULFATE 75 MG/1
75 TABLET ORAL DAILY
Qty: 90 TABLET | Refills: 3 | Status: SHIPPED | OUTPATIENT
Start: 2021-04-14

## 2021-04-14 RX ORDER — ACETAMINOPHEN 500 MG
1000 TABLET ORAL ONCE
Status: COMPLETED | OUTPATIENT
Start: 2021-04-14 | End: 2021-04-14

## 2021-04-14 RX ORDER — ONDANSETRON HYDROCHLORIDE 8 MG/1
8 TABLET, FILM COATED ORAL EVERY 8 HOURS PRN
Qty: 30 TABLET | Refills: 5 | Status: SHIPPED | OUTPATIENT
Start: 2021-04-14 | End: 2021-08-20

## 2021-04-14 RX ORDER — LOPERAMIDE HYDROCHLORIDE 2 MG/1
2 CAPSULE ORAL 4 TIMES DAILY PRN
COMMUNITY
End: 2021-10-31 | Stop reason: HOSPADM

## 2021-04-14 RX ADMIN — ACETAMINOPHEN 1000 MG: 500 TABLET ORAL at 19:56

## 2021-04-14 NOTE — ED PROVIDER NOTES
EMERGENCY DEPARTMENT ENCOUNTER    Room Number:  24/24  Date of encounter:  4/14/2021  PCP: Vladimir Hernandez APRN  Historian: Patient, daughter      HPI:  Chief Complaint: Fall  A complete HPI/ROS/PMH/PSH/SH/FH are unobtainable due to: None    Context: Mar Burgos is a 80 y.o. female who presents to the ED c/o fall.  She had a mechanical fall from standing that occurred about 30 minutes prior to arrival.  She complains of pain in her right elbow and right thumb.  Pain is constant and mild.  It is 2/10 in intensity.  Worse with movement.  She is on Plavix.  She is confident that she did not hit her head.  She denies pain elsewhere on her body.      PAST MEDICAL HISTORY  Active Ambulatory Problems     Diagnosis Date Noted   • Anemia 07/01/2013   • Uncontrolled type 2 diabetes mellitus (CMS/[facility]) 01/16/2017   • Diabetic neuropathy (CMS/[facility]) 01/16/2017   • Gastroesophageal reflux disease 01/16/2017   • Hearing loss 01/16/2017   • Hyperlipidemia 01/16/2017   • Hypertension 01/16/2017   • Thoracic back pain 01/16/2017   • Peripheral arterial occlusive disease (CMS/[facility]) 01/16/2017   • Seasonal allergic rhinitis 01/16/2017   • Type 2 diabetes mellitus, without long-term current use of insulin (CMS/[facility]) 07/01/2013   • B12 deficiency 01/16/2017   • Encounter for long-term (current) use of high-risk medication 02/22/2017   • Cerebrovascular accident in February 2019 (CVA) (CMS/[facility]) 02/15/2019   • Smoker 02/22/2019   • Malignant neoplasm of right female breast (CMS/[facility]) 08/14/2019   • TIA (transient ischemic attack) 09/26/2019   • Hypertensive urgency 09/26/2019   • CKD (chronic kidney disease) stage 3, GFR 30-59 ml/min (CMS/[facility]) 09/26/2019   • Conjunctivitis of right eye 07/31/2020   • Angio-edema 09/15/2020   • 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 in female, estrogen receptor positive (CMS/[facility]) 02/18/2021     Resolved Ambulatory Problems     Diagnosis Date Noted   • Diverticulitis of colon 01/16/2017   •  Gastritis 01/16/2017   • Vitamin B-complex deficiency 07/01/2013   • History of breast cancer 07/01/2013   • Personal history of other malignant neoplasm of skin 07/01/2013   • History of tobacco use 07/01/2013   • Pneumonia 01/16/2017   • Osteoporosis screening 02/22/2017   • Sinusitis 03/15/2018   • Left-sided weakness 02/15/2019   • Dysphagia due to recent cerebral infarction 02/16/2019   • Right ankle swelling 03/14/2019   • Localized swelling on left hand 03/14/2019   • Breast mass 07/16/2019   • Nipple discharge 07/16/2019   • Headache 09/26/2019     Past Medical History:   Diagnosis Date   • Arthritis    • Cancer (CMS/HCC)    • Clostridium difficile colitis    • Diabetes mellitus (CMS/HCC)    • Difficulty walking    • Diverticulitis    • Environmental allergies    • History of malignant neoplasm of breast 07/01/2013   • History of malignant neoplasm of skin 07/01/2013   • HL (hearing loss)    • Kidney disease    • Neuropathy in diabetes (CMS/HCC)    • Peripheral neuropathy    • Shingles    • Skin cancer    • Stroke (CMS/HCC) 2019   • Vision loss          PAST SURGICAL HISTORY  Past Surgical History:   Procedure Laterality Date   • CATARACT EXTRACTION, BILATERAL     • EAR TUBES     • MASTECTOMY Bilateral     radical   • TUMOR REMOVAL Left     large lipoma removed from hip         FAMILY HISTORY  Family History   Problem Relation Age of Onset   • Stroke Mother 76   • Diabetes Mother    • Hypertension Mother    • Arthritis Mother    • Cancer Father         metastatic unknown cause   • Heart attack Father 70   • Colon cancer Father    • Lung cancer Father    • Cancer Brother         bladder   • Arthritis Sister    • Melanoma Sister    • Hypertension Daughter    • Diabetes Daughter          SOCIAL HISTORY  Social History     Socioeconomic History   • Marital status:      Spouse name: Not on file   • Number of children: 3   • Years of education: High school   • Highest education level: Not on file   Tobacco  Use   • Smoking status: Former Smoker     Packs/day: 0.25     Years: 10.00     Pack years: 2.50     Types: Cigarettes     Quit date: 2019     Years since quittin.1   • Smokeless tobacco: Former User   • Tobacco comment: she quit for 8 years and started back about a year ago then quit again   Substance and Sexual Activity   • Alcohol use: No   • Drug use: No   • Sexual activity: Never         ALLERGIES  Lisinopril and Lipitor [atorvastatin]        REVIEW OF SYSTEMS  Review of Systems     All systems reviewed and negative except for those discussed in HPI.       PHYSICAL EXAM    I have reviewed the triage vital signs and nursing notes.    ED Triage Vitals   Temp Heart Rate Resp BP SpO2   21 19021   97.8 °F (36.6 °C) 92 16 172/79 93 %      Temp src Heart Rate Source Patient Position BP Location FiO2 (%)   21 -- -- --   Tympanic Monitor          Physical Exam  GENERAL: not distressed  HENT: nares patent, scalp is atraumatic  EYES: no scleral icterus  CV: regular rhythm, regular rate  RESPIRATORY: normal effort  ABDOMEN: soft, nontender  MUSCULOSKELETAL: no deformity, full range of motion of the right elbow with flexion, extension, pronation, and supination.  No pain palpation of the 4 extremities otherwise.  She does have swelling at her right thumb IP joint which she states is chronic and she thinks it is likely due to gout.  No overlying redness or warmth.  No pain with passive range of motion of the hips bilaterally.  No C/T/L-spine tenderness.  NEURO: alert, moves all extremities, follows commands  SKIN: warm, dry, quarter sized hematoma to the right elbow at the proximal ulna        LAB RESULTS  No results found for this or any previous visit (from the past 24 hour(s)).    Ordered the above labs and independently reviewed the results.        RADIOLOGY  XR Elbow 3+ View Right, XR Hand 3+ View Right    Result Date:  4/14/2021  X-RAYS OF THE RIGHT HAND AND RIGHT ELBOW  CLINICAL HISTORY: Patient fell. Right hand pain. Elbow pain.  Right hand:  3 views of the right hand demonstrate no evidence of acute fracture or subluxation. There is mild osteopenia. There is mild osteoarthritis involving the DIP joints and first carpometacarpal joint.  Right elbow:  AP lateral and oblique views demonstrate no evidence of fracture or subluxation or hemarthrosis. Focal soft tissue swelling is noted along the dorsal aspect of the proximal ulna consistent with a hematoma. Correlation with clinical findings is recommended.  This report was finalized on 4/14/2021 7:57 PM by Dr. Derrek Aguirre M.D.        I ordered the above noted radiological studies. Reviewed by me and discussed with radiologist.  See dictation for official radiology interpretation.      PROCEDURES    Procedures      MEDICATIONS GIVEN IN ER    Medications   acetaminophen (TYLENOL) tablet 1,000 mg (1,000 mg Oral Given 4/14/21 1956)         PROGRESS, DATA ANALYSIS, CONSULTS, AND MEDICAL DECISION MAKING    All labs have been independently reviewed by me.  All radiology studies have been reviewed by me and discussed with radiologist dictating the report.   EKG's independently viewed and interpreted by me.  Discussion below represents my analysis of pertinent findings related to patient's condition, differential diagnosis, treatment plan and final disposition.    Patient presents after a fall.  Low suspicion for head injury given that she has good recollection of the event and is confident that she did not hit her head.  She is on Plavix.  Injury to the isolated to the right upper extremity.  She has been ambulatory since her fall and is walking normally per the daughter.  ED Course as of Apr 14 2012 Wed Apr 14, 2021 2010 X-ray of the right elbow and hand show no evidence of fracture or dislocation.    [TD]      ED Course User Index  [TD] Ignacio Drake II, MD         PPE: Both  the patient and I wore a surgical mask throughout the entire patient encounter. I wore protective goggles.     AS OF 20:12 EDT VITALS:    BP - 157/97  HR - 92  TEMP - 97.8 °F (36.6 °C) (Tympanic)  O2 SATS - 93%        DIAGNOSIS  Final diagnoses:   Traumatic hematoma of right elbow, initial encounter   Fall, initial encounter   Antiplatelet or antithrombotic long-term use         DISPOSITION  DISCHARGE    FOLLOW-UP  Vladimir Hernandez, APRN  [address] [id number] [phone]    Schedule an appointment as soon as possible for a visit   As needed    [facility] Emergency Department  [address] [facility] [address] [phone]  Go to   If symptoms worsen         Medication List      No changes were made to your prescriptions during this visit.                  Ignacio Drake II, MD  04/14/21 2013

## 2021-04-14 NOTE — ED NOTES
Patient was placed in face mask in first look.  Patient was wearing a face mask throughout our encounter.  I wore protective eye protection throughout the encounter.  Hand hygiene was performed before and after patient encounter.       Pt had a fall in her home and c/o right elbow pain current pain level is a 4 out of 10.  Pt denies hitting her head, denies any thinners or LOC.     Noah Mabry, TOMMY  04/14/21 [id number]

## 2021-04-14 NOTE — PROGRESS NOTES
Oral Chemotherapy Teaching      Patient Name/:  Mar Burgos   1940  Oral Chemotherapy Regimen:  Ibrance 75 mg daily for 14/28 days in addition to current Faslodex  Date Started Medication: Upon delivery    Initial Teaching Follow Up Comments     Safety     Storage instructions (away from children; away from heat/cold, sunlight, or moisture), handling - use of gloves (caregivers), washing hands after touching pills, managing waste     “How are you storing your medications?”, reminders on storage, proper handling (caregivers using gloves, washing hands, away from children, managing waste, etc.), disposal of medication with D/C or dosage change    Please store Ibrance in a safe dry location out of direct sunlight. Keep out of reach of any children or pets. Mar will wash her hands prior to medication administration. Caregivers should wear gloves while handling. If you stop this medication, please bring the remainder back to the office for proper disposal.     Adherence      patient and/or caregiver on how to take medication, take with/without food, assess their adherence potential, stress importance of adherence, ways to manage adherence (pill boxes, phone reminders, calendars), what to do if miss a dose   “How are you taking your medication?” “How are you remembering to take your medication?”, “How many doses have you missed?”, determine reasons for non-adherence (not remembering, side effects, etc), ways to improve, overadherence? Remind patient of ways to improve/maintain adherence   Ibrance 75 mg daily for 2 weeks on then 2 weeks off. Take this medication at about the same time each day without regards to meals. If you miss a dose, take within 12 hrs of the normal time. If >12 hrs pass, skip that dose and continue taking as you normally would. DO NOT double up.      Side Effects/Adverse Reactions      patient on potential side effects, s/s, ways to manage, when to call MD/seek help      Determine if patient experiencing side effects, ways to manage  We discussed the following side effects:  - nausea  - diarrhea  - low blood counts  - hair loss  - mouth sores  - fatigue  - change in liver function  Mar and her daughter verbalized understanding of side effects and how to manage.      Miscellaneous     Food interactions, DDIs, financial issues Determine if patient started any new medications since being placed on oral chemo (analyze for DDI) No significant DDI with Ibrance per Up To Date. Avoid grapefruit.     Additional Notes:  Education provided in person to Mar and her daughter prior to therapy initiation. They verbalized understanding of the medication regimen. CCA and consents were signed today. I provided them with written information as well.     Thanks,   Tayla Cabello, ClayD

## 2021-04-15 ENCOUNTER — PATIENT OUTREACH (OUTPATIENT)
Dept: CASE MANAGEMENT | Facility: OTHER | Age: 81
End: 2021-04-15

## 2021-04-15 ENCOUNTER — DOCUMENTATION (OUTPATIENT)
Dept: ONCOLOGY | Facility: CLINIC | Age: 81
End: 2021-04-15

## 2021-04-15 ENCOUNTER — EPISODE CHANGES (OUTPATIENT)
Dept: CASE MANAGEMENT | Facility: OTHER | Age: 81
End: 2021-04-15

## 2021-04-15 NOTE — PROGRESS NOTES
Call rec from Antoinette, pts daughter-she has provided me with the income information for pt. I let Antoinette know that I will finish getting her approved for assistance for the Ibrance and update [facility].     I have gotten pt fully approved through [facility]. I have provided the information to [facility] to finish processing the Ibrance.    Member ID: [id number]  Group ID: [id number]  RxBin ID: [id number]  PCN: [facility]  Eligibility Start Date: 01/15/2021  Eligibility End Date: 04/14/2022  Assistance Amount: $5,400.00

## 2021-04-15 NOTE — OUTREACH NOTE
Care Coordination Assessment    Documented/Reviewed By: Mar Hilton RN Date/time: 4/15/2021  2:27 PM   Assessment completed with: children  Enrolled in care management program: No  Living arrangement: alone  Support system: family  Type of residence: private residence (Comment: NO STEPS)  Equipment used at home: walker, cane (Comment: grab bars in shower)  Bed or wheelchair confined: No  Inadequate nutrition: No  History of fall(s) in last 6 months: Yes

## 2021-04-15 NOTE — PROGRESS NOTES
Staff message rec from MAURICE Mckenna Pharmacist-pts daughter had called asking about assistance. Tayla asked me to look for assistance for pt.    I have gotten emails this morning stating funds have opened up for several foundations.     [facility] has opened up for pts diagnosis. I contacted them and spoke to Ashley. I inquired on if I can get pt approved provisionally as I did not have her exact income but she is below the 500% FPL required for the program. The cut off for a household of 1 is $95547.    [facility] took pts information and she is entered with [employer] but not approved yet. They need her income.    VM left for pts daughter, Antoinette, to contact me back so I can get pt fully approved.

## 2021-04-15 NOTE — OUTREACH NOTE
Care Plan Note      Responses   Lifestyle Goals  Routine follow-up with doctor(s), Decrease falls risk, Fewer ER/urgent care visits   Barriers  Disease education   Specific Disease Process Teaching  -- [walk with assistive devices and move with purpose. Patient currently uses cane but also has a walker available if needed. ]   Does patient have depression diagnosis?  No   Discharge destination:  Home   Medication Adherence  N/A   Goal Progress  Making Progress Toward Goal(s)   Readiness Scale  6   Confidence Scale  6   Health Literacy  Good        The main concerns and/or symptoms the patient would like to address are: Spoke with patient's daughter regarding patient's health and wellness. Patient is reported to be sore and bruised but feeling better today. Patient has grab bars in the bathrooms and has no steps to enter her home. Patient currently uses a cane and has a walker to advance to if needed. Daughter states patient is aware she will need to use the walker for balance in the near future. Daughter does not anticipate any needs for patient at this time.    Education/instruction provided by Care Coordinator: Introduced self, explained ACM RN role and provided contact information. Reviewed patient's status post fall. Reviewed falls prevention in the home. No needs identified after discussion with daughter.     Follow Up Outreach Due: as needed    Mar Hilton RN  Ambulatory     4/15/2021, 14:29 EDT

## 2021-04-20 ENCOUNTER — MEDICATION THERAPY MANAGEMENT (OUTPATIENT)
Dept: PHARMACY | Facility: HOSPITAL | Age: 81
End: 2021-04-20

## 2021-04-20 NOTE — PROGRESS NOTES
[facility] telephone encounter re delivery of Ibrance    Ms Paniagua's daughter reported that [facility] has not delivered Ibrance yet.  She had received a message from them that they did not have an rx, but then received an email that stated they did had an rx.  A call was placed to [facility] from the [facility] office, and it was verified they have rx and it has been ready to schedule shipment since 4/15.  They need to speak with family to schedule delivery.  Ms Paniagua's daughter verbalized understanding and stated she would call them today.

## 2021-04-21 ENCOUNTER — DOCUMENTATION (OUTPATIENT)
Dept: ONCOLOGY | Facility: CLINIC | Age: 81
End: 2021-04-21

## 2021-04-29 ENCOUNTER — MEDICATION THERAPY MANAGEMENT (OUTPATIENT)
Dept: PHARMACY | Facility: HOSPITAL | Age: 81
End: 2021-04-29

## 2021-04-29 NOTE — PROGRESS NOTES
[facility] telephone encounter re adherence and side effects ( Ibrance)    Spoke with Antoinette, patient's daughter today.  She reports appropriate adherence for Mar to Ibrance 75 mg po 14 days on/14 days off. She stated her mom had some mouth tenderness, but no mouth sores.  She was encouraged to initiate baking soda/salt water mouth rinses pc+hs, and to call the office should any sores develop.  She also reported a few waves of nausea, but reports ondansetron was successful in alleviation.  She stated her mom will be needing fluid pulled off her chest soon, and plans to call to get the appointment set up, in basket message sent to Dr Marcus with this information.

## 2021-04-30 ENCOUNTER — TRANSCRIBE ORDERS (OUTPATIENT)
Dept: ADMINISTRATIVE | Facility: HOSPITAL | Age: 81
End: 2021-04-30

## 2021-04-30 DIAGNOSIS — J90 PLEURAL EFFUSION: ICD-10-CM

## 2021-04-30 DIAGNOSIS — Z17.0 MALIGNANT NEOPLASM OF RIGHT BREAST IN FEMALE, ESTROGEN RECEPTOR POSITIVE, UNSPECIFIED SITE OF BREAST (HCC): Primary | ICD-10-CM

## 2021-04-30 DIAGNOSIS — Z20.822 ENCOUNTER FOR PREPROCEDURE SCREENING LABORATORY TESTING FOR COVID-19: Primary | ICD-10-CM

## 2021-04-30 DIAGNOSIS — C50.911 MALIGNANT NEOPLASM OF RIGHT BREAST IN FEMALE, ESTROGEN RECEPTOR POSITIVE, UNSPECIFIED SITE OF BREAST (HCC): Primary | ICD-10-CM

## 2021-04-30 DIAGNOSIS — Z01.812 ENCOUNTER FOR PREPROCEDURE SCREENING LABORATORY TESTING FOR COVID-19: Primary | ICD-10-CM

## 2021-05-01 ENCOUNTER — LAB (OUTPATIENT)
Dept: LAB | Facility: HOSPITAL | Age: 81
End: 2021-05-01

## 2021-05-01 DIAGNOSIS — Z20.822 ENCOUNTER FOR PREPROCEDURE SCREENING LABORATORY TESTING FOR COVID-19: ICD-10-CM

## 2021-05-01 DIAGNOSIS — Z01.812 ENCOUNTER FOR PREPROCEDURE SCREENING LABORATORY TESTING FOR COVID-19: ICD-10-CM

## 2021-05-01 PROCEDURE — U0005 INFEC AGEN DETEC AMPLI PROBE: HCPCS

## 2021-05-01 PROCEDURE — C9803 HOPD COVID-19 SPEC COLLECT: HCPCS

## 2021-05-01 PROCEDURE — U0004 COV-19 TEST NON-CDC HGH THRU: HCPCS

## 2021-05-03 LAB — SARS-COV-2 RNA RESP QL NAA+PROBE: NOT DETECTED

## 2021-05-04 ENCOUNTER — HOSPITAL ENCOUNTER (OUTPATIENT)
Dept: ULTRASOUND IMAGING | Facility: HOSPITAL | Age: 81
Discharge: HOME | End: 2021-05-04
Admitting: INTERNAL MEDICINE

## 2021-05-04 VITALS
OXYGEN SATURATION: 94 % | DIASTOLIC BLOOD PRESSURE: 77 MMHG | SYSTOLIC BLOOD PRESSURE: 135 MMHG | BODY MASS INDEX: 18.61 KG/M2 | HEIGHT: 64 IN | WEIGHT: 109 LBS | HEART RATE: 70 BPM | RESPIRATION RATE: 20 BRPM | TEMPERATURE: 97.8 F

## 2021-05-04 DIAGNOSIS — Z17.0 MALIGNANT NEOPLASM OF RIGHT BREAST IN FEMALE, ESTROGEN RECEPTOR POSITIVE, UNSPECIFIED SITE OF BREAST (HCC): ICD-10-CM

## 2021-05-04 DIAGNOSIS — J90 PLEURAL EFFUSION: ICD-10-CM

## 2021-05-04 DIAGNOSIS — C50.911 MALIGNANT NEOPLASM OF RIGHT BREAST IN FEMALE, ESTROGEN RECEPTOR POSITIVE, UNSPECIFIED SITE OF BREAST (HCC): ICD-10-CM

## 2021-05-04 PROCEDURE — 76942 ECHO GUIDE FOR BIOPSY: CPT

## 2021-05-04 PROCEDURE — 25010000003 LIDOCAINE 1 % SOLUTION: Performed by: RADIOLOGY

## 2021-05-04 RX ORDER — LIDOCAINE HYDROCHLORIDE 10 MG/ML
10 INJECTION, SOLUTION INFILTRATION; PERINEURAL ONCE
Status: COMPLETED | OUTPATIENT
Start: 2021-05-04 | End: 2021-05-04

## 2021-05-04 RX ORDER — SODIUM CHLORIDE 0.9 % (FLUSH) 0.9 %
10 SYRINGE (ML) INJECTION AS NEEDED
Status: CANCELLED | OUTPATIENT
Start: 2021-05-04

## 2021-05-04 RX ORDER — SODIUM CHLORIDE 0.9 % (FLUSH) 0.9 %
3 SYRINGE (ML) INJECTION EVERY 12 HOURS SCHEDULED
Status: CANCELLED | OUTPATIENT
Start: 2021-05-04

## 2021-05-04 RX ADMIN — LIDOCAINE HYDROCHLORIDE 10 ML: 10 INJECTION, SOLUTION INFILTRATION; PERINEURAL at 13:58

## 2021-05-05 ENCOUNTER — TELEPHONE (OUTPATIENT)
Dept: INTERVENTIONAL RADIOLOGY/VASCULAR | Facility: HOSPITAL | Age: 81
End: 2021-05-05

## 2021-05-07 ENCOUNTER — TELEPHONE (OUTPATIENT)
Dept: ONCOLOGY | Facility: CLINIC | Age: 81
End: 2021-05-07

## 2021-05-07 ENCOUNTER — INFUSION (OUTPATIENT)
Dept: ONCOLOGY | Facility: HOSPITAL | Age: 81
End: 2021-05-07

## 2021-05-07 ENCOUNTER — LAB (OUTPATIENT)
Dept: LAB | Facility: HOSPITAL | Age: 81
End: 2021-05-07

## 2021-05-07 ENCOUNTER — OFFICE VISIT (OUTPATIENT)
Dept: ONCOLOGY | Facility: CLINIC | Age: 81
End: 2021-05-07

## 2021-05-07 VITALS
WEIGHT: 106.5 LBS | SYSTOLIC BLOOD PRESSURE: 150 MMHG | HEART RATE: 89 BPM | HEIGHT: 64 IN | BODY MASS INDEX: 18.18 KG/M2 | OXYGEN SATURATION: 98 % | RESPIRATION RATE: 16 BRPM | TEMPERATURE: 97.5 F | DIASTOLIC BLOOD PRESSURE: 72 MMHG

## 2021-05-07 DIAGNOSIS — N18.30 ACUTE RENAL FAILURE SUPERIMPOSED ON STAGE 3 CHRONIC KIDNEY DISEASE, UNSPECIFIED ACUTE RENAL FAILURE TYPE, UNSPECIFIED WHETHER STAGE 3A OR 3B CKD (HCC): ICD-10-CM

## 2021-05-07 DIAGNOSIS — D64.9 ANEMIA, UNSPECIFIED TYPE: Primary | ICD-10-CM

## 2021-05-07 DIAGNOSIS — E86.0 DEHYDRATION: ICD-10-CM

## 2021-05-07 DIAGNOSIS — C50.811 MALIGNANT NEOPLASM OF OVERLAPPING SITES OF RIGHT BREAST IN FEMALE, ESTROGEN RECEPTOR POSITIVE (HCC): ICD-10-CM

## 2021-05-07 DIAGNOSIS — E53.8 B12 DEFICIENCY: ICD-10-CM

## 2021-05-07 DIAGNOSIS — C50.911 MALIGNANT NEOPLASM OF RIGHT BREAST IN FEMALE, ESTROGEN RECEPTOR POSITIVE, UNSPECIFIED SITE OF BREAST (HCC): ICD-10-CM

## 2021-05-07 DIAGNOSIS — Z17.0 MALIGNANT NEOPLASM OF OVERLAPPING SITES OF RIGHT BREAST IN FEMALE, ESTROGEN RECEPTOR POSITIVE (HCC): Primary | ICD-10-CM

## 2021-05-07 DIAGNOSIS — Z17.0 MALIGNANT NEOPLASM OF RIGHT BREAST IN FEMALE, ESTROGEN RECEPTOR POSITIVE, UNSPECIFIED SITE OF BREAST (HCC): ICD-10-CM

## 2021-05-07 DIAGNOSIS — N17.9 ACUTE RENAL FAILURE SUPERIMPOSED ON STAGE 3 CHRONIC KIDNEY DISEASE, UNSPECIFIED ACUTE RENAL FAILURE TYPE, UNSPECIFIED WHETHER STAGE 3A OR 3B CKD (HCC): ICD-10-CM

## 2021-05-07 DIAGNOSIS — C50.811 MALIGNANT NEOPLASM OF OVERLAPPING SITES OF RIGHT BREAST IN FEMALE, ESTROGEN RECEPTOR POSITIVE (HCC): Primary | ICD-10-CM

## 2021-05-07 DIAGNOSIS — Z17.0 MALIGNANT NEOPLASM OF OVERLAPPING SITES OF RIGHT BREAST IN FEMALE, ESTROGEN RECEPTOR POSITIVE (HCC): ICD-10-CM

## 2021-05-07 LAB
ALBUMIN SERPL-MCNC: 3.9 G/DL (ref 3.5–5.2)
ALBUMIN/GLOB SERPL: 1.1 G/DL (ref 1.1–2.4)
ALP SERPL-CCNC: 84 U/L (ref 38–116)
ALT SERPL W P-5'-P-CCNC: 7 U/L (ref 0–33)
ANION GAP SERPL CALCULATED.3IONS-SCNC: 13.4 MMOL/L (ref 5–15)
AST SERPL-CCNC: 12 U/L (ref 0–32)
BASOPHILS # BLD AUTO: 0.01 10*3/MM3 (ref 0–0.2)
BASOPHILS NFR BLD AUTO: 0.5 % (ref 0–1.5)
BILIRUB SERPL-MCNC: 0.3 MG/DL (ref 0.2–1.2)
BUN SERPL-MCNC: 49 MG/DL (ref 6–20)
BUN/CREAT SERPL: 22.2 (ref 7.3–30)
CALCIUM SPEC-SCNC: 9 MG/DL (ref 8.5–10.2)
CHLORIDE SERPL-SCNC: 110 MMOL/L (ref 98–107)
CO2 SERPL-SCNC: 18.6 MMOL/L (ref 22–29)
CREAT SERPL-MCNC: 2.21 MG/DL (ref 0.6–1.1)
DEPRECATED RDW RBC AUTO: 47.8 FL (ref 37–54)
EOSINOPHIL # BLD AUTO: 0.01 10*3/MM3 (ref 0–0.4)
EOSINOPHIL NFR BLD AUTO: 0.5 % (ref 0.3–6.2)
ERYTHROCYTE [DISTWIDTH] IN BLOOD BY AUTOMATED COUNT: 15.9 % (ref 12.3–15.4)
FERRITIN SERPL-MCNC: 156.6 NG/ML (ref 13–150)
GFR SERPL CREATININE-BSD FRML MDRD: 21 ML/MIN/1.73
GLOBULIN UR ELPH-MCNC: 3.6 GM/DL (ref 1.8–3.5)
GLUCOSE SERPL-MCNC: 130 MG/DL (ref 74–124)
HCT VFR BLD AUTO: 30.5 % (ref 34–46.6)
HGB BLD-MCNC: 10.2 G/DL (ref 12–15.9)
IMM GRANULOCYTES # BLD AUTO: 0.11 10*3/MM3 (ref 0–0.05)
IMM GRANULOCYTES NFR BLD AUTO: 6 % (ref 0–0.5)
IRON 24H UR-MRATE: 42 MCG/DL (ref 37–145)
IRON SATN MFR SERPL: 15 % (ref 14–48)
LYMPHOCYTES # BLD AUTO: 0.61 10*3/MM3 (ref 0.7–3.1)
LYMPHOCYTES NFR BLD AUTO: 33.5 % (ref 19.6–45.3)
MCH RBC QN AUTO: 28.7 PG (ref 26.6–33)
MCHC RBC AUTO-ENTMCNC: 33.4 G/DL (ref 31.5–35.7)
MCV RBC AUTO: 85.7 FL (ref 79–97)
MONOCYTES # BLD AUTO: 0.1 10*3/MM3 (ref 0.1–0.9)
MONOCYTES NFR BLD AUTO: 5.5 % (ref 5–12)
NEUTROPHILS NFR BLD AUTO: 0.98 10*3/MM3 (ref 1.7–7)
NEUTROPHILS NFR BLD AUTO: 54 % (ref 42.7–76)
NRBC BLD AUTO-RTO: 0 /100 WBC (ref 0–0.2)
PLATELET # BLD AUTO: 100 10*3/MM3 (ref 140–450)
PMV BLD AUTO: 9.3 FL (ref 6–12)
POTASSIUM SERPL-SCNC: 4.4 MMOL/L (ref 3.5–4.7)
PROT SERPL-MCNC: 7.5 G/DL (ref 6.3–8)
RBC # BLD AUTO: 3.56 10*6/MM3 (ref 3.77–5.28)
SODIUM SERPL-SCNC: 142 MMOL/L (ref 134–145)
TIBC SERPL-MCNC: 288 MCG/DL (ref 249–505)
TRANSFERRIN SERPL-MCNC: 206 MG/DL (ref 200–360)
VIT B12 BLD-MCNC: 502 PG/ML (ref 211–946)
WBC # BLD AUTO: 1.82 10*3/MM3 (ref 3.4–10.8)

## 2021-05-07 PROCEDURE — 96360 HYDRATION IV INFUSION INIT: CPT

## 2021-05-07 PROCEDURE — 25010000002 FULVESTRANT PER 25 MG: Performed by: INTERNAL MEDICINE

## 2021-05-07 PROCEDURE — 36415 COLL VENOUS BLD VENIPUNCTURE: CPT

## 2021-05-07 PROCEDURE — 82607 VITAMIN B-12: CPT | Performed by: NURSE PRACTITIONER

## 2021-05-07 PROCEDURE — 82728 ASSAY OF FERRITIN: CPT | Performed by: NURSE PRACTITIONER

## 2021-05-07 PROCEDURE — 99215 OFFICE O/P EST HI 40 MIN: CPT | Performed by: NURSE PRACTITIONER

## 2021-05-07 PROCEDURE — 85025 COMPLETE CBC W/AUTO DIFF WBC: CPT

## 2021-05-07 PROCEDURE — 84466 ASSAY OF TRANSFERRIN: CPT | Performed by: NURSE PRACTITIONER

## 2021-05-07 PROCEDURE — 83540 ASSAY OF IRON: CPT | Performed by: NURSE PRACTITIONER

## 2021-05-07 PROCEDURE — 96402 CHEMO HORMON ANTINEOPL SQ/IM: CPT

## 2021-05-07 PROCEDURE — 80053 COMPREHEN METABOLIC PANEL: CPT

## 2021-05-07 RX ORDER — SODIUM CHLORIDE 9 MG/ML
1000 INJECTION, SOLUTION INTRAVENOUS ONCE
Status: COMPLETED | OUTPATIENT
Start: 2021-05-07 | End: 2021-05-07

## 2021-05-07 RX ADMIN — FULVESTRANT 500 MG: 250 INJECTION INTRAMUSCULAR at 15:06

## 2021-05-07 RX ADMIN — SODIUM CHLORIDE 1000 ML: 9 INJECTION, SOLUTION INTRAVENOUS at 15:40

## 2021-05-07 NOTE — TELEPHONE ENCOUNTER
CALLED PT WITH [facility]- SEE STAFF MSG    Please add this patient on for CBC, CMP, and IV fluids with review by nurse practitioner on Tuesday of next week.  Please change appointment on Wednesday of the following week to be with a nurse practitioner.  Possible fluids that day as well.

## 2021-05-07 NOTE — PROGRESS NOTES
Subjective     REASON FOR FOLLOW-UP:   1. Locally advanced right breast cancer ER [place] positive HER-2 positive  · Initiated Arimidex 9/11/2019  · Radiation therapy added, completing a total of 25 fractions as of 4/27/2020    2. Remote history of left breast cancer 1978 treated with mastectomy alone                               REQUESTING PHYSICIAN: MD Vladimir Cowart    History of Present Illness Ms. Burgos is a 80 y.o. female with above medical history who returns today for follow-up accompanied by her daughter.  Patient took her last dose of Ibrance yesterday after the first 2 weeks of being on the medication.  She has been also on Faslodex which she is due for today.  Patient reports in the first week of her Ibrance she had poor appetite and nausea.  Her daughter states she barely ate anything.  The week and thereafter however her appetite improved and she has having cravings for foods which she ate and enjoyed.  She did use ondansetron to control nausea.  She does report the first week she also had constipation which made her not want to eat which is since resolved with using Senokot-S.    Patient was having worsening shortness of breath and went in for thoracentesis on 5/4/2021 with removal of 1800 mL of fluid on the right side.  Thereafter, her shortness of breath improved.  She is sore in this area however.    She denies changes in urination though states she only urinates about twice daily which is normal for her.  She is had no bleeding, fevers, or new areas of pain.    Past Medical History:   Diagnosis Date   • Anemia     required prior blood transfusions   • Arthritis    • Cancer (CMS/HCC)     right breast cancer    • Clostridium difficile colitis    • Diabetes mellitus (CMS/HCC)    • Difficulty walking    • Diverticulitis    • Environmental allergies    • History of malignant neoplasm of breast 07/01/2013    denies any chemo or  radiation   • History of malignant neoplasm of skin 2013    possibly basal   • History of tobacco use 2013   • HL (hearing loss)    • Hyperlipidemia    • Hypertension    • Kidney disease    • Neuropathy in diabetes (CMS/[facility])    • Peripheral neuropathy    • Shingles    • Skin cancer    • Stroke (CMS/[facility]) 2019   • Vision loss       Patient is  3 para 3 with 2 miscarriages menarche was at age 9 menopause at age 49st childbirth was at age 24 she breast-fed for at least 6 months she did not take hormone replacement therapy because at age 38 she developed breast cancer in the left breast was treated at the [facility] with a modified radical mastectomy in  no radiation or hormonal blockade was given in details of the scans were not available at this time.    Family history is positive for father dying at age 84 with disseminated abdominal cancer of unknown type mother  at 77 of natural causes she has a brother with kidney cancer at age 65 who  of this and a sister with melanoma at age 30    Patient currently lives with her daughter does not smoke currently but smoked for 70 years 1 pack of cigarettes is not a drinker.  She uses a cane to walk because her balance is poor she has issues with swallowing from her stroke      Past Surgical History:   Procedure Laterality Date   • CATARACT EXTRACTION, BILATERAL     • EAR TUBES     • MASTECTOMY Bilateral     radical   • TUMOR REMOVAL Left     large lipoma removed from hip      HEME/ONC HISTORY:  patient is an 79-year-old female with multiple medical issues including hypertension diabetes hypercholesterolemia with a recent stroke in February of this year which is resulted in left-sided weakness and difficulty swallowing.  The patient noted changes in her breast over the last year and the last few months changes to the nipple specifically with developing lump in both the right breast and under the right arm.  She brought to the attention of  her family physician who sent her for imaging and biopsy of obvious tumor.  Mammogram revealed a large irregular mass measuring 6 cm with skin retraction nipple retraction and skin thickening.  At the 10 o'clock position there appeared to be some skin involvement.    She was also noted clinically to have large axillary lymph nodes.  Ultrasound was obtained revealing an irregular solid mass with indistinct margins measuring approximately 64 x 51 mm.  On ultrasound there were also some enlarged axillary lymph nodes measuring about 30 mm in greatest size.     A biopsy was recommended and has revealed a grade 2 invasive ductal cancer that is ER positive at 98%, MI positive at 82%, and HER-2 equivocal.  The Ki-67 is 20.81%.     Patient was then referred to Dr. Gonzalez who examined her and did not feel surgical excision was possible because there would be a large defect from her tumor and was wanting to consider neoadjuvant therapy in an effort to shrink the tumor.  The patient was presented at the multidisciplinary conference and because of her comorbidities her recent stroke and her overall general condition we did not think she was a good candidate for neoadjuvant chemotherapy and she was referred to us to discuss endocrine therapy.    In the interim additional testing on her tumor was done at [facility] because of the indeterminate HER-2 FISH and this revealed that there was 3+ HER-2 activity by IHC and therefore this is considered to be HER-2 positive  The patient had staging work-up at the recommendation of the multidisciplinary clinic And CT of the chest showed calcified granulomatous disease with mild AP window adenopathy which is increased from previous CAT scan in 2016 precarinal adenopathy also increased.  It showed the breast mass as well as right axillary adenopathy.  In the abdomen there was a left adrenal nodule which is increased in size from 1.4 to 1.8 cm felt to be an adenoma in addition there was a small  sclerotic lesion in the left iliac crest and metastasis could not be completely excluded although the bone scan showed no uptake in this area.  The bone scan did show a fracture and uptake in the left greater trochanter and uptake of the left superior pubic ramus that represents a superior ramus fracture of with no other signs of metastatic disease-patient did fall in July of this year but no obvious fractures were seen on x-ray.    Pt did not want chemotherapy-Patient initiating Arimidex therapy in mid September 2019.     the patient was hospitalized in late September, presenting with garbled speech and elevated blood pressure, felt to ultimately have had a TIA related to hypertensive urgency and small vessel disease.  Neurology recommended dual antiplatelet therapy for 3 months followed by Plavix alone.  Her antihypertensives were adjusted.  At that time she was also found to be B12 deficient with a level of 158 and iron deficient with an iron percent of 7%.  She was started on oral B12 and iron.    She states that the iron does cause some constipation but it is manageable with stool softeners at this time.  There was some discussion in the hospital record that if she could not tolerate oral iron IV iron should be considered.  Hemoglobin is improved up to 9.7 from discharge hemoglobin of 9.0.    2/20  he has not had a DEXA scan in many years and we repeated in February and it shows moderate osteopenia in the spine osteoporosis and left hip and severe osteopenia in the right hip.  I am not inclined to switch her to tamoxifen at this point and leave her room at acceptable see her back in 3 months and decide whether to add Prolia    Current Outpatient Medications on File Prior to Visit   Medication Sig Dispense Refill   • acetaminophen (TYLENOL) 500 MG tablet Take 500 mg by mouth Every 6 (Six) Hours As Needed for Mild Pain .     • amLODIPine (NORVASC) 10 MG tablet Take 1 tablet by mouth Daily. 90 tablet 3   •  cetirizine (zyrTEC) 10 MG tablet Take 10 mg by mouth Daily.     • cloNIDine (CATAPRES) 0.2 MG tablet TAKE 1 TABLET BY MOUTH EVERY 12 HOURS 180 tablet 0   • clopidogrel (PLAVIX) 75 MG tablet TAKE 1 TABLET BY MOUTH DAILY 90 tablet 3   • ferrous sulfate (IRON SUPPLEMENT) 325 (65 FE) MG tablet Take 1 tablet by mouth 2 (Two) Times a Day Before Meals. 60 tablet 0   • fluticasone (FLONASE) 50 MCG/ACT nasal spray 2 sprays into the nostril(s) as directed by provider Every Night.     • guaiFENesin (MUCINEX) 600 MG 12 hr tablet Take 600 mg by mouth 2 (Two) Times a Day As Needed for Cough.     • hydroCHLOROthiazide (MICROZIDE) 12.5 MG capsule Take 1 capsule by mouth Daily. 90 capsule 3   • loperamide (IMODIUM) 2 MG capsule Take 2 mg by mouth 4 (Four) Times a Day As Needed.     • metFORMIN (GLUCOPHAGE) 1000 MG tablet Take one tablet bid 180 tablet 1   • metoprolol succinate XL (TOPROL-XL) 50 MG 24 hr tablet TAKE 1 TABLET BY MOUTH DAILY 90 tablet 3   • ondansetron (ZOFRAN) 8 MG tablet Take 1 tablet by mouth Every 8 (Eight) Hours As Needed for Nausea or Vomiting. 30 tablet 5   • Palbociclib (Ibrance) 75 MG tablet tablet Take 1 tablet by mouth Daily. For 14 days on then 14 days off. 14 tablet 6   • pravastatin (PRAVACHOL) 20 MG tablet Take 1 tablet by mouth Every Night. 90 tablet 1   • vitamin B-12 (VITAMIN B-12) 2000 MCG tablet Take 1 tablet by mouth Daily. 30 tablet 0     No current facility-administered medications on file prior to visit.        ALLERGIES:    Allergies   Allergen Reactions   • Lisinopril Angioedema   • Lipitor [Atorvastatin] Unknown - High Severity     Leg weakness         Social History     Socioeconomic History   • Marital status:      Spouse name: Not on file   • Number of children: 3   • Years of education: High school   • Highest education level: Not on file   Tobacco Use   • Smoking status: Former Smoker     Packs/day: 0.25     Years: 10.00     Pack years: 2.50     Types: Cigarettes     Quit date:  "2019     Years since quittin.1   • Smokeless tobacco: Former User   • Tobacco comment: she quit for 8 years and started back about a year ago then quit again   Substance and Sexual Activity   • Alcohol use: No   • Drug use: No   • Sexual activity: Never        Family History   Problem Relation Age of Onset   • Stroke Mother 76   • Diabetes Mother    • Hypertension Mother    • Arthritis Mother    • Cancer Father         metastatic unknown cause   • Heart attack Father 70   • Colon cancer Father    • Lung cancer Father    • Cancer Brother         bladder   • Arthritis Sister    • Melanoma Sister    • Hypertension Daughter    • Diabetes Daughter         Review of Systems   Constitutional: Positive for fatigue.   HENT: Positive for hearing loss (same ).    Respiratory: Negative.    Cardiovascular: Negative.    Gastrointestinal: Negative for abdominal pain.   Genitourinary: Negative.    Musculoskeletal: Positive for arthralgias and gait problem. Negative for joint swelling.   Neurological: Positive for weakness (left side).   Hematological: Negative.    Psychiatric/Behavioral: Negative.    All other systems reviewed and are negative.  Review of systems 2021 unchanged from previous office visit except as updated.         Objective     Vitals:    21 1419   BP: 150/72  Comment: w/meds taken at 9am   Pulse: 89   Resp: 16   Temp: 97.5 °F (36.4 °C)   TempSrc: Skin   SpO2: 98%   Weight: 48.3 kg (106 lb 8 oz)   Height: 162.6 cm (64.02\")   PainSc: 0-No pain     Current Status 2021   ECOG score 0       Physical Exam   Pulmonary/Chest:           GENERAL:  Well-developed, well-nourished in no acute distress.   SKIN:  Warm, dry without rashes, purpura or petechiae.  EYES:  Pupils equal, round and reactive to light.  EOMs intact.  Left eye proptosis.  EARS:  Hearing intact.  NOSE:  Septum midline.  No excoriations or nasal discharge.  MOUTH:  Tongue is well-papillated; no stomatitis or ulcers.  Lips " normal.  THROAT:  Oropharynx without lesions or exudates.  NECK:  Supple with good range of motion; no thyromegaly or masses, no JVD.  LYMPHATICS:  No cervical, supraclavicular, axillary or inguinal adenopathy.  CHEST:  Lungs clear to auscultation decreased breath sounds right base [facility] up.  BREASTS: Left chest wall is benign; the right breast has a mobile soft tissue mass roughly 6 cm transverse x 5 cm and 2 subcutaneous nodules noted see picture above -No palpable adenopathy in the right axilla, neck or supraclavicular region.    CARDIAC:  Regular rate and rhythm without murmurs, rubs or gallops. Normal S1,S2.  ABDOMEN:  Soft, nontender.   EXTREMITIES:  No clubbing, cyanosis 2+lymphedema right arm  NEUROLOGICAL: Left-sided weakness.  PSYCHIATRIC:  Normal affect and mood.          RECENT LABS:  Lab Results   Component Value Date    WBC 1.82 (L) 05/07/2021    HGB 10.2 (L) 05/07/2021    HCT 30.5 (L) 05/07/2021    MCV 85.7 05/07/2021     (L) 05/07/2021     Lab Results   Component Value Date    GLUCOSE 130 (H) 05/07/2021    BUN 49 (H) 05/07/2021    CREATININE 2.21 (C) 05/07/2021    EGFRIFNONA 21 (L) 05/07/2021    EGFRIFAFRI 42 (L) 01/16/2017    BCR 22.2 05/07/2021    K 4.4 05/07/2021    CO2 18.6 (L) 05/07/2021    CALCIUM 9.0 05/07/2021    PROTENTOTREF 7.8 01/16/2017    ALBUMIN 3.90 05/07/2021    LABIL2 1.4 01/16/2017    AST 12 05/07/2021    ALT 7 05/07/2021       Results from last 7 days   Lab Units 05/07/21  1405   WBC 10*3/mm3 1.82*   NEUTROS ABS 10*3/mm3 0.98*   HEMOGLOBIN g/dL 10.2*   HEMATOCRIT % 30.5*   PLATELETS 10*3/mm3 100*     Results from last 7 days   Lab Units 05/07/21  1405   SODIUM mmol/L 142   POTASSIUM mmol/L 4.4   CHLORIDE mmol/L 110*   CO2 mmol/L 18.6*   BUN mg/dL 49*   CREATININE mg/dL 2.21*   CALCIUM mg/dL 9.0   ALBUMIN g/dL 3.90   BILIRUBIN mg/dL 0.3   ALK PHOS U/L 84   ALT (SGPT) U/L 7   AST (SGOT) U/L 12   GLUCOSE mg/dL 130*   FERRITIN ng/mL 156.60*   IRON mcg/dL 42   TIBC mcg/dL 288            Results from last 7 days   Lab Units 05/07/21  1405   SODIUM mmol/L 142   POTASSIUM mmol/L 4.4   CHLORIDE mmol/L 110*   CO2 mmol/L 18.6*   BUN mg/dL 49*   CREATININE mg/dL 2.21*   CALCIUM mg/dL 9.0   ALBUMIN g/dL 3.90   BILIRUBIN mg/dL 0.3   ALK PHOS U/L 84   ALT (SGPT) U/L 7   AST (SGOT) U/L 12   GLUCOSE mg/dL 130*   FERRITIN ng/mL 156.60*   IRON mcg/dL 42   TIBC mcg/dL 288                               MRI BRAIN     IMPRESSION:  Old right basal ganglia infarct. No acute abnormality  identified. No metastatic disease is identified at the brain or the  head.    Bone scan  IMPRESSION:  Abnormal uptake left greater trochanter where there is a  fracture demonstrated with CT (patient has a history of a fall and there  is no CT evidence that this is a pathologic fracture). There is also  abnormal uptake at the left superior pubic ramus that most likely  represents a superior ramus fracture though a fracture is not evident  with CT. This uptake left superior pubic ramus is technically  indeterminate though there is overall no convincing evidence for bony  metastatic disease. Follow-up CT could be obtained.     This report was finalized on 8/23/2019 1:    CT chest abdomen pelvis  IMPRESSION CHEST CT:    1. A 2.2 cm right breast mass laterally presumably related to the  patient's neoplasm.  2. Right axillary mediastinal adenopathy and metastatic disease cannot  be excluded. PET/CT may be helpful.     IMPRESSION ABDOMEN & PELVIS CT:    1. Low-density renal lesions as discussed, favor cysts.  2. Enlarging left adrenal nodule, likely adenoma.  3. Extensive diverticulosis.  4. A 5 mm sclerotic left iliac lesion as discussed.    BONE MINERAL DENSITOMETRY  IMPRESSION:  Osteoporosis at the left hip. Interval decrease in bone  density.     This report was finalized on 2/14/2020 12:10 PM by Dr. Osmin Dodd M.D.        Assessment/Plan   1.  Locally advanced right breast cancer strongly ER [place] positive with indeterminate  HER-2 with repeat testing showing 3+ IHC consistent with positive result  · Staging work-up probably negative although indeterminate bone lesions without obvious fractures on bone scan  · Abnormal mediastinal adenopathy which is been present since 2016 likely granulomatous disease  · Enlarging left adrenal mass felt to be an adenoma present since 2015  · History of left breast cancer 1978 at age 38 treated with modified radical mastectomy alone? Pathology  · [place] to be a poor candidate for surgery or chemotherapy.PT DECLINED CHEMO  · Initiated Arimidex around 9/11/2019.  Radiation may be a possibility for additional therapy if she does not get a good response from Arimidex  · Radiation added in 3/20  · Patient is reviewed back today, 11/17/2020, tolerating Arimidex well.  She does feel that the right breast mass is slightly larger though still overall improved compared to when she initially presented.    · Patient seen on 2/18/2021 with new cutaneous nodules restaging and switch to Faslodex planned  · Right pleural effusion positive cytology for metastatic breast cancer ER/ID positive HER-2 FISH negative we will add Ibrance to the Faslodex 75 mg a day 2 weeks on 2 weeks off and escalate as tolerated.  · Patient did have thoracentesis on 5/4/2021 with removal of 1800 mL of fluid on the right side.  She has had improvement in her shortness of breath since that time.  · Patient returns today, day 15 of her first cycle of Ibrance 75 mg daily for 2 weeks on, 2 weeks off.  She did have nausea with the first week and decreased appetite.  She did have some boost at home but only did this twice and have encouraged her to drink a couple a day if she is having periods of decreased appetite.  Her nausea was improved with ondansetron.  After about 5 days or so her appetite improved and she started eating more and having some strange cravings.  She also is finding herself to be more short of breath and went on to have  thoracentesis as discussed above on 5/4/2021 with her shortness of breath improved thereafter.  She is due for Faslodex today and we will proceed.  We will have her return in 2 weeks, just prior to needing to restart Ibrance to make sure her counts have recovered.  Did discuss neutropenic precautions today.    2.  Granulomatous mediastinal lymph nodes chronic    3.  Recent stroke in 2/19 with left hemiparesis and dysphagia  · Patient hospitalized again 9/26 and 9/29/2019 presenting with garbled speech and elevated blood pressure, felt to have had a TIA.  Neurology recommended dual antiplatelet therapy for 3 months and then Plavix alone.    4.  Hypertension diabetes and hypercholesterolemia.  Patient currently off lisinopril per primary care due to angioedema.    5.  Anemia, multifactorial:  · Patient has stage III CKD.  · Patient also found during recent hospitalization to have low B12 and low iron percent.  Though she had previously received B12 injections she was started on oral B12.     · Patient continues on oral iron at this time with some constipation though tolerable with stool softeners.  Plan to continue to monitor her blood work and if develops intolerance to oral iron consider IV iron.  · Was recent iron studies 11/20/2019 include a ferritin of 41, iron saturation 12%.   · Hemoglobin today 10.2.  Will add ferritin, iron profile, and B12 level at next office visit.  She continues oral B12 2000 mcg daily.     6.  Lymphedema right arm better after radiation    7.  Osteoporosis left hip -osteopenia spine observe for now    8.  Neutropenia.  ANC today 0.98 we will continue to monitor.    9.   DNR confirmed on 4/9/2021 with her daughter in attendance    10.  Chronic kidney disease.  Patient has acute on chronic kidney injury today creatinine at 2.21, previously 1.43.  BUN elevated to 49.  Patient will receive 1 L normal saline today.  I will have her come back on Tuesday of next week for repeat labs, review by  myself, and possible IV fluids.  She will then be seen the week thereafter with the same plan.    11.  Nausea and poor appetite during week 1 of Ibrance.  This is discussed to follow-up today.  The patient did not call in at that time reporting the symptoms.  Her appetite improved the weekend following and she is since been eating and drinking well she reports.  Her weight is down 3 pounds total.  I have encouraged her to use boost supplements during times of poor appetite.  She does use ondansetron to control nausea.  We will continue to monitor this.    PLAN:  1. Faslodex today and continue monthly  2. Ibrance 75 mg 2 weeks on 2 weeks off, patient currently in beginning of off 2 weeks  3. 1 L normal saline today.  4. Return on Tuesday of next week for CBC, stat CMP reviewed by myself and possible IV fluids.  5. Return to office on 5/19/2021 with repeat labs, review by nurse practitioner, and possible IV fluids.  6. Return in 4 weeks for review by Dr. Marcus, repeat labs, and Faslodex.      Patient discussed with Dr. Marcus.  Patient on high risk medication requiring frequent monitoring with severe side effects of treatment.

## 2021-05-11 ENCOUNTER — APPOINTMENT (OUTPATIENT)
Dept: ONCOLOGY | Facility: HOSPITAL | Age: 81
End: 2021-05-11

## 2021-05-11 ENCOUNTER — LAB (OUTPATIENT)
Dept: LAB | Facility: HOSPITAL | Age: 81
End: 2021-05-11

## 2021-05-11 ENCOUNTER — OFFICE VISIT (OUTPATIENT)
Dept: ONCOLOGY | Facility: CLINIC | Age: 81
End: 2021-05-11

## 2021-05-11 ENCOUNTER — HOSPITAL ENCOUNTER (OUTPATIENT)
Dept: ULTRASOUND IMAGING | Facility: HOSPITAL | Age: 81
Discharge: HOME | End: 2021-05-11

## 2021-05-11 ENCOUNTER — HOSPITAL ENCOUNTER (OUTPATIENT)
Dept: CT IMAGING | Facility: HOSPITAL | Age: 81
Discharge: HOME | End: 2021-05-11

## 2021-05-11 VITALS
BODY MASS INDEX: 17.84 KG/M2 | WEIGHT: 104.5 LBS | HEART RATE: 69 BPM | HEIGHT: 64 IN | RESPIRATION RATE: 16 BRPM | TEMPERATURE: 97.3 F | OXYGEN SATURATION: 93 % | SYSTOLIC BLOOD PRESSURE: 131 MMHG | DIASTOLIC BLOOD PRESSURE: 76 MMHG

## 2021-05-11 DIAGNOSIS — N17.9 ACUTE RENAL FAILURE SUPERIMPOSED ON STAGE 3 CHRONIC KIDNEY DISEASE, UNSPECIFIED ACUTE RENAL FAILURE TYPE, UNSPECIFIED WHETHER STAGE 3A OR 3B CKD (HCC): ICD-10-CM

## 2021-05-11 DIAGNOSIS — R10.11 RUQ PAIN: ICD-10-CM

## 2021-05-11 DIAGNOSIS — K83.8 BILIARY SLUDGE DETERMINED BY ULTRASOUND: ICD-10-CM

## 2021-05-11 DIAGNOSIS — C50.811 MALIGNANT NEOPLASM OF OVERLAPPING SITES OF RIGHT BREAST IN FEMALE, ESTROGEN RECEPTOR POSITIVE (HCC): ICD-10-CM

## 2021-05-11 DIAGNOSIS — Z17.0 MALIGNANT NEOPLASM OF OVERLAPPING SITES OF RIGHT BREAST IN FEMALE, ESTROGEN RECEPTOR POSITIVE (HCC): ICD-10-CM

## 2021-05-11 DIAGNOSIS — R14.0 ABDOMINAL DISTENTION: ICD-10-CM

## 2021-05-11 DIAGNOSIS — N18.30 ACUTE RENAL FAILURE SUPERIMPOSED ON STAGE 3 CHRONIC KIDNEY DISEASE, UNSPECIFIED ACUTE RENAL FAILURE TYPE, UNSPECIFIED WHETHER STAGE 3A OR 3B CKD (HCC): ICD-10-CM

## 2021-05-11 DIAGNOSIS — R63.4 WEIGHT LOSS, UNINTENTIONAL: ICD-10-CM

## 2021-05-11 DIAGNOSIS — C50.811 MALIGNANT NEOPLASM OF OVERLAPPING SITES OF RIGHT BREAST IN FEMALE, ESTROGEN RECEPTOR POSITIVE (HCC): Primary | ICD-10-CM

## 2021-05-11 DIAGNOSIS — Z17.0 MALIGNANT NEOPLASM OF OVERLAPPING SITES OF RIGHT BREAST IN FEMALE, ESTROGEN RECEPTOR POSITIVE (HCC): Primary | ICD-10-CM

## 2021-05-11 DIAGNOSIS — R10.31 RLQ ABDOMINAL PAIN: ICD-10-CM

## 2021-05-11 DIAGNOSIS — D69.6 THROMBOCYTOPENIA (HCC): ICD-10-CM

## 2021-05-11 PROCEDURE — 74176 CT ABD & PELVIS W/O CONTRAST: CPT

## 2021-05-11 PROCEDURE — 80053 COMPREHEN METABOLIC PANEL: CPT

## 2021-05-11 PROCEDURE — 76705 ECHO EXAM OF ABDOMEN: CPT

## 2021-05-11 PROCEDURE — 85025 COMPLETE CBC W/AUTO DIFF WBC: CPT

## 2021-05-11 PROCEDURE — 36415 COLL VENOUS BLD VENIPUNCTURE: CPT

## 2021-05-11 PROCEDURE — 99215 OFFICE O/P EST HI 40 MIN: CPT | Performed by: NURSE PRACTITIONER

## 2021-05-11 RX ORDER — LEVOFLOXACIN 250 MG/1
TABLET ORAL
Qty: 5 TABLET | Refills: 0 | Status: SHIPPED | OUTPATIENT
Start: 2021-05-11 | End: 2021-05-20

## 2021-05-11 NOTE — PROGRESS NOTES
Subjective     REASON FOR FOLLOW-UP:   1. Locally advanced right breast cancer ER MO positive HER-2 positive  · Initiated Arimidex 9/11/2019  · Radiation therapy added, completing a total of 25 fractions as of 4/27/2020    2. Remote history of left breast cancer 1978 treated with mastectomy alone                               REQUESTING PHYSICIAN: MD Vladimir Cowart    History of Present Illness Ms. Burgos is a 80 y.o. female with above medical history who returns today for follow-up accompanied by her daughter.  [facility]  She was found last week to have elevated creatinine up to 2.21 from previous 1.43 with a BUN of forty-nine.  She was given a liter of normal saline while in the office.  She is brought back today for recheck of labs and possible fluids.    The patient states she has had a reasonable appetite and has increased her fluid intake.  She denies nausea, vomiting, or diarrhea.  She reports decreased frequency of bowel movements but does not strain with her bowel movements.  These are approximately every other day.  Her stool is dark and tarry but she is on oral iron and this is been stable.  She denies any other known bleeding.  She has not had any fevers.  She has noticed some discomfort in her right upper quadrant for the past couple of weeks she reports, however she was not tender on exam last week.  She denies fevers or bleeding.  She has had some increased shortness of breath and is wondering if the fluid is building back up in her right lung.    Past Medical History:   Diagnosis Date   • Anemia     required prior blood transfusions   • Arthritis    • Cancer (CMS/HCC)     right breast cancer    • Clostridium difficile colitis    • Diabetes mellitus (CMS/HCC)    • Difficulty walking    • Diverticulitis    • Environmental allergies    • History of malignant neoplasm of breast  2013    denies any chemo or radiation   • History of malignant neoplasm of skin 2013    possibly basal   • History of tobacco use 2013   • HL (hearing loss)    • Hyperlipidemia    • Hypertension    • Kidney disease    • Neuropathy in diabetes (CMS/[facility])    • Peripheral neuropathy    • Shingles    • Skin cancer    • Stroke (CMS/[facility]) 2019   • Vision loss       Patient is  3 para 3 with 2 miscarriages menarche was at age 9 menopause at age 49st childbirth was at age 24 she breast-fed for at least 6 months she did not take hormone replacement therapy because at age 38 she developed breast cancer in the left breast was treated at the [facility] with a modified radical mastectomy in  no radiation or hormonal blockade was given in details of the scans were not available at this time.    Family history is positive for father dying at age 84 with disseminated abdominal cancer of unknown type mother  at 77 of natural causes she has a brother with kidney cancer at age 65 who  of this and a sister with melanoma at age 30    Patient currently lives with her daughter does not smoke currently but smoked for 70 years 1 pack of cigarettes is not a drinker.  She uses a cane to walk because her balance is poor she has issues with swallowing from her stroke      Past Surgical History:   Procedure Laterality Date   • CATARACT EXTRACTION, BILATERAL     • EAR TUBES     • MASTECTOMY Bilateral     radical   • TUMOR REMOVAL Left     large lipoma removed from hip      HEME/ONC HISTORY:  patient is an 79-year-old female with multiple medical issues including hypertension diabetes hypercholesterolemia with a recent stroke in February of this year which is resulted in left-sided weakness and difficulty swallowing.  The patient noted changes in her breast over the last year and the last few months changes to the nipple specifically with developing lump in both the right breast and under the right arm.   She brought to the attention of her family physician who sent her for imaging and biopsy of obvious tumor.  Mammogram revealed a large irregular mass measuring 6 cm with skin retraction nipple retraction and skin thickening.  At the 10 o'clock position there appeared to be some skin involvement.    She was also noted clinically to have large axillary lymph nodes.  Ultrasound was obtained revealing an irregular solid mass with indistinct margins measuring approximately 64 x 51 mm.  On ultrasound there were also some enlarged axillary lymph nodes measuring about 30 mm in greatest size.     A biopsy was recommended and has revealed a grade 2 invasive ductal cancer that is ER positive at 98%, HI positive at 82%, and HER-2 equivocal.  The Ki-67 is 20.81%.     Patient was then referred to Dr. Gonzalez who examined her and did not feel surgical excision was possible because there would be a large defect from her tumor and was wanting to consider neoadjuvant therapy in an effort to shrink the tumor.  The patient was presented at the multidisciplinary conference and because of her comorbidities her recent stroke and her overall general condition we did not think she was a good candidate for neoadjuvant chemotherapy and she was referred to us to discuss endocrine therapy.    In the interim additional testing on her tumor was done at [facility] because of the indeterminate HER-2 FISH and this revealed that there was 3+ HER-2 activity by IHC and therefore this is considered to be HER-2 positive  The patient had staging work-up at the recommendation of the multidisciplinary clinic And CT of the chest showed calcified granulomatous disease with mild AP window adenopathy which is increased from previous CAT scan in 2016 precarinal adenopathy also increased.  It showed the breast mass as well as right axillary adenopathy.  In the abdomen there was a left adrenal nodule which is increased in size from 1.4 to 1.8 cm felt to be an  adenoma in addition there was a small sclerotic lesion in the left iliac crest and metastasis could not be completely excluded although the bone scan showed no uptake in this area.  The bone scan did show a fracture and uptake in the left greater trochanter and uptake of the left superior pubic ramus that represents a superior ramus fracture of with no other signs of metastatic disease-patient did fall in July of this year but no obvious fractures were seen on x-ray.    Pt did not want chemotherapy-Patient initiating Arimidex therapy in mid September 2019.     the patient was hospitalized in late September, presenting with garbled speech and elevated blood pressure, felt to ultimately have had a TIA related to hypertensive urgency and small vessel disease.  Neurology recommended dual antiplatelet therapy for 3 months followed by Plavix alone.  Her antihypertensives were adjusted.  At that time she was also found to be B12 deficient with a level of 158 and iron deficient with an iron percent of 7%.  She was started on oral B12 and iron.    She states that the iron does cause some constipation but it is manageable with stool softeners at this time.  There was some discussion in the hospital record that if she could not tolerate oral iron IV iron should be considered.  Hemoglobin is improved up to 9.7 from discharge hemoglobin of 9.0.    2/20  he has not had a DEXA scan in many years and we repeated in February and it shows moderate osteopenia in the spine osteoporosis and left hip and severe osteopenia in the right hip.  I am not inclined to switch her to tamoxifen at this point and leave her room at acceptable see her back in 3 months and decide whether to add Prolia    Current Outpatient Medications on File Prior to Visit   Medication Sig Dispense Refill   • acetaminophen (TYLENOL) 500 MG tablet Take 500 mg by mouth Every 6 (Six) Hours As Needed for Mild Pain .     • amLODIPine (NORVASC) 10 MG tablet Take 1 tablet  by mouth Daily. 90 tablet 3   • cetirizine (zyrTEC) 10 MG tablet Take 10 mg by mouth Daily.     • cloNIDine (CATAPRES) 0.2 MG tablet TAKE 1 TABLET BY MOUTH EVERY 12 HOURS 180 tablet 0   • clopidogrel (PLAVIX) 75 MG tablet TAKE 1 TABLET BY MOUTH DAILY 90 tablet 3   • ferrous sulfate (IRON SUPPLEMENT) 325 (65 FE) MG tablet Take 1 tablet by mouth 2 (Two) Times a Day Before Meals. 60 tablet 0   • fluticasone (FLONASE) 50 MCG/ACT nasal spray 2 sprays into the nostril(s) as directed by provider Every Night.     • guaiFENesin (MUCINEX) 600 MG 12 hr tablet Take 600 mg by mouth 2 (Two) Times a Day As Needed for Cough.     • hydroCHLOROthiazide (MICROZIDE) 12.5 MG capsule Take 1 capsule by mouth Daily. 90 capsule 3   • loperamide (IMODIUM) 2 MG capsule Take 2 mg by mouth 4 (Four) Times a Day As Needed.     • metFORMIN (GLUCOPHAGE) 1000 MG tablet Take one tablet bid 180 tablet 1   • metoprolol succinate XL (TOPROL-XL) 50 MG 24 hr tablet TAKE 1 TABLET BY MOUTH DAILY 90 tablet 3   • ondansetron (ZOFRAN) 8 MG tablet Take 1 tablet by mouth Every 8 (Eight) Hours As Needed for Nausea or Vomiting. 30 tablet 5   • Palbociclib (Ibrance) 75 MG tablet tablet Take 1 tablet by mouth Daily. For 14 days on then 14 days off. 14 tablet 6   • pravastatin (PRAVACHOL) 20 MG tablet Take 1 tablet by mouth Every Night. 90 tablet 1   • vitamin B-12 (VITAMIN B-12) 2000 MCG tablet Take 1 tablet by mouth Daily. 30 tablet 0     No current facility-administered medications on file prior to visit.        ALLERGIES:    Allergies   Allergen Reactions   • Lisinopril Angioedema   • Lipitor [Atorvastatin] Unknown - High Severity     Leg weakness         Social History     Socioeconomic History   • Marital status:      Spouse name: Not on file   • Number of children: 3   • Years of education: High school   • Highest education level: Not on file   Tobacco Use   • Smoking status: Former Smoker     Packs/day: 0.25     Years: 10.00     Pack years: 2.50      "Types: Cigarettes     Quit date: 2019     Years since quittin.2   • Smokeless tobacco: Former User   • Tobacco comment: she quit for 8 years and started back about a year ago then quit again   Substance and Sexual Activity   • Alcohol use: No   • Drug use: No   • Sexual activity: Never        Family History   Problem Relation Age of Onset   • Stroke Mother 76   • Diabetes Mother    • Hypertension Mother    • Arthritis Mother    • Cancer Father         metastatic unknown cause   • Heart attack Father 70   • Colon cancer Father    • Lung cancer Father    • Cancer Brother         bladder   • Arthritis Sister    • Melanoma Sister    • Hypertension Daughter    • Diabetes Daughter         Review of Systems   Constitutional: Positive for fatigue.   HENT: Positive for hearing loss (same ).    Respiratory: Negative.    Cardiovascular: Negative.    Gastrointestinal: Positive for abdominal distention, abdominal pain and constipation. Negative for blood in stool, nausea and vomiting.   Genitourinary: Negative.    Musculoskeletal: Positive for arthralgias and gait problem. Negative for joint swelling.   Neurological: Positive for weakness (left side).   Hematological: Negative.    Psychiatric/Behavioral: Negative.    All other systems reviewed and are negative.  Review of systems 2021 unchanged from previous office visit except as updated.         Objective     Vitals:    21 0934   BP: 131/76   Pulse: 69   Resp: 16   Temp: 97.3 °F (36.3 °C)   TempSrc: Temporal   SpO2: 93%   Weight: 47.4 kg (104 lb 8 oz)   Height: 162.6 cm (64.02\")   PainSc: 0-No pain     Current Status 2021   ECOG score 1       Physical Exam   Pulmonary/Chest:           GENERAL:  Well-developed, well-nourished in no acute distress.   SKIN:  Warm, dry without rashes, purpura or petechiae.  EYES:  Pupils equal, round and reactive to light.  EOMs intact.  Left eye proptosis.  EARS:  Hearing intact.  NOSE:  Septum midline.  No excoriations " or nasal discharge.  MOUTH:  Tongue is well-papillated; no stomatitis or ulcers.  Lips normal.  THROAT:  Oropharynx without lesions or exudates.  NECK:  Supple with good range of motion; no thyromegaly or masses, no JVD.  LYMPHATICS:  No cervical, supraclavicular adenopathy.  CHEST:  Lungs clear to auscultation decreased breath sounds right base FDC up.  BREASTS: Left chest wall is benign; the right breast has a mobile soft tissue mass roughly 6 cm transverse x 5 cm and 2 subcutaneous nodules noted see picture above -No palpable adenopathy in the right axilla, neck or supraclavicular region.  Not examined today  CARDIAC:  Regular rate and rhythm without murmurs, rubs or gallops. Normal S1,S2.  ABDOMEN:  Distended, tender to palpation RUQ and RLQ  EXTREMITIES:  No clubbing, cyanosis 2+lymphedema right arm  NEUROLOGICAL: Left-sided weakness.  PSYCHIATRIC:  Normal affect and mood.          RECENT LABS:  Lab Results   Component Value Date    WBC 1.82 (L) 05/07/2021    HGB 10.2 (L) 05/07/2021    HCT 30.5 (L) 05/07/2021    MCV 85.7 05/07/2021     (L) 05/07/2021     Lab Results   Component Value Date    GLUCOSE 130 (H) 05/07/2021    BUN 49 (H) 05/07/2021    CREATININE 2.21 (C) 05/07/2021    EGFRIFNONA 21 (L) 05/07/2021    EGFRIFAFRI 42 (L) 01/16/2017    BCR 22.2 05/07/2021    K 4.4 05/07/2021    CO2 18.6 (L) 05/07/2021    CALCIUM 9.0 05/07/2021    PROTENTOTREF 7.8 01/16/2017    ALBUMIN 3.90 05/07/2021    LABIL2 1.4 01/16/2017    AST 12 05/07/2021    ALT 7 05/07/2021       Results from last 7 days   Lab Units 05/07/21  1405   WBC 10*3/mm3 1.82*   NEUTROS ABS 10*3/mm3 0.98*   HEMOGLOBIN g/dL 10.2*   HEMATOCRIT % 30.5*   PLATELETS 10*3/mm3 100*     Results from last 7 days   Lab Units 05/07/21  1405   SODIUM mmol/L 142   POTASSIUM mmol/L 4.4   CHLORIDE mmol/L 110*   CO2 mmol/L 18.6*   BUN mg/dL 49*   CREATININE mg/dL 2.21*   CALCIUM mg/dL 9.0   ALBUMIN g/dL 3.90   BILIRUBIN mg/dL 0.3   ALK PHOS U/L 84   ALT (SGPT)  U/L 7   AST (SGOT) U/L 12   GLUCOSE mg/dL 130*   FERRITIN ng/mL 156.60*   IRON mcg/dL 42   TIBC mcg/dL 288           Results from last 7 days   Lab Units 05/07/21  1405   SODIUM mmol/L 142   POTASSIUM mmol/L 4.4   CHLORIDE mmol/L 110*   CO2 mmol/L 18.6*   BUN mg/dL 49*   CREATININE mg/dL 2.21*   CALCIUM mg/dL 9.0   ALBUMIN g/dL 3.90   BILIRUBIN mg/dL 0.3   ALK PHOS U/L 84   ALT (SGPT) U/L 7   AST (SGOT) U/L 12   GLUCOSE mg/dL 130*   FERRITIN ng/mL 156.60*   IRON mcg/dL 42   TIBC mcg/dL 288                               MRI BRAIN     IMPRESSION:  Old right basal ganglia infarct. No acute abnormality  identified. No metastatic disease is identified at the brain or the  head.    Bone scan  IMPRESSION:  Abnormal uptake left greater trochanter where there is a  fracture demonstrated with CT (patient has a history of a fall and there  is no CT evidence that this is a pathologic fracture). There is also  abnormal uptake at the left superior pubic ramus that most likely  represents a superior ramus fracture though a fracture is not evident  with CT. This uptake left superior pubic ramus is technically  indeterminate though there is overall no convincing evidence for bony  metastatic disease. Follow-up CT could be obtained.     This report was finalized on 8/23/2019 1:    CT chest abdomen pelvis  IMPRESSION CHEST CT:    1. A 2.2 cm right breast mass laterally presumably related to the  patient's neoplasm.  2. Right axillary mediastinal adenopathy and metastatic disease cannot  be excluded. PET/CT may be helpful.     IMPRESSION ABDOMEN & PELVIS CT:    1. Low-density renal lesions as discussed, favor cysts.  2. Enlarging left adrenal nodule, likely adenoma.  3. Extensive diverticulosis.  4. A 5 mm sclerotic left iliac lesion as discussed.    BONE MINERAL DENSITOMETRY  IMPRESSION:  Osteoporosis at the left hip. Interval decrease in bone  density.     This report was finalized on 2/14/2020 12:10 PM by Dr. Solorio  LEESA Dodd        Assessment/Plan   1.  Locally advanced right breast cancer strongly ER TN positive with indeterminate HER-2 with repeat testing showing 3+ IHC consistent with positive result  · Staging work-up probably negative although indeterminate bone lesions without obvious fractures on bone scan  · Abnormal mediastinal adenopathy which is been present since 2016 likely granulomatous disease  · Enlarging left adrenal mass felt to be an adenoma present since 2015  · History of left breast cancer 1978 at age 38 treated with modified radical mastectomy alone? Pathology  · [place] to be a poor candidate for surgery or chemotherapy.PT DECLINED CHEMO  · Initiated Arimidex around 9/11/2019.  Radiation may be a possibility for additional therapy if she does not get a good response from Arimidex  · Radiation added in 3/20  · Patient is reviewed back today, 11/17/2020, tolerating Arimidex well.  She does feel that the right breast mass is slightly larger though still overall improved compared to when she initially presented.    · Patient seen on 2/18/2021 with new cutaneous nodules restaging and switch to Faslodex planned  · Right pleural effusion positive cytology for metastatic breast cancer ER/TN positive HER-2 FISH negative we will add Ibrance to the Faslodex 75 mg a day 2 weeks on 2 weeks off and escalate as tolerated.  · Patient did have thoracentesis on 5/4/2021 with removal of 1800 mL of fluid on the right side.  She has had improvement in her shortness of breath since that time.  · Patient seen for day 15 of her first cycle of Ibrance 75 mg daily for 2 weeks on, 2 weeks off.  He was also given Faslodex that day.  She did have nausea with the first week and decreased appetite.  Her nausea was improved with ondansetron.  After about 5 days or so her appetite improved and she started eating more and having some strange cravings.  She also is finding herself to be more short of breath and went on to have  thoracentesis as discussed above on 5/4/2021 with her shortness of breath improved thereafter.   The patient was leaving her CMP resulted showing an increased creatinine and the patient was added on for fluids prior to leaving.  · Patient seen May 11, 2021 in short interval follow-up due to elevated creatinine last week.  She was given IV fluids on the day that she was seen and encouraged to push fluids at home which she states she has been doing.  She has however lost 2 pounds down today.  States her appetite is sufficient.  She denies fevers or known bleeding.  She has continuous dark stools due to being on oral iron.  Patient does report some increased shortness of breath and is concerned that her pleural effusion may be worsening once again.  We will continue to monitor this as she just had a thoracentesis last week.  She also reports abdominal pain over the past couple of weeks.  She was noted on exam last week however is very tender on exam today and noted to have distention in her abdomen.  This is reviewed with Dr. Marcus who also comes in to see the patient and agrees.  She will be sent for a stat CT abdomen pelvis as well as an ultrasound of the gallbladder.    2.  Granulomatous mediastinal lymph nodes chronic    3.  Recent stroke in 2/19 with left hemiparesis and dysphagia  · Patient hospitalized again 9/26 and 9/29/2019 presenting with garbled speech and elevated blood pressure, felt to have had a TIA.  Neurology recommended dual antiplatelet therapy for 3 months and then Plavix alone.    4.  Hypertension diabetes and hypercholesterolemia.  Patient currently off lisinopril per primary care due to angioedema.    5.  Anemia, multifactorial:  · Patient has stage III CKD.  · Patient also found during recent hospitalization to have low B12 and low iron percent.  Though she had previously received B12 injections she was started on oral B12.     · Patient continues on oral iron at this time with some  constipation though tolerable with stool softeners.  Plan to continue to monitor her blood work and if develops intolerance to oral iron consider IV iron.  · Was recent iron studies 11/20/2019 include a ferritin of 41, iron saturation 12%.   · 5/7/2021 ferritin and iron panel obtained with ferritin 156, iron saturation 50%, TIBC 288.  B12 502 with patient continuing oral B12.  · Hemoglobin today has declined to 8 as discussed with Dr. Marcus.    6.  Lymphedema right arm better after radiation    7.  Osteoporosis left hip -osteopenia spine observe for now    8.  Neutropenia.  ANC today has improved today to 1.24    9.   DNR confirmed on 4/9/2021 with her daughter in attendance    10.  Chronic kidney disease.    · Patient has acute on chronic kidney injury when seen 5/7/2021 creatinine at 2.21, previously 1.43.  BUN elevated to 49.  Patient will receive 1 L normal saline today.  · Creatinine today worsened at 2.31.  Patient will return to the office tomorrow for 500 mL of normal saline (1 L increase her shortness of breath) and again on Friday at which time we will repeat her CBC.  · We will consider referral to nephrology if no improvement in creatinine on repeat labs on Friday.  No hydronephrosis noted on CT scan from 5/11/2021.    11.  Nausea and poor appetite.  · Was noted during week 1 of Ibrance.  This is discussed to follow-up today.  The patient did not call in at that time reporting the symptoms.  Her appetite improved the weekend following and she is since been eating and drinking well she reports.  Her weight is down 3 pounds total.  I have encouraged her to use boost supplements during times of poor appetite.  She does use ondansetron to control nausea.  We will continue to monitor this.  · Patient returns on 5/11/2021 with further weight loss.  She is also reporting right upper quadrant pain which will be discussed below.    12.  Right upper quadrant pain  · Patient is seen on 5/11/2021 reporting right  upper quadrant pain times the past 2 weeks.  She did not mention this last week when seen it was unremarkable on exam.  However today, she is very tender on exam the point of jumping with light palpation.  She has noticed to also have abdominal distention.  Dr. Marcus came into the room to evaluate the patient as well was agreeable.  We did proceed with a stat CT abdomen pelvis as well as gallbladder ultrasound showing small stones and/or sludge in the gallbladder with pericholecystic fluid.  There is diverticulosis without evidence of diverticulitis.  Is reviewed with Dr. Marcus and we will cover the patient with Levaquin, renally dosed.  The patient will also be referred to Dr. Dolores Solis, surgery, for evaluation of possible cholecystitis however we are aware that options at the present are somewhat limited in the nonemergent situation secondary to her thrombocytopenia.  Perhaps at the point time she sees Dr. Solis her platelet count will have improved.    PLAN:  1. Stat CT abdomen today  2. Stat ultrasound gallbladder today  3. Monitor thrombocytopenia and need to hold Plavix.  Platelet count currently greater than 50,000 so may continue Plavix.  4. Levaquin 500 mg today followed by 250 mg every 48 hours thereafter dispense number 5 tablets refill x0  5. Patient to return tomorrow and Friday for 500 mL normal saline due to her elevated creatinine.  Will add a CBC and BMP on for Friday.  6. Referral to Dr. Dolores Solis, surgery for evaluation of possible cholecystitis.  7. Ibrance 75 mg 2 weeks on 2 weeks off, patient currently in first of her off 2 weeks, this may need to be held in the future.  8. Consider referral to nephrology if creatinine not further improved.  9. Return to office on 5/19/2021 with repeat labs, review by nurse practitioner, and possible IV fluids.  10. Return in 2 weeks for review by Dr. Marcus, repeat labs, and Faslodex.      Patient on high risk medication requiring frequent  monitoring with severe side effects of treatment.  She required stat imaging today due to a new issue.  Case discussed with Dr. Marcus, her treating oncologist, who also saw the patient in the exam room.  Stat ultrasound results discussed with ramesh

## 2021-05-12 ENCOUNTER — INFUSION (OUTPATIENT)
Dept: ONCOLOGY | Facility: HOSPITAL | Age: 81
End: 2021-05-12

## 2021-05-12 VITALS
HEART RATE: 75 BPM | SYSTOLIC BLOOD PRESSURE: 116 MMHG | BODY MASS INDEX: 17.75 KG/M2 | OXYGEN SATURATION: 96 % | WEIGHT: 104 LBS | TEMPERATURE: 97.3 F | HEIGHT: 64 IN | RESPIRATION RATE: 18 BRPM | DIASTOLIC BLOOD PRESSURE: 72 MMHG

## 2021-05-12 DIAGNOSIS — N18.31 STAGE 3A CHRONIC KIDNEY DISEASE (HCC): Chronic | ICD-10-CM

## 2021-05-12 DIAGNOSIS — N18.30 STAGE 3 CHRONIC KIDNEY DISEASE, UNSPECIFIED WHETHER STAGE 3A OR 3B CKD (HCC): Primary | ICD-10-CM

## 2021-05-12 DIAGNOSIS — Z17.0 MALIGNANT NEOPLASM OF OVERLAPPING SITES OF RIGHT BREAST IN FEMALE, ESTROGEN RECEPTOR POSITIVE (HCC): ICD-10-CM

## 2021-05-12 DIAGNOSIS — D50.9 IRON DEFICIENCY ANEMIA, UNSPECIFIED IRON DEFICIENCY ANEMIA TYPE: ICD-10-CM

## 2021-05-12 DIAGNOSIS — C50.811 MALIGNANT NEOPLASM OF OVERLAPPING SITES OF RIGHT BREAST IN FEMALE, ESTROGEN RECEPTOR POSITIVE (HCC): ICD-10-CM

## 2021-05-12 PROCEDURE — 96360 HYDRATION IV INFUSION INIT: CPT

## 2021-05-12 RX ORDER — SODIUM CHLORIDE 9 MG/ML
500 INJECTION, SOLUTION INTRAVENOUS ONCE
Status: COMPLETED | OUTPATIENT
Start: 2021-05-12 | End: 2021-05-12

## 2021-05-12 RX ADMIN — SODIUM CHLORIDE 500 ML: 9 INJECTION, SOLUTION INTRAVENOUS at 14:29

## 2021-05-14 ENCOUNTER — TRANSCRIBE ORDERS (OUTPATIENT)
Dept: ADMINISTRATIVE | Facility: HOSPITAL | Age: 81
End: 2021-05-14

## 2021-05-14 ENCOUNTER — INFUSION (OUTPATIENT)
Dept: ONCOLOGY | Facility: HOSPITAL | Age: 81
End: 2021-05-14

## 2021-05-14 VITALS
SYSTOLIC BLOOD PRESSURE: 136 MMHG | HEIGHT: 64 IN | WEIGHT: 105.8 LBS | DIASTOLIC BLOOD PRESSURE: 78 MMHG | HEART RATE: 71 BPM | TEMPERATURE: 97.5 F | BODY MASS INDEX: 18.06 KG/M2 | RESPIRATION RATE: 18 BRPM | OXYGEN SATURATION: 95 %

## 2021-05-14 DIAGNOSIS — Z17.0 MALIGNANT NEOPLASM OF OVERLAPPING SITES OF RIGHT BREAST IN FEMALE, ESTROGEN RECEPTOR POSITIVE (HCC): ICD-10-CM

## 2021-05-14 DIAGNOSIS — N17.9 ACUTE RENAL FAILURE SUPERIMPOSED ON STAGE 3 CHRONIC KIDNEY DISEASE, UNSPECIFIED ACUTE RENAL FAILURE TYPE, UNSPECIFIED WHETHER STAGE 3A OR 3B CKD (HCC): ICD-10-CM

## 2021-05-14 DIAGNOSIS — N18.30 ACUTE RENAL FAILURE SUPERIMPOSED ON STAGE 3 CHRONIC KIDNEY DISEASE, UNSPECIFIED ACUTE RENAL FAILURE TYPE, UNSPECIFIED WHETHER STAGE 3A OR 3B CKD (HCC): ICD-10-CM

## 2021-05-14 DIAGNOSIS — C50.811 MALIGNANT NEOPLASM OF OVERLAPPING SITES OF RIGHT BREAST IN FEMALE, ESTROGEN RECEPTOR POSITIVE (HCC): ICD-10-CM

## 2021-05-14 LAB
ANION GAP SERPL CALCULATED.3IONS-SCNC: 15.1 MMOL/L (ref 5–15)
BASOPHILS # BLD AUTO: 0.01 10*3/MM3 (ref 0–0.2)
BASOPHILS NFR BLD AUTO: 0.6 % (ref 0–1.5)
BUN SERPL-MCNC: 39 MG/DL (ref 8–23)
BUN/CREAT SERPL: 19.2 (ref 7–25)
CALCIUM SPEC-SCNC: 8.7 MG/DL (ref 8.6–10.5)
CHLORIDE SERPL-SCNC: 110 MMOL/L (ref 98–107)
CO2 SERPL-SCNC: 17.9 MMOL/L (ref 22–29)
CREAT SERPL-MCNC: 2.03 MG/DL (ref 0.57–1)
DEPRECATED RDW RBC AUTO: 50.3 FL (ref 37–54)
EOSINOPHIL # BLD AUTO: 0 10*3/MM3 (ref 0–0.4)
EOSINOPHIL NFR BLD AUTO: 0 % (ref 0.3–6.2)
ERYTHROCYTE [DISTWIDTH] IN BLOOD BY AUTOMATED COUNT: 16.7 % (ref 12.3–15.4)
GFR SERPL CREATININE-BSD FRML MDRD: 23 ML/MIN/1.73
GLUCOSE SERPL-MCNC: 144 MG/DL (ref 65–99)
HCT VFR BLD AUTO: 21.3 % (ref 34–46.6)
HGB BLD-MCNC: 6.8 G/DL (ref 12–15.9)
IMM GRANULOCYTES # BLD AUTO: 0.02 10*3/MM3 (ref 0–0.05)
IMM GRANULOCYTES NFR BLD AUTO: 1.2 % (ref 0–0.5)
LYMPHOCYTES # BLD AUTO: 0.33 10*3/MM3 (ref 0.7–3.1)
LYMPHOCYTES NFR BLD AUTO: 20.5 % (ref 19.6–45.3)
MCH RBC QN AUTO: 28 PG (ref 26.6–33)
MCHC RBC AUTO-ENTMCNC: 31.9 G/DL (ref 31.5–35.7)
MCV RBC AUTO: 87.7 FL (ref 79–97)
MONOCYTES # BLD AUTO: 0.18 10*3/MM3 (ref 0.1–0.9)
MONOCYTES NFR BLD AUTO: 11.2 % (ref 5–12)
NEUTROPHILS NFR BLD AUTO: 1.07 10*3/MM3 (ref 1.7–7)
NEUTROPHILS NFR BLD AUTO: 66.5 % (ref 42.7–76)
NRBC BLD AUTO-RTO: 0 /100 WBC (ref 0–0.2)
PLAT MORPH BLD: NORMAL
PLATELET # BLD AUTO: 69 10*3/MM3 (ref 140–450)
PMV BLD AUTO: 10.1 FL (ref 6–12)
POTASSIUM SERPL-SCNC: 4.2 MMOL/L (ref 3.5–5.2)
RBC # BLD AUTO: 2.43 10*6/MM3 (ref 3.77–5.28)
RBC MORPH BLD: NORMAL
SODIUM SERPL-SCNC: 143 MMOL/L (ref 136–145)
WBC # BLD AUTO: 1.61 10*3/MM3 (ref 3.4–10.8)
WBC MORPH BLD: NORMAL

## 2021-05-14 PROCEDURE — 96360 HYDRATION IV INFUSION INIT: CPT

## 2021-05-14 PROCEDURE — 86923 COMPATIBILITY TEST ELECTRIC: CPT

## 2021-05-14 PROCEDURE — 86900 BLOOD TYPING SEROLOGIC ABO: CPT | Performed by: INTERNAL MEDICINE

## 2021-05-14 PROCEDURE — 85025 COMPLETE CBC W/AUTO DIFF WBC: CPT | Performed by: NURSE PRACTITIONER

## 2021-05-14 PROCEDURE — 80048 BASIC METABOLIC PNL TOTAL CA: CPT | Performed by: NURSE PRACTITIONER

## 2021-05-14 PROCEDURE — 85007 BL SMEAR W/DIFF WBC COUNT: CPT | Performed by: NURSE PRACTITIONER

## 2021-05-14 PROCEDURE — 86901 BLOOD TYPING SEROLOGIC RH(D): CPT | Performed by: INTERNAL MEDICINE

## 2021-05-14 PROCEDURE — 86850 RBC ANTIBODY SCREEN: CPT | Performed by: INTERNAL MEDICINE

## 2021-05-14 RX ORDER — SODIUM CHLORIDE 9 MG/ML
250 INJECTION, SOLUTION INTRAVENOUS AS NEEDED
Status: CANCELLED | OUTPATIENT
Start: 2021-05-15

## 2021-05-14 RX ORDER — SODIUM CHLORIDE 9 MG/ML
500 INJECTION, SOLUTION INTRAVENOUS ONCE
Status: COMPLETED | OUTPATIENT
Start: 2021-05-14 | End: 2021-05-14

## 2021-05-14 RX ORDER — ACETAMINOPHEN 325 MG/1
650 TABLET ORAL ONCE
Status: CANCELLED | OUTPATIENT
Start: 2021-05-15 | End: 2021-05-14

## 2021-05-14 RX ORDER — FUROSEMIDE 10 MG/ML
20 INJECTION INTRAMUSCULAR; INTRAVENOUS ONCE
Status: CANCELLED | OUTPATIENT
Start: 2021-05-15 | End: 2021-05-14

## 2021-05-14 RX ADMIN — SODIUM CHLORIDE 500 ML: 900 INJECTION, SOLUTION INTRAVENOUS at 14:30

## 2021-05-15 ENCOUNTER — APPOINTMENT (OUTPATIENT)
Dept: ONCOLOGY | Facility: HOSPITAL | Age: 81
End: 2021-05-15

## 2021-05-15 ENCOUNTER — INFUSION (OUTPATIENT)
Dept: ONCOLOGY | Facility: HOSPITAL | Age: 81
End: 2021-05-15

## 2021-05-15 VITALS
SYSTOLIC BLOOD PRESSURE: 151 MMHG | TEMPERATURE: 97.9 F | HEART RATE: 78 BPM | DIASTOLIC BLOOD PRESSURE: 79 MMHG | OXYGEN SATURATION: 95 % | RESPIRATION RATE: 18 BRPM

## 2021-05-15 DIAGNOSIS — Z17.0 MALIGNANT NEOPLASM OF OVERLAPPING SITES OF RIGHT BREAST IN FEMALE, ESTROGEN RECEPTOR POSITIVE (HCC): ICD-10-CM

## 2021-05-15 DIAGNOSIS — N17.9 ACUTE RENAL FAILURE SUPERIMPOSED ON STAGE 3 CHRONIC KIDNEY DISEASE, UNSPECIFIED ACUTE RENAL FAILURE TYPE, UNSPECIFIED WHETHER STAGE 3A OR 3B CKD (HCC): ICD-10-CM

## 2021-05-15 DIAGNOSIS — N18.30 ACUTE RENAL FAILURE SUPERIMPOSED ON STAGE 3 CHRONIC KIDNEY DISEASE, UNSPECIFIED ACUTE RENAL FAILURE TYPE, UNSPECIFIED WHETHER STAGE 3A OR 3B CKD (HCC): ICD-10-CM

## 2021-05-15 DIAGNOSIS — C50.811 MALIGNANT NEOPLASM OF OVERLAPPING SITES OF RIGHT BREAST IN FEMALE, ESTROGEN RECEPTOR POSITIVE (HCC): ICD-10-CM

## 2021-05-15 PROCEDURE — P9016 RBC LEUKOCYTES REDUCED: HCPCS

## 2021-05-15 PROCEDURE — 86900 BLOOD TYPING SEROLOGIC ABO: CPT

## 2021-05-15 PROCEDURE — 36430 TRANSFUSION BLD/BLD COMPNT: CPT

## 2021-05-15 PROCEDURE — 96374 THER/PROPH/DIAG INJ IV PUSH: CPT

## 2021-05-15 PROCEDURE — 25010000002 FUROSEMIDE PER 20 MG: Performed by: INTERNAL MEDICINE

## 2021-05-15 RX ORDER — SODIUM CHLORIDE 9 MG/ML
250 INJECTION, SOLUTION INTRAVENOUS AS NEEDED
Status: DISCONTINUED | OUTPATIENT
Start: 2021-05-15 | End: 2021-05-15 | Stop reason: HOSPADM

## 2021-05-15 RX ORDER — FUROSEMIDE 10 MG/ML
20 INJECTION INTRAMUSCULAR; INTRAVENOUS ONCE
Status: COMPLETED | OUTPATIENT
Start: 2021-05-15 | End: 2021-05-15

## 2021-05-15 RX ORDER — ACETAMINOPHEN 325 MG/1
650 TABLET ORAL ONCE
Status: COMPLETED | OUTPATIENT
Start: 2021-05-15 | End: 2021-05-15

## 2021-05-15 RX ADMIN — ACETAMINOPHEN 650 MG: 325 TABLET, FILM COATED ORAL at 12:59

## 2021-05-15 RX ADMIN — FUROSEMIDE 20 MG: 10 INJECTION, SOLUTION INTRAMUSCULAR; INTRAVENOUS at 15:43

## 2021-05-16 ENCOUNTER — APPOINTMENT (OUTPATIENT)
Dept: ONCOLOGY | Facility: HOSPITAL | Age: 81
End: 2021-05-16

## 2021-05-16 LAB
BH BB BLOOD EXPIRATION DATE: NORMAL
BH BB BLOOD EXPIRATION DATE: NORMAL
BH BB BLOOD TYPE BARCODE: 6200
BH BB BLOOD TYPE BARCODE: 6200
BH BB DISPENSE STATUS: NORMAL
BH BB DISPENSE STATUS: NORMAL
BH BB PRODUCT CODE: NORMAL
BH BB PRODUCT CODE: NORMAL
BH BB UNIT NUMBER: NORMAL
BH BB UNIT NUMBER: NORMAL
CROSSMATCH INTERPRETATION: NORMAL
CROSSMATCH INTERPRETATION: NORMAL
UNIT  ABO: NORMAL
UNIT  ABO: NORMAL
UNIT  RH: NORMAL
UNIT  RH: NORMAL

## 2021-05-17 ENCOUNTER — TELEPHONE (OUTPATIENT)
Dept: ONCOLOGY | Facility: CLINIC | Age: 81
End: 2021-05-17

## 2021-05-17 NOTE — TELEPHONE ENCOUNTER
Called and spoke with pt's daughter. States her mother is feeling much better since her transfusion. Has more energy and has been eating better as well. Is currently scheduled to follow up here with APRN and possible IVF on Thursday; instructed daughter to call us if she feels her mother needs to be brought in soon.er and she v/u.

## 2021-05-20 ENCOUNTER — PREP FOR SURGERY (OUTPATIENT)
Dept: OTHER | Facility: HOSPITAL | Age: 81
End: 2021-05-20

## 2021-05-20 ENCOUNTER — TRANSCRIBE ORDERS (OUTPATIENT)
Dept: ADMINISTRATIVE | Facility: HOSPITAL | Age: 81
End: 2021-05-20

## 2021-05-20 ENCOUNTER — INFUSION (OUTPATIENT)
Dept: ONCOLOGY | Facility: HOSPITAL | Age: 81
End: 2021-05-20

## 2021-05-20 ENCOUNTER — APPOINTMENT (OUTPATIENT)
Dept: ONCOLOGY | Facility: HOSPITAL | Age: 81
End: 2021-05-20

## 2021-05-20 ENCOUNTER — LAB (OUTPATIENT)
Dept: LAB | Facility: HOSPITAL | Age: 81
End: 2021-05-20

## 2021-05-20 ENCOUNTER — OFFICE VISIT (OUTPATIENT)
Dept: ONCOLOGY | Facility: CLINIC | Age: 81
End: 2021-05-20

## 2021-05-20 ENCOUNTER — PREP FOR SURGERY (OUTPATIENT)
Dept: SURGERY | Facility: SURGERY CENTER | Age: 81
End: 2021-05-20

## 2021-05-20 VITALS
BODY MASS INDEX: 17.83 KG/M2 | RESPIRATION RATE: 18 BRPM | HEIGHT: 64 IN | TEMPERATURE: 96.9 F | WEIGHT: 104.4 LBS | SYSTOLIC BLOOD PRESSURE: 143 MMHG | OXYGEN SATURATION: 93 % | DIASTOLIC BLOOD PRESSURE: 70 MMHG | HEART RATE: 83 BPM

## 2021-05-20 DIAGNOSIS — J90 PLEURAL EFFUSION: Primary | ICD-10-CM

## 2021-05-20 DIAGNOSIS — D64.9 ANEMIA, UNSPECIFIED TYPE: ICD-10-CM

## 2021-05-20 DIAGNOSIS — E86.0 DEHYDRATION: ICD-10-CM

## 2021-05-20 DIAGNOSIS — C50.811 MALIGNANT NEOPLASM OF OVERLAPPING SITES OF RIGHT BREAST IN FEMALE, ESTROGEN RECEPTOR POSITIVE (HCC): Primary | ICD-10-CM

## 2021-05-20 DIAGNOSIS — Z79.899 ENCOUNTER FOR LONG-TERM (CURRENT) USE OF HIGH-RISK MEDICATION: ICD-10-CM

## 2021-05-20 DIAGNOSIS — J91.0 MALIGNANT PLEURAL EFFUSION: ICD-10-CM

## 2021-05-20 DIAGNOSIS — N18.31 STAGE 3A CHRONIC KIDNEY DISEASE (HCC): Chronic | ICD-10-CM

## 2021-05-20 DIAGNOSIS — Z20.822 ENCOUNTER FOR PREPROCEDURE SCREENING LABORATORY TESTING FOR COVID-19: Primary | ICD-10-CM

## 2021-05-20 DIAGNOSIS — Z01.812 ENCOUNTER FOR PREPROCEDURE SCREENING LABORATORY TESTING FOR COVID-19: Primary | ICD-10-CM

## 2021-05-20 DIAGNOSIS — Z17.0 MALIGNANT NEOPLASM OF OVERLAPPING SITES OF RIGHT BREAST IN FEMALE, ESTROGEN RECEPTOR POSITIVE (HCC): ICD-10-CM

## 2021-05-20 DIAGNOSIS — D50.9 IRON DEFICIENCY ANEMIA, UNSPECIFIED IRON DEFICIENCY ANEMIA TYPE: ICD-10-CM

## 2021-05-20 DIAGNOSIS — N17.9 AKI (ACUTE KIDNEY INJURY) (HCC): ICD-10-CM

## 2021-05-20 DIAGNOSIS — Z17.0 MALIGNANT NEOPLASM OF OVERLAPPING SITES OF RIGHT BREAST IN FEMALE, ESTROGEN RECEPTOR POSITIVE (HCC): Primary | ICD-10-CM

## 2021-05-20 DIAGNOSIS — C50.811 MALIGNANT NEOPLASM OF OVERLAPPING SITES OF RIGHT BREAST IN FEMALE, ESTROGEN RECEPTOR POSITIVE (HCC): ICD-10-CM

## 2021-05-20 DIAGNOSIS — Z17.0 MALIGNANT NEOPLASM OF RIGHT BREAST IN FEMALE, ESTROGEN RECEPTOR POSITIVE, UNSPECIFIED SITE OF BREAST (HCC): ICD-10-CM

## 2021-05-20 DIAGNOSIS — C50.911 MALIGNANT NEOPLASM OF RIGHT BREAST IN FEMALE, ESTROGEN RECEPTOR POSITIVE, UNSPECIFIED SITE OF BREAST (HCC): ICD-10-CM

## 2021-05-20 LAB
ALBUMIN SERPL-MCNC: 3.1 G/DL (ref 3.5–5.2)
ALBUMIN/GLOB SERPL: 0.8 G/DL (ref 1.1–2.4)
ALP SERPL-CCNC: 139 U/L (ref 38–116)
ALT SERPL W P-5'-P-CCNC: 23 U/L (ref 0–33)
ANION GAP SERPL CALCULATED.3IONS-SCNC: 14 MMOL/L (ref 5–15)
AST SERPL-CCNC: 18 U/L (ref 0–32)
BASOPHILS # BLD AUTO: 0.04 10*3/MM3 (ref 0–0.2)
BASOPHILS NFR BLD AUTO: 1.1 % (ref 0–1.5)
BILIRUB SERPL-MCNC: <0.2 MG/DL (ref 0.2–1.2)
BUN SERPL-MCNC: 43 MG/DL (ref 6–20)
BUN/CREAT SERPL: 22.8 (ref 7.3–30)
CALCIUM SPEC-SCNC: 7.9 MG/DL (ref 8.5–10.2)
CHLORIDE SERPL-SCNC: 108 MMOL/L (ref 98–107)
CO2 SERPL-SCNC: 19 MMOL/L (ref 22–29)
CREAT SERPL-MCNC: 1.89 MG/DL (ref 0.6–1.1)
DEPRECATED RDW RBC AUTO: 50.4 FL (ref 37–54)
EOSINOPHIL # BLD AUTO: 0.01 10*3/MM3 (ref 0–0.4)
EOSINOPHIL NFR BLD AUTO: 0.3 % (ref 0.3–6.2)
ERYTHROCYTE [DISTWIDTH] IN BLOOD BY AUTOMATED COUNT: 15.9 % (ref 12.3–15.4)
GFR SERPL CREATININE-BSD FRML MDRD: 25 ML/MIN/1.73
GLOBULIN UR ELPH-MCNC: 3.9 GM/DL (ref 1.8–3.5)
GLUCOSE SERPL-MCNC: 107 MG/DL (ref 74–124)
HCT VFR BLD AUTO: 30.4 % (ref 34–46.6)
HGB BLD-MCNC: 10.2 G/DL (ref 12–15.9)
IMM GRANULOCYTES # BLD AUTO: 0.02 10*3/MM3 (ref 0–0.05)
IMM GRANULOCYTES NFR BLD AUTO: 0.5 % (ref 0–0.5)
LYMPHOCYTES # BLD AUTO: 0.5 10*3/MM3 (ref 0.7–3.1)
LYMPHOCYTES NFR BLD AUTO: 13.2 % (ref 19.6–45.3)
MCH RBC QN AUTO: 29.9 PG (ref 26.6–33)
MCHC RBC AUTO-ENTMCNC: 33.6 G/DL (ref 31.5–35.7)
MCV RBC AUTO: 89.1 FL (ref 79–97)
MONOCYTES # BLD AUTO: 0.54 10*3/MM3 (ref 0.1–0.9)
MONOCYTES NFR BLD AUTO: 14.2 % (ref 5–12)
NEUTROPHILS NFR BLD AUTO: 2.69 10*3/MM3 (ref 1.7–7)
NEUTROPHILS NFR BLD AUTO: 70.7 % (ref 42.7–76)
NRBC BLD AUTO-RTO: 0 /100 WBC (ref 0–0.2)
PLATELET # BLD AUTO: 250 10*3/MM3 (ref 140–450)
PMV BLD AUTO: 9.5 FL (ref 6–12)
POTASSIUM SERPL-SCNC: 4.2 MMOL/L (ref 3.5–4.7)
PROT SERPL-MCNC: 7 G/DL (ref 6.3–8)
RBC # BLD AUTO: 3.41 10*6/MM3 (ref 3.77–5.28)
SODIUM SERPL-SCNC: 141 MMOL/L (ref 134–145)
WBC # BLD AUTO: 3.8 10*3/MM3 (ref 3.4–10.8)

## 2021-05-20 PROCEDURE — 96360 HYDRATION IV INFUSION INIT: CPT

## 2021-05-20 PROCEDURE — 85025 COMPLETE CBC W/AUTO DIFF WBC: CPT

## 2021-05-20 PROCEDURE — 36415 COLL VENOUS BLD VENIPUNCTURE: CPT

## 2021-05-20 PROCEDURE — 80053 COMPREHEN METABOLIC PANEL: CPT

## 2021-05-20 PROCEDURE — 99215 OFFICE O/P EST HI 40 MIN: CPT | Performed by: NURSE PRACTITIONER

## 2021-05-20 RX ORDER — SODIUM CHLORIDE 9 MG/ML
1000 INJECTION, SOLUTION INTRAVENOUS ONCE
Status: COMPLETED | OUTPATIENT
Start: 2021-05-20 | End: 2021-05-20

## 2021-05-20 RX ADMIN — SODIUM CHLORIDE 1000 ML: 9 INJECTION, SOLUTION INTRAVENOUS at 14:50

## 2021-05-20 NOTE — PROGRESS NOTES
Subjective     REASON FOR FOLLOW-UP:   1. Locally advanced right breast cancer ER MA positive HER-2 positive  · Initiated Arimidex 9/11/2019  · Radiation therapy added, completing a total of 25 fractions as of 4/27/2020    2. Remote history of left breast cancer 1978 treated with mastectomy alone                               REQUESTING PHYSICIAN: MD Vladimir Cowart    History of Present Illness Ms. Burgos is a 80 y.o. female with above medical history who is seen today in short-term follow-up. She was here 2 weeks ago and found to have elevated creatinine of 2.2, BUN 49. She received IV fluids. She was seen last week during her off week of Ibrance and was noting new right upper quadrant abdominal pain reportedly x2 weeks. Very tender on exam. He was also noting some increase shortness of breath. She was sent for CT of the abdomen/pelvis and ultrasound of the gallbladder. Imaging showed small stones in the gallbladder and/or sludge with pericholecystic fluid. Diverticulosis without evidence of diverticulitis. Patient was referred to Dr. Dolores Solis, surgery, for evaluation of possible cholecystitis.    Patient's hemoglobin declined further to 6.8 last week requiring transfusion of 2 units PRBCs given 5/15/2021.  She also received additional IV fluids the day prior for continued elevation of BUN at 39, creatinine 2.0.    The patient is reviewed back now, accompanied by her daughter, with hemoglobin improved to 10.2.  Patient is currently finishing up her second off week of Ibrance and WBC count has improved to 3.8, platelet count normalized to 250,000.  The patient is tentatively due to restart Ibrance tomorrow, after discussion with Dr. Marcus, it is not felt the patient can tolerate Ibrance considering how poorly she did was a 75 mg dose.  We discussed that this point keeping her off Ibrance and allowing her to get stronger.      Her BUN  remains elevated at 43 today, creatinine 1.8.  Her baseline creatinine is more around 1.4-1.6.  Patient did see nephrology, Dr. Stanley, yesterday.  We will request records.  Patient's daughter states it was felt the patient was acutely dehydrated due to poor tolerance with Ibrance, specifically with nausea and poor oral intake.  We will give her additional IV fluids in the office today.    Regards to previous abdominal pain last week, patient states this has largely resolved.  She is in the process of making an appointment with Dr. Will bolden.    The patient is feeling more short of breath.  Her last thoracentesis was 2021 with 1.8 L of fluid removed.  Patient requesting to be set up for additional therapeutic thoracentesis soon.    They deny other concerns at this time.    Past Medical History:   Diagnosis Date   • Anemia     required prior blood transfusions   • Arthritis    • Cancer (CMS/HCC)     right breast cancer    • Clostridium difficile colitis    • Diabetes mellitus (CMS/HCC)    • Difficulty walking    • Diverticulitis    • Environmental allergies    • History of malignant neoplasm of breast 2013    denies any chemo or radiation   • History of malignant neoplasm of skin 2013    possibly basal   • History of tobacco use 2013   • HL (hearing loss)    • Hyperlipidemia    • Hypertension    • Kidney disease    • Neuropathy in diabetes (CMS/HCC)    • Peripheral neuropathy    • Shingles    • Skin cancer    • Stroke (CMS/HCC) 2019   • Vision loss       Patient is  3 para 3 with 2 miscarriages menarche was at age 9 menopause at age 49st childbirth was at age 24 she breast-fed for at least 6 months she did not take hormone replacement therapy because at age 38 she developed breast cancer in the left breast was treated at the [facility] [place] with a modified radical mastectomy in  no radiation or hormonal blockade was given in details of the scans were not available at this  time.    Family history is positive for father dying at age 84 with disseminated abdominal cancer of unknown type mother  at 77 of natural causes she has a brother with kidney cancer at age 65 who  of this and a sister with melanoma at age 30    Patient currently lives with her daughter does not smoke currently but smoked for 70 years 1 pack of cigarettes is not a drinker.  She uses a cane to walk because her balance is poor she has issues with swallowing from her stroke      Past Surgical History:   Procedure Laterality Date   • CATARACT EXTRACTION, BILATERAL     • EAR TUBES     • MASTECTOMY Bilateral     radical   • TUMOR REMOVAL Left     large lipoma removed from hip      HEME/ONC HISTORY:  patient is an 79-year-old female with multiple medical issues including hypertension diabetes hypercholesterolemia with a recent stroke in February of this year which is resulted in left-sided weakness and difficulty swallowing.  The patient noted changes in her breast over the last year and the last few months changes to the nipple specifically with developing lump in both the right breast and under the right arm.  She brought to the attention of her family physician who sent her for imaging and biopsy of obvious tumor.  Mammogram revealed a large irregular mass measuring 6 cm with skin retraction nipple retraction and skin thickening.  At the 10 o'clock position there appeared to be some skin involvement.    She was also noted clinically to have large axillary lymph nodes.  Ultrasound was obtained revealing an irregular solid mass with indistinct margins measuring approximately 64 x 51 mm.  On ultrasound there were also some enlarged axillary lymph nodes measuring about 30 mm in greatest size.     A biopsy was recommended and has revealed a grade 2 invasive ductal cancer that is ER positive at 98%, FL positive at 82%, and HER-2 equivocal.  The Ki-67 is 20.81%.     Patient was then referred to Dr. Gonzalez who  examined her and did not feel surgical excision was possible because there would be a large defect from her tumor and was wanting to consider neoadjuvant therapy in an effort to shrink the tumor.  The patient was presented at the multidisciplinary conference and because of her comorbidities her recent stroke and her overall general condition we did not think she was a good candidate for neoadjuvant chemotherapy and she was referred to us to discuss endocrine therapy.    In the interim additional testing on her tumor was done at [facility] because of the indeterminate HER-2 FISH and this revealed that there was 3+ HER-2 activity by IHC and therefore this is considered to be HER-2 positive  The patient had staging work-up at the recommendation of the multidisciplinary clinic And CT of the chest showed calcified granulomatous disease with mild AP window adenopathy which is increased from previous CAT scan in 2016 precarinal adenopathy also increased.  It showed the breast mass as well as right axillary adenopathy.  In the abdomen there was a left adrenal nodule which is increased in size from 1.4 to 1.8 cm felt to be an adenoma in addition there was a small sclerotic lesion in the left iliac crest and metastasis could not be completely excluded although the bone scan showed no uptake in this area.  The bone scan did show a fracture and uptake in the left greater trochanter and uptake of the left superior pubic ramus that represents a superior ramus fracture of with no other signs of metastatic disease-patient did fall in July of this year but no obvious fractures were seen on x-ray.    Pt did not want chemotherapy-Patient initiating Arimidex therapy in mid September 2019.     the patient was hospitalized in late September, presenting with garbled speech and elevated blood pressure, felt to ultimately have had a TIA related to hypertensive urgency and small vessel disease.  Neurology recommended dual antiplatelet therapy  for 3 months followed by Plavix alone.  Her antihypertensives were adjusted.  At that time she was also found to be B12 deficient with a level of 158 and iron deficient with an iron percent of 7%.  She was started on oral B12 and iron.    She states that the iron does cause some constipation but it is manageable with stool softeners at this time.  There was some discussion in the hospital record that if she could not tolerate oral iron IV iron should be considered.  Hemoglobin is improved up to 9.7 from discharge hemoglobin of 9.0.    2/20  he has not had a DEXA scan in many years and we repeated in February and it shows moderate osteopenia in the spine osteoporosis and left hip and severe osteopenia in the right hip.  I am not inclined to switch her to tamoxifen at this point and leave her room at acceptable see her back in 3 months and decide whether to add Prolia    Current Outpatient Medications on File Prior to Visit   Medication Sig Dispense Refill   • acetaminophen (TYLENOL) 500 MG tablet Take 500 mg by mouth Every 6 (Six) Hours As Needed for Mild Pain .     • amLODIPine (NORVASC) 10 MG tablet Take 1 tablet by mouth Daily. 90 tablet 3   • cetirizine (zyrTEC) 10 MG tablet Take 10 mg by mouth Daily.     • cloNIDine (CATAPRES) 0.2 MG tablet TAKE 1 TABLET BY MOUTH EVERY 12 HOURS 180 tablet 0   • clopidogrel (PLAVIX) 75 MG tablet TAKE 1 TABLET BY MOUTH DAILY 90 tablet 3   • ferrous sulfate (IRON SUPPLEMENT) 325 (65 FE) MG tablet Take 1 tablet by mouth 2 (Two) Times a Day Before Meals. 60 tablet 0   • fluticasone (FLONASE) 50 MCG/ACT nasal spray 2 sprays into the nostril(s) as directed by provider Every Night.     • guaiFENesin (MUCINEX) 600 MG 12 hr tablet Take 600 mg by mouth 2 (Two) Times a Day As Needed for Cough.     • hydroCHLOROthiazide (MICROZIDE) 12.5 MG capsule Take 1 capsule by mouth Daily. 90 capsule 3   • loperamide (IMODIUM) 2 MG capsule Take 2 mg by mouth 4 (Four) Times a Day As Needed.     •  metFORMIN (GLUCOPHAGE) 1000 MG tablet Take one tablet bid 180 tablet 1   • metoprolol succinate XL (TOPROL-XL) 50 MG 24 hr tablet TAKE 1 TABLET BY MOUTH DAILY 90 tablet 3   • ondansetron (ZOFRAN) 8 MG tablet Take 1 tablet by mouth Every 8 (Eight) Hours As Needed for Nausea or Vomiting. 30 tablet 5   • Palbociclib (Ibrance) 75 MG tablet tablet Take 1 tablet by mouth Daily. For 14 days on then 14 days off. 14 tablet 6   • pravastatin (PRAVACHOL) 20 MG tablet Take 1 tablet by mouth Every Night. 90 tablet 1   • vitamin B-12 (VITAMIN B-12) 2000 MCG tablet Take 1 tablet by mouth Daily. 30 tablet 0   • levoFLOXacin (LEVAQUIN) 250 MG tablet 2 tabs on day 1 then 1 tab every 48 hours thereafter 5 tablet 0     No current facility-administered medications on file prior to visit.        ALLERGIES:    Allergies   Allergen Reactions   • Lisinopril Angioedema   • Lipitor [Atorvastatin] Unknown - High Severity     Leg weakness         Social History     Socioeconomic History   • Marital status:      Spouse name: Not on file   • Number of children: 3   • Years of education: High school   • Highest education level: Not on file   Tobacco Use   • Smoking status: Former Smoker     Packs/day: 0.25     Years: 10.00     Pack years: 2.50     Types: Cigarettes     Quit date: 2019     Years since quittin.2   • Smokeless tobacco: Former User   • Tobacco comment: she quit for 8 years and started back about a year ago then quit again   Substance and Sexual Activity   • Alcohol use: No   • Drug use: No   • Sexual activity: Never        Family History   Problem Relation Age of Onset   • Stroke Mother 76   • Diabetes Mother    • Hypertension Mother    • Arthritis Mother    • Cancer Father         metastatic unknown cause   • Heart attack Father 70   • Colon cancer Father    • Lung cancer Father    • Cancer Brother         bladder   • Arthritis Sister    • Melanoma Sister    • Hypertension Daughter    • Diabetes Daughter      "    Review of Systems   Constitutional: Positive for fatigue.   HENT: Positive for hearing loss (same ).    Respiratory: Negative.    Cardiovascular: Negative.    Gastrointestinal: Positive for abdominal pain (improved). Negative for abdominal distention, blood in stool, constipation, nausea and vomiting.   Genitourinary: Negative.    Musculoskeletal: Positive for arthralgias and gait problem. Negative for joint swelling.   Neurological: Positive for weakness (left side).   Hematological: Negative.    Psychiatric/Behavioral: Negative.    All other systems reviewed and are negative.    Review of systems 05/20/2021 unchanged from previous office visit except as updated.       Objective     Vitals:    05/20/21 1301   Resp: 18   Height: 162.6 cm (64.02\")     Current Status 5/11/2021   ECOG score 1       Physical Exam   Pulmonary/Chest:           GENERAL:  Well-developed, well-nourished in no acute distress.   SKIN:  Warm, dry without rashes, purpura or petechiae.  EYES:  Pupils equal, round and reactive to light.  EOMs intact.  Left eye proptosis.  EARS:  Hearing intact.  NOSE:  Septum midline.  No excoriations or nasal discharge.  MOUTH:  Tongue is well-papillated; no stomatitis or ulcers.  Lips normal.  THROAT:  Oropharynx without lesions or exudates.  NECK:  Supple with good range of motion; no thyromegaly or masses, no JVD.  LYMPHATICS:  No cervical, supraclavicular adenopathy.  CHEST:  Lungs clear to auscultation but poor air movement in the right middle and lower lobes.    BREASTS: Left chest wall is benign; the right breast has a mobile soft tissue mass roughly 6 cm transverse x 5 cm and 2 subcutaneous nodules noted see picture above -No palpable adenopathy in the right axilla, neck or supraclavicular region.  Not examined today  CARDIAC:  Regular rate and rhythm without murmurs, rubs or gallops. Normal S1,S2.  ABDOMEN:  Distended, tender to palpation RUQ and RLQ  EXTREMITIES:  No clubbing, cyanosis 2+lymphedema " right arm  NEUROLOGICAL: Left-sided weakness.  PSYCHIATRIC:  Normal affect and mood.      I have reexamined the patient and the results are consistent with the previously documented exam. JEFFREY Mcgee       RECENT LABS:  Results from last 7 days   Lab Units 05/20/21  1303 05/14/21  1428   WBC 10*3/mm3 3.80 1.61*   NEUTROS ABS 10*3/mm3 2.69 1.07*   HEMOGLOBIN g/dL 10.2* 6.8*   HEMATOCRIT % 30.4* 21.3*   PLATELETS 10*3/mm3 250 69*     Results from last 7 days   Lab Units 05/20/21  1303 05/14/21  1428   SODIUM mmol/L 141 143   POTASSIUM mmol/L 4.2 4.2   CHLORIDE mmol/L 108* 110*   CO2 mmol/L 19.0* 17.9*   BUN mg/dL 43* 39*   CREATININE mg/dL 1.89* 2.03*   CALCIUM mg/dL 7.9* 8.7   ALBUMIN g/dL 3.10*  --    BILIRUBIN mg/dL <0.2*  --    ALK PHOS U/L 139*  --    ALT (SGPT) U/L 23  --    AST (SGOT) U/L 18  --    GLUCOSE mg/dL 107 144*                         MRI BRAIN     IMPRESSION:  Old right basal ganglia infarct. No acute abnormality  identified. No metastatic disease is identified at the brain or the  head.    Bone scan  IMPRESSION:  Abnormal uptake left greater trochanter where there is a  fracture demonstrated with CT (patient has a history of a fall and there  is no CT evidence that this is a pathologic fracture). There is also  abnormal uptake at the left superior pubic ramus that most likely  represents a superior ramus fracture though a fracture is not evident  with CT. This uptake left superior pubic ramus is technically  indeterminate though there is overall no convincing evidence for bony  metastatic disease. Follow-up CT could be obtained.     This report was finalized on 8/23/2019 1:    CT chest abdomen pelvis  IMPRESSION CHEST CT:    1. A 2.2 cm right breast mass laterally presumably related to the  patient's neoplasm.  2. Right axillary mediastinal adenopathy and metastatic disease cannot  be excluded. PET/CT may be helpful.     IMPRESSION ABDOMEN & PELVIS CT:    1. Low-density renal lesions  as discussed, favor cysts.  2. Enlarging left adrenal nodule, likely adenoma.  3. Extensive diverticulosis.  4. A 5 mm sclerotic left iliac lesion as discussed.    BONE MINERAL DENSITOMETRY  IMPRESSION:  Osteoporosis at the left hip. Interval decrease in bone  density.     This report was finalized on 2/14/2020 12:10 PM by Dr. Osmin Dodd M.D.        Assessment/Plan   1.  Locally advanced right breast cancer strongly ER CT positive with indeterminate HER-2 with repeat testing showing 3+ IHC consistent with positive result  · Staging work-up probably negative although indeterminate bone lesions without obvious fractures on bone scan  · Abnormal mediastinal adenopathy which is been present since 2016 likely granulomatous disease  · Enlarging left adrenal mass felt to be an adenoma present since 2015  · History of left breast cancer 1978 at age 38 treated with modified radical mastectomy alone? Pathology  · [place] to be a poor candidate for surgery or chemotherapy.PT DECLINED CHEMO  · Initiated Arimidex around 9/11/2019.  Radiation may be a possibility for additional therapy if she does not get a good response from Arimidex  · Radiation added in 3/20  · Patient is reviewed back today, 11/17/2020, tolerating Arimidex well.  She does feel that the right breast mass is slightly larger though still overall improved compared to when she initially presented.    · Patient seen on 2/18/2021 with new cutaneous nodules restaging and switch to Faslodex planned  · Right pleural effusion positive cytology for metastatic breast cancer ER/CT positive HER-2 FISH negative we will add Ibrance to the Faslodex 75 mg a day 2 weeks on 2 weeks off and escalate as tolerated.  · Patient did have thoracentesis on 5/4/2021 with removal of 1800 mL of fluid on the right side.  She has had improvement in her shortness of breath since that time.  · Patient seen for day 15 of her first cycle of Ibrance 75 mg daily for 2 weeks on, 2 weeks off.   Also given Faslodex that day.  She did have nausea with the first week and decreased appetite.  Her nausea was improved with ondansetron.  After about 5 days or so her appetite improved and she started eating more and having some strange cravings.  Patient also more short of breath, undergoing thoracentesis 5/4/2021.    · Patient with acute on chronic kidney disease in relation to nausea from Ibrance and decreased oral intake.  Per review 5/20/2021 in discussion with Dr. Marcus, we will hold further Ibrance at this time.    2.  Granulomatous mediastinal lymph nodes chronic    3.  History of stroke in 2/19 with left hemiparesis and dysphagia  · Patient hospitalized again 9/26 and 9/29/2019 presenting with garbled speech and elevated blood pressure, felt to have had a TIA.  Neurology recommended dual antiplatelet therapy for 3 months and then Plavix alone.    4.  Hypertension diabetes and hypercholesterolemia.  Patient currently off lisinopril per primary care due to angioedema.    5.  Anemia, multifactorial:  · Patient has stage III CKD.  · Patient also found during recent hospitalization to have low B12 and low iron percent.  Though she had previously received B12 injections she was started on oral B12.     · Patient continues on oral iron at this time with some constipation though tolerable with stool softeners.  Plan to continue to monitor her blood work and if develops intolerance to oral iron consider IV iron.  · Was recent iron studies 11/20/2019 include a ferritin of 41, iron saturation 12%.   · 5/7/2021 ferritin and iron panel obtained with ferritin 156, iron saturation 50%, TIBC 288.  B12 502 with patient continuing oral B12.  · Hemoglobin dropped to 6.8 as of 5/14/2021.  Patient set up for transfusion.    · Hemoglobin improved to 10.1 as of 5/20/2021.      6.  Lymphedema right arm better after radiation    7.  Osteoporosis left hip -osteopenia spine observe for now    8.  Neutropenia secondary to Ibrance.   Count improving off treatment.    9.   DNR confirmed on 4/9/2021 with her daughter in attendance    10.  Chronic kidney disease.    · Patient with acute on chronic kidney injury when seen 5/7/2021 creatinine at 2.21, previously 1.43.  BUN elevated to 49.  Patient will receive 1 L normal saline today.  · Creatinine worsened to 2.31.  Patient given IV fluids.    · Referred to nephrology.    · 5/20/2021: Patient reports seeing Dr. Tapia, nephrology, on 5/19/2021.  We will request records.  Patient's daughter states that she was felt to need to have acute on chronic kidney disease in relation to nausea and decreased oral intake of Ibrance.  Because of patient's poor tolerance to Ibrance we are holding further Ibrance and will continue to support her with IV fluids as needed.  BUN today 43, creatinine 1.8.  We will give the patient 1 L normal saline.    11.  Nausea and poor appetite.  · Was noted during week 1 of Ibrance.  This is discussed to follow-up today.  The patient did not call in at that time reporting the symptoms.  Her appetite improved the weekend following and she is since been eating and drinking well she reports.  Her weight is down 3 pounds total.  I have encouraged her to use boost supplements during times of poor appetite.  She does use ondansetron to control nausea.  We will continue to monitor this.  · Patient returns on 5/11/2021 with further weight loss.  She is also reporting right upper quadrant pain which will be discussed below.    12.  Right upper quadrant pain x2 weeks reported 5/11/2021.    · We did proceed with a stat CT abdomen pelvis as well as gallbladder ultrasound showing .  Imaging noting small stones and/or sludge in the gallbladder with pericholecystic fluid.  There is diverticulosis without evidence of diverticulitis.  Patient covered with Levaquin and also referred to Dr. Dolores Solis, surgeon.   · Patient's daughter reports that they are in the process of making an appointment.   Patient does note almost complete resolution abdominal pain at this point however.      13.  Malignant right pleural effusion.  · Patient required in and thoracentesis.  Patient's daughter requesting at least a referral to thoracic surgery to discuss possible Pleurx catheter.  In the meantime we will also go ahead and schedule her for another therapeutic thoracentesis.    PLAN:  1. Hold further Ibrance.  2. Patient to receive 1 L normal saline in the office today.  3. Patient undergo therapeutic thoracentesis at first available time.  4. Patient to see Dr. Solis, surgeon, per above after daughter makes appointment which is currently pending.  5. Referral placed to thoracic surgery, Dr. Larson, to discuss possible Pleurx catheter per family request.  6. Return in 2 weeks for review by Dr. Marcus, repeat labs, and Faslodex.    I spent 40 minutes caring for Mar Burgos on this date of service. This time includes time spent by me in the following activities: preparing for the visit, reviewing tests, obtaining and/or reviewing a separately obtained history, performing a medically appropriate examination and/or evaluation , counseling and educating the patient/family/caregiver, ordering medications, tests, or procedures, referring and communicating with other health care professionals , documenting information in the medical record, independently interpreting results and communicating that information with the patient/family/caregiver and care coordination.

## 2021-05-21 ENCOUNTER — APPOINTMENT (OUTPATIENT)
Dept: LAB | Facility: HOSPITAL | Age: 81
End: 2021-05-21

## 2021-05-22 ENCOUNTER — LAB (OUTPATIENT)
Dept: LAB | Facility: HOSPITAL | Age: 81
End: 2021-05-22

## 2021-05-22 DIAGNOSIS — Z20.822 ENCOUNTER FOR PREPROCEDURE SCREENING LABORATORY TESTING FOR COVID-19: ICD-10-CM

## 2021-05-22 DIAGNOSIS — Z01.812 ENCOUNTER FOR PREPROCEDURE SCREENING LABORATORY TESTING FOR COVID-19: ICD-10-CM

## 2021-05-22 PROCEDURE — U0004 COV-19 TEST NON-CDC HGH THRU: HCPCS

## 2021-05-22 PROCEDURE — U0005 INFEC AGEN DETEC AMPLI PROBE: HCPCS

## 2021-05-22 PROCEDURE — C9803 HOPD COVID-19 SPEC COLLECT: HCPCS

## 2021-05-22 NOTE — PROGRESS NOTES
05/25/21 0000   Pre-Procedure Phone Call   Procedure Time Verified Yes   Arrival Time 1145  (5/25/2021)   Procedure Location Verified Yes   Medical History Reviewed No   NPO Status Reinforced Yes   Ride and Caregiver Arranged Yes   Phone Number for Ride/Caregiver Antoinette, daughter   Patient Knows to Bring Current Medications No  (Pt will take Plavix after the procedure when she gets home.)   Bring Outside Films Requested No

## 2021-05-24 DIAGNOSIS — C50.911 MALIGNANT NEOPLASM OF RIGHT BREAST IN FEMALE, ESTROGEN RECEPTOR POSITIVE, UNSPECIFIED SITE OF BREAST (HCC): Primary | ICD-10-CM

## 2021-05-24 DIAGNOSIS — Z17.0 MALIGNANT NEOPLASM OF RIGHT BREAST IN FEMALE, ESTROGEN RECEPTOR POSITIVE, UNSPECIFIED SITE OF BREAST (HCC): Primary | ICD-10-CM

## 2021-05-24 LAB — SARS-COV-2 RNA RESP QL NAA+PROBE: NOT DETECTED

## 2021-05-25 ENCOUNTER — HOSPITAL ENCOUNTER (OUTPATIENT)
Dept: ULTRASOUND IMAGING | Facility: HOSPITAL | Age: 81
Discharge: HOME | End: 2021-05-25
Admitting: NURSE PRACTITIONER

## 2021-05-25 VITALS
RESPIRATION RATE: 18 BRPM | TEMPERATURE: 98.4 F | HEIGHT: 64 IN | DIASTOLIC BLOOD PRESSURE: 76 MMHG | WEIGHT: 104 LBS | BODY MASS INDEX: 17.75 KG/M2 | SYSTOLIC BLOOD PRESSURE: 135 MMHG | HEART RATE: 85 BPM | OXYGEN SATURATION: 95 %

## 2021-05-25 DIAGNOSIS — J90 PLEURAL EFFUSION: ICD-10-CM

## 2021-05-25 PROCEDURE — 25010000003 LIDOCAINE 1 % SOLUTION: Performed by: RADIOLOGY

## 2021-05-25 PROCEDURE — 76942 ECHO GUIDE FOR BIOPSY: CPT

## 2021-05-25 RX ORDER — SODIUM CHLORIDE 0.9 % (FLUSH) 0.9 %
3 SYRINGE (ML) INJECTION EVERY 12 HOURS SCHEDULED
Status: CANCELLED | OUTPATIENT
Start: 2021-05-27

## 2021-05-25 RX ORDER — SODIUM CHLORIDE 0.9 % (FLUSH) 0.9 %
10 SYRINGE (ML) INJECTION AS NEEDED
Status: CANCELLED | OUTPATIENT
Start: 2021-05-27

## 2021-05-25 RX ORDER — LIDOCAINE HYDROCHLORIDE 10 MG/ML
10 INJECTION, SOLUTION INFILTRATION; PERINEURAL ONCE
Status: COMPLETED | OUTPATIENT
Start: 2021-05-25 | End: 2021-05-25

## 2021-05-25 RX ADMIN — LIDOCAINE HYDROCHLORIDE 6 ML: 10 INJECTION, SOLUTION INFILTRATION; PERINEURAL at 13:10

## 2021-05-25 NOTE — DISCHARGE INSTRUCTIONS
EDUCATION /DISCHARGE INSTRUCTIONS Thoracentesis:  A thoracentesis is a procedure to remove fluid or air from the space between the lung and it's lining.  It is done to relieve lung compression and respiratory distress.  A sample can also be obtained to aid diagnosis.   Medications can be administered into the space.      During the procedure:  You will be seated comfortable leaning forward onto a pillow supported by a table.  The area will be cleaned with antiseptic soap and draped with a sterile towel.  The physician will administer a local antiseptic to numb the area.  Next, the physician will insert a needle with tubing attached into the space between the lung and lining.  Fluid is removed and a sample sent to the laboratory.   When completed a pressure dressing is applied to the site.    Risks of the procedure include but are not limited to:   *  Bleeding    *  Low blood pressure *  Infection     *  Lung collapse possibly requiring insertion of a tube to re-inflate the lung.    Benefits of the procedure:  Relief of respiratory distress and facilitation of a diagnosis.  Alternatives to the procedure:  Drug therapy with diuretics to remove fluid.  Risks of diuretic drug therapy include possible dehydration and renal failure.  The benefit of drug therapy is that it can be done at home under physician supervision.  THIS EDUCATION INFORMATION WAS REVIEWED PRIOR TO THE PROCEDURE AND CONSENT. Patient initials___________________Time___12:30_______________    Post Procedure:    *  Rest today (no pushing pulling, straining or heavy lifting).   *  Slowly increase activity tomorow.    *  If you received sedation do not drive for 24 hours.   *  Keep dressing clean and dry.   *  Leave dressing on puncture site for 24 hours.    *  You may shower when dressing removed.   *  Expect some coughing as the lung re-expands.    Call your doctor if experiencing:   *  If experiencing sudden / severe shortness of breath, chest pain or if  coughing       up blood go to the nearest emergency room.   *  Signs of infection such as redness, swelling, excessive pain and / or foul       smelling drainage from the puncture site.   *  Chills or fever over 101 degrees (by mouth).   *  Change in sputum color (yellow, green, red).   *  Unrelieved pain.   *  Any new or severe symptoms.  Following the procedure:     Follow-up with the ordering physician as directed.    Continue to take other medications as directed by your physician unless    otherwise instructed.   If applicable, resume taking your blood thinners or Aspirin on _May 26, 2021 at 1:00 PM__________.    If you have any concerns please call the Radiology Nurses Desk at [phone].  You are the most important factor in your recovery.  Follow the above instructions carefully.

## 2021-05-25 NOTE — NURSING NOTE
Pt taken out to DC area for daughterAntoinette to take pt home; Pt taken out with wheelchair; no s/s of distress noted

## 2021-05-25 NOTE — NURSING NOTE
Pt in xray triage with daughter - Pt getting Right Thora  Pt and daughter wearing mask; RN wearing mask and eye protection

## 2021-05-26 ENCOUNTER — TELEPHONE (OUTPATIENT)
Dept: INTERVENTIONAL RADIOLOGY/VASCULAR | Facility: HOSPITAL | Age: 81
End: 2021-05-26

## 2021-05-27 ENCOUNTER — PREP FOR SURGERY (OUTPATIENT)
Dept: OTHER | Facility: HOSPITAL | Age: 81
End: 2021-05-27

## 2021-05-27 ENCOUNTER — INFUSION (OUTPATIENT)
Dept: ONCOLOGY | Facility: HOSPITAL | Age: 81
End: 2021-05-27

## 2021-05-27 ENCOUNTER — LAB (OUTPATIENT)
Dept: LAB | Facility: HOSPITAL | Age: 81
End: 2021-05-27

## 2021-05-27 ENCOUNTER — OFFICE VISIT (OUTPATIENT)
Dept: OTHER | Facility: HOSPITAL | Age: 81
End: 2021-05-27

## 2021-05-27 VITALS
HEART RATE: 84 BPM | BODY MASS INDEX: 17.84 KG/M2 | OXYGEN SATURATION: 95 % | TEMPERATURE: 97.1 F | DIASTOLIC BLOOD PRESSURE: 72 MMHG | RESPIRATION RATE: 16 BRPM | SYSTOLIC BLOOD PRESSURE: 142 MMHG | HEIGHT: 64 IN

## 2021-05-27 VITALS
OXYGEN SATURATION: 93 % | BODY MASS INDEX: 17.5 KG/M2 | TEMPERATURE: 96.9 F | SYSTOLIC BLOOD PRESSURE: 150 MMHG | HEART RATE: 86 BPM | DIASTOLIC BLOOD PRESSURE: 71 MMHG | WEIGHT: 102 LBS

## 2021-05-27 DIAGNOSIS — Z17.0 MALIGNANT NEOPLASM OF OVERLAPPING SITES OF RIGHT BREAST IN FEMALE, ESTROGEN RECEPTOR POSITIVE (HCC): ICD-10-CM

## 2021-05-27 DIAGNOSIS — J91.0 MALIGNANT PLEURAL EFFUSION: Primary | ICD-10-CM

## 2021-05-27 DIAGNOSIS — C50.911 MALIGNANT NEOPLASM OF RIGHT BREAST IN FEMALE, ESTROGEN RECEPTOR POSITIVE, UNSPECIFIED SITE OF BREAST (HCC): ICD-10-CM

## 2021-05-27 DIAGNOSIS — Z17.0 MALIGNANT NEOPLASM OF RIGHT BREAST IN FEMALE, ESTROGEN RECEPTOR POSITIVE, UNSPECIFIED SITE OF BREAST (HCC): ICD-10-CM

## 2021-05-27 DIAGNOSIS — C50.811 MALIGNANT NEOPLASM OF OVERLAPPING SITES OF RIGHT BREAST IN FEMALE, ESTROGEN RECEPTOR POSITIVE (HCC): ICD-10-CM

## 2021-05-27 LAB
ALBUMIN SERPL-MCNC: 3.2 G/DL (ref 3.5–5.2)
ALBUMIN/GLOB SERPL: 0.8 G/DL (ref 1.1–2.4)
ALP SERPL-CCNC: 119 U/L (ref 38–116)
ALT SERPL W P-5'-P-CCNC: 10 U/L (ref 0–33)
ANION GAP SERPL CALCULATED.3IONS-SCNC: 10 MMOL/L (ref 5–15)
AST SERPL-CCNC: 13 U/L (ref 0–32)
BASOPHILS # BLD AUTO: 0.09 10*3/MM3 (ref 0–0.2)
BASOPHILS NFR BLD AUTO: 1.4 % (ref 0–1.5)
BILIRUB SERPL-MCNC: 0.2 MG/DL (ref 0.2–1.2)
BUN SERPL-MCNC: 26 MG/DL (ref 6–20)
BUN/CREAT SERPL: 16.5 (ref 7.3–30)
CALCIUM SPEC-SCNC: 7.8 MG/DL (ref 8.5–10.2)
CHLORIDE SERPL-SCNC: 105 MMOL/L (ref 98–107)
CO2 SERPL-SCNC: 26 MMOL/L (ref 22–29)
CREAT SERPL-MCNC: 1.58 MG/DL (ref 0.6–1.1)
DEPRECATED RDW RBC AUTO: 51.8 FL (ref 37–54)
EOSINOPHIL # BLD AUTO: 0.04 10*3/MM3 (ref 0–0.4)
EOSINOPHIL NFR BLD AUTO: 0.6 % (ref 0.3–6.2)
ERYTHROCYTE [DISTWIDTH] IN BLOOD BY AUTOMATED COUNT: 15.5 % (ref 12.3–15.4)
GFR SERPL CREATININE-BSD FRML MDRD: 31 ML/MIN/1.73
GLOBULIN UR ELPH-MCNC: 4 GM/DL (ref 1.8–3.5)
GLUCOSE SERPL-MCNC: 108 MG/DL (ref 74–124)
HCT VFR BLD AUTO: 33.3 % (ref 34–46.6)
HGB BLD-MCNC: 11.1 G/DL (ref 12–15.9)
IMM GRANULOCYTES # BLD AUTO: 0.05 10*3/MM3 (ref 0–0.05)
IMM GRANULOCYTES NFR BLD AUTO: 0.8 % (ref 0–0.5)
LYMPHOCYTES # BLD AUTO: 0.73 10*3/MM3 (ref 0.7–3.1)
LYMPHOCYTES NFR BLD AUTO: 11.1 % (ref 19.6–45.3)
MCH RBC QN AUTO: 30.6 PG (ref 26.6–33)
MCHC RBC AUTO-ENTMCNC: 33.3 G/DL (ref 31.5–35.7)
MCV RBC AUTO: 91.7 FL (ref 79–97)
MONOCYTES # BLD AUTO: 0.84 10*3/MM3 (ref 0.1–0.9)
MONOCYTES NFR BLD AUTO: 12.8 % (ref 5–12)
NEUTROPHILS NFR BLD AUTO: 4.82 10*3/MM3 (ref 1.7–7)
NEUTROPHILS NFR BLD AUTO: 73.3 % (ref 42.7–76)
NRBC BLD AUTO-RTO: 0 /100 WBC (ref 0–0.2)
PLATELET # BLD AUTO: 573 10*3/MM3 (ref 140–450)
PMV BLD AUTO: 8.7 FL (ref 6–12)
POTASSIUM SERPL-SCNC: 4.7 MMOL/L (ref 3.5–4.7)
PROT SERPL-MCNC: 7.2 G/DL (ref 6.3–8)
RBC # BLD AUTO: 3.63 10*6/MM3 (ref 3.77–5.28)
SODIUM SERPL-SCNC: 141 MMOL/L (ref 134–145)
WBC # BLD AUTO: 6.57 10*3/MM3 (ref 3.4–10.8)

## 2021-05-27 PROCEDURE — 96360 HYDRATION IV INFUSION INIT: CPT

## 2021-05-27 PROCEDURE — G0463 HOSPITAL OUTPT CLINIC VISIT: HCPCS

## 2021-05-27 PROCEDURE — 80053 COMPREHEN METABOLIC PANEL: CPT

## 2021-05-27 PROCEDURE — 99203 OFFICE O/P NEW LOW 30 MIN: CPT | Performed by: THORACIC SURGERY (CARDIOTHORACIC VASCULAR SURGERY)

## 2021-05-27 PROCEDURE — 36415 COLL VENOUS BLD VENIPUNCTURE: CPT

## 2021-05-27 PROCEDURE — 85025 COMPLETE CBC W/AUTO DIFF WBC: CPT

## 2021-05-27 RX ORDER — SODIUM CHLORIDE 9 MG/ML
500 INJECTION, SOLUTION INTRAVENOUS ONCE
Status: COMPLETED | OUTPATIENT
Start: 2021-05-27 | End: 2021-05-27

## 2021-05-27 RX ORDER — SODIUM CHLORIDE 0.9 % (FLUSH) 0.9 %
3 SYRINGE (ML) INJECTION EVERY 12 HOURS SCHEDULED
Status: CANCELLED | OUTPATIENT
Start: 2021-06-07

## 2021-05-27 RX ORDER — SODIUM CHLORIDE 0.9 % (FLUSH) 0.9 %
3-10 SYRINGE (ML) INJECTION AS NEEDED
Status: CANCELLED | OUTPATIENT
Start: 2021-06-07

## 2021-05-27 RX ORDER — CEFAZOLIN SODIUM 2 G/100ML
2 INJECTION, SOLUTION INTRAVENOUS ONCE
Status: CANCELLED | OUTPATIENT
Start: 2021-06-07 | End: 2021-05-27

## 2021-05-27 RX ADMIN — SODIUM CHLORIDE 500 ML: 900 INJECTION, SOLUTION INTRAVENOUS at 12:06

## 2021-05-27 NOTE — PROGRESS NOTES
Subjective   Patient ID: Mar Burgos is a 80 y.o. female is being seen for consultation today at the request of Linda Marcus MD    History of Present Illness  Dear Colleague,  Mar Burgos was seen in the lung care center at [facility] today May 27, 2021 as part of our multidisciplinary thoracic oncology clinic.  I have reviewed her history her x-rays and have examined her.  Thank you for asking us to participate in the care of Ms. Burgos.    Patient is an 80-year-old  female.  Patient has history of locally advanced right breast cancer ER and KS positive.  Also HER-2 positive.  Patient was initially treated with Arimidex and radiation therapy.  She has a remote history of left breast cancer treated in 1978 with mastectomy alone.  In February of this year she began developing shortness of breath.  She was found to have a large right pleural effusion.  In early March she underwent a right thoracentesis which yielded 1700 mL.  Fluid recurred and in April she had a second thoracentesis yielding 1800 mL.  2 days ago she had her third thoracentesis yielding 1200 mL.  Each time she has a thoracentesis shortness of breath improves.  She does get quite a bit of pleuritic pain at the end of the thoracentesis.  She is very weak and has lost quite a bit of weight.  Medications have been changed in hopes of controlling the fluid but we have been asked to place a Pleurx catheter to control the pleural effusion.    The following portions of the patient's history were reviewed and updated as appropriate: allergies, current medications, past family history, past medical history, past social history, past surgical history and problem list.  Review of Systems   Constitutional: Positive for decreased appetite and weight loss.   HENT: Negative.    Eyes: Negative.    Cardiovascular: Negative.    Respiratory: Positive for cough and shortness of breath.    Endocrine: Negative.    Hematologic/Lymphatic:  Negative.    Skin: Negative.    Musculoskeletal: Positive for gout and muscle weakness.   Gastrointestinal: Positive for constipation, diarrhea and nausea.   Genitourinary: Negative.    Neurological: Positive for dizziness and loss of balance.   Psychiatric/Behavioral: Negative.    Allergic/Immunologic: Positive for environmental allergies.   All other systems reviewed and are negative.    Patient Active Problem List   Diagnosis   • Anemia   • Uncontrolled type 2 diabetes mellitus (CMS/Formerly [facility])   • Diabetic neuropathy (CMS/Formerly [facility])   • Gastroesophageal reflux disease   • Hearing loss   • Hyperlipidemia   • Hypertension   • Thoracic back pain   • Peripheral arterial occlusive disease (CMS/Formerly [facility])   • Seasonal allergic rhinitis   • Type 2 diabetes mellitus, without long-term current use of insulin (CMS/Formerly [facility])   • B12 deficiency   • Encounter for long-term (current) use of high-risk medication   • Cerebrovascular accident in February 2019 (CVA) (CMS/Formerly [facility])   • Smoker   • Malignant neoplasm of right female breast (CMS/Formerly [facility])   • TIA (transient ischemic attack)   • Hypertensive urgency   • CKD (chronic kidney disease) stage 3, GFR 30-59 ml/min (CMS/Formerly [facility])   • Conjunctivitis of right eye   • Angio-edema   • 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 in female, estrogen receptor positive (CMS/Formerly [facility])     Past Medical History:   Diagnosis Date   • Anemia     required prior blood transfusions   • Arthritis    • Cancer (CMS/Formerly [facility])     right breast cancer    • Clostridium difficile colitis    • Diabetes mellitus (CMS/[facility])    • Difficulty walking    • Diverticulitis    • Environmental allergies    • History of malignant neoplasm of breast 07/01/2013    denies any chemo or radiation   • History of malignant neoplasm of skin 07/01/2013    possibly basal   • History of tobacco use 7/1/2013   • HL (hearing loss)    • Hyperlipidemia    • Hypertension    • Kidney disease    • Neuropathy in diabetes (CMS/[facility])    • Peripheral neuropathy    •  Shingles    • Skin cancer    • Stroke (CMS/HCC) 2019   • Vision loss      Past Surgical History:   Procedure Laterality Date   • CATARACT EXTRACTION, BILATERAL     • EAR TUBES     • MASTECTOMY Bilateral     radical   • TUMOR REMOVAL Left     large lipoma removed from hip     Family History   Problem Relation Age of Onset   • Stroke Mother 76   • Diabetes Mother    • Hypertension Mother    • Arthritis Mother    • Cancer Father         metastatic unknown cause   • Heart attack Father 70   • Colon cancer Father    • Lung cancer Father    • Cancer Brother         bladder   • Arthritis Sister    • Melanoma Sister    • Hypertension Daughter    • Diabetes Daughter      Social History     Socioeconomic History   • Marital status:      Spouse name: Not on file   • Number of children: 3   • Years of education: High school   • Highest education level: Not on file   Tobacco Use   • Smoking status: Former Smoker     Packs/day: 0.25     Years: 10.00     Pack years: 2.50     Types: Cigarettes     Quit date: 2019     Years since quittin.2   • Smokeless tobacco: Former User   • Tobacco comment: she quit for 8 years and started back about a year ago then quit again   Substance and Sexual Activity   • Alcohol use: No   • Drug use: No   • Sexual activity: Never       Current Outpatient Medications:   •  acetaminophen (TYLENOL) 500 MG tablet, Take 500 mg by mouth Every 6 (Six) Hours As Needed for Mild Pain ., Disp: , Rfl:   •  amLODIPine (NORVASC) 10 MG tablet, Take 1 tablet by mouth Daily., Disp: 90 tablet, Rfl: 3  •  cetirizine (zyrTEC) 10 MG tablet, Take 10 mg by mouth Daily., Disp: , Rfl:   •  cloNIDine (CATAPRES) 0.2 MG tablet, TAKE 1 TABLET BY MOUTH EVERY 12 HOURS, Disp: 180 tablet, Rfl: 0  •  clopidogrel (PLAVIX) 75 MG tablet, TAKE 1 TABLET BY MOUTH DAILY, Disp: 90 tablet, Rfl: 3  •  ferrous sulfate (IRON SUPPLEMENT) 325 (65 FE) MG tablet, Take 1 tablet by mouth 2 (Two) Times a Day Before Meals., Disp: 60 tablet,  Rfl: 0  •  fluticasone (FLONASE) 50 MCG/ACT nasal spray, 2 sprays into the nostril(s) as directed by provider Every Night., Disp: , Rfl:   •  Fulvestrant (FASLODEX IM), Inject  into the appropriate muscle as directed by prescriber Every 30 (Thirty) Days., Disp: , Rfl:   •  guaiFENesin (MUCINEX) 600 MG 12 hr tablet, Take 600 mg by mouth 2 (Two) Times a Day As Needed for Cough., Disp: , Rfl:   •  hydroCHLOROthiazide (MICROZIDE) 12.5 MG capsule, Take 1 capsule by mouth Daily., Disp: 90 capsule, Rfl: 3  •  loperamide (IMODIUM) 2 MG capsule, Take 2 mg by mouth 4 (Four) Times a Day As Needed., Disp: , Rfl:   •  metFORMIN (GLUCOPHAGE) 1000 MG tablet, Take one tablet bid, Disp: 180 tablet, Rfl: 1  •  metoprolol succinate XL (TOPROL-XL) 50 MG 24 hr tablet, TAKE 1 TABLET BY MOUTH DAILY, Disp: 90 tablet, Rfl: 3  •  ondansetron (ZOFRAN) 8 MG tablet, Take 1 tablet by mouth Every 8 (Eight) Hours As Needed for Nausea or Vomiting., Disp: 30 tablet, Rfl: 5  •  pravastatin (PRAVACHOL) 20 MG tablet, Take 1 tablet by mouth Every Night., Disp: 90 tablet, Rfl: 1  •  vitamin B-12 (VITAMIN B-12) 2000 MCG tablet, Take 1 tablet by mouth Daily., Disp: 30 tablet, Rfl: 0  Allergies   Allergen Reactions   • Lisinopril Angioedema   • Lipitor [Atorvastatin] Unknown - High Severity     Leg weakness         Objective   Vitals:    05/27/21 1057   BP: 142/72   Pulse: 84   Resp: 16   Temp: 97.1 °F (36.2 °C)   SpO2: 95%     Physical Exam  Constitutional:       General: She is awake.      Appearance: She is well-developed. She is cachectic. She is ill-appearing.   HENT:      Head: Normocephalic.   Eyes:      General: Lids are normal.      Conjunctiva/sclera: Conjunctivae normal.      Pupils: Pupils are equal, round, and reactive to light.   Neck:      Thyroid: No thyroid mass or thyromegaly.      Vascular: No carotid bruit, hepatojugular reflux or JVD.      Trachea: Trachea normal.   Cardiovascular:      Rate and Rhythm: Normal rate and regular rhythm.   No extrasystoles are present.     Chest Wall: PMI is not displaced.      Pulses: Normal pulses.      Heart sounds: Normal heart sounds, S1 normal and S2 normal.   Pulmonary:      Effort: Pulmonary effort is normal.      Breath sounds: Examination of the right-lower field reveals decreased breath sounds. Decreased breath sounds present.   Abdominal:      General: Bowel sounds are normal.      Palpations: Abdomen is soft. There is no mass.      Tenderness: There is no abdominal tenderness.      Hernia: No hernia is present.   Musculoskeletal:         General: Normal range of motion.      Cervical back: Normal range of motion and neck supple.   Skin:     General: Skin is warm and dry.   Neurological:      Mental Status: She is alert and oriented to person, place, and time.      Cranial Nerves: No cranial nerve deficit.      Sensory: No sensory deficit.      Deep Tendon Reflexes: Reflexes are normal and symmetric.   Psychiatric:         Speech: Speech normal.         Behavior: Behavior normal.         Thought Content: Thought content normal.         Judgment: Judgment normal.       Independent Review of Radiographic Studies:    CT of the chest without contrast performed March 12, 2021 was independently reviewed.  There is a large right pleural effusion with collapse of the right middle lobe as well as partial collapse of the right upper and lower lobes.  Groundglass opacity seen in the posterior aspect of the right upper lobe.  3 mm nodule in the inferior lateral left upper lobe.  Mediastinal lymph node enlargement.  Soft tissue mass enlargement in the lateral right breast extending to the infra axillary region.    Assessment/Plan   Assessment:  Patient has a right malignant pleural effusion from her breast cancer.  She has undergone 3 thoracenteses each yielding between 1 and 2000 mL.  She is not a candidate for a VATS/decortication/pleurodesis.  I have recommended to her and her daughter that she undergo placement of  a right Pleurx catheter.  I have explained this procedure in detail.  We have also discussed the risks and benefits.  I have answered all of her questions to her satisfaction.  She and the daughter have requested that we proceed with Pleurx catheter placement.    Plan:  Case request and preoperative orders have been placed in [facility].  Patient will be scheduled for right Pleurx catheter placement.  I will keep you informed of her progress.  Thank you for allowing us to participate in the care of Ms. Burgos    35 minutes were spent performing this encounter.    Diagnoses and all orders for this visit:    Malignant pleural effusion  -     Case request

## 2021-05-28 NOTE — NURSING NOTE
Per Nori NP, 500 mL NS started pending lab results.    CMP resulted, pt's creatinine returned to baseline and pt and daughter both report pt taking po fluids better. R/w Nori NP, no additional IV fluids today. Copy of labs given to pt and f/u appts reviewed. Pt and daughter understand to call w/ concerns prior to next visit.

## 2021-05-28 NOTE — PATIENT INSTRUCTIONS
Pt seen by Dr. Larson and will be scheduled for a [facility] cath and home health. Pt instructed to call nurse navigator with any questions or concerns. Pt given contact cards for Dr. Larson and nurse navigator.

## 2021-06-04 ENCOUNTER — OFFICE VISIT (OUTPATIENT)
Dept: ONCOLOGY | Facility: CLINIC | Age: 81
End: 2021-06-04

## 2021-06-04 ENCOUNTER — INFUSION (OUTPATIENT)
Dept: ONCOLOGY | Facility: HOSPITAL | Age: 81
End: 2021-06-04

## 2021-06-04 ENCOUNTER — LAB (OUTPATIENT)
Dept: LAB | Facility: HOSPITAL | Age: 81
End: 2021-06-04

## 2021-06-04 ENCOUNTER — PRE-ADMISSION TESTING (OUTPATIENT)
Dept: PREADMISSION TESTING | Facility: HOSPITAL | Age: 81
End: 2021-06-04

## 2021-06-04 VITALS
SYSTOLIC BLOOD PRESSURE: 131 MMHG | OXYGEN SATURATION: 95 % | BODY MASS INDEX: 18.1 KG/M2 | TEMPERATURE: 97.8 F | RESPIRATION RATE: 16 BRPM | HEIGHT: 64 IN | HEART RATE: 61 BPM | WEIGHT: 106 LBS | DIASTOLIC BLOOD PRESSURE: 72 MMHG

## 2021-06-04 VITALS
HEART RATE: 62 BPM | WEIGHT: 106.1 LBS | BODY MASS INDEX: 18.12 KG/M2 | OXYGEN SATURATION: 96 % | TEMPERATURE: 98.2 F | SYSTOLIC BLOOD PRESSURE: 132 MMHG | HEIGHT: 64 IN | RESPIRATION RATE: 16 BRPM | DIASTOLIC BLOOD PRESSURE: 68 MMHG

## 2021-06-04 DIAGNOSIS — Z17.0 MALIGNANT NEOPLASM OF OVERLAPPING SITES OF RIGHT BREAST IN FEMALE, ESTROGEN RECEPTOR POSITIVE (HCC): Primary | ICD-10-CM

## 2021-06-04 DIAGNOSIS — C50.811 MALIGNANT NEOPLASM OF OVERLAPPING SITES OF RIGHT BREAST IN FEMALE, ESTROGEN RECEPTOR POSITIVE (HCC): Primary | ICD-10-CM

## 2021-06-04 DIAGNOSIS — J91.0 MALIGNANT PLEURAL EFFUSION: ICD-10-CM

## 2021-06-04 LAB
ANION GAP SERPL CALCULATED.3IONS-SCNC: 12.5 MMOL/L (ref 5–15)
BASOPHILS # BLD AUTO: 0.11 10*3/MM3 (ref 0–0.2)
BASOPHILS NFR BLD AUTO: 1.4 % (ref 0–1.5)
BUN SERPL-MCNC: 22 MG/DL (ref 8–23)
BUN/CREAT SERPL: 18 (ref 7–25)
CALCIUM SPEC-SCNC: 9.3 MG/DL (ref 8.6–10.5)
CANCER AG15-3 SERPL-ACNC: 54.3 U/ML
CHLORIDE SERPL-SCNC: 102 MMOL/L (ref 98–107)
CO2 SERPL-SCNC: 25.5 MMOL/L (ref 22–29)
CREAT SERPL-MCNC: 1.22 MG/DL (ref 0.57–1)
DEPRECATED RDW RBC AUTO: 51 FL (ref 37–54)
EOSINOPHIL # BLD AUTO: 0.08 10*3/MM3 (ref 0–0.4)
EOSINOPHIL NFR BLD AUTO: 1 % (ref 0.3–6.2)
ERYTHROCYTE [DISTWIDTH] IN BLOOD BY AUTOMATED COUNT: 15.7 % (ref 12.3–15.4)
GFR SERPL CREATININE-BSD FRML MDRD: 42 ML/MIN/1.73
GLUCOSE SERPL-MCNC: 141 MG/DL (ref 65–99)
HCT VFR BLD AUTO: 30.8 % (ref 34–46.6)
HGB BLD-MCNC: 10 G/DL (ref 12–15.9)
IMM GRANULOCYTES # BLD AUTO: 0.03 10*3/MM3 (ref 0–0.05)
IMM GRANULOCYTES NFR BLD AUTO: 0.4 % (ref 0–0.5)
INR PPP: 0.98 (ref 0.9–1.1)
LYMPHOCYTES # BLD AUTO: 0.63 10*3/MM3 (ref 0.7–3.1)
LYMPHOCYTES NFR BLD AUTO: 8.1 % (ref 19.6–45.3)
MCH RBC QN AUTO: 29.2 PG (ref 26.6–33)
MCHC RBC AUTO-ENTMCNC: 32.5 G/DL (ref 31.5–35.7)
MCV RBC AUTO: 90.1 FL (ref 79–97)
MONOCYTES # BLD AUTO: 0.83 10*3/MM3 (ref 0.1–0.9)
MONOCYTES NFR BLD AUTO: 10.7 % (ref 5–12)
NEUTROPHILS NFR BLD AUTO: 6.09 10*3/MM3 (ref 1.7–7)
NEUTROPHILS NFR BLD AUTO: 78.4 % (ref 42.7–76)
NRBC BLD AUTO-RTO: 0 /100 WBC (ref 0–0.2)
PLATELET # BLD AUTO: 409 10*3/MM3 (ref 140–450)
PMV BLD AUTO: 9.4 FL (ref 6–12)
POTASSIUM SERPL-SCNC: 4.4 MMOL/L (ref 3.5–5.2)
PROTHROMBIN TIME: 12.8 SECONDS (ref 11.7–14.2)
QT INTERVAL: 426 MS
RBC # BLD AUTO: 3.42 10*6/MM3 (ref 3.77–5.28)
SODIUM SERPL-SCNC: 140 MMOL/L (ref 136–145)
WBC # BLD AUTO: 7.77 10*3/MM3 (ref 3.4–10.8)

## 2021-06-04 PROCEDURE — U0005 INFEC AGEN DETEC AMPLI PROBE: HCPCS | Performed by: NURSE PRACTITIONER

## 2021-06-04 PROCEDURE — 85610 PROTHROMBIN TIME: CPT

## 2021-06-04 PROCEDURE — U0004 COV-19 TEST NON-CDC HGH THRU: HCPCS | Performed by: NURSE PRACTITIONER

## 2021-06-04 PROCEDURE — 36415 COLL VENOUS BLD VENIPUNCTURE: CPT

## 2021-06-04 PROCEDURE — 85025 COMPLETE CBC W/AUTO DIFF WBC: CPT

## 2021-06-04 PROCEDURE — 99215 OFFICE O/P EST HI 40 MIN: CPT | Performed by: INTERNAL MEDICINE

## 2021-06-04 PROCEDURE — C9803 HOPD COVID-19 SPEC COLLECT: HCPCS | Performed by: NURSE PRACTITIONER

## 2021-06-04 PROCEDURE — 93005 ELECTROCARDIOGRAM TRACING: CPT

## 2021-06-04 PROCEDURE — 93010 ELECTROCARDIOGRAM REPORT: CPT | Performed by: INTERNAL MEDICINE

## 2021-06-04 PROCEDURE — 96402 CHEMO HORMON ANTINEOPL SQ/IM: CPT

## 2021-06-04 PROCEDURE — 25010000002 FULVESTRANT PER 25 MG: Performed by: INTERNAL MEDICINE

## 2021-06-04 PROCEDURE — 86300 IMMUNOASSAY TUMOR CA 15-3: CPT | Performed by: INTERNAL MEDICINE

## 2021-06-04 PROCEDURE — 80048 BASIC METABOLIC PNL TOTAL CA: CPT

## 2021-06-04 RX ORDER — CHLORHEXIDINE GLUCONATE 500 MG/1
1 CLOTH TOPICAL TAKE AS DIRECTED
COMMUNITY
End: 2021-06-07 | Stop reason: HOSPADM

## 2021-06-04 RX ADMIN — FULVESTRANT 500 MG: 250 INJECTION INTRAMUSCULAR at 11:47

## 2021-06-04 NOTE — DISCHARGE INSTRUCTIONS
Take the following medications the morning of surgery: AMLODIPINE, CLONIDINE, METOPROLOL     ARRIVAL TIME: PER MD PAPERWORK    General Instructions:  • Do not eat solid food after midnight the night before surgery.  • You may drink clear liquids day of surgery but must stop at least one hour before your hospital arrival time.   • It is beneficial for you to have a clear drink that contains carbohydrates the day of surgery.  We suggest a 12 to 20 ounce bottle of Gatorade or Powerade for non-diabetic patients or a 12 to 20 ounce bottle of G2 or Powerade Zero for diabetic patients. (Pediatric patients, are not advised to drink a 12 to 20 ounce carbohydrate drink)    Clear liquids are liquids you can see through.  Nothing red in color.     Plain water                               Sports drinks  Sodas                                   Gelatin (Jell-O)  Fruit juices without pulp such as white grape juice and apple juice  Popsicles that contain no fruit or yogurt  Tea or coffee (no cream or milk added)  Gatorade / Powerade  G2 / Powerade Zero    • Infants may have breast milk up to four hours before surgery.  • Infants drinking formula may drink formula up to six hours before surgery.   • Patients who avoid smoking, chewing tobacco and alcohol for 4 weeks prior to surgery have a reduced risk of post-operative complications.  Quit smoking as many days before surgery as you can.  • Do not smoke, use chewing tobacco or drink alcohol the day of surgery.   • If applicable bring your C-PAP/ BI-PAP machine.  • Bring any papers given to you in the doctor’s office.  • Wear clean comfortable clothes.  • Do not wear contact lenses, false eyelashes or make-up.  Bring a case for your glasses.   • Bring crutches or walker if applicable.  • Remove all piercings.  Leave jewelry and any other valuables at home.  • Hair extensions with metal clips must be removed prior to surgery.  • The Pre-Admission Testing nurse will instruct you to  bring medications if unable to obtain an accurate list in Pre-Admission Testing.        Preventing a Surgical Site Infection:  • For 2 to 3 days before surgery, avoid shaving with a razor because the razor can irritate skin and make it easier to develop an infection.    • Any areas of open skin can increase the risk of a post-operative wound infection by allowing bacteria to enter and travel throughout the body.  Notify your surgeon if you have any skin wounds / rashes even if it is not near the expected surgical site.  The area will need assessed to determine if surgery should be delayed until it is healed.  • The night prior to surgery shower using a fresh bar of anti-bacterial soap (such as Dial) and clean washcloth.  Sleep in a clean bed with clean clothing.  Do not allow pets to sleep with you.  • Shower on the morning of surgery using a fresh bar of anti-bacterial soap (such as Dial) and clean washcloth.  Dry with a clean towel and dress in clean clothing.  • Ask your surgeon if you will be receiving antibiotics prior to surgery.  • Make sure you, your family, and all healthcare providers clean their hands with soap and water or an alcohol based hand  before caring for you or your wound.    Day of surgery:  Your arrival time is approximately two hours before your scheduled surgery time.  Upon arrival, a Pre-op nurse and Anesthesiologist will review your health history, obtain vital signs, and answer questions you may have.  The only belongings needed at this time will be a list of your home medications and if applicable your C-PAP/BI-PAP machine.  A Pre-op nurse will start an IV and you may receive medication in preparation for surgery, including something to help you relax.     Please be aware that surgery does come with discomfort.  We want to make every effort to control your discomfort so please discuss any uncontrolled symptoms with your nurse.   Your doctor will most likely have prescribed pain  medications.      If you are going home after surgery you will receive individualized written care instructions before being discharged.  A responsible adult must drive you to and from the hospital on the day of your surgery and stay with you for 24 hours.  Discharge prescriptions can be filled by the hospital pharmacy during regular pharmacy hours.  If you are having surgery late in the day/evening your prescription may be e-prescribed to your pharmacy.  Please verify your pharmacy hours or chose a 24 hour pharmacy to avoid not having access to your prescription because your pharmacy has closed for the day.    If you are staying overnight following surgery, you will be transported to your hospital room following the recovery period.  [facility] has all private rooms.    If you have any questions please call Pre-Admission Testing at [phone].  Deductibles and co-payments are collected on the day of service. Please be prepared to pay the required co-pay, deductible or deposit on the day of service as defined by your plan.    Patient Education for Self-Quarantine Process    Following your COVID testing, we strongly recommend that you do not leave your home after you have been tested for COVID except to get medical care. This includes not going to work, school or to public areas.  If this is not possible for you to do please limit your activities to only required outings.  Be sure to wear a mask when you are with other people, practice social distancing and wash your hands frequently.      The following items provide additional details to keep you safe.  • Wash your hands with soap and water frequently for at least 20 seconds.   • Avoid touching your eyes, nose and mouth with unwashed hands.  • Do not share anything - utensils, towels, food from the same bowl.   • Have your own utensils, drinking glass, dishes, towels and bedding.   • Do not have visitors.   • Do use FaceTime to stay in touch with  family and friends.  • You should stay in a specific room away from others if possible.   • Stay at least 6 feet away from others in the home if you cannot have a dedicated room to yourself.   • Do not snuggle with your pet. While the CDC says there is no evidence that pets can spread COVID-19 or be infected from humans, it is probably best to avoid “petting, snuggling, being kissed or licked and sharing food (during self-quarantine)”, according to the CDC.   • Sanitize household surfaces daily. Include all high touch areas (door handles, light switches, phones, countertops, etc.)  • Do not share a bathroom with others, if possible.   • Wear a mask around others in your home if you are unable to stay in a separate room or 6 feet apart. If  you are unable to wear a mask, have your family member wear a mask if they must be within 6 feet of you.   Call your surgeon immediately if you experience any of the following symptoms:  • Sore Throat  • Shortness of Breath or difficulty breathing  • Cough  • Chills  • Body soreness or muscle pain  • Headache  • Fever  • New loss of taste or smell  • Do not arrive for your surgery ill.  Your procedure will need to be rescheduled to another time.  You will need to call your physician before the day of surgery to avoid any unnecessary exposure to hospital staff as well as other patients.      CHLORHEXIDINE CLOTH INSTRUCTIONS  The morning of surgery follow these instructions using the Chlorhexidine cloths you've been given.  These steps reduce bacteria on the body.  Do not use the cloths near your eyes, ears mouth, genitalia or on open wounds.  Throw the cloths away after use but do not try to flush them down a toilet.      • Open and remove one cloth at a time from the package.    • Leave the cloth unfolded and begin the bathing.  • Massage the skin with the cloths using gentle pressure to remove bacteria.  Do not scrub harshly.   • Follow the steps below with one 2% CHG cloth per  area (6 total cloths).  • One cloth for neck, shoulders and chest.  • One cloth for both arms, hands, fingers and underarms (do underarms last).  • One cloth for the abdomen followed by groin.  • One cloth for right leg and foot including between the toes.  • One cloth for left leg and foot including between the toes.  • The last cloth is to be used for the back of the neck, back and buttocks.    Allow the CHG to air dry 3 minutes on the skin which will give it time to work and decrease the chance of irritation.  The skin may feel sticky until it is dry.  Do not rinse with water or any other liquid or you will lose the beneficial effects of the CHG.  If mild skin irritation occurs, do rinse the skin to remove the CHG.  Report this to the nurse at time of admission.  Do not apply lotions, creams, ointments, deodorants or perfumes after using the clothes. Dress in clean clothes before coming to the hospital.

## 2021-06-05 LAB — SARS-COV-2 ORF1AB RESP QL NAA+PROBE: NOT DETECTED

## 2021-06-07 ENCOUNTER — ANESTHESIA EVENT (OUTPATIENT)
Dept: PERIOP | Facility: HOSPITAL | Age: 81
End: 2021-06-07

## 2021-06-07 ENCOUNTER — ANESTHESIA (OUTPATIENT)
Dept: PERIOP | Facility: HOSPITAL | Age: 81
End: 2021-06-07

## 2021-06-07 ENCOUNTER — HOME HEALTH ADMISSION (OUTPATIENT)
Dept: HOME HEALTH SERVICES | Facility: HOME HEALTHCARE | Age: 81
End: 2021-06-07

## 2021-06-07 ENCOUNTER — APPOINTMENT (OUTPATIENT)
Dept: GENERAL RADIOLOGY | Facility: HOSPITAL | Age: 81
End: 2021-06-07

## 2021-06-07 ENCOUNTER — HOSPITAL ENCOUNTER (OUTPATIENT)
Facility: HOSPITAL | Age: 81
Setting detail: HOSPITAL OUTPATIENT SURGERY
Discharge: HOME HEALTH | End: 2021-06-07
Attending: THORACIC SURGERY (CARDIOTHORACIC VASCULAR SURGERY) | Admitting: THORACIC SURGERY (CARDIOTHORACIC VASCULAR SURGERY)

## 2021-06-07 VITALS
HEIGHT: 64 IN | OXYGEN SATURATION: 94 % | WEIGHT: 105.5 LBS | RESPIRATION RATE: 16 BRPM | HEART RATE: 67 BPM | SYSTOLIC BLOOD PRESSURE: 147 MMHG | BODY MASS INDEX: 18.01 KG/M2 | DIASTOLIC BLOOD PRESSURE: 75 MMHG | TEMPERATURE: 97.6 F

## 2021-06-07 DIAGNOSIS — Z17.0 MALIGNANT NEOPLASM OF OVERLAPPING SITES OF RIGHT BREAST IN FEMALE, ESTROGEN RECEPTOR POSITIVE: Primary | ICD-10-CM

## 2021-06-07 DIAGNOSIS — C50.811 MALIGNANT NEOPLASM OF OVERLAPPING SITES OF RIGHT BREAST IN FEMALE, ESTROGEN RECEPTOR POSITIVE: Primary | ICD-10-CM

## 2021-06-07 DIAGNOSIS — J91.0 MALIGNANT PLEURAL EFFUSION: ICD-10-CM

## 2021-06-07 LAB — GLUCOSE BLDC GLUCOMTR-MCNC: 113 MG/DL (ref 70–130)

## 2021-06-07 PROCEDURE — C1729 CATH, DRAINAGE: HCPCS | Performed by: THORACIC SURGERY (CARDIOTHORACIC VASCULAR SURGERY)

## 2021-06-07 PROCEDURE — 71045 X-RAY EXAM CHEST 1 VIEW: CPT

## 2021-06-07 PROCEDURE — 25010000002 PROPOFOL 10 MG/ML EMULSION: Performed by: NURSE ANESTHETIST, CERTIFIED REGISTERED

## 2021-06-07 PROCEDURE — 82962 GLUCOSE BLOOD TEST: CPT

## 2021-06-07 PROCEDURE — 25010000003 CEFAZOLIN IN DEXTROSE 2-4 GM/100ML-% SOLUTION: Performed by: THORACIC SURGERY (CARDIOTHORACIC VASCULAR SURGERY)

## 2021-06-07 PROCEDURE — 32550 INSERT PLEURAL CATH: CPT | Performed by: THORACIC SURGERY (CARDIOTHORACIC VASCULAR SURGERY)

## 2021-06-07 PROCEDURE — 25010000003 LIDOCAINE 1 % SOLUTION: Performed by: THORACIC SURGERY (CARDIOTHORACIC VASCULAR SURGERY)

## 2021-06-07 PROCEDURE — 76000 FLUOROSCOPY <1 HR PHYS/QHP: CPT

## 2021-06-07 RX ORDER — LIDOCAINE HYDROCHLORIDE 10 MG/ML
0.5 INJECTION, SOLUTION EPIDURAL; INFILTRATION; INTRACAUDAL; PERINEURAL ONCE AS NEEDED
Status: DISCONTINUED | OUTPATIENT
Start: 2021-06-07 | End: 2021-06-07 | Stop reason: HOSPADM

## 2021-06-07 RX ORDER — FENTANYL CITRATE 50 UG/ML
50 INJECTION, SOLUTION INTRAMUSCULAR; INTRAVENOUS
Status: DISCONTINUED | OUTPATIENT
Start: 2021-06-07 | End: 2021-06-07 | Stop reason: HOSPADM

## 2021-06-07 RX ORDER — MIDAZOLAM HYDROCHLORIDE 1 MG/ML
0.5 INJECTION INTRAMUSCULAR; INTRAVENOUS
Status: DISCONTINUED | OUTPATIENT
Start: 2021-06-07 | End: 2021-06-07 | Stop reason: HOSPADM

## 2021-06-07 RX ORDER — FAMOTIDINE 10 MG/ML
20 INJECTION, SOLUTION INTRAVENOUS ONCE
Status: COMPLETED | OUTPATIENT
Start: 2021-06-07 | End: 2021-06-07

## 2021-06-07 RX ORDER — FLUMAZENIL 0.1 MG/ML
0.2 INJECTION INTRAVENOUS AS NEEDED
Status: DISCONTINUED | OUTPATIENT
Start: 2021-06-07 | End: 2021-06-07 | Stop reason: HOSPADM

## 2021-06-07 RX ORDER — LIDOCAINE HYDROCHLORIDE 10 MG/ML
INJECTION, SOLUTION INFILTRATION; PERINEURAL AS NEEDED
Status: DISCONTINUED | OUTPATIENT
Start: 2021-06-07 | End: 2021-06-07 | Stop reason: HOSPADM

## 2021-06-07 RX ORDER — SODIUM CHLORIDE 0.9 % (FLUSH) 0.9 %
3 SYRINGE (ML) INJECTION EVERY 12 HOURS SCHEDULED
Status: DISCONTINUED | OUTPATIENT
Start: 2021-06-07 | End: 2021-06-07 | Stop reason: HOSPADM

## 2021-06-07 RX ORDER — SODIUM CHLORIDE 0.9 % (FLUSH) 0.9 %
3-10 SYRINGE (ML) INJECTION AS NEEDED
Status: DISCONTINUED | OUTPATIENT
Start: 2021-06-07 | End: 2021-06-07 | Stop reason: HOSPADM

## 2021-06-07 RX ORDER — PROPOFOL 10 MG/ML
VIAL (ML) INTRAVENOUS CONTINUOUS PRN
Status: DISCONTINUED | OUTPATIENT
Start: 2021-06-07 | End: 2021-06-07 | Stop reason: SURG

## 2021-06-07 RX ORDER — CEFAZOLIN SODIUM 2 G/100ML
2 INJECTION, SOLUTION INTRAVENOUS ONCE
Status: COMPLETED | OUTPATIENT
Start: 2021-06-07 | End: 2021-06-07

## 2021-06-07 RX ORDER — DIPHENHYDRAMINE HYDROCHLORIDE 50 MG/ML
12.5 INJECTION INTRAMUSCULAR; INTRAVENOUS
Status: DISCONTINUED | OUTPATIENT
Start: 2021-06-07 | End: 2021-06-07 | Stop reason: HOSPADM

## 2021-06-07 RX ORDER — LIDOCAINE HYDROCHLORIDE 20 MG/ML
INJECTION, SOLUTION INFILTRATION; PERINEURAL AS NEEDED
Status: DISCONTINUED | OUTPATIENT
Start: 2021-06-07 | End: 2021-06-07 | Stop reason: SURG

## 2021-06-07 RX ORDER — PROPOFOL 10 MG/ML
VIAL (ML) INTRAVENOUS AS NEEDED
Status: DISCONTINUED | OUTPATIENT
Start: 2021-06-07 | End: 2021-06-07 | Stop reason: SURG

## 2021-06-07 RX ORDER — ONDANSETRON 2 MG/ML
4 INJECTION INTRAMUSCULAR; INTRAVENOUS ONCE AS NEEDED
Status: DISCONTINUED | OUTPATIENT
Start: 2021-06-07 | End: 2021-06-07 | Stop reason: HOSPADM

## 2021-06-07 RX ORDER — LABETALOL HYDROCHLORIDE 5 MG/ML
5 INJECTION, SOLUTION INTRAVENOUS
Status: DISCONTINUED | OUTPATIENT
Start: 2021-06-07 | End: 2021-06-07 | Stop reason: HOSPADM

## 2021-06-07 RX ORDER — NALOXONE HCL 0.4 MG/ML
0.2 VIAL (ML) INJECTION AS NEEDED
Status: DISCONTINUED | OUTPATIENT
Start: 2021-06-07 | End: 2021-06-07 | Stop reason: HOSPADM

## 2021-06-07 RX ORDER — CEPHALEXIN 250 MG/1
250 CAPSULE ORAL 3 TIMES DAILY
Qty: 15 CAPSULE | Refills: 0 | Status: SHIPPED | OUTPATIENT
Start: 2021-06-07 | End: 2021-06-12

## 2021-06-07 RX ORDER — EPHEDRINE SULFATE 50 MG/ML
5 INJECTION, SOLUTION INTRAVENOUS ONCE AS NEEDED
Status: DISCONTINUED | OUTPATIENT
Start: 2021-06-07 | End: 2021-06-07 | Stop reason: HOSPADM

## 2021-06-07 RX ORDER — SODIUM CHLORIDE, SODIUM LACTATE, POTASSIUM CHLORIDE, CALCIUM CHLORIDE 600; 310; 30; 20 MG/100ML; MG/100ML; MG/100ML; MG/100ML
9 INJECTION, SOLUTION INTRAVENOUS CONTINUOUS
Status: DISCONTINUED | OUTPATIENT
Start: 2021-06-07 | End: 2021-06-07 | Stop reason: HOSPADM

## 2021-06-07 RX ADMIN — PROPOFOL 100 MCG/KG/MIN: 10 INJECTION, EMULSION INTRAVENOUS at 09:28

## 2021-06-07 RX ADMIN — LIDOCAINE HYDROCHLORIDE 40 MG: 20 INJECTION, SOLUTION INFILTRATION; PERINEURAL at 09:28

## 2021-06-07 RX ADMIN — SODIUM CHLORIDE, POTASSIUM CHLORIDE, SODIUM LACTATE AND CALCIUM CHLORIDE 9 ML/HR: 600; 310; 30; 20 INJECTION, SOLUTION INTRAVENOUS at 08:58

## 2021-06-07 RX ADMIN — PROPOFOL 30 MG: 10 INJECTION, EMULSION INTRAVENOUS at 09:28

## 2021-06-07 RX ADMIN — FAMOTIDINE 20 MG: 10 INJECTION INTRAVENOUS at 09:15

## 2021-06-07 RX ADMIN — CEFAZOLIN SODIUM 2 G: 2 INJECTION, SOLUTION INTRAVENOUS at 09:21

## 2021-06-07 NOTE — ADDENDUM NOTE
Addendum  created 06/07/21 1107 by Arun Iyer MD    Attestation recorded in Intraprocedure, Intraprocedure Attestations filed

## 2021-06-07 NOTE — ANESTHESIA POSTPROCEDURE EVALUATION
"Patient: Mar Burgos    Procedure Summary     Date: 06/07/21 Room / Location: [facility] OR  / [facility] MAIN OR    Anesthesia Start: 0925 Anesthesia Stop: 1004    Procedure: PLEURX CATHETER INSERTION (Right ) Diagnosis:       Malignant pleural effusion      (Malignant pleural effusion [J91.0])    Surgeons: Yusef Larson III, MD Provider: Arun Iyer MD    Anesthesia Type: MAC ASA Status: 3          Anesthesia Type: MAC    Vitals  Vitals Value Taken Time   /66 06/07/21 1015   Temp     Pulse     Resp     SpO2 98 % 06/07/21 1018   Vitals shown include unvalidated device data.        Post Anesthesia Care and Evaluation    Patient location during evaluation: PHASE II  Patient participation: complete - patient participated  Level of consciousness: awake and alert  Pain management: adequate  Airway patency: patent  Anesthetic complications: No anesthetic complications    Cardiovascular status: acceptable  Respiratory status: acceptable  Hydration status: acceptable    Comments: /74 (BP Location: Right arm, Patient Position: Lying)   Pulse 68   Temp 36.7 °C (98.1 °F) (Oral)   Resp 16   Ht 162.6 cm (64\")   Wt 47.9 kg (105 lb 8 oz)   SpO2 95%   BMI 18.11 kg/m²       "

## 2021-06-07 NOTE — ANESTHESIA PREPROCEDURE EVALUATION
Anesthesia Evaluation     Patient summary reviewed and Nursing notes reviewed   no history of anesthetic complications:               Airway   Mallampati: II  TM distance: >3 FB  Neck ROM: full  no difficulty expected  Dental - normal exam     Pulmonary     breath sounds clear to auscultation  (+) pleural effusion, a smoker Former,   (-) shortness of breath, sleep apnea, decreased breath sounds, wheezes  Cardiovascular - normal exam  Exercise tolerance: good (4-7 METS)    Rhythm: regular  Rate: normal    (+) hypertension, PVD, hyperlipidemia,   (-) past MI, angina, CHF, orthopnea, PND, ROGER      Neuro/Psych  (+) TIA, CVA, numbness (neuropathy),     (-) seizures, neuromuscular disease, dizziness/light headedness, weakness  GI/Hepatic/Renal/Endo    (+)  GERD,  renal disease CRI, diabetes mellitus,   (-) liver disease    Musculoskeletal     Abdominal  - normal exam   Substance History - negative use  (-) alcohol use, drug use     OB/GYN negative ob/gyn ROS         Other   arthritis,    history of cancer                    Anesthesia Plan    ASA 3     MAC   (D/W pt. MAC and possible awareness intra op.  Pt understands MAC and GA are not the same and the possibility of GA being required for failed MAC)  intravenous induction     Anesthetic plan, all risks, benefits, and alternatives have been provided, discussed and informed consent has been obtained with: patient.

## 2021-06-08 ENCOUNTER — HOME CARE VISIT (OUTPATIENT)
Dept: HOME HEALTH SERVICES | Facility: HOME HEALTHCARE | Age: 81
End: 2021-06-08

## 2021-06-08 VITALS
SYSTOLIC BLOOD PRESSURE: 138 MMHG | TEMPERATURE: 98.4 F | BODY MASS INDEX: 17.93 KG/M2 | HEIGHT: 64 IN | DIASTOLIC BLOOD PRESSURE: 60 MMHG | OXYGEN SATURATION: 96 % | WEIGHT: 105 LBS | RESPIRATION RATE: 16 BRPM | HEART RATE: 79 BPM

## 2021-06-08 PROCEDURE — G0299 HHS/HOSPICE OF RN EA 15 MIN: HCPCS

## 2021-06-09 ENCOUNTER — HOME CARE VISIT (OUTPATIENT)
Dept: HOME HEALTH SERVICES | Facility: HOME HEALTHCARE | Age: 81
End: 2021-06-09

## 2021-06-09 PROCEDURE — G0299 HHS/HOSPICE OF RN EA 15 MIN: HCPCS

## 2021-06-10 VITALS
DIASTOLIC BLOOD PRESSURE: 58 MMHG | TEMPERATURE: 97.8 F | HEART RATE: 79 BPM | OXYGEN SATURATION: 97 % | RESPIRATION RATE: 20 BRPM | SYSTOLIC BLOOD PRESSURE: 120 MMHG

## 2021-06-11 ENCOUNTER — HOME CARE VISIT (OUTPATIENT)
Dept: HOME HEALTH SERVICES | Facility: HOME HEALTHCARE | Age: 81
End: 2021-06-11

## 2021-06-11 PROCEDURE — G0299 HHS/HOSPICE OF RN EA 15 MIN: HCPCS

## 2021-06-14 ENCOUNTER — HOME CARE VISIT (OUTPATIENT)
Dept: HOME HEALTH SERVICES | Facility: HOME HEALTHCARE | Age: 81
End: 2021-06-14

## 2021-06-14 VITALS
OXYGEN SATURATION: 95 % | HEART RATE: 97 BPM | SYSTOLIC BLOOD PRESSURE: 148 MMHG | RESPIRATION RATE: 18 BRPM | DIASTOLIC BLOOD PRESSURE: 64 MMHG | TEMPERATURE: 98.8 F

## 2021-06-14 VITALS
RESPIRATION RATE: 20 BRPM | HEART RATE: 86 BPM | TEMPERATURE: 98.5 F | SYSTOLIC BLOOD PRESSURE: 132 MMHG | OXYGEN SATURATION: 96 % | DIASTOLIC BLOOD PRESSURE: 58 MMHG

## 2021-06-14 PROCEDURE — G0299 HHS/HOSPICE OF RN EA 15 MIN: HCPCS

## 2021-06-14 PROCEDURE — G0180 MD CERTIFICATION HHA PATIENT: HCPCS | Performed by: NURSE PRACTITIONER

## 2021-06-18 ENCOUNTER — HOME CARE VISIT (OUTPATIENT)
Dept: HOME HEALTH SERVICES | Facility: HOME HEALTHCARE | Age: 81
End: 2021-06-18

## 2021-06-18 PROCEDURE — G0299 HHS/HOSPICE OF RN EA 15 MIN: HCPCS

## 2021-06-21 ENCOUNTER — HOME CARE VISIT (OUTPATIENT)
Dept: HOME HEALTH SERVICES | Facility: HOME HEALTHCARE | Age: 81
End: 2021-06-21

## 2021-06-21 VITALS
SYSTOLIC BLOOD PRESSURE: 118 MMHG | HEART RATE: 76 BPM | OXYGEN SATURATION: 95 % | DIASTOLIC BLOOD PRESSURE: 58 MMHG | TEMPERATURE: 98 F | RESPIRATION RATE: 20 BRPM

## 2021-06-21 PROCEDURE — G0299 HHS/HOSPICE OF RN EA 15 MIN: HCPCS

## 2021-06-22 VITALS
DIASTOLIC BLOOD PRESSURE: 60 MMHG | RESPIRATION RATE: 18 BRPM | SYSTOLIC BLOOD PRESSURE: 120 MMHG | OXYGEN SATURATION: 94 % | HEART RATE: 77 BPM | TEMPERATURE: 98.6 F

## 2021-06-24 ENCOUNTER — HOME CARE VISIT (OUTPATIENT)
Dept: HOME HEALTH SERVICES | Facility: HOME HEALTHCARE | Age: 81
End: 2021-06-24

## 2021-06-24 VITALS
RESPIRATION RATE: 18 BRPM | SYSTOLIC BLOOD PRESSURE: 128 MMHG | OXYGEN SATURATION: 96 % | HEART RATE: 77 BPM | TEMPERATURE: 98.8 F | DIASTOLIC BLOOD PRESSURE: 68 MMHG

## 2021-06-24 PROCEDURE — G0299 HHS/HOSPICE OF RN EA 15 MIN: HCPCS

## 2021-06-25 ENCOUNTER — HOME CARE VISIT (OUTPATIENT)
Dept: HOME HEALTH SERVICES | Facility: HOME HEALTHCARE | Age: 81
End: 2021-06-25

## 2021-06-28 ENCOUNTER — HOME CARE VISIT (OUTPATIENT)
Dept: HOME HEALTH SERVICES | Facility: HOME HEALTHCARE | Age: 81
End: 2021-06-28

## 2021-06-28 PROCEDURE — G0299 HHS/HOSPICE OF RN EA 15 MIN: HCPCS

## 2021-06-29 VITALS
OXYGEN SATURATION: 95 % | HEART RATE: 67 BPM | DIASTOLIC BLOOD PRESSURE: 68 MMHG | RESPIRATION RATE: 18 BRPM | TEMPERATURE: 98 F | SYSTOLIC BLOOD PRESSURE: 106 MMHG

## 2021-06-30 ENCOUNTER — HOME CARE VISIT (OUTPATIENT)
Dept: HOME HEALTH SERVICES | Facility: HOME HEALTHCARE | Age: 81
End: 2021-06-30

## 2021-06-30 ENCOUNTER — OFFICE VISIT (OUTPATIENT)
Dept: SURGERY | Facility: CLINIC | Age: 81
End: 2021-06-30

## 2021-06-30 ENCOUNTER — HOSPITAL ENCOUNTER (OUTPATIENT)
Dept: GENERAL RADIOLOGY | Facility: HOSPITAL | Age: 81
Discharge: HOME | End: 2021-06-30
Admitting: NURSE PRACTITIONER

## 2021-06-30 VITALS
OXYGEN SATURATION: 95 % | WEIGHT: 105 LBS | SYSTOLIC BLOOD PRESSURE: 122 MMHG | BODY MASS INDEX: 17.93 KG/M2 | HEART RATE: 79 BPM | DIASTOLIC BLOOD PRESSURE: 72 MMHG | HEIGHT: 64 IN

## 2021-06-30 DIAGNOSIS — C50.811 MALIGNANT NEOPLASM OF OVERLAPPING SITES OF RIGHT BREAST IN FEMALE, ESTROGEN RECEPTOR POSITIVE (HCC): ICD-10-CM

## 2021-06-30 DIAGNOSIS — J91.0 MALIGNANT PLEURAL EFFUSION: Primary | ICD-10-CM

## 2021-06-30 DIAGNOSIS — Z17.0 MALIGNANT NEOPLASM OF OVERLAPPING SITES OF RIGHT BREAST IN FEMALE, ESTROGEN RECEPTOR POSITIVE (HCC): ICD-10-CM

## 2021-06-30 DIAGNOSIS — J91.0 MALIGNANT PLEURAL EFFUSION: ICD-10-CM

## 2021-06-30 PROCEDURE — 71046 X-RAY EXAM CHEST 2 VIEWS: CPT

## 2021-06-30 PROCEDURE — 99214 OFFICE O/P EST MOD 30 MIN: CPT | Performed by: NURSE PRACTITIONER

## 2021-06-30 PROCEDURE — 36593 DECLOT VASCULAR DEVICE: CPT | Performed by: NURSE PRACTITIONER

## 2021-06-30 NOTE — PROGRESS NOTES
"Chief Complaint  Recurrent pleural effusion with Pleurx catheter    Subjective          Mar Burgos presents to [facility] for further evaluation of a recurrent right pleural effusion secondary to metastatic breast cancer. Ms. Burgos underwent right pleural catheter insertion by Dr. Yusef Larson on 6/7/2021.  Her daughter has been assisting her with Pleurx drainage.  Initially she was draining anywhere from 250 to 400 cc of fluid every Monday, Wednesday and Friday.  Over the last week the amount of fluid has dropped off.  She presents today for her first postoperative visit with a chest x-ray.  The patient is in a wheelchair and is accompanied by her daughter.  The patient denies fever, chills or hemoptysis.  Her only complaint is some intermittent right lower back pain.    History of Present Illness    Objective   Vital Signs:   /72 (BP Location: Right arm, Patient Position: Sitting, Cuff Size: Adult)   Pulse 79   Ht 162.6 cm (64\")   Wt 47.6 kg (105 lb)   SpO2 95%   BMI 18.02 kg/m²     Physical Exam  Vitals and nursing note reviewed.   Constitutional:       Appearance: She is well-groomed. She is cachectic.   HENT:      Head: Normocephalic and atraumatic.   Cardiovascular:      Rate and Rhythm: Normal rate and regular rhythm.      Pulses: Normal pulses.   Pulmonary:      Effort: Pulmonary effort is normal. No respiratory distress.      Breath sounds: No wheezing, rhonchi or rales.   Chest:      Chest wall: No swelling or tenderness.      Comments: Right Pleurx catheter in good position.  Surrounding skin is clean, dry without erythema or signs of infection.  Abdominal:      General: Bowel sounds are normal. There is no distension.      Palpations: Abdomen is soft. There is no mass.   Musculoskeletal:      Cervical back: Neck supple.   Skin:     General: Skin is warm and dry.      Coloration: Skin is pale.   Neurological:      General: No focal deficit present. "      Mental Status: She is alert and oriented to person, place, and time. Mental status is at baseline.   Psychiatric:         Behavior: Behavior is cooperative.        Result Review :   The following data was reviewed by: JEFFREY Floyd on 06/30/2021:    Data reviewed: Radiologic studies PA and lateral chest x-ray performed prior to the patient's appointment today was independently reviewed.  Previously seen basilar pneumothorax is no longer visible.  There is a small right pleural effusion with associated right basilar atelectasis.  Stable interstitial opacities throughout right lung.  Left lung is clear.         Assessment and Plan    Diagnoses and all orders for this visit:    1. Malignant pleural effusion (Primary)  -     alteplase (CATHFLO/ACTIVASE) injection 4 mg  -     XR Chest 2 View; Future    2. 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 in female, estrogen receptor positive (CMS/HCC)  -     XR Chest 2 View; Future    Ms. Burgos reports that she has had improvement in shortness of air with Pleurx catheter drainage, however drainage has recently dropped off.  I removed the dressing and assessed the site.  No erythema or tenderness.  I removed the surgical suture without difficulty.  There is a minimal amount of right pleural fluid seen on today's chest x-ray with some associated atelectasis.  She may not have much fluid to drain, versus a clotted Pleurx.  I elected to infuse 4 mg of Activase intrapleurally.  No resistance met.  Patient tolerated well.  Pleurx was then capped off and redressed.  Recommend drainage of Pleurx in 48 hours.  Patient can likely then resume regular drainage 2 times per week.  Follow-up to see me in 6 weeks with a hospital performed chest x-ray.  Recommended lidocaine patches and Tylenol for musculoskeletal back pain.  Avoid NSAID use due to Plavix.    I spent 34 minutes caring for Mar on this date of service. This time includes time spent by me in the  following activities:preparing for the visit, reviewing tests, obtaining and/or reviewing a separately obtained history, performing a medically appropriate examination and/or evaluation , counseling and educating the patient/family/caregiver, ordering medications, tests, or procedures, referring and communicating with other health care professionals , documenting information in the medical record, independently interpreting results and communicating that information with the patient/family/caregiver and care coordination  Follow Up   Return in about 6 weeks (around 8/11/2021) for Recheck with chest x-ray.  Patient was given instructions and counseling regarding her condition or for health maintenance advice. Please see specific information pulled into the AVS if appropriate.

## 2021-07-01 NOTE — PROGRESS NOTES
Subjective     REASON FOR FOLLOW-UP:   1. Locally advanced right breast cancer ER NV positive HER-2 positive  · Initiated Arimidex 9/11/2019  · Radiation therapy added, completing a total of 25 fractions as of 4/27/2020    2. Remote history of left breast cancer 1978 treated with mastectomy alone                               REQUESTING PHYSICIAN: MD Vladimir Cowart    History of Present Illness Ms. Burgos is a 80 y.o. female with above medical history who returns today in anticipation of Faslodex.  She is with her daughter who gives most of the history.      She was started on Faslodex and we attempted to give her very low-dose Ibrance 75 mg 2 weeks on and 2 weeks off and despite this she had tremendous toxicity.  She became profoundly anemic, and thrombocytopenic and her creatinine went up to 2.3  and she was also leukopenic    She is finally recovered from that toxicity and is feeling somewhat better but is adamantly opposed to trying any more Ibrance and not even abemaciclib     She is reviewed today, 7/2/2021, and is feeling reasonably well today.  She is eating and drinking adequately.  She does experience constipation, this is controlled with Senokot-S.  She denies nausea or vomiting.  Her abdominal pain has resolved.  She denies worsening shortness of breath.  She had a Pleurx catheter placed on 6/7/2021.  She was evaluated by thoracic surgery yesterday, who feel that her pleurex catheter may be clogged so she currently has Cathflo in place.  Her daughter was instructed to drain the Pleurx later today and follow-up with thoracic surgery if she has any problems.    They deny other concerns at this time    Dr. Marcus discussed her DNR status and she is a DNR    Past Medical History:   Diagnosis Date   • Anemia     required prior blood transfusions   • Arthritis    • Cancer (CMS/HCC)     right breast cancer    • Clostridium difficile  colitis    • COVID-19 vaccine series completed     PFIZER   • Diabetes mellitus (CMS/HCC)    • Difficulty walking    • Diverticulitis    • Environmental allergies    • History of malignant neoplasm of breast 2013    denies any chemo or radiation   • History of malignant neoplasm of skin 2013    possibly basal   • History of tobacco use 2013   • History of transfusion    • HL (hearing loss)    • Hyperlipidemia    • Hypertension    • Kidney disease    • Neuropathy in diabetes (CMS/HCC)    • Peripheral neuropathy    • Shingles    • Skin cancer    • Stroke (CMS/HCC) 2019   • Vision loss       Patient is  3 para 3 with 2 miscarriages menarche was at age 9 menopause at age 49st childbirth was at age 24 she breast-fed for at least 6 months she did not take hormone replacement therapy because at age 38 she developed breast cancer in the left breast was treated at the [facility] with a modified radical mastectomy in  no radiation or hormonal blockade was given in details of the scans were not available at this time.    Family history is positive for father dying at age 84 with disseminated abdominal cancer of unknown type mother  at 77 of natural causes she has a brother with kidney cancer at age 65 who  of this and a sister with melanoma at age 30    Patient currently lives with her daughter does not smoke currently but smoked for 70 years 1 pack of cigarettes is not a drinker.  She uses a cane to walk because her balance is poor she has issues with swallowing from her stroke      Past Surgical History:   Procedure Laterality Date   • CATARACT EXTRACTION, BILATERAL     • EAR TUBES     • MASTECTOMY Left     radical   • PLEURAL CATHETER INSERTION Right 2021    Procedure: PLEURX CATHETER INSERTION;  Surgeon: Yusef Larson III, MD;  Location: [facility];  Service: Thoracic;  Laterality: Right;   • TUMOR REMOVAL Left     large lipoma removed from hip      HEME/ONC  HISTORY:  patient is an 79-year-old female with multiple medical issues including hypertension diabetes hypercholesterolemia with a recent stroke in February of this year which is resulted in left-sided weakness and difficulty swallowing.  The patient noted changes in her breast over the last year and the last few months changes to the nipple specifically with developing lump in both the right breast and under the right arm.  She brought to the attention of her family physician who sent her for imaging and biopsy of obvious tumor.  Mammogram revealed a large irregular mass measuring 6 cm with skin retraction nipple retraction and skin thickening.  At the 10 o'clock position there appeared to be some skin involvement.    She was also noted clinically to have large axillary lymph nodes.  Ultrasound was obtained revealing an irregular solid mass with indistinct margins measuring approximately 64 x 51 mm.  On ultrasound there were also some enlarged axillary lymph nodes measuring about 30 mm in greatest size.     A biopsy was recommended and has revealed a grade 2 invasive ductal cancer that is ER positive at 98%, ID positive at 82%, and HER-2 equivocal.  The Ki-67 is 20.81%.     Patient was then referred to Dr. Gonzalez who examined her and did not feel surgical excision was possible because there would be a large defect from her tumor and was wanting to consider neoadjuvant therapy in an effort to shrink the tumor.  The patient was presented at the multidisciplinary conference and because of her comorbidities her recent stroke and her overall general condition we did not think she was a good candidate for neoadjuvant chemotherapy and she was referred to us to discuss endocrine therapy.    In the interim additional testing on her tumor was done at [facility] because of the indeterminate HER-2 FISH and this revealed that there was 3+ HER-2 activity by IHC and therefore this is considered to be HER-2 positive  The patient  had staging work-up at the recommendation of the multidisciplinary clinic And CT of the chest showed calcified granulomatous disease with mild AP window adenopathy which is increased from previous CAT scan in 2016 precarinal adenopathy also increased.  It showed the breast mass as well as right axillary adenopathy.  In the abdomen there was a left adrenal nodule which is increased in size from 1.4 to 1.8 cm felt to be an adenoma in addition there was a small sclerotic lesion in the left iliac crest and metastasis could not be completely excluded although the bone scan showed no uptake in this area.  The bone scan did show a fracture and uptake in the left greater trochanter and uptake of the left superior pubic ramus that represents a superior ramus fracture of with no other signs of metastatic disease-patient did fall in July of this year but no obvious fractures were seen on x-ray.    Pt did not want chemotherapy-Patient initiating Arimidex therapy in mid September 2019.     the patient was hospitalized in late September, presenting with garbled speech and elevated blood pressure, felt to ultimately have had a TIA related to hypertensive urgency and small vessel disease.  Neurology recommended dual antiplatelet therapy for 3 months followed by Plavix alone.  Her antihypertensives were adjusted.  At that time she was also found to be B12 deficient with a level of 158 and iron deficient with an iron percent of 7%.  She was started on oral B12 and iron.    She states that the iron does cause some constipation but it is manageable with stool softeners at this time.  There was some discussion in the hospital record that if she could not tolerate oral iron IV iron should be considered.  Hemoglobin is improved up to 9.7 from discharge hemoglobin of 9.0.    2/20  he has not had a DEXA scan in many years and we repeated in February and it shows moderate osteopenia in the spine osteoporosis and left hip and severe  osteopenia in the right hip.  I am not inclined to switch her to tamoxifen at this point and leave her room at acceptable see her back in 3 months and decide whether to add Prolia    2/21  Progressive disease with malignant pleural effusion ER positive still decision made to try Faslodex and low-dose Ibrance 75 mg 2 weeks on 2 weeks off    5/21  She was here 2 weeks ago and found to have elevated creatinine of 2.2, BUN 49. She received IV fluids. She was seen last week during her off week of Ibrance and was noting new right upper quadrant abdominal pain reportedly x2 weeks. Very tender on exam. He was also noting some increase shortness of breath. She was sent for CT of the abdomen/pelvis and ultrasound of the gallbladder. Imaging showed small stones in the gallbladder and/or sludge with pericholecystic fluid. Diverticulosis without evidence of diverticulitis. Patient was referred to Dr. Dolores Solis, surgery, for evaluation of possible cholecystitis.    Patient's hemoglobin declined further to 6.8 last week requiring transfusion of 2 units PRBCs given 5/15/2021.  She also received additional IV fluids the day prior for continued elevation of BUN at 39, creatinine 2.0.    The patient is reviewed back now, accompanied by her daughter, with hemoglobin improved to 10.2.  Patient is currently finishing up her second off week of Ibrance and WBC count has improved to 3.8, platelet count normalized to 250,000.  The patient is tentatively due to restart Ibrance tomorrow, after discussion with Dr. Marcus, it is not felt the patient can tolerate Ibrance considering how poorly she did was a 75 mg dose.  We discussed that this point keeping her off Ibrance and allowing her to get stronger.      Her BUN remains elevated at 43 today, creatinine 1.8.  Her baseline creatinine is more around 1.4-1.6.  Patient did see nephrology, Dr. Stanley, yesterday.  We will request records.  Patient's daughter states it was felt the patient was  acutely dehydrated due to poor tolerance with Ibrance, specifically with nausea and poor oral intake.  We will give her additional IV fluids in the office today.        Current Outpatient Medications on File Prior to Visit   Medication Sig Dispense Refill   • acetaminophen (TYLENOL) 500 MG tablet Take 500 mg by mouth Every 6 (Six) Hours As Needed for Mild Pain .     • amLODIPine (NORVASC) 10 MG tablet Take 1 tablet by mouth Daily. (Patient taking differently: Take 5 mg by mouth Daily.) 90 tablet 3   • cetirizine (zyrTEC) 10 MG tablet Take 10 mg by mouth Every Night.     • cloNIDine (CATAPRES) 0.2 MG tablet TAKE 1 TABLET BY MOUTH EVERY 12 HOURS (Patient taking differently: Take 0.2 mg by mouth 2 (Two) Times a Day. Caution: Look alike/sound alike drug alert. Please read the label.) 180 tablet 0   • clopidogrel (PLAVIX) 75 MG tablet TAKE 1 TABLET BY MOUTH DAILY (Patient taking differently: Take 75 mg by mouth Daily. HOLD 5 DAYS PRIOR TO OR) 90 tablet 3   • ferrous sulfate (IRON SUPPLEMENT) 325 (65 FE) MG tablet Take 1 tablet by mouth 2 (Two) Times a Day Before Meals. 60 tablet 0   • fluticasone (FLONASE) 50 MCG/ACT nasal spray 2 sprays into the nostril(s) as directed by provider Every Night.     • Fulvestrant (FASLODEX IM) Inject  into the appropriate muscle as directed by prescriber Every 30 (Thirty) Days.     • guaiFENesin (MUCINEX) 600 MG 12 hr tablet Take 600 mg by mouth 2 (Two) Times a Day As Needed for Cough.     • hydroCHLOROthiazide (MICROZIDE) 12.5 MG capsule Take 1 capsule by mouth Daily. 90 capsule 3   • loperamide (IMODIUM) 2 MG capsule Take 2 mg by mouth 4 (Four) Times a Day As Needed.     • metFORMIN (GLUCOPHAGE) 1000 MG tablet Take one tablet bid (Patient taking differently: Take 1,000 mg by mouth Every Other Day. Take one tablet bid) 180 tablet 1   • metoprolol succinate XL (TOPROL-XL) 50 MG 24 hr tablet TAKE 1 TABLET BY MOUTH DAILY 90 tablet 3   • ondansetron (ZOFRAN) 8 MG tablet Take 1 tablet by mouth  Every 8 (Eight) Hours As Needed for Nausea or Vomiting. 30 tablet 5   • pravastatin (PRAVACHOL) 20 MG tablet Take 1 tablet by mouth Every Night. 90 tablet 1   • vitamin B-12 (VITAMIN B-12) 2000 MCG tablet Take 1 tablet by mouth Daily. 30 tablet 0     No current facility-administered medications on file prior to visit.        ALLERGIES:    Allergies   Allergen Reactions   • Lisinopril Angioedema   • Lipitor [Atorvastatin] Unknown - High Severity     Leg weakness         Social History     Socioeconomic History   • Marital status:      Spouse name: Not on file   • Number of children: 3   • Years of education: High school   • Highest education level: Not on file   Tobacco Use   • Smoking status: Former Smoker     Packs/day: 0.25     Years: 10.00     Pack years: 2.50     Types: Cigarettes     Quit date: 2019     Years since quittin.3   • Smokeless tobacco: Former User   • Tobacco comment: she quit for 8 years and started back about a year ago then quit again   Substance and Sexual Activity   • Alcohol use: No   • Drug use: No   • Sexual activity: Defer        Family History   Problem Relation Age of Onset   • Stroke Mother 76   • Diabetes Mother    • Hypertension Mother    • Arthritis Mother    • Cancer Father         metastatic unknown cause   • Heart attack Father 70   • Colon cancer Father    • Lung cancer Father    • Cancer Brother         bladder   • Arthritis Sister    • Melanoma Sister    • Hypertension Daughter    • Diabetes Daughter    • Malig Hyperthermia Neg Hx       Review of Systems   Constitutional: Positive for fatigue.   HENT: Positive for hearing loss (same ).    Respiratory: Negative.    Cardiovascular: Negative.    Gastrointestinal: Positive for abdominal pain (improved). Negative for abdominal distention, blood in stool, constipation, nausea and vomiting.   Genitourinary: Negative.    Musculoskeletal: Positive for arthralgias and gait problem. Negative for joint swelling.  "  Neurological: Positive for weakness (left side).   Hematological: Negative.    Psychiatric/Behavioral: Negative.    All other systems reviewed and are negative.      Objective     Vitals:    07/02/21 1111   BP: 126/65   Pulse: 71   Resp: 16   Temp: 98 °F (36.7 °C)   TempSrc: Temporal   SpO2: 96%   Weight: 46.8 kg (103 lb 1.6 oz)   Height: 162.6 cm (64.02\")   PainSc:   3   PainLoc: Back  Comment: right side     Current Status 7/2/2021   ECOG score 1     Physical Exam   Pulmonary/Chest:           GENERAL:  Well-developed, well-nourished in no acute distress.   SKIN:  Warm, dry without rashes, purpura or petechiae.  EYES:  Pupils equal, round and reactive to light.  EOMs intact.  Left eye proptosis.  EARS:  Hearing intact.  NOSE:  Septum midline.  No excoriations or nasal discharge.  MOUTH:  Tongue is well-papillated; no stomatitis or ulcers.  Lips normal.  THROAT:  Oropharynx without lesions or exudates.  NECK:  Supple with good range of motion; no thyromegaly or masses, no JVD.  LYMPHATICS:  No cervical, supraclavicular adenopathy.  CHEST:  Lungs clear to auscultation but poor air movement in the right middle and lower lobes.    BREASTS: Left chest wall is benign; the right breast has a mobile soft tissue mass roughly 4 cm transverse x 5 cm and 2 subcutaneous nodules noted see picture above -No palpable adenopathy in the right axilla, neck or supraclavicular region.   CARDIAC:  Regular rate and rhythm without murmurs, rubs or gallops. Normal S1,S2.  ABDOMEN: Bowel sounds active.  Soft, nontender.  EXTREMITIES:  No clubbing, cyanosis 2+lymphedema right arm and 1+ in both legs  NEUROLOGICAL: Left-sided weakness.  PSYCHIATRIC:  Normal affect and mood.      I have reexamined the patient and the results are consistent with the previously documented exam. JEFFREY Schilling       RECENT LABS:  Results from last 7 days   Lab Units 07/02/21  1118   WBC 10*3/mm3 5.74   NEUTROS ABS 10*3/mm3 3.94   HEMOGLOBIN g/dL 8.9* "   HEMATOCRIT % 28.9*   PLATELETS 10*3/mm3 266     Results from last 7 days   Lab Units 07/02/21  1118   SODIUM mmol/L 140   POTASSIUM mmol/L 4.7   CHLORIDE mmol/L 103   CO2 mmol/L 27.2   BUN mg/dL 34*   CREATININE mg/dL 1.36*   CALCIUM mg/dL 9.6   ALBUMIN g/dL 3.70   BILIRUBIN mg/dL <0.2*   ALK PHOS U/L 80   ALT (SGPT) U/L <5   AST (SGOT) U/L 10   GLUCOSE mg/dL 145*                         MRI BRAIN     IMPRESSION:  Old right basal ganglia infarct. No acute abnormality  identified. No metastatic disease is identified at the brain or the  head.    Bone scan  IMPRESSION:  Abnormal uptake left greater trochanter where there is a  fracture demonstrated with CT (patient has a history of a fall and there  is no CT evidence that this is a pathologic fracture). There is also  abnormal uptake at the left superior pubic ramus that most likely  represents a superior ramus fracture though a fracture is not evident  with CT. This uptake left superior pubic ramus is technically  indeterminate though there is overall no convincing evidence for bony  metastatic disease. Follow-up CT could be obtained.     This report was finalized on 8/23/2019 1:    CT chest abdomen pelvis  IMPRESSION CHEST CT:    1. A 2.2 cm right breast mass laterally presumably related to the  patient's neoplasm.  2. Right axillary mediastinal adenopathy and metastatic disease cannot  be excluded. PET/CT may be helpful.     IMPRESSION ABDOMEN & PELVIS CT:    1. Low-density renal lesions as discussed, favor cysts.  2. Enlarging left adrenal nodule, likely adenoma.  3. Extensive diverticulosis.  4. A 5 mm sclerotic left iliac lesion as discussed.    BONE MINERAL DENSITOMETRY  IMPRESSION:  Osteoporosis at the left hip. Interval decrease in bone  density.     This report was finalized on 2/14/2020 12:10 PM by Dr. Osmin Dodd M.D.        Assessment/Plan   1.  Locally advanced right breast cancer strongly ER MI positive with indeterminate HER-2 with repeat  testing showing 3+ IHC consistent with positive result  · Staging work-up probably negative although indeterminate bone lesions without obvious fractures on bone scan  · Abnormal mediastinal adenopathy which is been present since 2016 likely granulomatous disease  · Enlarging left adrenal mass felt to be an adenoma present since 2015  · History of left breast cancer 1978 at age 38 treated with modified radical mastectomy alone? Pathology  · [place] to be a poor candidate for surgery or chemotherapy.PT DECLINED CHEMO  · Initiated Arimidex around 9/11/2019.  Radiation may be a possibility for additional therapy if she does not get a good response from Arimidex  · Radiation added in 3/20  · Patient is reviewed back today, 11/17/2020, tolerating Arimidex well.  She does feel that the right breast mass is slightly larger though still overall improved compared to when she initially presented.    · Patient seen on 2/18/2021 with new cutaneous nodules restaging and switch to Faslodex planned  · Right pleural effusion positive cytology for metastatic breast cancer ER/MO positive HER-2 FISH negative we will add Ibrance to the Faslodex 75 mg a day 2 weeks on 2 weeks off and escalate as tolerated.  · Patient did have thoracentesis on 5/4/2021 with removal of 1800 mL of fluid on the right side.  She has had improvement in her shortness of breath since that time.  · Patient seen for day 15 of her first cycle of Ibrance 75 mg daily for 2 weeks on, 2 weeks off.  Also given Faslodex that day.  She did have nausea with the first week and decreased appetite.  Her nausea was improved with ondansetron.  After about 5 days or so her appetite improved and she started eating more and having some strange cravings.  Patient also more short of breath, undergoing thoracentesis 5/4/2021.    · Patient with acute on chronic kidney disease in relation to nausea from Ibrance and decreased oral intake.  Per review 5/20/2021 in discussion with   Amrit, we will hold further Ibrance at this time.  · Decision made to place her Pleurx catheter and continue with single agent Faslodex for now  · 7/2/2021, the patient returns today for Faslodex.  She had a Pleurx catheter placed on 6/7/2021.    2.  Granulomatous mediastinal lymph nodes chronic    3.  History of stroke in 2/19 with left hemiparesis and dysphagia  · Patient hospitalized again 9/26 and 9/29/2019 presenting with garbled speech and elevated blood pressure, felt to have had a TIA.  Neurology recommended dual antiplatelet therapy for 3 months and then Plavix alone.    4.  Hypertension diabetes and hypercholesterolemia.  Patient currently off lisinopril per primary care due to angioedema.    5.  Anemia, multifactorial:  · Patient has stage III CKD.  · Patient also found during recent hospitalization to have low B12 and low iron percent.  Though she had previously received B12 injections she was started on oral B12.     · Patient continues on oral iron at this time with some constipation though tolerable with stool softeners.  Plan to continue to monitor her blood work and if develops intolerance to oral iron consider IV iron.  · Was recent iron studies 11/20/2019 include a ferritin of 41, iron saturation 12%.   · 5/7/2021 ferritin and iron panel obtained with ferritin 156, iron saturation 50%, TIBC 288.  B12 502 with patient continuing oral B12.  · Hemoglobin dropped to 6.8 as of 5/14/2021.  Patient set up for transfusion.    · Hemoglobin improved to 10.1 as of 5/20/2021.    · 7/2/2021, hemoglobin declined to 8.9.  She is asymptomatic.    6.  Lymphedema right arm better after radiation    7.  Osteoporosis left hip -osteopenia spine observe for now    8.  Neutropenia secondary to Ibrance.  Count improving off treatment.    9.   DNR confirmed on 4/9/2021 with her daughter in attendance    10.  Chronic kidney disease.    · Patient with acute on chronic kidney injury when seen 5/7/2021 creatinine at 2.21,  previously 1.43.  BUN elevated to 49.  Patient will receive 1 L normal saline today.  · Creatinine worsened to 2.31.  Patient given IV fluids.   · Referred to nephrology.    · 5/20/2021: Patient reports seeing Dr. Tapia, nephrology, on 5/19/2021.    · Creatinine improved to 1.2162 21  · 7/2/2021, creatinine 1.36.    11.  Nausea and poor appetite.  · Was noted during week 1 of Ibrance.  This is discussed to follow-up today.  The patient did not call in at that time reporting the symptoms.  Her appetite improved the weekend following and she is since been eating and drinking well she reports.  Her weight is down 3 pounds total.  I have encouraged her to use boost supplements during times of poor appetite.  She does use ondansetron to control nausea.  We will continue to monitor this.  · Patient returns on 5/11/2021 with further weight loss.  She is also reporting right upper quadrant pain which will be discussed below.  · Appetite improved after thoracentesis    12.  Right upper quadrant pain x2 weeks reported 5/11/2021.    · We did proceed with a stat CT abdomen pelvis as well as gallbladder ultrasound showing .  Imaging noting small stones and/or sludge in the gallbladder with pericholecystic fluid.  There is diverticulosis without evidence of diverticulitis.  Patient covered with Levaquin and also referred to Dr. Dolores Solis, surgeon.   · Patient's daughter reports that they are in the process of making an appointment.  Patient does note almost complete resolution abdominal pain at this point however.    · Abdominal pain much improved as of 6/3/2021  · 7/2/2021, no complaints of abdominal pain today.    13.  Malignant right pleural effusion.  Patient required in and thoracentesis.  Patient's daughter requesting at least a referral to thoracic surgery to discuss possible Pleurx catheter.  · 6/7/2021, Pleurx catheter placed.    PLAN:  1. Proceed with single agent Faslodex today.  2. Return in 1 month for MD  follow-up and Faslodex.  3. If the patient has toxicity to Faslodex and/or if she has progressive disease we will consider single agent Xeloda, but no IV chemotherapy.  This was discussed by Dr. Marcus with the patient and her daughter.    The patient is high risk medication that requires close monitoring for toxicity    She is a DNR    Dayana Grewal, JEFFREY  07/02/2021

## 2021-07-02 ENCOUNTER — LAB (OUTPATIENT)
Dept: LAB | Facility: HOSPITAL | Age: 81
End: 2021-07-02

## 2021-07-02 ENCOUNTER — HOME CARE VISIT (OUTPATIENT)
Dept: HOME HEALTH SERVICES | Facility: HOME HEALTHCARE | Age: 81
End: 2021-07-02

## 2021-07-02 ENCOUNTER — OFFICE VISIT (OUTPATIENT)
Dept: ONCOLOGY | Facility: CLINIC | Age: 81
End: 2021-07-02

## 2021-07-02 ENCOUNTER — INFUSION (OUTPATIENT)
Dept: ONCOLOGY | Facility: HOSPITAL | Age: 81
End: 2021-07-02

## 2021-07-02 ENCOUNTER — TELEPHONE (OUTPATIENT)
Dept: ONCOLOGY | Facility: CLINIC | Age: 81
End: 2021-07-02

## 2021-07-02 VITALS
OXYGEN SATURATION: 96 % | SYSTOLIC BLOOD PRESSURE: 126 MMHG | TEMPERATURE: 98 F | DIASTOLIC BLOOD PRESSURE: 65 MMHG | WEIGHT: 103.1 LBS | HEART RATE: 71 BPM | RESPIRATION RATE: 16 BRPM | BODY MASS INDEX: 17.6 KG/M2 | HEIGHT: 64 IN

## 2021-07-02 DIAGNOSIS — C50.811 MALIGNANT NEOPLASM OF OVERLAPPING SITES OF RIGHT BREAST IN FEMALE, ESTROGEN RECEPTOR POSITIVE (HCC): Primary | ICD-10-CM

## 2021-07-02 DIAGNOSIS — J91.0 MALIGNANT PLEURAL EFFUSION: ICD-10-CM

## 2021-07-02 DIAGNOSIS — Z17.0 MALIGNANT NEOPLASM OF OVERLAPPING SITES OF RIGHT BREAST IN FEMALE, ESTROGEN RECEPTOR POSITIVE (HCC): Primary | ICD-10-CM

## 2021-07-02 DIAGNOSIS — N18.31 STAGE 3A CHRONIC KIDNEY DISEASE (HCC): ICD-10-CM

## 2021-07-02 DIAGNOSIS — Z17.0 MALIGNANT NEOPLASM OF OVERLAPPING SITES OF RIGHT BREAST IN FEMALE, ESTROGEN RECEPTOR POSITIVE (HCC): ICD-10-CM

## 2021-07-02 DIAGNOSIS — Z79.899 ENCOUNTER FOR LONG-TERM (CURRENT) USE OF HIGH-RISK MEDICATION: ICD-10-CM

## 2021-07-02 DIAGNOSIS — C50.811 MALIGNANT NEOPLASM OF OVERLAPPING SITES OF RIGHT BREAST IN FEMALE, ESTROGEN RECEPTOR POSITIVE (HCC): ICD-10-CM

## 2021-07-02 LAB
ALBUMIN SERPL-MCNC: 3.7 G/DL (ref 3.5–5.2)
ALBUMIN/GLOB SERPL: 1.1 G/DL (ref 1.1–2.4)
ALP SERPL-CCNC: 80 U/L (ref 38–116)
ALT SERPL W P-5'-P-CCNC: <5 U/L (ref 0–33)
ANION GAP SERPL CALCULATED.3IONS-SCNC: 9.8 MMOL/L (ref 5–15)
AST SERPL-CCNC: 10 U/L (ref 0–32)
BASOPHILS # BLD AUTO: 0.06 10*3/MM3 (ref 0–0.2)
BASOPHILS NFR BLD AUTO: 1 % (ref 0–1.5)
BILIRUB SERPL-MCNC: <0.2 MG/DL (ref 0.2–1.2)
BUN SERPL-MCNC: 34 MG/DL (ref 6–20)
BUN/CREAT SERPL: 25 (ref 7.3–30)
CALCIUM SPEC-SCNC: 9.6 MG/DL (ref 8.5–10.2)
CHLORIDE SERPL-SCNC: 103 MMOL/L (ref 98–107)
CO2 SERPL-SCNC: 27.2 MMOL/L (ref 22–29)
CREAT SERPL-MCNC: 1.36 MG/DL (ref 0.6–1.1)
DEPRECATED RDW RBC AUTO: 54.2 FL (ref 37–54)
EOSINOPHIL # BLD AUTO: 0.16 10*3/MM3 (ref 0–0.4)
EOSINOPHIL NFR BLD AUTO: 2.8 % (ref 0.3–6.2)
ERYTHROCYTE [DISTWIDTH] IN BLOOD BY AUTOMATED COUNT: 15.9 % (ref 12.3–15.4)
GFR SERPL CREATININE-BSD FRML MDRD: 37 ML/MIN/1.73
GLOBULIN UR ELPH-MCNC: 3.4 GM/DL (ref 1.8–3.5)
GLUCOSE SERPL-MCNC: 145 MG/DL (ref 74–124)
HCT VFR BLD AUTO: 28.9 % (ref 34–46.6)
HGB BLD-MCNC: 8.9 G/DL (ref 12–15.9)
IMM GRANULOCYTES # BLD AUTO: 0.03 10*3/MM3 (ref 0–0.05)
IMM GRANULOCYTES NFR BLD AUTO: 0.5 % (ref 0–0.5)
LYMPHOCYTES # BLD AUTO: 0.75 10*3/MM3 (ref 0.7–3.1)
LYMPHOCYTES NFR BLD AUTO: 13.1 % (ref 19.6–45.3)
MCH RBC QN AUTO: 28.9 PG (ref 26.6–33)
MCHC RBC AUTO-ENTMCNC: 30.8 G/DL (ref 31.5–35.7)
MCV RBC AUTO: 93.8 FL (ref 79–97)
MONOCYTES # BLD AUTO: 0.8 10*3/MM3 (ref 0.1–0.9)
MONOCYTES NFR BLD AUTO: 13.9 % (ref 5–12)
NEUTROPHILS NFR BLD AUTO: 3.94 10*3/MM3 (ref 1.7–7)
NEUTROPHILS NFR BLD AUTO: 68.7 % (ref 42.7–76)
NRBC BLD AUTO-RTO: 0 /100 WBC (ref 0–0.2)
PLATELET # BLD AUTO: 266 10*3/MM3 (ref 140–450)
PMV BLD AUTO: 8.6 FL (ref 6–12)
POTASSIUM SERPL-SCNC: 4.7 MMOL/L (ref 3.5–4.7)
PROT SERPL-MCNC: 7.1 G/DL (ref 6.3–8)
RBC # BLD AUTO: 3.08 10*6/MM3 (ref 3.77–5.28)
SODIUM SERPL-SCNC: 140 MMOL/L (ref 134–145)
WBC # BLD AUTO: 5.74 10*3/MM3 (ref 3.4–10.8)

## 2021-07-02 PROCEDURE — 99214 OFFICE O/P EST MOD 30 MIN: CPT | Performed by: NURSE PRACTITIONER

## 2021-07-02 PROCEDURE — 85025 COMPLETE CBC W/AUTO DIFF WBC: CPT

## 2021-07-02 PROCEDURE — G0299 HHS/HOSPICE OF RN EA 15 MIN: HCPCS

## 2021-07-02 PROCEDURE — 25010000002 FULVESTRANT PER 25 MG: Performed by: NURSE PRACTITIONER

## 2021-07-02 PROCEDURE — 80053 COMPREHEN METABOLIC PANEL: CPT

## 2021-07-02 PROCEDURE — 36415 COLL VENOUS BLD VENIPUNCTURE: CPT

## 2021-07-02 PROCEDURE — 96402 CHEMO HORMON ANTINEOPL SQ/IM: CPT

## 2021-07-02 RX ADMIN — FULVESTRANT 500 MG: 250 INJECTION INTRAMUSCULAR at 12:27

## 2021-07-02 NOTE — TELEPHONE ENCOUNTER
----- Message from JEFFREY Schilling sent at 7/2/2021  2:09 PM EDT -----  Can we please update appointment lines to allow for daughter to attend all appointments.    Thanks,    Dayana

## 2021-07-05 ENCOUNTER — HOME CARE VISIT (OUTPATIENT)
Dept: HOME HEALTH SERVICES | Facility: HOME HEALTHCARE | Age: 81
End: 2021-07-05

## 2021-07-05 VITALS
SYSTOLIC BLOOD PRESSURE: 120 MMHG | OXYGEN SATURATION: 98 % | RESPIRATION RATE: 18 BRPM | TEMPERATURE: 98.6 F | HEART RATE: 70 BPM | DIASTOLIC BLOOD PRESSURE: 60 MMHG

## 2021-07-05 PROCEDURE — G0299 HHS/HOSPICE OF RN EA 15 MIN: HCPCS

## 2021-07-06 RX ORDER — CLONIDINE HYDROCHLORIDE 0.2 MG/1
TABLET ORAL
Qty: 180 TABLET | Refills: 3 | Status: SHIPPED | OUTPATIENT
Start: 2021-07-06

## 2021-07-07 VITALS
OXYGEN SATURATION: 98 % | DIASTOLIC BLOOD PRESSURE: 70 MMHG | RESPIRATION RATE: 18 BRPM | TEMPERATURE: 99.8 F | SYSTOLIC BLOOD PRESSURE: 118 MMHG | HEART RATE: 68 BPM

## 2021-07-08 ENCOUNTER — HOME CARE VISIT (OUTPATIENT)
Dept: HOME HEALTH SERVICES | Facility: HOME HEALTHCARE | Age: 81
End: 2021-07-08

## 2021-07-08 VITALS
SYSTOLIC BLOOD PRESSURE: 108 MMHG | HEART RATE: 57 BPM | DIASTOLIC BLOOD PRESSURE: 52 MMHG | TEMPERATURE: 98.9 F | RESPIRATION RATE: 18 BRPM | OXYGEN SATURATION: 95 %

## 2021-07-08 PROCEDURE — G0299 HHS/HOSPICE OF RN EA 15 MIN: HCPCS

## 2021-07-09 ENCOUNTER — HOME CARE VISIT (OUTPATIENT)
Dept: HOME HEALTH SERVICES | Facility: HOME HEALTHCARE | Age: 81
End: 2021-07-09

## 2021-07-12 ENCOUNTER — HOME CARE VISIT (OUTPATIENT)
Dept: HOME HEALTH SERVICES | Facility: HOME HEALTHCARE | Age: 81
End: 2021-07-12

## 2021-07-12 VITALS
OXYGEN SATURATION: 94 % | DIASTOLIC BLOOD PRESSURE: 60 MMHG | RESPIRATION RATE: 18 BRPM | TEMPERATURE: 98.2 F | SYSTOLIC BLOOD PRESSURE: 128 MMHG | HEART RATE: 83 BPM

## 2021-07-12 DIAGNOSIS — J91.0 MALIGNANT PLEURAL EFFUSION: Primary | ICD-10-CM

## 2021-07-12 PROCEDURE — G0299 HHS/HOSPICE OF RN EA 15 MIN: HCPCS

## 2021-07-13 NOTE — PROGRESS NOTES
"Chief Complaint  Pleural effusion with chest x-ray    Subjective          Mar Burgos presents to [facility] for continued follow-up and surveillance of a right pleural effusion for which she underwent pleurx catheter insertion by Dr. Yusef Larson on 6/7/21. The patient was last seen in our office on 6/30/21 and her output had decreased at that time. Activase was instilled intrapleurally and Home Health was able to drain approximately 450 cc on 7/2. Since that time her output has once again diminished. She presents today for further evaluation with a chest x-ray. The patient denies worsening shortness of air.  She has had no fever, chills, cough or hemoptysis.  Patient presents with her daughter today.  The patient is in a wheelchair although she is mobile without assistance at home and just uses a wheelchair for distances.    History of Present Illness    Objective   Vital Signs:   /72 (BP Location: Right arm, Patient Position: Sitting, Cuff Size: Adult)   Pulse 75   Resp 18   Ht 162.6 cm (64.02\")   Wt 46.7 kg (103 lb)   SpO2 95%   BMI 17.67 kg/m²     Physical Exam  Vitals and nursing note reviewed.   Constitutional:       Appearance: She is ill-appearing.   HENT:      Head: Normocephalic and atraumatic.      Right Ear: Decreased hearing noted.      Left Ear: Decreased hearing noted.   Eyes:      General: No scleral icterus.     Conjunctiva/sclera: Conjunctivae normal.   Cardiovascular:      Rate and Rhythm: Normal rate and regular rhythm.   Pulmonary:      Effort: Pulmonary effort is normal.      Breath sounds: Examination of the right-lower field reveals decreased breath sounds. Decreased breath sounds present. No wheezing, rhonchi or rales.   Chest:      Chest wall: No swelling or tenderness.      Comments: Right Pleurx catheter site is clean and dry without erythema or drainage.  No warmth or signs of infection.  Abdominal:      General: Bowel sounds are " normal.      Palpations: Abdomen is soft.   Musculoskeletal:      Cervical back: Neck supple.   Skin:     General: Skin is warm and dry.   Neurological:      General: No focal deficit present.      Mental Status: She is alert and oriented to person, place, and time. Mental status is at baseline.   Psychiatric:         Mood and Affect: Mood normal.         Behavior: Behavior normal.         Thought Content: Thought content normal.         Judgment: Judgment normal.        Result Review :   The following data was reviewed by: JEFFREY Floyd on 07/14/2021:    Data reviewed: Radiologic studies PA and lateral chest x-ray was independently reviewed.  There is a small right pleural effusion which actually appears diminished compared to previous imaging at the patient's appointment 2 months prior.          Assessment and Plan    Diagnoses and all orders for this visit:    1. Malignant pleural effusion (Primary)  -     XR Chest 2 View; Future  -     alteplase (CATHFLO/ACTIVASE) injection 4 mg    Patient's Pleurx output has diminished.  She denies symptoms including shortness of air.  Minimal fluid on this morning's chest x-ray.  Patient may be achieving pleurodesis.  Utilizing sterile technique I instilled 4 mg of Activase intrapleurally.  I met minimal resistance.  Patient tolerated without difficulty.  I then redressed the site.  We will request home health to drain her Pleurx on Friday.  If output is minimal at that point, we can discuss Pleurx catheter removal.  Otherwise, continue draining weekly and as needed and follow-up to see me in 1 month with a chest x-ray.      I spent 42 minutes caring for Mar on this date of service. This time includes time spent by me in the following activities:preparing for the visit, reviewing tests, obtaining and/or reviewing a separately obtained history, performing a medically appropriate examination and/or evaluation , counseling and educating the patient/family/caregiver,  ordering medications, tests, or procedures, referring and communicating with other health care professionals , documenting information in the medical record, independently interpreting results and communicating that information with the patient/family/caregiver and care coordination.  Follow Up   Return in about 4 weeks (around 8/11/2021) for Next scheduled follow up with Chest x-ray.  Patient was given instructions and counseling regarding her condition or for health maintenance advice. Please see specific information pulled into the AVS if appropriate.

## 2021-07-14 ENCOUNTER — OFFICE VISIT (OUTPATIENT)
Dept: SURGERY | Facility: CLINIC | Age: 81
End: 2021-07-14

## 2021-07-14 ENCOUNTER — HOME CARE VISIT (OUTPATIENT)
Dept: HOME HEALTH SERVICES | Facility: HOME HEALTHCARE | Age: 81
End: 2021-07-14

## 2021-07-14 ENCOUNTER — HOSPITAL ENCOUNTER (OUTPATIENT)
Dept: GENERAL RADIOLOGY | Facility: HOSPITAL | Age: 81
Discharge: HOME | End: 2021-07-14
Admitting: NURSE PRACTITIONER

## 2021-07-14 VITALS
DIASTOLIC BLOOD PRESSURE: 72 MMHG | RESPIRATION RATE: 18 BRPM | BODY MASS INDEX: 17.58 KG/M2 | OXYGEN SATURATION: 95 % | WEIGHT: 103 LBS | HEART RATE: 75 BPM | HEIGHT: 64 IN | SYSTOLIC BLOOD PRESSURE: 142 MMHG

## 2021-07-14 DIAGNOSIS — J91.0 MALIGNANT PLEURAL EFFUSION: Primary | ICD-10-CM

## 2021-07-14 DIAGNOSIS — J91.0 MALIGNANT PLEURAL EFFUSION: ICD-10-CM

## 2021-07-14 PROCEDURE — 71046 X-RAY EXAM CHEST 2 VIEWS: CPT

## 2021-07-14 PROCEDURE — 99215 OFFICE O/P EST HI 40 MIN: CPT | Performed by: NURSE PRACTITIONER

## 2021-07-16 ENCOUNTER — HOME CARE VISIT (OUTPATIENT)
Dept: HOME HEALTH SERVICES | Facility: HOME HEALTHCARE | Age: 81
End: 2021-07-16

## 2021-07-16 PROCEDURE — G0299 HHS/HOSPICE OF RN EA 15 MIN: HCPCS

## 2021-07-19 VITALS
OXYGEN SATURATION: 95 % | TEMPERATURE: 98 F | RESPIRATION RATE: 18 BRPM | DIASTOLIC BLOOD PRESSURE: 60 MMHG | SYSTOLIC BLOOD PRESSURE: 148 MMHG | HEART RATE: 86 BPM

## 2021-07-22 ENCOUNTER — HOME CARE VISIT (OUTPATIENT)
Dept: HOME HEALTH SERVICES | Facility: HOME HEALTHCARE | Age: 81
End: 2021-07-22

## 2021-07-22 PROCEDURE — G0299 HHS/HOSPICE OF RN EA 15 MIN: HCPCS

## 2021-07-25 VITALS
SYSTOLIC BLOOD PRESSURE: 119 MMHG | DIASTOLIC BLOOD PRESSURE: 52 MMHG | TEMPERATURE: 99 F | HEART RATE: 80 BPM | OXYGEN SATURATION: 95 % | RESPIRATION RATE: 18 BRPM

## 2021-07-30 ENCOUNTER — HOME CARE VISIT (OUTPATIENT)
Dept: HOME HEALTH SERVICES | Facility: HOME HEALTHCARE | Age: 81
End: 2021-07-30

## 2021-07-30 VITALS
SYSTOLIC BLOOD PRESSURE: 138 MMHG | DIASTOLIC BLOOD PRESSURE: 58 MMHG | HEART RATE: 70 BPM | RESPIRATION RATE: 18 BRPM | TEMPERATURE: 98.9 F | OXYGEN SATURATION: 97 %

## 2021-07-30 PROCEDURE — G0299 HHS/HOSPICE OF RN EA 15 MIN: HCPCS

## 2021-08-04 ENCOUNTER — OFFICE VISIT (OUTPATIENT)
Dept: ONCOLOGY | Facility: CLINIC | Age: 81
End: 2021-08-04

## 2021-08-04 ENCOUNTER — INFUSION (OUTPATIENT)
Dept: ONCOLOGY | Facility: HOSPITAL | Age: 81
End: 2021-08-04

## 2021-08-04 ENCOUNTER — LAB (OUTPATIENT)
Dept: LAB | Facility: HOSPITAL | Age: 81
End: 2021-08-04

## 2021-08-04 VITALS
WEIGHT: 106.5 LBS | DIASTOLIC BLOOD PRESSURE: 82 MMHG | BODY MASS INDEX: 18.18 KG/M2 | TEMPERATURE: 96.9 F | SYSTOLIC BLOOD PRESSURE: 160 MMHG | HEIGHT: 64 IN | HEART RATE: 68 BPM | OXYGEN SATURATION: 96 % | RESPIRATION RATE: 16 BRPM

## 2021-08-04 DIAGNOSIS — Z17.0 MALIGNANT NEOPLASM OF OVERLAPPING SITES OF RIGHT BREAST IN FEMALE, ESTROGEN RECEPTOR POSITIVE (HCC): Primary | ICD-10-CM

## 2021-08-04 DIAGNOSIS — C50.811 MALIGNANT NEOPLASM OF OVERLAPPING SITES OF RIGHT BREAST IN FEMALE, ESTROGEN RECEPTOR POSITIVE (HCC): Primary | ICD-10-CM

## 2021-08-04 DIAGNOSIS — Z17.0 MALIGNANT NEOPLASM OF OVERLAPPING SITES OF RIGHT BREAST IN FEMALE, ESTROGEN RECEPTOR POSITIVE (HCC): ICD-10-CM

## 2021-08-04 DIAGNOSIS — C50.811 MALIGNANT NEOPLASM OF OVERLAPPING SITES OF RIGHT BREAST IN FEMALE, ESTROGEN RECEPTOR POSITIVE (HCC): ICD-10-CM

## 2021-08-04 LAB
ALBUMIN SERPL-MCNC: 3.9 G/DL (ref 3.5–5.2)
ALBUMIN/GLOB SERPL: 1.1 G/DL (ref 1.1–2.4)
ALP SERPL-CCNC: 95 U/L (ref 38–116)
ALT SERPL W P-5'-P-CCNC: 5 U/L (ref 0–33)
ANION GAP SERPL CALCULATED.3IONS-SCNC: 11.4 MMOL/L (ref 5–15)
AST SERPL-CCNC: 11 U/L (ref 0–32)
BASOPHILS # BLD AUTO: 0.04 10*3/MM3 (ref 0–0.2)
BASOPHILS NFR BLD AUTO: 0.7 % (ref 0–1.5)
BILIRUB SERPL-MCNC: <0.2 MG/DL (ref 0.2–1.2)
BUN SERPL-MCNC: 35 MG/DL (ref 6–20)
BUN/CREAT SERPL: 24.5 (ref 7.3–30)
CALCIUM SPEC-SCNC: 9.9 MG/DL (ref 8.5–10.2)
CANCER AG15-3 SERPL-ACNC: 83.1 U/ML
CHLORIDE SERPL-SCNC: 101 MMOL/L (ref 98–107)
CO2 SERPL-SCNC: 27.6 MMOL/L (ref 22–29)
CREAT SERPL-MCNC: 1.43 MG/DL (ref 0.6–1.1)
DEPRECATED RDW RBC AUTO: 49.8 FL (ref 37–54)
EOSINOPHIL # BLD AUTO: 0.1 10*3/MM3 (ref 0–0.4)
EOSINOPHIL NFR BLD AUTO: 1.8 % (ref 0.3–6.2)
ERYTHROCYTE [DISTWIDTH] IN BLOOD BY AUTOMATED COUNT: 14.7 % (ref 12.3–15.4)
GFR SERPL CREATININE-BSD FRML MDRD: 35 ML/MIN/1.73
GLOBULIN UR ELPH-MCNC: 3.7 GM/DL (ref 1.8–3.5)
GLUCOSE SERPL-MCNC: 98 MG/DL (ref 74–124)
HCT VFR BLD AUTO: 30.4 % (ref 34–46.6)
HGB BLD-MCNC: 9.3 G/DL (ref 12–15.9)
IMM GRANULOCYTES # BLD AUTO: 0.03 10*3/MM3 (ref 0–0.05)
IMM GRANULOCYTES NFR BLD AUTO: 0.5 % (ref 0–0.5)
LYMPHOCYTES # BLD AUTO: 0.78 10*3/MM3 (ref 0.7–3.1)
LYMPHOCYTES NFR BLD AUTO: 13.8 % (ref 19.6–45.3)
MCH RBC QN AUTO: 28.4 PG (ref 26.6–33)
MCHC RBC AUTO-ENTMCNC: 30.6 G/DL (ref 31.5–35.7)
MCV RBC AUTO: 93 FL (ref 79–97)
MONOCYTES # BLD AUTO: 0.68 10*3/MM3 (ref 0.1–0.9)
MONOCYTES NFR BLD AUTO: 12.1 % (ref 5–12)
NEUTROPHILS NFR BLD AUTO: 4.01 10*3/MM3 (ref 1.7–7)
NEUTROPHILS NFR BLD AUTO: 71.1 % (ref 42.7–76)
NRBC BLD AUTO-RTO: 0 /100 WBC (ref 0–0.2)
PLATELET # BLD AUTO: 312 10*3/MM3 (ref 140–450)
PMV BLD AUTO: 8.5 FL (ref 6–12)
POTASSIUM SERPL-SCNC: 4.1 MMOL/L (ref 3.5–4.7)
PROT SERPL-MCNC: 7.6 G/DL (ref 6.3–8)
RBC # BLD AUTO: 3.27 10*6/MM3 (ref 3.77–5.28)
SODIUM SERPL-SCNC: 140 MMOL/L (ref 134–145)
WBC # BLD AUTO: 5.64 10*3/MM3 (ref 3.4–10.8)

## 2021-08-04 PROCEDURE — 85025 COMPLETE CBC W/AUTO DIFF WBC: CPT

## 2021-08-04 PROCEDURE — 99214 OFFICE O/P EST MOD 30 MIN: CPT | Performed by: INTERNAL MEDICINE

## 2021-08-04 PROCEDURE — 25010000002 FULVESTRANT PER 25 MG: Performed by: INTERNAL MEDICINE

## 2021-08-04 PROCEDURE — 80053 COMPREHEN METABOLIC PANEL: CPT

## 2021-08-04 PROCEDURE — 36415 COLL VENOUS BLD VENIPUNCTURE: CPT

## 2021-08-04 PROCEDURE — 96402 CHEMO HORMON ANTINEOPL SQ/IM: CPT

## 2021-08-04 PROCEDURE — 86300 IMMUNOASSAY TUMOR CA 15-3: CPT | Performed by: INTERNAL MEDICINE

## 2021-08-04 RX ADMIN — FULVESTRANT 500 MG: 250 INJECTION INTRAMUSCULAR at 11:16

## 2021-08-04 NOTE — PROGRESS NOTES
Subjective     REASON FOR FOLLOW-UP:   1. Locally advanced right breast cancer ER ID positive HER-2 positive  · Initiated Arimidex 9/11/2019  · Radiation therapy added, completing a total of 25 fractions as of 4/27/2020    2. Remote history of left breast cancer 1978 treated with mastectomy alone                               REQUESTING PHYSICIAN: MD Vladimir Cowart    History of Present Illness Ms. Burgos is a 80 y.o. female with above medical history who returns today on single agent Faslodex.  After unexpected and unacceptable toxicity with Ibrance.  She is with her daughter who gives most of the history.      She was started on Faslodex and we attempted to give her very low-dose Ibrance 75 mg 2 weeks on and 2 weeks off and despite this she had tremendous toxicity.  She became profoundly anemic, and thrombocytopenic and her creatinine went up to 2.3  and she was also leukopenic    She is finally recovered from that toxicity and is feeling somewhat better but is adamantly opposed to trying any more Ibrance and not even abemaciclib    Shortness of breath is resolved and her Pleurx is not draining much although there might be a clot in the system.  She is is not in much pain and her appetite is fair and she is ambulating with a walker and her quality life is decent.  She is complaining of pain in the right hip and sacroiliac area and we will evaluate this.  She has noted few more nodules in her right chest wall but these are small and not causing any issues    Her tumor marker has dropped from 67-54 but now she is complaining of  They deny other concerns at this time    I again discussed her DNR status and she is a DNR    Past Medical History:   Diagnosis Date   • Anemia     required prior blood transfusions   • Arthritis    • Cancer (CMS/HCC)     right breast cancer    • Clostridium difficile colitis    • COVID-19 vaccine series completed 2021     PFIZER   • Diabetes mellitus (CMS/HCC)    • Difficulty walking    • Diverticulitis    • Environmental allergies    • History of malignant neoplasm of breast 2013    denies any chemo or radiation   • History of malignant neoplasm of skin 2013    possibly basal   • History of tobacco use 2013   • History of transfusion    • HL (hearing loss)    • Hyperlipidemia    • Hypertension    • Kidney disease    • Neuropathy in diabetes (CMS/HCC)    • Peripheral neuropathy    • Shingles    • Skin cancer    • Stroke (CMS/HCC) 2019   • Vision loss       Patient is  3 para 3 with 2 miscarriages menarche was at age 9 menopause at age 49st childbirth was at age 24 she breast-fed for at least 6 months she did not take hormone replacement therapy because at age 38 she developed breast cancer in the left breast was treated at the [facility] with a modified radical mastectomy in  no radiation or hormonal blockade was given in details of the scans were not available at this time.    Family history is positive for father dying at age 84 with disseminated abdominal cancer of unknown type mother  at 77 of natural causes she has a brother with kidney cancer at age 65 who  of this and a sister with melanoma at age 30    Patient currently lives with her daughter does not smoke currently but smoked for 70 years 1 pack of cigarettes is not a drinker.  She uses a cane to walk because her balance is poor she has issues with swallowing from her stroke      Past Surgical History:   Procedure Laterality Date   • CATARACT EXTRACTION, BILATERAL     • EAR TUBES     • MASTECTOMY Left     radical   • PLEURAL CATHETER INSERTION Right 2021    Procedure: PLEURX CATHETER INSERTION;  Surgeon: Yusef Larson III, MD;  Location: [facility];  Service: Thoracic;  Laterality: Right;   • TUMOR REMOVAL Left     large lipoma removed from hip      HEME/ONC HISTORY:  patient is an 79-year-old female with multiple  medical issues including hypertension diabetes hypercholesterolemia with a recent stroke in February of this year which is resulted in left-sided weakness and difficulty swallowing.  The patient noted changes in her breast over the last year and the last few months changes to the nipple specifically with developing lump in both the right breast and under the right arm.  She brought to the attention of her family physician who sent her for imaging and biopsy of obvious tumor.  Mammogram revealed a large irregular mass measuring 6 cm with skin retraction nipple retraction and skin thickening.  At the 10 o'clock position there appeared to be some skin involvement.    She was also noted clinically to have large axillary lymph nodes.  Ultrasound was obtained revealing an irregular solid mass with indistinct margins measuring approximately 64 x 51 mm.  On ultrasound there were also some enlarged axillary lymph nodes measuring about 30 mm in greatest size.     A biopsy was recommended and has revealed a grade 2 invasive ductal cancer that is ER positive at 98%, WI positive at 82%, and HER-2 equivocal.  The Ki-67 is 20.81%.     Patient was then referred to Dr. Gonzalez who examined her and did not feel surgical excision was possible because there would be a large defect from her tumor and was wanting to consider neoadjuvant therapy in an effort to shrink the tumor.  The patient was presented at the multidisciplinary conference and because of her comorbidities her recent stroke and her overall general condition we did not think she was a good candidate for neoadjuvant chemotherapy and she was referred to us to discuss endocrine therapy.    In the interim additional testing on her tumor was done at [facility] because of the indeterminate HER-2 FISH and this revealed that there was 3+ HER-2 activity by IHC and therefore this is considered to be HER-2 positive  The patient had staging work-up at the recommendation of the  multidisciplinary clinic And CT of the chest showed calcified granulomatous disease with mild AP window adenopathy which is increased from previous CAT scan in 2016 precarinal adenopathy also increased.  It showed the breast mass as well as right axillary adenopathy.  In the abdomen there was a left adrenal nodule which is increased in size from 1.4 to 1.8 cm felt to be an adenoma in addition there was a small sclerotic lesion in the left iliac crest and metastasis could not be completely excluded although the bone scan showed no uptake in this area.  The bone scan did show a fracture and uptake in the left greater trochanter and uptake of the left superior pubic ramus that represents a superior ramus fracture of with no other signs of metastatic disease-patient did fall in July of this year but no obvious fractures were seen on x-ray.    Pt did not want chemotherapy-Patient initiating Arimidex therapy in mid September 2019.     the patient was hospitalized in late September, presenting with garbled speech and elevated blood pressure, felt to ultimately have had a TIA related to hypertensive urgency and small vessel disease.  Neurology recommended dual antiplatelet therapy for 3 months followed by Plavix alone.  Her antihypertensives were adjusted.  At that time she was also found to be B12 deficient with a level of 158 and iron deficient with an iron percent of 7%.  She was started on oral B12 and iron.    She states that the iron does cause some constipation but it is manageable with stool softeners at this time.  There was some discussion in the hospital record that if she could not tolerate oral iron IV iron should be considered.  Hemoglobin is improved up to 9.7 from discharge hemoglobin of 9.0.    2/20  he has not had a DEXA scan in many years and we repeated in February and it shows moderate osteopenia in the spine osteoporosis and left hip and severe osteopenia in the right hip.  I am not inclined to switch  her to tamoxifen at this point and leave her room at acceptable see her back in 3 months and decide whether to add Prolia    2/21  Progressive disease with malignant pleural effusion ER positive still decision made to try Faslodex and low-dose Ibrance 75 mg 2 weeks on 2 weeks off    5/21  She was here 2 weeks ago and found to have elevated creatinine of 2.2, BUN 49. She received IV fluids. She was seen last week during her off week of Ibrance and was noting new right upper quadrant abdominal pain reportedly x2 weeks. Very tender on exam. He was also noting some increase shortness of breath. She was sent for CT of the abdomen/pelvis and ultrasound of the gallbladder. Imaging showed small stones in the gallbladder and/or sludge with pericholecystic fluid. Diverticulosis without evidence of diverticulitis. Patient was referred to Dr. Dolores Solis, surgery, for evaluation of possible cholecystitis.    Patient's hemoglobin declined further to 6.8 last week requiring transfusion of 2 units PRBCs given 5/15/2021.  She also received additional IV fluids the day prior for continued elevation of BUN at 39, creatinine 2.0.    The patient is reviewed back now, accompanied by her daughter, with hemoglobin improved to 10.2.  Patient is currently finishing up her second off week of Ibrance and WBC count has improved to 3.8, platelet count normalized to 250,000.  The patient is tentatively due to restart Ibrance tomorrow, after discussion with Dr. Marcus, it is not felt the patient can tolerate Ibrance considering how poorly she did was a 75 mg dose.  We discussed that this point keeping her off Ibrance and allowing her to get stronger.      Her BUN remains elevated at 43 today, creatinine 1.8.  Her baseline creatinine is more around 1.4-1.6.  Patient did see nephrology, Dr. Stanley, yesterday.  We will request records.  Patient's daughter states it was felt the patient was acutely dehydrated due to poor tolerance with Ibrance,  specifically with nausea and poor oral intake.  We will give her additional IV fluids in the office today.        Current Outpatient Medications on File Prior to Visit   Medication Sig Dispense Refill   • acetaminophen (TYLENOL) 500 MG tablet Take 500 mg by mouth Every 6 (Six) Hours As Needed for Mild Pain .     • amLODIPine (NORVASC) 10 MG tablet Take 1 tablet by mouth Daily. (Patient taking differently: Take 5 mg by mouth Daily.) 90 tablet 3   • cetirizine (zyrTEC) 10 MG tablet Take 10 mg by mouth Every Night.     • cloNIDine (CATAPRES) 0.2 MG tablet TAKE 1 TABLET BY MOUTH EVERY 12 HOURS 180 tablet 3   • clopidogrel (PLAVIX) 75 MG tablet TAKE 1 TABLET BY MOUTH DAILY (Patient taking differently: Take 75 mg by mouth Daily. HOLD 5 DAYS PRIOR TO OR) 90 tablet 3   • ferrous sulfate (IRON SUPPLEMENT) 325 (65 FE) MG tablet Take 1 tablet by mouth 2 (Two) Times a Day Before Meals. 60 tablet 0   • fluticasone (FLONASE) 50 MCG/ACT nasal spray 2 sprays into the nostril(s) as directed by provider Every Night.     • Fulvestrant (FASLODEX IM) Inject  into the appropriate muscle as directed by prescriber Every 30 (Thirty) Days.     • guaiFENesin (MUCINEX) 600 MG 12 hr tablet Take 600 mg by mouth 2 (Two) Times a Day As Needed for Cough.     • hydroCHLOROthiazide (MICROZIDE) 12.5 MG capsule Take 1 capsule by mouth Daily. 90 capsule 3   • loperamide (IMODIUM) 2 MG capsule Take 2 mg by mouth 4 (Four) Times a Day As Needed.     • metFORMIN (GLUCOPHAGE) 1000 MG tablet Take one tablet bid (Patient taking differently: Take 1,000 mg by mouth Every Other Day. Take one tablet bid) 180 tablet 1   • metoprolol succinate XL (TOPROL-XL) 50 MG 24 hr tablet TAKE 1 TABLET BY MOUTH DAILY 90 tablet 3   • ondansetron (ZOFRAN) 8 MG tablet Take 1 tablet by mouth Every 8 (Eight) Hours As Needed for Nausea or Vomiting. 30 tablet 5   • pravastatin (PRAVACHOL) 20 MG tablet Take 1 tablet by mouth Every Night. 90 tablet 1   • vitamin B-12 (VITAMIN B-12) 2000  MCG tablet Take 1 tablet by mouth Daily. 30 tablet 0     No current facility-administered medications on file prior to visit.        ALLERGIES:    Allergies   Allergen Reactions   • Lisinopril Angioedema   • Lipitor [Atorvastatin] Unknown - High Severity     Leg weakness         Social History     Socioeconomic History   • Marital status:      Spouse name: Not on file   • Number of children: 3   • Years of education: High school   • Highest education level: Not on file   Tobacco Use   • Smoking status: Former Smoker     Packs/day: 0.25     Years: 10.00     Pack years: 2.50     Types: Cigarettes     Quit date: 2019     Years since quittin.4   • Smokeless tobacco: Former User   • Tobacco comment: she quit for 8 years and started back about a year ago then quit again   Substance and Sexual Activity   • Alcohol use: No   • Drug use: No   • Sexual activity: Defer        Family History   Problem Relation Age of Onset   • Stroke Mother 76   • Diabetes Mother    • Hypertension Mother    • Arthritis Mother    • Cancer Father         metastatic unknown cause   • Heart attack Father 70   • Colon cancer Father    • Lung cancer Father    • Cancer Brother         bladder   • Arthritis Sister    • Melanoma Sister    • Hypertension Daughter    • Diabetes Daughter    • Malig Hyperthermia Neg Hx       Review of Systems   Constitutional: Positive for fatigue.   HENT: Positive for hearing loss (same ).    Respiratory: Negative.    Cardiovascular: Negative.    Gastrointestinal: Negative for abdominal distention, abdominal pain (improved), blood in stool, constipation, nausea and vomiting.   Genitourinary: Negative.    Musculoskeletal: Positive for arthralgias (Hip pain) and gait problem (Hip pain). Negative for joint swelling.   Neurological: Positive for weakness (left side).   Hematological: Negative.    Psychiatric/Behavioral: Negative.    All other systems reviewed and are negative.      Objective     Vitals:     "08/04/21 1020   BP: 160/82   Pulse: 68   Resp: 16   Temp: 96.9 °F (36.1 °C)   TempSrc: Skin   SpO2: 96%   Weight: 48.3 kg (106 lb 8 oz)   Height: 162.6 cm (64.02\")   PainSc:   8   PainLoc: Hip  Comment: Rt side w/movement     Current Status 8/4/2021   ECOG score 3     Physical Exam   Pulmonary/Chest:           GENERAL:  Well-developed, well-nourished in no acute distress.   SKIN:  Warm, dry without rashes, purpura or petechiae.  EYES:  Pupils equal, round and reactive to light.  EOMs intact.  Left eye proptosis.  EARS:  Hearing intact.  NOSE:  Septum midline.  No excoriations or nasal discharge.  MOUTH:  Tongue is well-papillated; no stomatitis or ulcers.  Lips normal.  THROAT:  Oropharynx without lesions or exudates.  NECK:  Supple with good range of motion; no thyromegaly or masses, no JVD.  LYMPHATICS:  No cervical, supraclavicular adenopathy.  CHEST:  Lungs clear to auscultation but poor air movement in the right middle and lower lobes.    BREASTS: Left chest wall is benign; the right breast has a mobile soft tissue mass roughly 4 cm transverse x 5 cm and 2 subcutaneous nodules noted see picture above -No palpable adenopathy in the right axilla, neck or supraclavicular region.  Nodules appear slightly larger  CARDIAC:  Regular rate and rhythm without murmurs, rubs or gallops. Normal S1,S2.  ABDOMEN: Bowel sounds active.  Soft, nontender.  EXTREMITIES:  No clubbing, cyanosis 2+lymphedema right arm and 1+ in both legs  NEUROLOGICAL: Left-sided weakness.  PSYCHIATRIC:  Normal affect and mood.      I have reexamined the patient and the results are consistent with the previously documented exam. Noble Marcus MD       RECENT LABS:  Results from last 7 days   Lab Units 08/04/21  0945   WBC 10*3/mm3 5.64   NEUTROS ABS 10*3/mm3 4.01   HEMOGLOBIN g/dL 9.3*   HEMATOCRIT % 30.4*   PLATELETS 10*3/mm3 312     Results from last 7 days   Lab Units 08/04/21  0945   SODIUM mmol/L 140   POTASSIUM mmol/L 4.1   CHLORIDE " mmol/L 101   CO2 mmol/L 27.6   BUN mg/dL 35*   CREATININE mg/dL 1.43*   CALCIUM mg/dL 9.9   ALBUMIN g/dL 3.90   BILIRUBIN mg/dL <0.2*   ALK PHOS U/L 95   ALT (SGPT) U/L 5   AST (SGOT) U/L 11   GLUCOSE mg/dL 98                         MRI BRAIN     IMPRESSION:  Old right basal ganglia infarct. No acute abnormality  identified. No metastatic disease is identified at the brain or the  head.    Bone scan  IMPRESSION:  Abnormal uptake left greater trochanter where there is a  fracture demonstrated with CT (patient has a history of a fall and there  is no CT evidence that this is a pathologic fracture). There is also  abnormal uptake at the left superior pubic ramus that most likely  represents a superior ramus fracture though a fracture is not evident  with CT. This uptake left superior pubic ramus is technically  indeterminate though there is overall no convincing evidence for bony  metastatic disease. Follow-up CT could be obtained.     This report was finalized on 8/23/2019 1:    CT chest abdomen pelvis  IMPRESSION CHEST CT:    1. A 2.2 cm right breast mass laterally presumably related to the  patient's neoplasm.  2. Right axillary mediastinal adenopathy and metastatic disease cannot  be excluded. PET/CT may be helpful.     IMPRESSION ABDOMEN & PELVIS CT:    1. Low-density renal lesions as discussed, favor cysts.  2. Enlarging left adrenal nodule, likely adenoma.  3. Extensive diverticulosis.  4. A 5 mm sclerotic left iliac lesion as discussed.    BONE MINERAL DENSITOMETRY  IMPRESSION:  Osteoporosis at the left hip. Interval decrease in bone  density.     This report was finalized on 2/14/2020 12:10 PM by Dr. Osmin Dodd M.D.        Assessment/Plan   1.  Locally advanced right breast cancer strongly ER MT positive with indeterminate HER-2 with repeat testing showing 3+ IHC consistent with positive result  · Staging work-up probably negative although indeterminate bone lesions without obvious fractures on bone  scan  · Abnormal mediastinal adenopathy which is been present since 2016 likely granulomatous disease  · Enlarging left adrenal mass felt to be an adenoma present since 2015  · History of left breast cancer 1978 at age 38 treated with modified radical mastectomy alone? Pathology  · Phyllis to be a poor candidate for surgery or chemotherapy.PT DECLINED CHEMO  · Initiated Arimidex around 9/11/2019.  Radiation may be a possibility for additional therapy if she does not get a good response from Arimidex  · Radiation added in 3/20  · Patient is reviewed back today, 11/17/2020, tolerating Arimidex well.  She does feel that the right breast mass is slightly larger though still overall improved compared to when she initially presented.    · Patient seen on 2/18/2021 with new cutaneous nodules restaging and switch to Faslodex planned  · Right pleural effusion positive cytology for metastatic breast cancer ER/KS positive HER-2 FISH negative we will add Ibrance to the Faslodex 75 mg a day 2 weeks on 2 weeks off and escalate as tolerated.  · Patient did have thoracentesis on 5/4/2021 with removal of 1800 mL of fluid on the right side.  She has had improvement in her shortness of breath since that time.  · Patient seen for day 15 of her first cycle of Ibrance 75 mg daily for 2 weeks on, 2 weeks off.  Also given Faslodex that day.  She did have nausea with the first week and decreased appetite.  Her nausea was improved with ondansetron.  After about 5 days or so her appetite improved and she started eating more and having some strange cravings.  Patient also more short of breath, undergoing thoracentesis 5/4/2021.    · Patient with acute on chronic kidney disease in relation to nausea from Ibrance and decreased oral intake.  Per review 5/20/2021 in discussion with Dr. Marcus, we will hold further Ibrance at this time.  · Decision made to place her Pleurx catheter and continue with single agent Faslodex for now  · 7/2/2021, the  patient returns today for Faslodex.  She had a Pleurx catheter placed on 6/7/2021.    2.  Granulomatous mediastinal lymph nodes chronic    3.  History of stroke in 2/19 with left hemiparesis and dysphagia  · Patient hospitalized again 9/26 and 9/29/2019 presenting with garbled speech and elevated blood pressure, felt to have had a TIA.  Neurology recommended dual antiplatelet therapy for 3 months and then Plavix alone.    4.  Hypertension diabetes and hypercholesterolemia.  Patient currently off lisinopril per primary care due to angioedema.    5.  Anemia, multifactorial:  · Patient has stage III CKD.  · Patient also found during recent hospitalization to have low B12 and low iron percent.  Though she had previously received B12 injections she was started on oral B12.     · Patient continues on oral iron at this time with some constipation though tolerable with stool softeners.  Plan to continue to monitor her blood work and if develops intolerance to oral iron consider IV iron.  · Was recent iron studies 11/20/2019 include a ferritin of 41, iron saturation 12%.   · 5/7/2021 ferritin and iron panel obtained with ferritin 156, iron saturation 50%, TIBC 288.  B12 502 with patient continuing oral B12.  · Hemoglobin dropped to 6.8 as of 5/14/2021.  Patient set up for transfusion.    · Hemoglobin improved to 10.1 as of 5/20/2021.    · 7/2/2021, hemoglobin declined to 8.9.  She is asymptomatic.    6.  Lymphedema right arm better after radiation    7.  Osteoporosis left hip -osteopenia spine observe for now    8.  Neutropenia secondary to Ibrance.  Count improving off treatment.    9.   DNR confirmed on 4/9/2021 with her daughter in attendance    10.  Chronic kidney disease.    · Patient with acute on chronic kidney injury when seen 5/7/2021 creatinine at 2.21, previously 1.43.  BUN elevated to 49.  Patient will receive 1 L normal saline today.  · Creatinine worsened to 2.31.  Patient given IV fluids.   · Referred to  nephrology.    · 5/20/2021: Patient reports seeing Dr. Tapia, nephrology, on 5/19/2021.    · Creatinine improved to 1.2162 21  · 7/2/2021, creatinine 1.36.    11.  Nausea and poor appetite.  · Was noted during week 1 of Ibrance.  This is discussed to follow-up today.  The patient did not call in at that time reporting the symptoms.  Her appetite improved the weekend following and she is since been eating and drinking well she reports.  Her weight is down 3 pounds total.  I have encouraged her to use boost supplements during times of poor appetite.  She does use ondansetron to control nausea.  We will continue to monitor this.  · Patient returns on 5/11/2021 with further weight loss.  She is also reporting right upper quadrant pain which will be discussed below.  · Appetite improved after thoracentesis    12.  Right upper quadrant pain x2 weeks reported 5/11/2021.    · We did proceed with a stat CT abdomen pelvis as well as gallbladder ultrasound showing .  Imaging noting small stones and/or sludge in the gallbladder with pericholecystic fluid.  There is diverticulosis without evidence of diverticulitis.  Patient covered with Levaquin and also referred to Dr. Dolores Solis, surgeon.   · Patient's daughter reports that they are in the process of making an appointment.  Patient does note almost complete resolution abdominal pain at this point however.    · Abdominal pain much improved as of 6/3/2021  · 7/2/2021, no complaints of abdominal pain today.    13.  Malignant right pleural effusion.  Patient required in and thoracentesis.  Patient's daughter requesting at least a referral to thoracic surgery to discuss possible Pleurx catheter.  · 6/7/2021, Pleurx catheter placed.    PLAN:  1. Proceed with single agent Faslodex today.  2. Return in 1 month for MD follow-up and Faslodex.  With a chest x-ray and bone scan  3. If the patient has  progressive disease we will consider single agent Xeloda, but no IV chemotherapy.   This was discussed  with the patient and her daughter.    The patient is high risk medication that requires close monitoring for toxicity    She is a DNR    Noble Marcus MD  07/02/2021

## 2021-08-05 ENCOUNTER — HOME CARE VISIT (OUTPATIENT)
Dept: HOME HEALTH SERVICES | Facility: HOME HEALTHCARE | Age: 81
End: 2021-08-05

## 2021-08-05 VITALS
RESPIRATION RATE: 20 BRPM | TEMPERATURE: 98.6 F | OXYGEN SATURATION: 95 % | SYSTOLIC BLOOD PRESSURE: 158 MMHG | HEART RATE: 76 BPM | DIASTOLIC BLOOD PRESSURE: 60 MMHG

## 2021-08-05 PROCEDURE — G0299 HHS/HOSPICE OF RN EA 15 MIN: HCPCS

## 2021-08-09 DIAGNOSIS — I10 ESSENTIAL HYPERTENSION: ICD-10-CM

## 2021-08-09 RX ORDER — METOPROLOL SUCCINATE 50 MG/1
50 TABLET, EXTENDED RELEASE ORAL
Qty: 90 TABLET | Refills: 3 | OUTPATIENT
Start: 2021-08-09

## 2021-08-09 NOTE — TELEPHONE ENCOUNTER
Rx Refill Note  Requested Prescriptions     Pending Prescriptions Disp Refills   • metoprolol succinate XL (TOPROL-XL) 50 MG 24 hr tablet [Pharmacy Med Name: METOPROLOL ER SUCCINATE 50MG TABS] 90 tablet 3     Sig: TAKE 1 TABLET BY MOUTH DAILY      Last office visit with prescribing clinician: 9/15/2020      Next office visit with prescribing clinician: Visit date not found            Robe Pate MA  08/09/21, 16:01 EDT

## 2021-08-10 DIAGNOSIS — I10 ESSENTIAL HYPERTENSION: ICD-10-CM

## 2021-08-11 ENCOUNTER — PREP FOR SURGERY (OUTPATIENT)
Dept: OTHER | Facility: HOSPITAL | Age: 81
End: 2021-08-11

## 2021-08-11 ENCOUNTER — HOSPITAL ENCOUNTER (OUTPATIENT)
Dept: GENERAL RADIOLOGY | Facility: HOSPITAL | Age: 81
Discharge: HOME | End: 2021-08-11
Admitting: INTERNAL MEDICINE

## 2021-08-11 ENCOUNTER — OFFICE VISIT (OUTPATIENT)
Dept: SURGERY | Facility: CLINIC | Age: 81
End: 2021-08-11

## 2021-08-11 VITALS
TEMPERATURE: 97.8 F | HEIGHT: 64 IN | HEART RATE: 56 BPM | OXYGEN SATURATION: 99 % | WEIGHT: 106.48 LBS | BODY MASS INDEX: 18.18 KG/M2 | DIASTOLIC BLOOD PRESSURE: 45 MMHG | RESPIRATION RATE: 16 BRPM | SYSTOLIC BLOOD PRESSURE: 116 MMHG

## 2021-08-11 DIAGNOSIS — I10 ESSENTIAL HYPERTENSION: ICD-10-CM

## 2021-08-11 DIAGNOSIS — C50.811 MALIGNANT NEOPLASM OF OVERLAPPING SITES OF RIGHT BREAST IN FEMALE, ESTROGEN RECEPTOR POSITIVE (HCC): ICD-10-CM

## 2021-08-11 DIAGNOSIS — J91.0 MALIGNANT PLEURAL EFFUSION: Primary | ICD-10-CM

## 2021-08-11 DIAGNOSIS — Z17.0 MALIGNANT NEOPLASM OF OVERLAPPING SITES OF RIGHT BREAST IN FEMALE, ESTROGEN RECEPTOR POSITIVE (HCC): ICD-10-CM

## 2021-08-11 PROCEDURE — 99212 OFFICE O/P EST SF 10 MIN: CPT | Performed by: THORACIC SURGERY (CARDIOTHORACIC VASCULAR SURGERY)

## 2021-08-11 PROCEDURE — 71046 X-RAY EXAM CHEST 2 VIEWS: CPT

## 2021-08-11 RX ORDER — SODIUM CHLORIDE 0.9 % (FLUSH) 0.9 %
3 SYRINGE (ML) INJECTION EVERY 12 HOURS SCHEDULED
Status: CANCELLED | OUTPATIENT
Start: 2021-08-23

## 2021-08-11 RX ORDER — CEFAZOLIN SODIUM 2 G/100ML
2 INJECTION, SOLUTION INTRAVENOUS ONCE
Status: CANCELLED | OUTPATIENT
Start: 2021-08-23 | End: 2021-08-11

## 2021-08-11 RX ORDER — METOPROLOL SUCCINATE 50 MG/1
50 TABLET, EXTENDED RELEASE ORAL
Qty: 90 TABLET | Refills: 3 | OUTPATIENT
Start: 2021-08-11

## 2021-08-11 RX ORDER — SODIUM CHLORIDE 0.9 % (FLUSH) 0.9 %
3-10 SYRINGE (ML) INJECTION AS NEEDED
Status: CANCELLED | OUTPATIENT
Start: 2021-08-23

## 2021-08-11 NOTE — TELEPHONE ENCOUNTER
Caller: NICKY     Relationship: SELF    Best call back number: [phone]    What is the best time to reach you: ANY TIME    Who are you requesting to speak with (clinical staff, provider,  specific staff member): CLINICAL    What was the call regarding: PATIENT'S DAUGHTER CALLED TO SCHEDULE AN APPOINTMENT TO GET HER METOPROLOL REFILLED BUT WAS UNABLE TO GET IN UNTIL 8/19/2021 (PATIENT WAS ADDED TO WAITING LIST) AND WILL RUN OUT WITHIN THE NEXT 2 DAYS. PATIENT'S DAUGHTER WAS WANTING TO KNOW IF THERE WAS ANY WAY IT CAN GET CALLED IN SOONER, AND THAT THEY WILL STILL BE AT THE APPOINTMENT REGARDLESS.

## 2021-08-11 NOTE — H&P (VIEW-ONLY)
"Chief Complaint  No chief complaint on file.     Pleurx catheter follow-up    Subjective          Mar Burgos presents to [facility] for her second post op appointment with a CXR.  History of Present Illness     80-year-old female with metastatic breast cancer and malignant right pleural effusion was seen in the office today for follow-up of a right Pleurx catheter placed June 7, 2021.  The catheter has drained very little fluid since it was placed.  We have placed Activase in the catheter on 2 different occasions.  Each time fluid was obtained from the pleural space.  It is difficult to tell if the patient has significant shortness of breath and if draining the fluid it improves it or not.  She reports no pleuritic pain.  She is comfortable at rest with no shortness of breath.  She is fairly sedentary.    Objective   Vital Signs:   /45 (BP Location: Right arm, Patient Position: Sitting, Cuff Size: Adult)   Pulse 56   Temp 97.8 °F (36.6 °C) (Temporal)   Resp 16   Ht 162.6 cm (64.02\")   Wt 48.3 kg (106 lb 7.7 oz)   SpO2 99%   BMI 18.27 kg/m²     Physical Exam     Chest: Pleurx catheter site is clean and dry without drainage or signs of infection.      Pulmonary: Diminished breath sounds at the right base otherwise the lungs are clear to auscultation with good air movement bilaterally.    Cardiac: Regular rate and rhythm.  No murmurs or gallops.  No dependent edema.    Result Review :   The following data was reviewed by: Yusef Larson III, MD on 08/11/2021:    Chest x-ray performed today was independently reviewed and compared to previous x-rays.  I see no significant change in the right lung or pleural space.  The catheter remains in good position.  No pulmonary infiltrates.  Left lung is clear.  This x-ray looks essentially the same as the x-ray prior to the placement of Pleurx catheter.         Assessment and Plan    Diagnoses and all orders for this " visit:    1. Malignant pleural effusion (Primary)  -     Case request      I spent 19 minutes caring for Mar on this date of service. This time includes time spent by me in the following activities:preparing for the visit, reviewing tests, performing a medically appropriate examination and/or evaluation , counseling and educating the patient/family/caregiver, ordering medications, tests, or procedures, referring and communicating with other health care professionals  and documenting information in the medical record  Follow Up     The Pleurx catheter is never worked properly.  The amount of fluid being drained is minimal.  Chest x-ray remains unchanged.  I have recommended that we remove this Pleurx catheter.  I have explained the procedure to the patient and her daughter.  We have discussed the risks and benefits.  I have answered all of their questions.  The patient has requested that we proceed.    Case request and preoperative orders have been placed in [facility].  Patient will be scheduled to have the Pleurx catheter removed as an outpatient at [facility] in the near future.  I will keep you informed of her progress.    Return for Return to office following surgery.  Patient was given instructions and counseling regarding her condition or for health maintenance advice. Please see specific information pulled into the AVS if appropriate.

## 2021-08-11 NOTE — PROGRESS NOTES
"Chief Complaint  No chief complaint on file.     Pleurx catheter follow-up    Subjective          Mar Burgos presents to [facility] for her second post op appointment with a CXR.  History of Present Illness     80-year-old female with metastatic breast cancer and malignant right pleural effusion was seen in the office today for follow-up of a right Pleurx catheter placed June 7, 2021.  The catheter has drained very little fluid since it was placed.  We have placed Activase in the catheter on 2 different occasions.  Each time fluid was obtained from the pleural space.  It is difficult to tell if the patient has significant shortness of breath and if draining the fluid it improves it or not.  She reports no pleuritic pain.  She is comfortable at rest with no shortness of breath.  She is fairly sedentary.    Objective   Vital Signs:   /45 (BP Location: Right arm, Patient Position: Sitting, Cuff Size: Adult)   Pulse 56   Temp 97.8 °F (36.6 °C) (Temporal)   Resp 16   Ht 162.6 cm (64.02\")   Wt 48.3 kg (106 lb 7.7 oz)   SpO2 99%   BMI 18.27 kg/m²     Physical Exam     Chest: Pleurx catheter site is clean and dry without drainage or signs of infection.      Pulmonary: Diminished breath sounds at the right base otherwise the lungs are clear to auscultation with good air movement bilaterally.    Cardiac: Regular rate and rhythm.  No murmurs or gallops.  No dependent edema.    Result Review :   The following data was reviewed by: Yusef Larson III, MD on 08/11/2021:    Chest x-ray performed today was independently reviewed and compared to previous x-rays.  I see no significant change in the right lung or pleural space.  The catheter remains in good position.  No pulmonary infiltrates.  Left lung is clear.  This x-ray looks essentially the same as the x-ray prior to the placement of Pleurx catheter.         Assessment and Plan    Diagnoses and all orders for this " visit:    1. Malignant pleural effusion (Primary)  -     Case request      I spent 19 minutes caring for Mar on this date of service. This time includes time spent by me in the following activities:preparing for the visit, reviewing tests, performing a medically appropriate examination and/or evaluation , counseling and educating the patient/family/caregiver, ordering medications, tests, or procedures, referring and communicating with other health care professionals  and documenting information in the medical record  Follow Up     The Pleurx catheter is never worked properly.  The amount of fluid being drained is minimal.  Chest x-ray remains unchanged.  I have recommended that we remove this Pleurx catheter.  I have explained the procedure to the patient and her daughter.  We have discussed the risks and benefits.  I have answered all of their questions.  The patient has requested that we proceed.    Case request and preoperative orders have been placed in [facility].  Patient will be scheduled to have the Pleurx catheter removed as an outpatient at [facility] in the near future.  I will keep you informed of her progress.    Return for Return to office following surgery.  Patient was given instructions and counseling regarding her condition or for health maintenance advice. Please see specific information pulled into the AVS if appropriate.

## 2021-08-12 RX ORDER — METOPROLOL SUCCINATE 50 MG/1
50 TABLET, EXTENDED RELEASE ORAL
Qty: 90 TABLET | Refills: 3 | Status: SHIPPED | OUTPATIENT
Start: 2021-08-12

## 2021-08-18 ENCOUNTER — HOME CARE VISIT (OUTPATIENT)
Dept: HOME HEALTH SERVICES | Facility: HOME HEALTHCARE | Age: 81
End: 2021-08-18

## 2021-08-19 ENCOUNTER — OFFICE VISIT (OUTPATIENT)
Dept: FAMILY MEDICINE CLINIC | Facility: CLINIC | Age: 81
End: 2021-08-19

## 2021-08-19 VITALS
OXYGEN SATURATION: 99 % | HEIGHT: 64 IN | SYSTOLIC BLOOD PRESSURE: 142 MMHG | BODY MASS INDEX: 19.12 KG/M2 | HEART RATE: 55 BPM | WEIGHT: 112 LBS | DIASTOLIC BLOOD PRESSURE: 70 MMHG | TEMPERATURE: 97.8 F

## 2021-08-19 DIAGNOSIS — I10 ESSENTIAL HYPERTENSION: Primary | ICD-10-CM

## 2021-08-19 DIAGNOSIS — K21.9 GASTROESOPHAGEAL REFLUX DISEASE WITHOUT ESOPHAGITIS: ICD-10-CM

## 2021-08-19 DIAGNOSIS — Z17.0 MALIGNANT NEOPLASM OF OVERLAPPING SITES OF RIGHT BREAST IN FEMALE, ESTROGEN RECEPTOR POSITIVE (HCC): ICD-10-CM

## 2021-08-19 DIAGNOSIS — E11.9 TYPE 2 DIABETES MELLITUS WITHOUT COMPLICATION, WITHOUT LONG-TERM CURRENT USE OF INSULIN (HCC): ICD-10-CM

## 2021-08-19 DIAGNOSIS — C50.811 MALIGNANT NEOPLASM OF OVERLAPPING SITES OF RIGHT BREAST IN FEMALE, ESTROGEN RECEPTOR POSITIVE (HCC): ICD-10-CM

## 2021-08-19 DIAGNOSIS — E78.2 MIXED HYPERLIPIDEMIA: ICD-10-CM

## 2021-08-19 DIAGNOSIS — D50.9 IRON DEFICIENCY ANEMIA, UNSPECIFIED IRON DEFICIENCY ANEMIA TYPE: ICD-10-CM

## 2021-08-19 PROBLEM — IMO0002 UNCONTROLLED TYPE 2 DIABETES MELLITUS: Status: RESOLVED | Noted: 2017-01-16 | Resolved: 2021-08-19

## 2021-08-19 PROCEDURE — 99214 OFFICE O/P EST MOD 30 MIN: CPT | Performed by: NURSE PRACTITIONER

## 2021-08-19 RX ORDER — PRAVASTATIN SODIUM 20 MG
20 TABLET ORAL NIGHTLY
Qty: 90 TABLET | Refills: 3 | Status: SHIPPED | OUTPATIENT
Start: 2021-08-19 | End: 2021-10-31 | Stop reason: HOSPADM

## 2021-08-19 NOTE — PROGRESS NOTES
"Chief Complaint  Hypertension (Patient is here for overall check up she is needing all of her medications refilled ( wore mask and goggles) )    Subjective          Mar Burgos presents to [facility]  History of Present Illness  #1 hypertension-patient is currently on Toprol-XL 50 mg daily, hydrochlorothiazide 12.5 mg daily and amlodipine 10 mg daily and clonidine 0.2 mg once a day which she is using as directed without any side effects.  Her blood pressures well controlled in office today.    2.  Hyperlipidemia-patient is currently on pravastatin 20 mg daily as directed without any side effects.  Her cholesterol was last checked in September 2019 with a total cholesterol of 132 and an LDL of 71.    3.  Diabetes-patient is currently on metformin 1000 mg with breakfast.    4.  Iron deficiency anemia-patient is currently on iron supplementation and is followed by hematologist for this.    5. Renal failure in May- seeing nephrologist.    6. Breast cancer- seeing [facility].    Objective   Vital Signs:   /70   Pulse 55   Temp 97.8 °F (36.6 °C)   Ht 162.6 cm (64.02\")   Wt 50.8 kg (112 lb)   SpO2 99%   BMI 19.22 kg/m²     Physical Exam  Vitals reviewed.   Constitutional:       General: She is not in acute distress.     Appearance: She is well-developed.   HENT:      Head: Normocephalic.   Cardiovascular:      Rate and Rhythm: Normal rate and regular rhythm.      Heart sounds: Normal heart sounds.   Pulmonary:      Effort: Pulmonary effort is normal.      Breath sounds: Normal breath sounds.   Neurological:      Mental Status: She is alert and oriented to person, place, and time.      Gait: Gait normal.   Psychiatric:         Behavior: Behavior normal.         Thought Content: Thought content normal.         Judgment: Judgment normal.        Result Review :                 Assessment and Plan    Diagnoses and all orders for this visit:    1. Essential hypertension " (Primary)    2. Mixed hyperlipidemia  -     pravastatin (PRAVACHOL) 20 MG tablet; Take 1 tablet by mouth Every Night.  Dispense: 90 tablet; Refill: 3  -     Lipid Panel With LDL / HDL Ratio    3. Type 2 diabetes mellitus without complication, without long-term current use of insulin (CMS/HCC)  -     Hemoglobin A1c    4. Gastroesophageal reflux disease without esophagitis    5. 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 in female, estrogen receptor positive (CMS/HCC)    6. Iron deficiency anemia, unspecified iron deficiency anemia type        Follow Up   Return in about 6 months (around 2/19/2022) for Recheck.  Patient was given instructions and counseling regarding her condition or for health maintenance advice. Please see specific information pulled into the AVS if appropriate.     Cont same with meds  Follow up after labs   rto in 6 mons     Mask and googles worn  .

## 2021-08-20 ENCOUNTER — PRE-ADMISSION TESTING (OUTPATIENT)
Dept: PREADMISSION TESTING | Facility: HOSPITAL | Age: 81
End: 2021-08-20

## 2021-08-20 VITALS
HEART RATE: 70 BPM | TEMPERATURE: 98.6 F | SYSTOLIC BLOOD PRESSURE: 171 MMHG | OXYGEN SATURATION: 98 % | BODY MASS INDEX: 18.59 KG/M2 | WEIGHT: 108.9 LBS | RESPIRATION RATE: 16 BRPM | HEIGHT: 64 IN | DIASTOLIC BLOOD PRESSURE: 72 MMHG

## 2021-08-20 DIAGNOSIS — J91.0 MALIGNANT PLEURAL EFFUSION: ICD-10-CM

## 2021-08-20 LAB
ANION GAP SERPL CALCULATED.3IONS-SCNC: 8.2 MMOL/L (ref 5–15)
BUN SERPL-MCNC: 31 MG/DL (ref 8–23)
BUN/CREAT SERPL: 23.7 (ref 7–25)
CALCIUM SPEC-SCNC: 9.8 MG/DL (ref 8.6–10.5)
CHLORIDE SERPL-SCNC: 103 MMOL/L (ref 98–107)
CHOLEST SERPL-MCNC: 149 MG/DL (ref 0–200)
CO2 SERPL-SCNC: 26.8 MMOL/L (ref 22–29)
CREAT SERPL-MCNC: 1.31 MG/DL (ref 0.57–1)
GFR SERPL CREATININE-BSD FRML MDRD: 39 ML/MIN/1.73
GLUCOSE SERPL-MCNC: 149 MG/DL (ref 65–99)
HBA1C MFR BLD: 6.5 % (ref 4.8–5.6)
HDLC SERPL-MCNC: 52 MG/DL (ref 40–60)
INR PPP: 1.03 (ref 0.9–1.1)
LDLC SERPL CALC-MCNC: 78 MG/DL (ref 0–100)
LDLC/HDLC SERPL: 1.47 {RATIO}
POTASSIUM SERPL-SCNC: 4.1 MMOL/L (ref 3.5–5.2)
PROTHROMBIN TIME: 13.3 SECONDS (ref 11.7–14.2)
SARS-COV-2 ORF1AB RESP QL NAA+PROBE: NOT DETECTED
SODIUM SERPL-SCNC: 138 MMOL/L (ref 136–145)
TRIGL SERPL-MCNC: 103 MG/DL (ref 0–150)
VLDLC SERPL CALC-MCNC: 19 MG/DL (ref 5–40)

## 2021-08-20 PROCEDURE — 80048 BASIC METABOLIC PNL TOTAL CA: CPT

## 2021-08-20 PROCEDURE — U0005 INFEC AGEN DETEC AMPLI PROBE: HCPCS

## 2021-08-20 PROCEDURE — G0179 MD RECERTIFICATION HHA PT: HCPCS | Performed by: NURSE PRACTITIONER

## 2021-08-20 PROCEDURE — 36415 COLL VENOUS BLD VENIPUNCTURE: CPT

## 2021-08-20 PROCEDURE — U0004 COV-19 TEST NON-CDC HGH THRU: HCPCS

## 2021-08-20 PROCEDURE — C9803 HOPD COVID-19 SPEC COLLECT: HCPCS

## 2021-08-20 PROCEDURE — 85610 PROTHROMBIN TIME: CPT

## 2021-08-20 RX ORDER — CHLORHEXIDINE GLUCONATE 500 MG/1
1 CLOTH TOPICAL
COMMUNITY
End: 2021-08-23 | Stop reason: HOSPADM

## 2021-08-20 NOTE — DISCHARGE INSTRUCTIONS
Take the following medications the morning of surgery: AMLODIPINE AND  CLONIDINE, METOPROLOL      ARRIVAL TIME IS 0900 ON 08/23/2021    If you are on prescription narcotic pain medication to control your pain you may also take that medication the morning of surgery.    General Instructions:  • Do not eat solid food after midnight the night before surgery.  • You may drink clear liquids day of surgery but must stop at least one hour before your hospital arrival time.  • It is beneficial for you to have a clear drink that contains carbohydrates the day of surgery.  We suggest a 12 to 20 ounce bottle of Gatorade or Powerade for non-diabetic patients or a 12 to 20 ounce bottle of G2 or Powerade Zero for diabetic patients. (Pediatric patients, are not advised to drink a 12 to 20 ounce carbohydrate drink)    Clear liquids are liquids you can see through.  Nothing red in color.     Plain water                               Sports drinks  Sodas                                   Gelatin (Jell-O)  Fruit juices without pulp such as white grape juice and apple juice  Popsicles that contain no fruit or yogurt  Tea or coffee (no cream or milk added)  Gatorade / Powerade  G2 / Powerade Zero      • Patients who avoid smoking, chewing tobacco and alcohol for 4 weeks prior to surgery have a reduced risk of post-operative complications.  Quit smoking as many days before surgery as you can.  • Do not smoke, use chewing tobacco or drink alcohol the day of surgery.   • If applicable bring your C-PAP/ BI-PAP machine.  • Bring any papers given to you in the doctor’s office.  • Wear clean comfortable clothes.  • Do not wear contact lenses, false eyelashes or make-up.  Bring a case for your glasses.   • Bring crutches or walker if applicable.  • Remove all piercings.  Leave jewelry and any other valuables at home.  • Hair extensions with metal clips must be removed prior to surgery.  • The Pre-Admission Testing nurse will instruct you to  bring medications if unable to obtain an accurate list in Pre-Admission Testing.            Preventing a Surgical Site Infection:  • For 2 to 3 days before surgery, avoid shaving with a razor because the razor can irritate skin and make it easier to develop an infection.    • Any areas of open skin can increase the risk of a post-operative wound infection by allowing bacteria to enter and travel throughout the body.  Notify your surgeon if you have any skin wounds / rashes even if it is not near the expected surgical site.  The area will need assessed to determine if surgery should be delayed until it is healed.  • The night prior to surgery shower using a fresh bar of anti-bacterial soap (such as Dial) and clean washcloth.  Sleep in a clean bed with clean clothing.  Do not allow pets to sleep with you.  • Shower on the morning of surgery using a fresh bar of anti-bacterial soap (such as Dial) and clean washcloth.  Dry with a clean towel and dress in clean clothing.  • Ask your surgeon if you will be receiving antibiotics prior to surgery.  • Make sure you, your family, and all healthcare providers clean their hands with soap and water or an alcohol based hand  before caring for you or your wound.    CHLORHEXIDINE CLOTH INSTRUCTIONS  The morning of surgery follow these instructions using the Chlorhexidine cloths you've been given.  These steps reduce bacteria on the body.  Do not use the cloths near your eyes, ears mouth, genitalia or on open wounds.  Throw the cloths away after use but do not try to flush them down a toilet.      • Open and remove one cloth at a time from the package.    • Leave the cloth unfolded and begin the bathing.  • Massage the skin with the cloths using gentle pressure to remove bacteria.  Do not scrub harshly.   • Follow the steps below with one 2% CHG cloth per area (6 total cloths).  • One cloth for neck, shoulders and chest.  • One cloth for both arms, hands, fingers and  underarms (do underarms last).  • One cloth for the abdomen followed by groin.  • One cloth for right leg and foot including between the toes.  • One cloth for left leg and foot including between the toes.  • The last cloth is to be used for the back of the neck, back and buttocks.    Allow the CHG to air dry 3 minutes on the skin which will give it time to work and decrease the chance of irritation.  The skin may feel sticky until it is dry.  Do not rinse with water or any other liquid or you will lose the beneficial effects of the CHG.  If mild skin irritation occurs, do rinse the skin to remove the CHG.  Report this to the nurse at time of admission.  Do not apply lotions, creams, ointments, deodorants or perfumes after using the clothes. Dress in clean clothes before coming to the hospital.    Day of surgery:  Your arrival time is approximately two hours before your scheduled surgery time.  Upon arrival, a Pre-op nurse and Anesthesiologist will review your health history, obtain vital signs, and answer questions you may have.  The only belongings needed at this time will be a list of your home medications and if applicable your C-PAP/BI-PAP machine.  A Pre-op nurse will start an IV and you may receive medication in preparation for surgery, including something to help you relax.     Please be aware that surgery does come with discomfort.  We want to make every effort to control your discomfort so please discuss any uncontrolled symptoms with your nurse.   Your doctor will most likely have prescribed pain medications.      If you are going home after surgery you will receive individualized written care instructions before being discharged.  A responsible adult must drive you to and from the hospital on the day of your surgery and stay with you for 24 hours.  Discharge prescriptions can be filled by the hospital pharmacy during regular pharmacy hours.  If you are having surgery late in the day/evening your  prescription may be e-prescribed to your pharmacy.  Please verify your pharmacy hours or chose a 24 hour pharmacy to avoid not having access to your prescription because your pharmacy has closed for the day.    If you are staying overnight following surgery, you will be transported to your hospital room following the recovery period.  [facility] has all private rooms.    If you have any questions please call Pre-Admission Testing at [phone].  Deductibles and co-payments are collected on the day of service. Please be prepared to pay the required co-pay, deductible or deposit on the day of service as defined by your plan.    Patient Education for Self-Quarantine Process    • Following your COVID testing, we strongly recommend that you wear a mask when you are with other people and practice social distancing.   • Limit your activities to only required outings.  • Wash your hands with soap and water frequently for at least 20 seconds.   • Avoid touching your eyes, nose and mouth with unwashed hands.  • Do not share anything - utensils, drinking glasses, food from the same bowl.   • Sanitize household surfaces daily. Include all high touch areas (door handles, light switches, phones, countertops, etc.)    Call your surgeon immediately if you experience any of the following symptoms:  • Sore Throat  • Shortness of Breath or difficulty breathing  • Cough  • Chills  • Body soreness or muscle pain  • Headache  • Fever  • New loss of taste or smell  • Do not arrive for your surgery ill.  Your procedure will need to be rescheduled to another time.  You will need to call your physician before the day of surgery to avoid any unnecessary exposure to hospital staff as well as other patients.

## 2021-08-23 ENCOUNTER — HOSPITAL ENCOUNTER (OUTPATIENT)
Facility: HOSPITAL | Age: 81
Setting detail: HOSPITAL OUTPATIENT SURGERY
Discharge: HOME | End: 2021-08-23
Attending: THORACIC SURGERY (CARDIOTHORACIC VASCULAR SURGERY) | Admitting: THORACIC SURGERY (CARDIOTHORACIC VASCULAR SURGERY)

## 2021-08-23 ENCOUNTER — ANESTHESIA EVENT (OUTPATIENT)
Dept: PERIOP | Facility: HOSPITAL | Age: 81
End: 2021-08-23

## 2021-08-23 ENCOUNTER — ANESTHESIA (OUTPATIENT)
Dept: PERIOP | Facility: HOSPITAL | Age: 81
End: 2021-08-23

## 2021-08-23 VITALS
HEART RATE: 56 BPM | DIASTOLIC BLOOD PRESSURE: 70 MMHG | RESPIRATION RATE: 16 BRPM | BODY MASS INDEX: 18.37 KG/M2 | WEIGHT: 107.58 LBS | SYSTOLIC BLOOD PRESSURE: 161 MMHG | HEIGHT: 64 IN | TEMPERATURE: 97.5 F | OXYGEN SATURATION: 95 %

## 2021-08-23 DIAGNOSIS — J91.0 MALIGNANT PLEURAL EFFUSION: ICD-10-CM

## 2021-08-23 LAB
GLUCOSE BLDC GLUCOMTR-MCNC: 134 MG/DL (ref 70–130)
GLUCOSE BLDC GLUCOMTR-MCNC: 147 MG/DL (ref 70–130)

## 2021-08-23 PROCEDURE — 32552 REMOVE LUNG CATHETER: CPT | Performed by: THORACIC SURGERY (CARDIOTHORACIC VASCULAR SURGERY)

## 2021-08-23 PROCEDURE — 25010000002 PROPOFOL 10 MG/ML EMULSION: Performed by: NURSE ANESTHETIST, CERTIFIED REGISTERED

## 2021-08-23 PROCEDURE — 82962 GLUCOSE BLOOD TEST: CPT

## 2021-08-23 PROCEDURE — 25010000003 CEFAZOLIN IN DEXTROSE 2-4 GM/100ML-% SOLUTION: Performed by: THORACIC SURGERY (CARDIOTHORACIC VASCULAR SURGERY)

## 2021-08-23 RX ORDER — HYDROCODONE BITARTRATE AND ACETAMINOPHEN 7.5; 325 MG/1; MG/1
2 TABLET ORAL EVERY 4 HOURS PRN
Status: DISCONTINUED | OUTPATIENT
Start: 2021-08-23 | End: 2021-08-23 | Stop reason: HOSPADM

## 2021-08-23 RX ORDER — LIDOCAINE HYDROCHLORIDE 20 MG/ML
INJECTION, SOLUTION INFILTRATION; PERINEURAL AS NEEDED
Status: DISCONTINUED | OUTPATIENT
Start: 2021-08-23 | End: 2021-08-23 | Stop reason: SURG

## 2021-08-23 RX ORDER — FENTANYL CITRATE 50 UG/ML
25 INJECTION, SOLUTION INTRAMUSCULAR; INTRAVENOUS
Status: DISCONTINUED | OUTPATIENT
Start: 2021-08-23 | End: 2021-08-23 | Stop reason: HOSPADM

## 2021-08-23 RX ORDER — HYDROMORPHONE HYDROCHLORIDE 1 MG/ML
0.25 INJECTION, SOLUTION INTRAMUSCULAR; INTRAVENOUS; SUBCUTANEOUS
Status: DISCONTINUED | OUTPATIENT
Start: 2021-08-23 | End: 2021-08-23 | Stop reason: HOSPADM

## 2021-08-23 RX ORDER — DIPHENHYDRAMINE HYDROCHLORIDE 50 MG/ML
12.5 INJECTION INTRAMUSCULAR; INTRAVENOUS
Status: DISCONTINUED | OUTPATIENT
Start: 2021-08-23 | End: 2021-08-23 | Stop reason: HOSPADM

## 2021-08-23 RX ORDER — ONDANSETRON 2 MG/ML
4 INJECTION INTRAMUSCULAR; INTRAVENOUS ONCE AS NEEDED
Status: DISCONTINUED | OUTPATIENT
Start: 2021-08-23 | End: 2021-08-23 | Stop reason: HOSPADM

## 2021-08-23 RX ORDER — SODIUM CHLORIDE 0.9 % (FLUSH) 0.9 %
3-10 SYRINGE (ML) INJECTION AS NEEDED
Status: DISCONTINUED | OUTPATIENT
Start: 2021-08-23 | End: 2021-08-23 | Stop reason: HOSPADM

## 2021-08-23 RX ORDER — SODIUM CHLORIDE 0.9 % (FLUSH) 0.9 %
3 SYRINGE (ML) INJECTION EVERY 12 HOURS SCHEDULED
Status: DISCONTINUED | OUTPATIENT
Start: 2021-08-23 | End: 2021-08-23 | Stop reason: HOSPADM

## 2021-08-23 RX ORDER — DIPHENHYDRAMINE HCL 25 MG
25 CAPSULE ORAL
Status: DISCONTINUED | OUTPATIENT
Start: 2021-08-23 | End: 2021-08-23 | Stop reason: HOSPADM

## 2021-08-23 RX ORDER — NALOXONE HCL 0.4 MG/ML
0.2 VIAL (ML) INJECTION AS NEEDED
Status: DISCONTINUED | OUTPATIENT
Start: 2021-08-23 | End: 2021-08-23 | Stop reason: HOSPADM

## 2021-08-23 RX ORDER — IBUPROFEN 600 MG/1
600 TABLET ORAL ONCE AS NEEDED
Status: DISCONTINUED | OUTPATIENT
Start: 2021-08-23 | End: 2021-08-23 | Stop reason: HOSPADM

## 2021-08-23 RX ORDER — LABETALOL HYDROCHLORIDE 5 MG/ML
5 INJECTION, SOLUTION INTRAVENOUS
Status: DISCONTINUED | OUTPATIENT
Start: 2021-08-23 | End: 2021-08-23 | Stop reason: HOSPADM

## 2021-08-23 RX ORDER — CEFAZOLIN SODIUM 2 G/100ML
2 INJECTION, SOLUTION INTRAVENOUS ONCE
Status: COMPLETED | OUTPATIENT
Start: 2021-08-23 | End: 2021-08-23

## 2021-08-23 RX ORDER — FLUMAZENIL 0.1 MG/ML
0.2 INJECTION INTRAVENOUS AS NEEDED
Status: DISCONTINUED | OUTPATIENT
Start: 2021-08-23 | End: 2021-08-23 | Stop reason: HOSPADM

## 2021-08-23 RX ORDER — FENTANYL CITRATE 50 UG/ML
50 INJECTION, SOLUTION INTRAMUSCULAR; INTRAVENOUS
Status: DISCONTINUED | OUTPATIENT
Start: 2021-08-23 | End: 2021-08-23 | Stop reason: HOSPADM

## 2021-08-23 RX ORDER — HYDRALAZINE HYDROCHLORIDE 20 MG/ML
5 INJECTION INTRAMUSCULAR; INTRAVENOUS
Status: DISCONTINUED | OUTPATIENT
Start: 2021-08-23 | End: 2021-08-23 | Stop reason: HOSPADM

## 2021-08-23 RX ORDER — SODIUM CHLORIDE, SODIUM LACTATE, POTASSIUM CHLORIDE, CALCIUM CHLORIDE 600; 310; 30; 20 MG/100ML; MG/100ML; MG/100ML; MG/100ML
9 INJECTION, SOLUTION INTRAVENOUS CONTINUOUS
Status: DISCONTINUED | OUTPATIENT
Start: 2021-08-23 | End: 2021-08-23 | Stop reason: HOSPADM

## 2021-08-23 RX ORDER — FAMOTIDINE 10 MG/ML
20 INJECTION, SOLUTION INTRAVENOUS ONCE
Status: COMPLETED | OUTPATIENT
Start: 2021-08-23 | End: 2021-08-23

## 2021-08-23 RX ORDER — LIDOCAINE HYDROCHLORIDE 10 MG/ML
0.5 INJECTION, SOLUTION EPIDURAL; INFILTRATION; INTRACAUDAL; PERINEURAL ONCE AS NEEDED
Status: DISCONTINUED | OUTPATIENT
Start: 2021-08-23 | End: 2021-08-23 | Stop reason: HOSPADM

## 2021-08-23 RX ORDER — HYDROCODONE BITARTRATE AND ACETAMINOPHEN 5; 325 MG/1; MG/1
1 TABLET ORAL ONCE AS NEEDED
Status: DISCONTINUED | OUTPATIENT
Start: 2021-08-23 | End: 2021-08-23 | Stop reason: HOSPADM

## 2021-08-23 RX ORDER — PROPOFOL 10 MG/ML
VIAL (ML) INTRAVENOUS AS NEEDED
Status: DISCONTINUED | OUTPATIENT
Start: 2021-08-23 | End: 2021-08-23 | Stop reason: SURG

## 2021-08-23 RX ORDER — LIDOCAINE HYDROCHLORIDE 10 MG/ML
INJECTION, SOLUTION EPIDURAL; INFILTRATION; INTRACAUDAL; PERINEURAL AS NEEDED
Status: DISCONTINUED | OUTPATIENT
Start: 2021-08-23 | End: 2021-08-23 | Stop reason: HOSPADM

## 2021-08-23 RX ORDER — PROPOFOL 10 MG/ML
VIAL (ML) INTRAVENOUS CONTINUOUS PRN
Status: DISCONTINUED | OUTPATIENT
Start: 2021-08-23 | End: 2021-08-23 | Stop reason: SURG

## 2021-08-23 RX ORDER — EPHEDRINE SULFATE 50 MG/ML
5 INJECTION, SOLUTION INTRAVENOUS ONCE AS NEEDED
Status: DISCONTINUED | OUTPATIENT
Start: 2021-08-23 | End: 2021-08-23 | Stop reason: HOSPADM

## 2021-08-23 RX ADMIN — FAMOTIDINE 20 MG: 10 INJECTION INTRAVENOUS at 10:28

## 2021-08-23 RX ADMIN — CEFAZOLIN SODIUM 2 G: 2 INJECTION, SOLUTION INTRAVENOUS at 12:56

## 2021-08-23 RX ADMIN — SODIUM CHLORIDE, POTASSIUM CHLORIDE, SODIUM LACTATE AND CALCIUM CHLORIDE 9 ML/HR: 600; 310; 30; 20 INJECTION, SOLUTION INTRAVENOUS at 10:28

## 2021-08-23 RX ADMIN — PROPOFOL 100 MCG/KG/MIN: 10 INJECTION, EMULSION INTRAVENOUS at 13:07

## 2021-08-23 RX ADMIN — Medication 40 MG: at 13:07

## 2021-08-23 RX ADMIN — LIDOCAINE HYDROCHLORIDE 60 MG: 20 INJECTION, SOLUTION INFILTRATION; PERINEURAL at 13:07

## 2021-08-23 NOTE — ANESTHESIA POSTPROCEDURE EVALUATION
"Patient: Mar Burgos    Procedure Summary     Date: 08/23/21 Room / Location: [facility] OR [address] [place] [address] MAIN OR    Anesthesia Start: 1302 Anesthesia Stop: 1336    Procedure: RIGHT PLEURX CATHETER REMOVAL (Right ) Diagnosis:       Malignant pleural effusion      (Malignant pleural effusion [J91.0])    Surgeons: Yusef Larson III, MD Provider: Freddy Kearney MD    Anesthesia Type: MAC ASA Status: 3          Anesthesia Type: MAC    Vitals  Vitals Value Taken Time   /73 08/23/21 1350   Temp 36.4 °C (97.5 °F) 08/23/21 1331   Pulse 54 08/23/21 1350   Resp 16 08/23/21 1350   SpO2 100 % 08/23/21 1350           Post Anesthesia Care and Evaluation    Patient location during evaluation: bedside  Patient participation: complete - patient participated  Level of consciousness: awake and alert  Pain score: 0  Pain management: adequate  Airway patency: patent  Anesthetic complications: No anesthetic complications    Cardiovascular status: acceptable  Respiratory status: acceptable  Hydration status: acceptable    Comments: /73 (BP Location: Right arm, Patient Position: Lying)   Pulse 54   Temp 36.4 °C (97.5 °F) (Oral)   Resp 16   Ht 162.6 cm (64\")   Wt 48.8 kg (107 lb 9.4 oz)   SpO2 100%   BMI 18.47 kg/m²       "

## 2021-08-23 NOTE — ANESTHESIA PREPROCEDURE EVALUATION
Anesthesia Evaluation     Patient summary reviewed and Nursing notes reviewed   no history of anesthetic complications:  NPO Solid Status: > 8 hours  NPO Liquid Status: > 2 hours           Airway   Mallampati: II  TM distance: >3 FB  Neck ROM: full  no difficulty expected  Dental - normal exam     Pulmonary - normal exam   (+) pleural effusion, a smoker Former, COPD mild,   (-) asthma, lung cancer  Cardiovascular - normal exam  Exercise tolerance: good (4-7 METS)    ECG reviewed  Patient on routine beta blocker and Beta blocker given within 24 hours of surgery  Rhythm: regular  Rate: normal    (+) hypertension well controlled 2 medications or greater, PVD, hyperlipidemia,   (-) valvular problems/murmurs, past MI, CAD, dysrhythmias, angina, CHF, orthopnea, cardiac stents, CABG, pericardial effusion    ROS comment: TTE 04/2021:  ·There is moderate thickening of the aortic valve. The aortic valve appears trileaflet. Trace aortic valve regurgitation is present. No aortic valve stenosis is present.  ·Severe mitral annular calcification is present. There is moderate, bileaflet mitral valve thickening present.  ·Mild tricuspid valve regurgitation is present. Estimated right ventricular systolic pressure from tricuspid regurgitation is mildly elevated (35-45 mmHg). Calculated right ventricular systolic pressure from tricuspid regurgitation is 42 mmHg.    Neuro/Psych  (+) TIA, CVA residual symptoms, numbness,     (-) seizures, neuromuscular disease, headaches, dizziness/light headedness    ROS Comment: Hx/o CVA 02/2019  GI/Hepatic/Renal/Endo    (+)  GERD,  renal disease CRI, diabetes mellitus type 2 well controlled,   (-) hiatal hernia, PUD, hepatitis, liver disease, GI bleed, no thyroid disorder    Musculoskeletal     Abdominal  - normal exam   Substance History - negative use     OB/GYN negative ob/gyn ROS         Other   arthritis, blood dyscrasia anemia,   history of cancer      Other Comment: Hx/o breast CA                 Anesthesia Plan    ASA 3     MAC     intravenous induction     Anesthetic plan, all risks, benefits, and alternatives have been provided, discussed and informed consent has been obtained with: patient.    Plan discussed with CRNA.

## 2021-08-23 NOTE — OP NOTE
PLEURX CATHETER REMOVAL  Progress Note    Mar Burgso  8/23/2021    Pre-op Diagnosis:   Malignant pleural effusion [J91.0]       Post-Op Diagnosis Codes:     * Malignant pleural effusion [J91.0]    Procedure/CPT® Codes:        Procedure(s):  RIGHT PLEURX CATHETER REMOVAL    Surgeon(s):  Yusef Larson III, MD    Anesthesia: Monitored Anesthesia Care    Staff:   Circulator: Pablo Spears RN  Scrub Person: Brigitte Taylor; Marquise Vu         Estimated Blood Loss: none    Urine Voided: * No values recorded between 8/23/2021  1:00 PM and 8/23/2021  1:29 PM *    Specimens:                None          Drains:   [REMOVED] Chest Tube Right Pleural (Removed)   Function One-way valve 08/05/21 1300   Air Leak/Fluctuation air leak not present 08/05/21 1300   Patency Intervention Stripped 08/05/21 1300   Drainage Description Clots;Sanguineous 08/05/21 1300   Dressing Type Gauze 08/23/21 1019   Dressing Status Clean;Dry;Intact 08/23/21 1019   Dressing Intervention Dressing changed 08/05/21 1300   Site Assessment Clean;Dry;Intact 08/05/21 1300   Left Subcutaneous Emphysema none present 08/05/21 1300   Right Subcutaneous Emphysema none present 08/05/21 1300   Safety all connections secured 08/05/21 1300   Output (mL) 5 mL 08/05/21 1300       Findings: Catheter was removed completely and intact.  No drainage from the site.  No signs of infection.    Complications: none      Summary of procedure: Mar Burgos was brought to the operating room and placed on the operating table in the supine position.  Blood pressure, EKG, pulse oximetry and airway were monitored by the anesthesia service.  Supplemental oxygen and intravenous sedation were administered by anesthesia.  With the patient adequately sedated, the right lateral chest wall was prepped and draped in usual sterile manner.  An area overlying the insertion site of the catheter was infiltrated with 1% Xylocaine.  An incision was made in the skin and carried  down through the skin and subcutaneous tissues.  The Kris cuff was identified and dissected free from the surrounding tissues.  The catheter was removed from the chest completely and intact.  The wound was irrigated and closed in layers with 3-0 Vicryl and the skin with 4-0 Vicryl.  Dry sterile dressing was applied.  Patient was awakened and transported to seated recovery where he was observed and then discharged home per criteria.    Sponge instrument and needle counts were correct.    .        Dictated utilizing Dragon dictation          Yusef Larson III, MD     Date: 8/23/2021  Time: 13:31 EDT

## 2021-08-31 ENCOUNTER — HOSPITAL ENCOUNTER (OUTPATIENT)
Dept: NUCLEAR MEDICINE | Facility: HOSPITAL | Age: 81
Discharge: HOME | End: 2021-08-31

## 2021-08-31 DIAGNOSIS — Z17.0 MALIGNANT NEOPLASM OF OVERLAPPING SITES OF RIGHT BREAST IN FEMALE, ESTROGEN RECEPTOR POSITIVE (HCC): ICD-10-CM

## 2021-08-31 DIAGNOSIS — C50.811 MALIGNANT NEOPLASM OF OVERLAPPING SITES OF RIGHT BREAST IN FEMALE, ESTROGEN RECEPTOR POSITIVE (HCC): ICD-10-CM

## 2021-08-31 PROCEDURE — 0 TECHNETIUM MEDRONATE KIT: Performed by: INTERNAL MEDICINE

## 2021-08-31 PROCEDURE — A9503 TC99M MEDRONATE: HCPCS | Performed by: INTERNAL MEDICINE

## 2021-08-31 PROCEDURE — 78306 BONE IMAGING WHOLE BODY: CPT

## 2021-08-31 RX ORDER — TC 99M MEDRONATE 20 MG/10ML
20 INJECTION, POWDER, LYOPHILIZED, FOR SOLUTION INTRAVENOUS
Status: COMPLETED | OUTPATIENT
Start: 2021-08-31 | End: 2021-08-31

## 2021-08-31 RX ADMIN — Medication 20 MILLICURIE: at 09:19

## 2021-09-02 NOTE — PROGRESS NOTES
Subjective     REASON FOR FOLLOW-UP:   1. Locally advanced right breast cancer ER ME positive HER-2 positive  · Initiated Arimidex 9/11/2019  · Radiation therapy added, completing a total of 25 fractions as of 4/27/2020    2. Remote history of left breast cancer 1978 treated with mastectomy alone                               REQUESTING PHYSICIAN: MD Vladimir Cowart    History of Present Illness Ms. Burgos is a 80 y.o. female with above medical history who returns today on single agent Faslodex.  After unexpected and unacceptable toxicity with Ibrance.  She is with her daughter who gives most of the history.      She was started on Faslodex and we attempted to give her very low-dose Ibrance 75 mg 2 weeks on and 2 weeks off and despite this she had tremendous toxicity.  She became profoundly anemic, and thrombocytopenic and her creatinine went up to 2.3  and she was also leukopenic    She is finally recovered from that toxicity and is feeling somewhat better but is adamantly opposed to trying any more Ibrance and not even abemaciclib    Shortness of breath is resolved and her Pleurx was removed by Dr. Larson last week     she is is not in much pain and her appetite is fair and she is ambulating with a walker and her quality life is decent. \    Staging bone scan was negative chest x-ray shows stable right pleural effusion CA 15-3 from today is pending  she has noted few more nodules in her right chest wall but these are small and not causing any issues    At this point with very minimal progression in the chest wall will help opted to hold off starting Xeloda and continue Faslodex as she has fairly good quality of life.    She is asked about the COVID-19 booster and I told her that was fine    I again discussed her DNR status and she is a DNR    Past Medical History:   Diagnosis Date   • Anemia     required prior blood transfusions   • Arthritis  "   • C. difficile colitis     APPROXIMATELY 5 YEARS AGO; TREATED IN HOSPITAL   • Cancer (CMS/HCC)     right breast cancer  ( 40 years ago left breast cancer)   • Clostridium difficile colitis    • COVID-19 vaccine series completed     PFIZER   • Diabetes mellitus (CMS/[facility])    • Difficulty walking    • Diverticulitis    • Environmental allergies    • History of malignant neoplasm of breast 2013    denies any chemo or radiation   • History of malignant neoplasm of skin 2013    possibly basal   • History of tobacco use 2013   • History of transfusion    • HL (hearing loss)    • Hyperlipidemia    • Hypertension    • Kidney disease    • Neuropathy in diabetes (CMS/HCC)    • Peripheral neuropathy    • Pleural effusion on right     TUBE IN PLACE   • Shingles    • Skin cancer    • Stroke (CMS/[facility]) 2019   • Thrombophlebitis leg superficial     LEFT LEG \"AT LEAST 1O YEARS AGO'   • Vision loss    • Wears partial dentures     UPPER      Patient is  3 para 3 with 2 miscarriages menarche was at age 9 menopause at age 49st childbirth was at age 24 she breast-fed for at least 6 months she did not take hormone replacement therapy because at age 38 she developed breast cancer in the left breast was treated at the [facility] with a modified radical mastectomy in  no radiation or hormonal blockade was given in details of the scans were not available at this time.    Family history is positive for father dying at age 84 with disseminated abdominal cancer of unknown type mother  at 77 of natural causes she has a brother with kidney cancer at age 65 who  of this and a sister with melanoma at age 30    Patient currently lives with her daughter does not smoke currently but smoked for 70 years 1 pack of cigarettes is not a drinker.  She uses a cane to walk because her balance is poor she has issues with swallowing from her stroke      Past Surgical History:   Procedure Laterality Date   • " "CATARACT EXTRACTION, BILATERAL     • COLONOSCOPY     • EAR TUBES     • EAR TUBES Right     'PARK EAR TUBES\"   • MASTECTOMY Left 1978    radical   • PLEURAL CATHETER INSERTION Right 6/7/2021    Procedure: PLEURX CATHETER INSERTION;  Surgeon: Yusef Larson III, MD;  Location: [facility] OR;  Service: Thoracic;  Laterality: Right;   • PLEURX CATHETER REMOVAL Right 8/23/2021    Procedure: RIGHT PLEURX CATHETER REMOVAL;  Surgeon: uYsef Larson III, MD;  Location: [facility] OR;  Service: Thoracic;  Laterality: Right;   • TUMOR REMOVAL Left     large lipoma removed from hip      HEME/ONC HISTORY:  patient is an 79-year-old female with multiple medical issues including hypertension diabetes hypercholesterolemia with a recent stroke in February of this year which is resulted in left-sided weakness and difficulty swallowing.  The patient noted changes in her breast over the last year and the last few months changes to the nipple specifically with developing lump in both the right breast and under the right arm.  She brought to the attention of her family physician who sent her for imaging and biopsy of obvious tumor.  Mammogram revealed a large irregular mass measuring 6 cm with skin retraction nipple retraction and skin thickening.  At the 10 o'clock position there appeared to be some skin involvement.    She was also noted clinically to have large axillary lymph nodes.  Ultrasound was obtained revealing an irregular solid mass with indistinct margins measuring approximately 64 x 51 mm.  On ultrasound there were also some enlarged axillary lymph nodes measuring about 30 mm in greatest size.     A biopsy was recommended and has revealed a grade 2 invasive ductal cancer that is ER positive at 98%, LA positive at 82%, and HER-2 equivocal.  The Ki-67 is 20.81%.     Patient was then referred to Dr. Gonzalez who examined her and did not feel surgical excision was possible because there would be a large defect " from her tumor and was wanting to consider neoadjuvant therapy in an effort to shrink the tumor.  The patient was presented at the multidisciplinary conference and because of her comorbidities her recent stroke and her overall general condition we did not think she was a good candidate for neoadjuvant chemotherapy and she was referred to us to discuss endocrine therapy.    In the interim additional testing on her tumor was done at [facility] because of the indeterminate HER-2 FISH and this revealed that there was 3+ HER-2 activity by IHC and therefore this is considered to be HER-2 positive  The patient had staging work-up at the recommendation of the multidisciplinary clinic And CT of the chest showed calcified granulomatous disease with mild AP window adenopathy which is increased from previous CAT scan in 2016 precarinal adenopathy also increased.  It showed the breast mass as well as right axillary adenopathy.  In the abdomen there was a left adrenal nodule which is increased in size from 1.4 to 1.8 cm felt to be an adenoma in addition there was a small sclerotic lesion in the left iliac crest and metastasis could not be completely excluded although the bone scan showed no uptake in this area.  The bone scan did show a fracture and uptake in the left greater trochanter and uptake of the left superior pubic ramus that represents a superior ramus fracture of with no other signs of metastatic disease-patient did fall in July of this year but no obvious fractures were seen on x-ray.    Pt did not want chemotherapy-Patient initiating Arimidex therapy in mid September 2019.     the patient was hospitalized in late September, presenting with garbled speech and elevated blood pressure, felt to ultimately have had a TIA related to hypertensive urgency and small vessel disease.  Neurology recommended dual antiplatelet therapy for 3 months followed by Plavix alone.  Her antihypertensives were adjusted.  At that time she was  also found to be B12 deficient with a level of 158 and iron deficient with an iron percent of 7%.  She was started on oral B12 and iron.    She states that the iron does cause some constipation but it is manageable with stool softeners at this time.  There was some discussion in the hospital record that if she could not tolerate oral iron IV iron should be considered.  Hemoglobin is improved up to 9.7 from discharge hemoglobin of 9.0.    2/20  he has not had a DEXA scan in many years and we repeated in February and it shows moderate osteopenia in the spine osteoporosis and left hip and severe osteopenia in the right hip.  I am not inclined to switch her to tamoxifen at this point and leave her room at acceptable see her back in 3 months and decide whether to add Prolia    2/21  Progressive disease with malignant pleural effusion ER positive still decision made to try Faslodex and low-dose Ibrance 75 mg 2 weeks on 2 weeks off    5/21  She was here 2 weeks ago and found to have elevated creatinine of 2.2, BUN 49. She received IV fluids. She was seen last week during her off week of Ibrance and was noting new right upper quadrant abdominal pain reportedly x2 weeks. Very tender on exam. He was also noting some increase shortness of breath. She was sent for CT of the abdomen/pelvis and ultrasound of the gallbladder. Imaging showed small stones in the gallbladder and/or sludge with pericholecystic fluid. Diverticulosis without evidence of diverticulitis. Patient was referred to Dr. Dolores Solis, surgery, for evaluation of possible cholecystitis.    Patient's hemoglobin declined further to 6.8 last week requiring transfusion of 2 units PRBCs given 5/15/2021.  She also received additional IV fluids the day prior for continued elevation of BUN at 39, creatinine 2.0.    The patient is reviewed back now, accompanied by her daughter, with hemoglobin improved to 10.2.  Patient is currently finishing up her second off week of  Ibrance and WBC count has improved to 3.8, platelet count normalized to 250,000.  The patient is tentatively due to restart Ibrance tomorrow, after discussion with Dr. Marcus, it is not felt the patient can tolerate Ibrance considering how poorly she did was a 75 mg dose.  We discussed that this point keeping her off Ibrance and allowing her to get stronger.      Her BUN remains elevated at 43 today, creatinine 1.8.  Her baseline creatinine is more around 1.4-1.6.  Patient did see nephrology, Dr. Stanley, yesterday.  We will request records.  Patient's daughter states it was felt the patient was acutely dehydrated due to poor tolerance with Ibrance, specifically with nausea and poor oral intake.  We will give her additional IV fluids in the office today.        Current Outpatient Medications on File Prior to Visit   Medication Sig Dispense Refill   • acetaminophen (TYLENOL) 500 MG tablet Take 500 mg by mouth Every 6 (Six) Hours As Needed for Mild Pain .     • amLODIPine (NORVASC) 10 MG tablet Take 1 tablet by mouth Daily. (Patient taking differently: Take 5 mg by mouth Daily.) 90 tablet 3   • cetirizine (zyrTEC) 10 MG tablet Take 10 mg by mouth Every Night.     • cloNIDine (CATAPRES) 0.2 MG tablet TAKE 1 TABLET BY MOUTH EVERY 12 HOURS (Patient taking differently: Take 0.2 mg by mouth 2 (Two) Times a Day. Caution: Look alike/sound alike drug alert. Please read the label.) 180 tablet 3   • clopidogrel (PLAVIX) 75 MG tablet TAKE 1 TABLET BY MOUTH DAILY (Patient taking differently: Take 75 mg by mouth Daily. HOLD 5 DAYS PRIOR TO SURGERY PER DR WILLSON) 90 tablet 3   • ferrous sulfate (IRON SUPPLEMENT) 325 (65 FE) MG tablet Take 1 tablet by mouth 2 (Two) Times a Day Before Meals. (Patient taking differently: Take 325 mg by mouth Daily.) 60 tablet 0   • fluticasone (FLONASE) 50 MCG/ACT nasal spray 2 sprays into the nostril(s) as directed by provider Every Night.     • Fulvestrant (FASLODEX IM) Inject  into the appropriate  muscle as directed by prescriber Every 30 (Thirty) Days.     • guaiFENesin (MUCINEX) 600 MG 12 hr tablet Take 600 mg by mouth 2 (Two) Times a Day As Needed for Cough.     • hydroCHLOROthiazide (MICROZIDE) 12.5 MG capsule Take 1 capsule by mouth Daily. 90 capsule 3   • loperamide (IMODIUM) 2 MG capsule Take 2 mg by mouth 4 (Four) Times a Day As Needed.     • metFORMIN (GLUCOPHAGE) 1000 MG tablet Take one tablet bid (Patient taking differently: Take 1,000 mg by mouth Daily With Breakfast.) 180 tablet 1   • metoprolol succinate XL (TOPROL-XL) 50 MG 24 hr tablet Take 1 tablet by mouth Daily. (Patient taking differently: Take 50 mg by mouth Daily.) 90 tablet 3   • pravastatin (PRAVACHOL) 20 MG tablet Take 1 tablet by mouth Every Night. 90 tablet 3   • vitamin B-12 (VITAMIN B-12) 2000 MCG tablet Take 1 tablet by mouth Daily. (Patient taking differently: Take 2,000 mcg by mouth Daily. HOLD FOR SURGERY) 30 tablet 0     No current facility-administered medications on file prior to visit.        ALLERGIES:    Allergies   Allergen Reactions   • Lisinopril Angioedema   • Lipitor [Atorvastatin] Unknown - High Severity     Leg weakness         Social History     Socioeconomic History   • Marital status:      Spouse name: Not on file   • Number of children: 3   • Years of education: High school   • Highest education level: Not on file   Tobacco Use   • Smoking status: Former Smoker     Packs/day: 0.25     Years: 10.00     Pack years: 2.50     Types: Cigarettes     Quit date: 2019     Years since quittin.5   • Smokeless tobacco: Former User   • Tobacco comment: she quit for 8 years and started back about a year ago then quit again   Vaping Use   • Vaping Use: Never used   Substance and Sexual Activity   • Alcohol use: No   • Drug use: No   • Sexual activity: Defer        Family History   Problem Relation Age of Onset   • Stroke Mother 76   • Diabetes Mother    • Hypertension Mother    • Arthritis Mother    • Cancer  "Father         metastatic unknown cause   • Heart attack Father 70   • Colon cancer Father    • Lung cancer Father    • Cancer Brother         bladder   • Arthritis Sister    • Melanoma Sister    • Hypertension Daughter    • Diabetes Daughter    • Malig Hyperthermia Neg Hx       Review of Systems   Constitutional: Positive for fatigue.   HENT: Positive for hearing loss (same ).    Respiratory: Negative.    Cardiovascular: Negative.    Gastrointestinal: Negative for abdominal distention, abdominal pain (improved), blood in stool, constipation, nausea and vomiting.   Genitourinary: Negative.    Musculoskeletal: Positive for arthralgias (Hip pain) and gait problem (Hip pain). Negative for joint swelling.   Neurological: Positive for weakness (left side).   Hematological: Negative.    Psychiatric/Behavioral: Negative.    All other systems reviewed and are negative.      Objective     Vitals:    09/03/21 0848   BP: 172/74  Comment: w/o med taken today yet   Pulse: 70   Resp: 16   Temp: 96.9 °F (36.1 °C)   TempSrc: Skin   SpO2: 94%   Weight: 48.9 kg (107 lb 14.4 oz)   Height: 162.6 cm (64.02\")   PainSc: 0-No pain     Current Status 9/3/2021   ECOG score 3     Physical Exam   Pulmonary/Chest:           GENERAL:  Well-developed, well-nourished in no acute distress.   SKIN:  Warm, dry without rashes, purpura or petechiae.  EYES:  Pupils equal, round and reactive to light.  EOMs intact.  Left eye proptosis.  EARS:  Hearing intact.  NOSE:  Septum midline.  No excoriations or nasal discharge.  MOUTH:  Tongue is well-papillated; no stomatitis or ulcers.  Lips normal.  THROAT:  Oropharynx without lesions or exudates.  NECK:  Supple with good range of motion; no thyromegaly or masses, no JVD.  LYMPHATICS:  No cervical, supraclavicular adenopathy.  CHEST:  Lungs clear to auscultation but poor air movement in the right middle and lower lobes.    BREASTS: Left chest wall is benign; the right breast has a mobile soft tissue mass " roughly 4 cm transverse x 5 cm and 2 subcutaneous nodules noted see picture above -No palpable adenopathy in the right axilla, neck or supraclavicular region.  Nodules appear slightly larger see photograph   CARDIAC:  Regular rate and rhythm without murmurs, rubs or gallops. Normal S1,S2.  ABDOMEN: Bowel sounds active.  Soft, nontender.  EXTREMITIES:  No clubbing, cyanosis 2+lymphedema right arm and 1+ in both legs  NEUROLOGICAL: Left-sided weakness.  PSYCHIATRIC:  Normal affect and mood.      I have reexamined the patient and the results are consistent with the previously documented exam. Noble Marcus MD       RECENT LABS:  Results from last 7 days   Lab Units 09/03/21  0838   WBC 10*3/mm3 6.39   NEUTROS ABS 10*3/mm3 4.43   HEMOGLOBIN g/dL 9.6*   HEMATOCRIT % 31.6*   PLATELETS 10*3/mm3 292     Results from last 7 days   Lab Units 09/03/21  0838   SODIUM mmol/L 141   POTASSIUM mmol/L 4.2   CHLORIDE mmol/L 102   CO2 mmol/L 24.8   BUN mg/dL 30*   CREATININE mg/dL 1.47*   CALCIUM mg/dL 9.8   ALBUMIN g/dL 3.90   BILIRUBIN mg/dL <0.2*   ALK PHOS U/L 106   ALT (SGPT) U/L <5   AST (SGOT) U/L 13   GLUCOSE mg/dL 180*                         MRI BRAIN     IMPRESSION:  Old right basal ganglia infarct. No acute abnormality  identified. No metastatic disease is identified at the brain or the  head.    Bone scan  IMPRESSION:  Abnormal uptake left greater trochanter where there is a  fracture demonstrated with CT (patient has a history of a fall and there  is no CT evidence that this is a pathologic fracture). There is also  abnormal uptake at the left superior pubic ramus that most likely  represents a superior ramus fracture though a fracture is not evident  with CT. This uptake left superior pubic ramus is technically  indeterminate though there is overall no convincing evidence for bony  metastatic disease. Follow-up CT could be obtained.     This report was finalized on 8/23/2019 1:    CT chest abdomen  pelvis  IMPRESSION CHEST CT:    1. A 2.2 cm right breast mass laterally presumably related to the  patient's neoplasm.  2. Right axillary mediastinal adenopathy and metastatic disease cannot  be excluded. PET/CT may be helpful.     IMPRESSION ABDOMEN & PELVIS CT:    1. Low-density renal lesions as discussed, favor cysts.  2. Enlarging left adrenal nodule, likely adenoma.  3. Extensive diverticulosis.  4. A 5 mm sclerotic left iliac lesion as discussed.    BONE MINERAL DENSITOMETRY  IMPRESSION:  Osteoporosis at the left hip. Interval decrease in bone  density.     This report was finalized on 2/14/2020 12:10 PM by Dr. Osmin Dodd M.D.        Assessment/Plan   1.  Locally advanced right breast cancer strongly ER RI positive with indeterminate HER-2 with repeat testing showing 3+ IHC consistent with positive result  · Staging work-up probably negative although indeterminate bone lesions without obvious fractures on bone scan  · Abnormal mediastinal adenopathy which is been present since 2016 likely granulomatous disease  · Enlarging left adrenal mass felt to be an adenoma present since 2015  · History of left breast cancer 1978 at age 38 treated with modified radical mastectomy alone? Pathology  · [place] to be a poor candidate for surgery or chemotherapy.PT DECLINED CHEMO  · Initiated Arimidex around 9/11/2019.  Radiation may be a possibility for additional therapy if she does not get a good response from Arimidex  · Radiation added in 3/20  · Patient is reviewed back today, 11/17/2020, tolerating Arimidex well.  She does feel that the right breast mass is slightly larger though still overall improved compared to when she initially presented.    · Patient seen on 2/18/2021 with new cutaneous nodules restaging and switch to Faslodex planned  · Right pleural effusion positive cytology for metastatic breast cancer ER/RI positive HER-2 FISH negative we will add Ibrance to the Faslodex 75 mg a day 2 weeks on 2 weeks off  and escalate as tolerated.  · Patient did have thoracentesis on 5/4/2021 with removal of 1800 mL of fluid on the right side.  She has had improvement in her shortness of breath since that time.  · Patient seen for day 15 of her first cycle of Ibrance 75 mg daily for 2 weeks on, 2 weeks off.  Also given Faslodex that day.  She did have nausea with the first week and decreased appetite.  Her nausea was improved with ondansetron.  After about 5 days or so her appetite improved and she started eating more and having some strange cravings.  Patient also more short of breath, undergoing thoracentesis 5/4/2021.    · Patient with acute on chronic kidney disease in relation to nausea from Ibrance and decreased oral intake.  Per review 5/20/2021 in discussion with Dr. Marcus, we will hold further Ibrance at this time.  · Decision made to place her Pleurx catheter and continue with single agent Faslodex for now  · 7/2/2021, the patient returns today for Faslodex.  She had a Pleurx catheter placed on 6/7/2021.  · Pleurx catheter removed in August 2021  · Bone scan negative in 8/21 Faslodex continued    2.  Granulomatous mediastinal lymph nodes chronic    3.  History of stroke in 2/19 with left hemiparesis and dysphagia  · Patient hospitalized again 9/26 and 9/29/2019 presenting with garbled speech and elevated blood pressure, felt to have had a TIA.  Neurology recommended dual antiplatelet therapy for 3 months and then Plavix alone.    4.  Hypertension diabetes and hypercholesterolemia.  Patient currently off lisinopril per primary care due to angioedema.    5.  Anemia, multifactorial:  · Patient has stage III CKD.  · Patient also found during recent hospitalization to have low B12 and low iron percent.  Though she had previously received B12 injections she was started on oral B12.     · Patient continues on oral iron at this time with some constipation though tolerable with stool softeners.  Plan to continue to monitor her  blood work and if develops intolerance to oral iron consider IV iron.  · Was recent iron studies 11/20/2019 include a ferritin of 41, iron saturation 12%.   · 5/7/2021 ferritin and iron panel obtained with ferritin 156, iron saturation 50%, TIBC 288.  B12 502 with patient continuing oral B12.  · Hemoglobin dropped to 6.8 as of 5/14/2021.  Patient set up for transfusion.    · Hemoglobin improved to 10.1 as of 5/20/2021.    · 7/2/2021, hemoglobin declined to 8.9.  She is asymptomatic.    6.  Lymphedema right arm better after radiation    7.  Osteoporosis left hip -osteopenia spine observe for now    8.  Neutropenia secondary to Ibrance.  Count improving off treatment.    9.   DNR confirmed on 4/9/2021 with her daughter in attendance    10.  Chronic kidney disease.    · Patient with acute on chronic kidney injury when seen 5/7/2021 creatinine at 2.21, previously 1.43.  BUN elevated to 49.  Patient will receive 1 L normal saline today.  · Creatinine worsened to 2.31.  Patient given IV fluids.   · Referred to nephrology.    · 5/20/2021: Patient reports seeing Dr. Tapia, nephrology, on 5/19/2021.    · Creatinine improved to 1.2162 21  · 7/2/2021, creatinine 1.36.    11.  Nausea and poor appetite.  · Was noted during week 1 of Ibrance.  This is discussed to follow-up today.  The patient did not call in at that time reporting the symptoms.  Her appetite improved the weekend following and she is since been eating and drinking well she reports.  Her weight is down 3 pounds total.  I have encouraged her to use boost supplements during times of poor appetite.  She does use ondansetron to control nausea.  We will continue to monitor this.  · Patient returns on 5/11/2021 with further weight loss.  She is also reporting right upper quadrant pain which will be discussed below.  · Appetite improved after thoracentesis    12.  Right upper quadrant pain x2 weeks reported 5/11/2021.    · We did proceed with a stat CT abdomen pelvis as  well as gallbladder ultrasound showing .  Imaging noting small stones and/or sludge in the gallbladder with pericholecystic fluid.  There is diverticulosis without evidence of diverticulitis.  Patient covered with Levaquin and also referred to Dr. Dolores Solis, surgeon.   · Patient's daughter reports that they are in the process of making an appointment.  Patient does note almost complete resolution abdominal pain at this point however.    · Abdominal pain much improved as of 6/3/2021  · 7/2/2021, no complaints of abdominal pain today.    13.  Malignant right pleural effusion.  Patient required in and thoracentesis.  Patient's daughter requesting at least a referral to thoracic surgery to discuss possible Pleurx catheter.  · 6/7/2021, Pleurx catheter placed.    14.  Patient is a DNR    PLAN:  1. Proceed with single agent Faslodex today.  2. Return in 1 month for Faslodex.    3. See me in 2 months with CBC and tumor markers   4.   5. if the patient has  progressive disease we will consider single agent Xeloda, but no IV chemotherapy.  This was discussed  with the patient and her daughter.    The patient is high risk medication that requires close monitoring for toxicity    She is a DNR    Noble Marcus MD  07/02/2021

## 2021-09-03 ENCOUNTER — LAB (OUTPATIENT)
Dept: LAB | Facility: HOSPITAL | Age: 81
End: 2021-09-03

## 2021-09-03 ENCOUNTER — OFFICE VISIT (OUTPATIENT)
Dept: ONCOLOGY | Facility: CLINIC | Age: 81
End: 2021-09-03

## 2021-09-03 ENCOUNTER — INFUSION (OUTPATIENT)
Dept: ONCOLOGY | Facility: HOSPITAL | Age: 81
End: 2021-09-03

## 2021-09-03 VITALS
SYSTOLIC BLOOD PRESSURE: 172 MMHG | DIASTOLIC BLOOD PRESSURE: 74 MMHG | OXYGEN SATURATION: 94 % | HEIGHT: 64 IN | RESPIRATION RATE: 16 BRPM | WEIGHT: 107.9 LBS | BODY MASS INDEX: 18.42 KG/M2 | HEART RATE: 70 BPM | TEMPERATURE: 96.9 F

## 2021-09-03 DIAGNOSIS — Z17.0 MALIGNANT NEOPLASM OF OVERLAPPING SITES OF RIGHT BREAST IN FEMALE, ESTROGEN RECEPTOR POSITIVE (HCC): Primary | ICD-10-CM

## 2021-09-03 DIAGNOSIS — C50.811 MALIGNANT NEOPLASM OF OVERLAPPING SITES OF RIGHT BREAST IN FEMALE, ESTROGEN RECEPTOR POSITIVE (HCC): Primary | ICD-10-CM

## 2021-09-03 DIAGNOSIS — C50.811 MALIGNANT NEOPLASM OF OVERLAPPING SITES OF RIGHT BREAST IN FEMALE, ESTROGEN RECEPTOR POSITIVE (HCC): ICD-10-CM

## 2021-09-03 DIAGNOSIS — Z17.0 MALIGNANT NEOPLASM OF OVERLAPPING SITES OF RIGHT BREAST IN FEMALE, ESTROGEN RECEPTOR POSITIVE (HCC): ICD-10-CM

## 2021-09-03 LAB
ALBUMIN SERPL-MCNC: 3.9 G/DL (ref 3.5–5.2)
ALBUMIN/GLOB SERPL: 1.1 G/DL (ref 1.1–2.4)
ALP SERPL-CCNC: 106 U/L (ref 38–116)
ALT SERPL W P-5'-P-CCNC: <5 U/L (ref 0–33)
ANION GAP SERPL CALCULATED.3IONS-SCNC: 14.2 MMOL/L (ref 5–15)
AST SERPL-CCNC: 13 U/L (ref 0–32)
BASOPHILS # BLD AUTO: 0.07 10*3/MM3 (ref 0–0.2)
BASOPHILS NFR BLD AUTO: 1.1 % (ref 0–1.5)
BILIRUB SERPL-MCNC: <0.2 MG/DL (ref 0.2–1.2)
BUN SERPL-MCNC: 30 MG/DL (ref 6–20)
BUN/CREAT SERPL: 20.4 (ref 7.3–30)
CALCIUM SPEC-SCNC: 9.8 MG/DL (ref 8.5–10.2)
CANCER AG15-3 SERPL-ACNC: 116.8 U/ML
CHLORIDE SERPL-SCNC: 102 MMOL/L (ref 98–107)
CO2 SERPL-SCNC: 24.8 MMOL/L (ref 22–29)
CREAT SERPL-MCNC: 1.47 MG/DL (ref 0.6–1.1)
DEPRECATED RDW RBC AUTO: 47.1 FL (ref 37–54)
EOSINOPHIL # BLD AUTO: 0.13 10*3/MM3 (ref 0–0.4)
EOSINOPHIL NFR BLD AUTO: 2 % (ref 0.3–6.2)
ERYTHROCYTE [DISTWIDTH] IN BLOOD BY AUTOMATED COUNT: 13.9 % (ref 12.3–15.4)
GFR SERPL CREATININE-BSD FRML MDRD: 34 ML/MIN/1.73
GLOBULIN UR ELPH-MCNC: 3.7 GM/DL (ref 1.8–3.5)
GLUCOSE SERPL-MCNC: 180 MG/DL (ref 74–124)
HCT VFR BLD AUTO: 31.6 % (ref 34–46.6)
HGB BLD-MCNC: 9.6 G/DL (ref 12–15.9)
IMM GRANULOCYTES # BLD AUTO: 0.02 10*3/MM3 (ref 0–0.05)
IMM GRANULOCYTES NFR BLD AUTO: 0.3 % (ref 0–0.5)
LYMPHOCYTES # BLD AUTO: 0.99 10*3/MM3 (ref 0.7–3.1)
LYMPHOCYTES NFR BLD AUTO: 15.5 % (ref 19.6–45.3)
MCH RBC QN AUTO: 28.3 PG (ref 26.6–33)
MCHC RBC AUTO-ENTMCNC: 30.4 G/DL (ref 31.5–35.7)
MCV RBC AUTO: 93.2 FL (ref 79–97)
MONOCYTES # BLD AUTO: 0.75 10*3/MM3 (ref 0.1–0.9)
MONOCYTES NFR BLD AUTO: 11.7 % (ref 5–12)
NEUTROPHILS NFR BLD AUTO: 4.43 10*3/MM3 (ref 1.7–7)
NEUTROPHILS NFR BLD AUTO: 69.4 % (ref 42.7–76)
NRBC BLD AUTO-RTO: 0 /100 WBC (ref 0–0.2)
PLATELET # BLD AUTO: 292 10*3/MM3 (ref 140–450)
PMV BLD AUTO: 8.8 FL (ref 6–12)
POTASSIUM SERPL-SCNC: 4.2 MMOL/L (ref 3.5–4.7)
PROT SERPL-MCNC: 7.6 G/DL (ref 6.3–8)
RBC # BLD AUTO: 3.39 10*6/MM3 (ref 3.77–5.28)
SODIUM SERPL-SCNC: 141 MMOL/L (ref 134–145)
WBC # BLD AUTO: 6.39 10*3/MM3 (ref 3.4–10.8)

## 2021-09-03 PROCEDURE — 25010000002 FULVESTRANT PER 25 MG: Performed by: INTERNAL MEDICINE

## 2021-09-03 PROCEDURE — 99214 OFFICE O/P EST MOD 30 MIN: CPT | Performed by: INTERNAL MEDICINE

## 2021-09-03 PROCEDURE — 80053 COMPREHEN METABOLIC PANEL: CPT

## 2021-09-03 PROCEDURE — 86300 IMMUNOASSAY TUMOR CA 15-3: CPT | Performed by: INTERNAL MEDICINE

## 2021-09-03 PROCEDURE — 36415 COLL VENOUS BLD VENIPUNCTURE: CPT

## 2021-09-03 PROCEDURE — 96402 CHEMO HORMON ANTINEOPL SQ/IM: CPT

## 2021-09-03 PROCEDURE — 85025 COMPLETE CBC W/AUTO DIFF WBC: CPT

## 2021-09-03 RX ADMIN — FULVESTRANT 500 MG: 250 INJECTION INTRAMUSCULAR at 09:49

## 2021-09-08 ENCOUNTER — OFFICE VISIT (OUTPATIENT)
Dept: SURGERY | Facility: CLINIC | Age: 81
End: 2021-09-08

## 2021-09-08 VITALS
BODY MASS INDEX: 18.4 KG/M2 | OXYGEN SATURATION: 95 % | WEIGHT: 107.81 LBS | HEART RATE: 62 BPM | HEIGHT: 64 IN | DIASTOLIC BLOOD PRESSURE: 60 MMHG | SYSTOLIC BLOOD PRESSURE: 141 MMHG | RESPIRATION RATE: 16 BRPM | TEMPERATURE: 97.8 F

## 2021-09-08 DIAGNOSIS — C50.811 MALIGNANT NEOPLASM OF OVERLAPPING SITES OF RIGHT BREAST IN FEMALE, ESTROGEN RECEPTOR POSITIVE (HCC): ICD-10-CM

## 2021-09-08 DIAGNOSIS — Z17.0 MALIGNANT NEOPLASM OF OVERLAPPING SITES OF RIGHT BREAST IN FEMALE, ESTROGEN RECEPTOR POSITIVE (HCC): ICD-10-CM

## 2021-09-08 DIAGNOSIS — J91.0 MALIGNANT PLEURAL EFFUSION: Primary | ICD-10-CM

## 2021-09-08 PROCEDURE — 99212 OFFICE O/P EST SF 10 MIN: CPT | Performed by: NURSE PRACTITIONER

## 2021-09-08 NOTE — PROGRESS NOTES
"Chief Complaint  Pleural effusion, follow-up after Pleurx catheter removal    Subjective          Mar Burgos presents to [facility]ORAC[facility] for follow-up     History of Present Illness     Mar Burgos is a very pleasant 80-year-old female who presents to our office today after undergoing right Pleurx catheter removal on 8/23/2021 by Dr. Yusef Larson.  The patient presents today via wheelchair and is accompanied by her daughter.  She reports that her her shortness of air is improved and she has been somewhat more active since she was last seen in our office and is even taken short walks with her daughter.  She denies any fever, chills, cough or hemoptysis.    Objective   Vital Signs:   /60 (BP Location: Right arm, Patient Position: Sitting, Cuff Size: Adult)   Pulse 62   Temp 97.8 °F (36.6 °C) (Temporal)   Resp 16   Ht 162.6 cm (64.02\")   Wt 48.9 kg (107 lb 12.9 oz)   SpO2 95%   BMI 18.50 kg/m²     Physical Exam  Vitals and nursing note reviewed. Exam conducted with a chaperone present.   Constitutional:       Appearance: She is ill-appearing.   HENT:      Head: Normocephalic and atraumatic.   Eyes:      General: No scleral icterus.     Conjunctiva/sclera: Conjunctivae normal.   Cardiovascular:      Rate and Rhythm: Normal rate and regular rhythm.   Pulmonary:      Effort: Pulmonary effort is normal.      Breath sounds: Examination of the right-lower field reveals decreased breath sounds. Examination of the left-lower field reveals decreased breath sounds. Decreased breath sounds present. No wheezing, rhonchi or rales.   Chest:      Chest wall: No swelling, tenderness or edema.      Comments: Right chest Pleurx catheter site has healed nicely and is clean and dry.  Abdominal:      General: Bowel sounds are normal.      Palpations: Abdomen is soft.   Skin:     General: Skin is warm and dry.   Neurological:      General: No focal deficit present.      Mental Status: " She is alert and oriented to person, place, and time. Mental status is at baseline.      Motor: Weakness present.   Psychiatric:         Mood and Affect: Mood normal.         Behavior: Behavior normal.         Thought Content: Thought content normal.         Judgment: Judgment normal.        Result Review :                 Assessment and Plan    Diagnoses and all orders for this visit:    1. Malignant pleural effusion (Primary)    2. 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 in female, estrogen receptor positive (CMS/HCC)    Patient has done well since her Pleurx catheter was removed.  Unfortunately, she did not undergo a chest x-ray prior to her appointment today and declined when offered in the office.  She is in no respiratory distress and has no symptoms of recurrence of the effusion.  Her catheter site has healed nicely and with no erythema, tenderness or drainage.  I did remove a small scab over the site that was irritating the patient but there are no sutures requiring removal.  At this point, we will see Mrs. Burgos on an as-needed basis.  Please call our office at any time in the future if needed.  Thank you for allowing us to participate in her care.    I spent 17 minutes caring for Mar on this date of service. This time includes time spent by me in the following activities:preparing for the visit, obtaining and/or reviewing a separately obtained history, performing a medically appropriate examination and/or evaluation , counseling and educating the patient/family/caregiver, referring and communicating with other health care professionals , documenting information in the medical record and independently interpreting results and communicating that information with the patient/family/caregiver  Follow Up   Return if symptoms worsen or fail to improve.  Patient was given instructions and counseling regarding her condition or for health maintenance advice. Please see specific information  pulled into the AVS if appropriate.

## 2021-10-01 ENCOUNTER — LAB (OUTPATIENT)
Dept: LAB | Facility: HOSPITAL | Age: 81
End: 2021-10-01

## 2021-10-01 ENCOUNTER — INFUSION (OUTPATIENT)
Dept: ONCOLOGY | Facility: HOSPITAL | Age: 81
End: 2021-10-01

## 2021-10-01 DIAGNOSIS — C50.811 MALIGNANT NEOPLASM OF OVERLAPPING SITES OF RIGHT BREAST IN FEMALE, ESTROGEN RECEPTOR POSITIVE (HCC): ICD-10-CM

## 2021-10-01 DIAGNOSIS — Z17.0 MALIGNANT NEOPLASM OF OVERLAPPING SITES OF RIGHT BREAST IN FEMALE, ESTROGEN RECEPTOR POSITIVE (HCC): Primary | ICD-10-CM

## 2021-10-01 DIAGNOSIS — C50.811 MALIGNANT NEOPLASM OF OVERLAPPING SITES OF RIGHT BREAST IN FEMALE, ESTROGEN RECEPTOR POSITIVE (HCC): Primary | ICD-10-CM

## 2021-10-01 DIAGNOSIS — Z17.0 MALIGNANT NEOPLASM OF OVERLAPPING SITES OF RIGHT BREAST IN FEMALE, ESTROGEN RECEPTOR POSITIVE (HCC): ICD-10-CM

## 2021-10-01 LAB
ALBUMIN SERPL-MCNC: 3.6 G/DL (ref 3.5–5.2)
ALBUMIN/GLOB SERPL: 0.9 G/DL (ref 1.1–2.4)
ALP SERPL-CCNC: 94 U/L (ref 38–116)
ALT SERPL W P-5'-P-CCNC: <5 U/L (ref 0–33)
ANION GAP SERPL CALCULATED.3IONS-SCNC: 12.1 MMOL/L (ref 5–15)
AST SERPL-CCNC: 13 U/L (ref 0–32)
BASOPHILS # BLD AUTO: 0.05 10*3/MM3 (ref 0–0.2)
BASOPHILS NFR BLD AUTO: 1.1 % (ref 0–1.5)
BILIRUB SERPL-MCNC: <0.2 MG/DL (ref 0.2–1.2)
BUN SERPL-MCNC: 33 MG/DL (ref 6–20)
BUN/CREAT SERPL: 24.6 (ref 7.3–30)
CALCIUM SPEC-SCNC: 9.6 MG/DL (ref 8.5–10.2)
CHLORIDE SERPL-SCNC: 103 MMOL/L (ref 98–107)
CO2 SERPL-SCNC: 23.9 MMOL/L (ref 22–29)
CREAT SERPL-MCNC: 1.34 MG/DL (ref 0.6–1.1)
DEPRECATED RDW RBC AUTO: 48.7 FL (ref 37–54)
EOSINOPHIL # BLD AUTO: 0.13 10*3/MM3 (ref 0–0.4)
EOSINOPHIL NFR BLD AUTO: 2.9 % (ref 0.3–6.2)
ERYTHROCYTE [DISTWIDTH] IN BLOOD BY AUTOMATED COUNT: 13.9 % (ref 12.3–15.4)
GFR SERPL CREATININE-BSD FRML MDRD: 38 ML/MIN/1.73
GLOBULIN UR ELPH-MCNC: 4 GM/DL (ref 1.8–3.5)
GLUCOSE SERPL-MCNC: 104 MG/DL (ref 74–124)
HCT VFR BLD AUTO: 32.8 % (ref 34–46.6)
HGB BLD-MCNC: 9.4 G/DL (ref 12–15.9)
IMM GRANULOCYTES # BLD AUTO: 0.01 10*3/MM3 (ref 0–0.05)
IMM GRANULOCYTES NFR BLD AUTO: 0.2 % (ref 0–0.5)
LYMPHOCYTES # BLD AUTO: 0.8 10*3/MM3 (ref 0.7–3.1)
LYMPHOCYTES NFR BLD AUTO: 18.1 % (ref 19.6–45.3)
MCH RBC QN AUTO: 27.3 PG (ref 26.6–33)
MCHC RBC AUTO-ENTMCNC: 28.7 G/DL (ref 31.5–35.7)
MCV RBC AUTO: 95.3 FL (ref 79–97)
MONOCYTES # BLD AUTO: 0.61 10*3/MM3 (ref 0.1–0.9)
MONOCYTES NFR BLD AUTO: 13.8 % (ref 5–12)
NEUTROPHILS NFR BLD AUTO: 2.83 10*3/MM3 (ref 1.7–7)
NEUTROPHILS NFR BLD AUTO: 63.9 % (ref 42.7–76)
NRBC BLD AUTO-RTO: 0 /100 WBC (ref 0–0.2)
PLATELET # BLD AUTO: 182 10*3/MM3 (ref 140–450)
PMV BLD AUTO: 10.4 FL (ref 6–12)
POTASSIUM SERPL-SCNC: 4.4 MMOL/L (ref 3.5–4.7)
PROT SERPL-MCNC: 7.6 G/DL (ref 6.3–8)
RBC # BLD AUTO: 3.44 10*6/MM3 (ref 3.77–5.28)
SODIUM SERPL-SCNC: 139 MMOL/L (ref 134–145)
WBC # BLD AUTO: 4.43 10*3/MM3 (ref 3.4–10.8)

## 2021-10-01 PROCEDURE — 25010000002 FULVESTRANT PER 25 MG: Performed by: NURSE PRACTITIONER

## 2021-10-01 PROCEDURE — 36415 COLL VENOUS BLD VENIPUNCTURE: CPT

## 2021-10-01 PROCEDURE — 80053 COMPREHEN METABOLIC PANEL: CPT

## 2021-10-01 PROCEDURE — 96402 CHEMO HORMON ANTINEOPL SQ/IM: CPT

## 2021-10-01 PROCEDURE — 85025 COMPLETE CBC W/AUTO DIFF WBC: CPT

## 2021-10-01 RX ADMIN — FULVESTRANT 500 MG: 250 INJECTION INTRAMUSCULAR at 15:26

## 2021-10-29 ENCOUNTER — HOSPITAL ENCOUNTER (INPATIENT)
Facility: HOSPITAL | Age: 81
LOS: 2 days | Discharge: HOME | End: 2021-10-31
Attending: INTERNAL MEDICINE | Admitting: HOSPITALIST

## 2021-10-29 ENCOUNTER — APPOINTMENT (OUTPATIENT)
Dept: ONCOLOGY | Facility: HOSPITAL | Age: 81
End: 2021-10-29

## 2021-10-29 ENCOUNTER — LAB (OUTPATIENT)
Dept: LAB | Facility: HOSPITAL | Age: 81
End: 2021-10-29

## 2021-10-29 ENCOUNTER — OFFICE VISIT (OUTPATIENT)
Dept: ONCOLOGY | Facility: CLINIC | Age: 81
End: 2021-10-29

## 2021-10-29 VITALS
TEMPERATURE: 98 F | SYSTOLIC BLOOD PRESSURE: 145 MMHG | HEART RATE: 76 BPM | HEIGHT: 64 IN | BODY MASS INDEX: 17.72 KG/M2 | OXYGEN SATURATION: 94 % | WEIGHT: 103.8 LBS | DIASTOLIC BLOOD PRESSURE: 68 MMHG | RESPIRATION RATE: 18 BRPM

## 2021-10-29 DIAGNOSIS — Z17.0 MALIGNANT NEOPLASM OF NIPPLE OF RIGHT BREAST IN FEMALE, ESTROGEN RECEPTOR POSITIVE (HCC): ICD-10-CM

## 2021-10-29 DIAGNOSIS — C50.811 MALIGNANT NEOPLASM OF OVERLAPPING SITES OF RIGHT BREAST IN FEMALE, ESTROGEN RECEPTOR POSITIVE (HCC): ICD-10-CM

## 2021-10-29 DIAGNOSIS — Z17.0 MALIGNANT NEOPLASM OF OVERLAPPING SITES OF RIGHT BREAST IN FEMALE, ESTROGEN RECEPTOR POSITIVE (HCC): Primary | ICD-10-CM

## 2021-10-29 DIAGNOSIS — Z17.0 MALIGNANT NEOPLASM OF OVERLAPPING SITES OF RIGHT BREAST IN FEMALE, ESTROGEN RECEPTOR POSITIVE (HCC): ICD-10-CM

## 2021-10-29 DIAGNOSIS — C50.011 MALIGNANT NEOPLASM OF NIPPLE OF RIGHT BREAST IN FEMALE, ESTROGEN RECEPTOR POSITIVE (HCC): ICD-10-CM

## 2021-10-29 DIAGNOSIS — C50.811 MALIGNANT NEOPLASM OF OVERLAPPING SITES OF RIGHT BREAST IN FEMALE, ESTROGEN RECEPTOR POSITIVE (HCC): Primary | ICD-10-CM

## 2021-10-29 PROBLEM — C50.919 BREAST CANCER (HCC): Status: ACTIVE | Noted: 2021-10-29

## 2021-10-29 LAB
ALBUMIN SERPL-MCNC: 3.7 G/DL (ref 3.5–5.2)
ALBUMIN/GLOB SERPL: 0.8 G/DL (ref 1.1–2.4)
ALP SERPL-CCNC: 86 U/L (ref 38–116)
ALT SERPL W P-5'-P-CCNC: <5 U/L (ref 0–33)
ANION GAP SERPL CALCULATED.3IONS-SCNC: 12.4 MMOL/L (ref 5–15)
AST SERPL-CCNC: 13 U/L (ref 0–32)
BASOPHILS # BLD AUTO: 0.05 10*3/MM3 (ref 0–0.2)
BASOPHILS NFR BLD AUTO: 0.8 % (ref 0–1.5)
BILIRUB SERPL-MCNC: 0.2 MG/DL (ref 0.2–1.2)
BUN SERPL-MCNC: 39 MG/DL (ref 6–20)
BUN/CREAT SERPL: 24.5 (ref 7.3–30)
CALCIUM SPEC-SCNC: 9.8 MG/DL (ref 8.5–10.2)
CANCER AG15-3 SERPL-ACNC: 219.5 U/ML
CHLORIDE SERPL-SCNC: 101 MMOL/L (ref 98–107)
CO2 SERPL-SCNC: 26.6 MMOL/L (ref 22–29)
CREAT SERPL-MCNC: 1.59 MG/DL (ref 0.6–1.1)
DEPRECATED RDW RBC AUTO: 44.7 FL (ref 37–54)
EOSINOPHIL # BLD AUTO: 0.26 10*3/MM3 (ref 0–0.4)
EOSINOPHIL NFR BLD AUTO: 4.1 % (ref 0.3–6.2)
ERYTHROCYTE [DISTWIDTH] IN BLOOD BY AUTOMATED COUNT: 14.4 % (ref 12.3–15.4)
GFR SERPL CREATININE-BSD FRML MDRD: 31 ML/MIN/1.73
GLOBULIN UR ELPH-MCNC: 4.4 GM/DL (ref 1.8–3.5)
GLUCOSE SERPL-MCNC: 176 MG/DL (ref 74–124)
HCT VFR BLD AUTO: 28.6 % (ref 34–46.6)
HGB BLD-MCNC: 8.7 G/DL (ref 12–15.9)
IMM GRANULOCYTES # BLD AUTO: 0.04 10*3/MM3 (ref 0–0.05)
IMM GRANULOCYTES NFR BLD AUTO: 0.6 % (ref 0–0.5)
LYMPHOCYTES # BLD AUTO: 0.62 10*3/MM3 (ref 0.7–3.1)
LYMPHOCYTES NFR BLD AUTO: 9.9 % (ref 19.6–45.3)
MCH RBC QN AUTO: 26.1 PG (ref 26.6–33)
MCHC RBC AUTO-ENTMCNC: 30.4 G/DL (ref 31.5–35.7)
MCV RBC AUTO: 85.9 FL (ref 79–97)
MONOCYTES # BLD AUTO: 0.83 10*3/MM3 (ref 0.1–0.9)
MONOCYTES NFR BLD AUTO: 13.2 % (ref 5–12)
NEUTROPHILS NFR BLD AUTO: 4.49 10*3/MM3 (ref 1.7–7)
NEUTROPHILS NFR BLD AUTO: 71.4 % (ref 42.7–76)
NRBC BLD AUTO-RTO: 0 /100 WBC (ref 0–0.2)
PLATELET # BLD AUTO: 334 10*3/MM3 (ref 140–450)
PMV BLD AUTO: 7.7 FL (ref 6–12)
POTASSIUM SERPL-SCNC: 4.4 MMOL/L (ref 3.5–4.7)
PROT SERPL-MCNC: 8.1 G/DL (ref 6.3–8)
RBC # BLD AUTO: 3.33 10*6/MM3 (ref 3.77–5.28)
SARS-COV-2 ORF1AB RESP QL NAA+PROBE: NOT DETECTED
SODIUM SERPL-SCNC: 140 MMOL/L (ref 134–145)
WBC # BLD AUTO: 6.29 10*3/MM3 (ref 3.4–10.8)

## 2021-10-29 PROCEDURE — U0004 COV-19 TEST NON-CDC HGH THRU: HCPCS | Performed by: INTERNAL MEDICINE

## 2021-10-29 PROCEDURE — U0005 INFEC AGEN DETEC AMPLI PROBE: HCPCS | Performed by: INTERNAL MEDICINE

## 2021-10-29 PROCEDURE — 99215 OFFICE O/P EST HI 40 MIN: CPT | Performed by: INTERNAL MEDICINE

## 2021-10-29 PROCEDURE — 85025 COMPLETE CBC W/AUTO DIFF WBC: CPT

## 2021-10-29 PROCEDURE — 80053 COMPREHEN METABOLIC PANEL: CPT

## 2021-10-29 PROCEDURE — 86300 IMMUNOASSAY TUMOR CA 15-3: CPT | Performed by: INTERNAL MEDICINE

## 2021-10-29 PROCEDURE — 36415 COLL VENOUS BLD VENIPUNCTURE: CPT

## 2021-10-29 RX ORDER — OXYCODONE HYDROCHLORIDE AND ACETAMINOPHEN 5; 325 MG/1; MG/1
1 TABLET ORAL EVERY 6 HOURS PRN
Qty: 100 TABLET | Refills: 0 | Status: ON HOLD | OUTPATIENT
Start: 2021-10-29 | End: 2021-10-31 | Stop reason: SDUPTHER

## 2021-10-29 RX ORDER — KETOROLAC TROMETHAMINE 15 MG/ML
15 INJECTION, SOLUTION INTRAMUSCULAR; INTRAVENOUS EVERY 6 HOURS PRN
Status: DISCONTINUED | OUTPATIENT
Start: 2021-10-29 | End: 2021-10-31 | Stop reason: HOSPADM

## 2021-10-29 RX ORDER — ACETAMINOPHEN 325 MG/1
650 TABLET ORAL EVERY 4 HOURS PRN
Status: DISCONTINUED | OUTPATIENT
Start: 2021-10-29 | End: 2021-10-31 | Stop reason: HOSPADM

## 2021-10-29 RX ORDER — ACETAMINOPHEN 160 MG/5ML
650 SOLUTION ORAL EVERY 4 HOURS PRN
Status: DISCONTINUED | OUTPATIENT
Start: 2021-10-29 | End: 2021-10-30

## 2021-10-29 RX ORDER — CHOLECALCIFEROL (VITAMIN D3) 125 MCG
2000 CAPSULE ORAL DAILY
Status: DISCONTINUED | OUTPATIENT
Start: 2021-10-29 | End: 2021-10-31 | Stop reason: HOSPADM

## 2021-10-29 RX ORDER — LORAZEPAM 2 MG/ML
2 INJECTION INTRAMUSCULAR
Status: DISCONTINUED | OUTPATIENT
Start: 2021-10-29 | End: 2021-10-31 | Stop reason: HOSPADM

## 2021-10-29 RX ORDER — AMLODIPINE BESYLATE 5 MG/1
5 TABLET ORAL DAILY
COMMUNITY
Start: 2021-09-09

## 2021-10-29 RX ORDER — HYDROMORPHONE HCL 110MG/55ML
1.5 PATIENT CONTROLLED ANALGESIA SYRINGE INTRAVENOUS
Status: DISCONTINUED | OUTPATIENT
Start: 2021-10-29 | End: 2021-10-31 | Stop reason: HOSPADM

## 2021-10-29 RX ORDER — MORPHINE SULFATE 4 MG/ML
4 INJECTION, SOLUTION INTRAMUSCULAR; INTRAVENOUS
Status: DISCONTINUED | OUTPATIENT
Start: 2021-10-29 | End: 2021-10-31 | Stop reason: HOSPADM

## 2021-10-29 RX ORDER — POLYETHYLENE GLYCOL 3350 17 G/17G
17 POWDER, FOR SOLUTION ORAL DAILY
Status: DISCONTINUED | OUTPATIENT
Start: 2021-10-29 | End: 2021-10-31 | Stop reason: HOSPADM

## 2021-10-29 RX ORDER — LORAZEPAM 2 MG/ML
0.5 INJECTION INTRAMUSCULAR
Status: DISCONTINUED | OUTPATIENT
Start: 2021-10-29 | End: 2021-10-31 | Stop reason: HOSPADM

## 2021-10-29 RX ORDER — ACETAMINOPHEN 160 MG/5ML
650 SOLUTION ORAL EVERY 4 HOURS PRN
Status: DISCONTINUED | OUTPATIENT
Start: 2021-10-29 | End: 2021-10-31 | Stop reason: HOSPADM

## 2021-10-29 RX ORDER — ECHINACEA PURPUREA EXTRACT 125 MG
1 TABLET ORAL AS NEEDED
Status: DISCONTINUED | OUTPATIENT
Start: 2021-10-29 | End: 2021-10-31 | Stop reason: HOSPADM

## 2021-10-29 RX ORDER — HYDROMORPHONE HYDROCHLORIDE 1 MG/ML
0.5 INJECTION, SOLUTION INTRAMUSCULAR; INTRAVENOUS; SUBCUTANEOUS
Status: DISCONTINUED | OUTPATIENT
Start: 2021-10-29 | End: 2021-10-31 | Stop reason: HOSPADM

## 2021-10-29 RX ORDER — LORAZEPAM 2 MG/ML
1 INJECTION INTRAMUSCULAR
Status: DISCONTINUED | OUTPATIENT
Start: 2021-10-29 | End: 2021-10-31 | Stop reason: HOSPADM

## 2021-10-29 RX ORDER — LORAZEPAM 2 MG/ML
0.5 CONCENTRATE ORAL
Status: DISCONTINUED | OUTPATIENT
Start: 2021-10-29 | End: 2021-10-31 | Stop reason: HOSPADM

## 2021-10-29 RX ORDER — MORPHINE SULFATE 20 MG/ML
5 SOLUTION ORAL
Status: DISCONTINUED | OUTPATIENT
Start: 2021-10-29 | End: 2021-10-31 | Stop reason: HOSPADM

## 2021-10-29 RX ORDER — LORAZEPAM 2 MG/ML
2 CONCENTRATE ORAL
Status: DISCONTINUED | OUTPATIENT
Start: 2021-10-29 | End: 2021-10-31 | Stop reason: HOSPADM

## 2021-10-29 RX ORDER — ACETAMINOPHEN 650 MG/1
650 SUPPOSITORY RECTAL EVERY 4 HOURS PRN
Status: DISCONTINUED | OUTPATIENT
Start: 2021-10-29 | End: 2021-10-30

## 2021-10-29 RX ORDER — METOPROLOL SUCCINATE 50 MG/1
50 TABLET, EXTENDED RELEASE ORAL DAILY
Status: DISCONTINUED | OUTPATIENT
Start: 2021-10-30 | End: 2021-10-31 | Stop reason: HOSPADM

## 2021-10-29 RX ORDER — LORAZEPAM 2 MG/ML
1 CONCENTRATE ORAL
Status: DISCONTINUED | OUTPATIENT
Start: 2021-10-29 | End: 2021-10-31 | Stop reason: HOSPADM

## 2021-10-29 RX ORDER — MORPHINE SULFATE 20 MG/ML
20 SOLUTION ORAL
Status: DISCONTINUED | OUTPATIENT
Start: 2021-10-29 | End: 2021-10-31 | Stop reason: HOSPADM

## 2021-10-29 RX ORDER — IPRATROPIUM BROMIDE AND ALBUTEROL SULFATE 2.5; .5 MG/3ML; MG/3ML
3 SOLUTION RESPIRATORY (INHALATION) EVERY 4 HOURS PRN
Status: DISCONTINUED | OUTPATIENT
Start: 2021-10-29 | End: 2021-10-31 | Stop reason: HOSPADM

## 2021-10-29 RX ORDER — CETIRIZINE HYDROCHLORIDE 10 MG/1
5 TABLET ORAL NIGHTLY
Status: DISCONTINUED | OUTPATIENT
Start: 2021-10-29 | End: 2021-10-31 | Stop reason: HOSPADM

## 2021-10-29 RX ORDER — AMLODIPINE BESYLATE 5 MG/1
5 TABLET ORAL DAILY
Status: DISCONTINUED | OUTPATIENT
Start: 2021-10-30 | End: 2021-10-31 | Stop reason: HOSPADM

## 2021-10-29 RX ORDER — FLUTICASONE PROPIONATE 50 MCG
2 SPRAY, SUSPENSION (ML) NASAL NIGHTLY
Status: DISCONTINUED | OUTPATIENT
Start: 2021-10-29 | End: 2021-10-31 | Stop reason: HOSPADM

## 2021-10-29 RX ORDER — MORPHINE SULFATE 20 MG/ML
10 SOLUTION ORAL
Status: DISCONTINUED | OUTPATIENT
Start: 2021-10-29 | End: 2021-10-31 | Stop reason: HOSPADM

## 2021-10-29 RX ORDER — MORPHINE SULFATE 2 MG/ML
2 INJECTION, SOLUTION INTRAMUSCULAR; INTRAVENOUS
Status: DISCONTINUED | OUTPATIENT
Start: 2021-10-29 | End: 2021-10-31 | Stop reason: HOSPADM

## 2021-10-29 RX ORDER — GUAIFENESIN 600 MG/1
600 TABLET, EXTENDED RELEASE ORAL 2 TIMES DAILY PRN
Status: DISCONTINUED | OUTPATIENT
Start: 2021-10-29 | End: 2021-10-31 | Stop reason: HOSPADM

## 2021-10-29 RX ORDER — MORPHINE SULFATE 10 MG/ML
6 INJECTION INTRAMUSCULAR; INTRAVENOUS; SUBCUTANEOUS
Status: DISCONTINUED | OUTPATIENT
Start: 2021-10-29 | End: 2021-10-31 | Stop reason: HOSPADM

## 2021-10-29 RX ORDER — CLONIDINE HYDROCHLORIDE 0.1 MG/1
0.2 TABLET ORAL 2 TIMES DAILY
Status: DISCONTINUED | OUTPATIENT
Start: 2021-10-29 | End: 2021-10-31 | Stop reason: HOSPADM

## 2021-10-29 RX ORDER — DIPHENOXYLATE HYDROCHLORIDE AND ATROPINE SULFATE 2.5; .025 MG/1; MG/1
1 TABLET ORAL
Status: DISCONTINUED | OUTPATIENT
Start: 2021-10-29 | End: 2021-10-29 | Stop reason: SDUPTHER

## 2021-10-29 RX ORDER — CLOPIDOGREL BISULFATE 75 MG/1
75 TABLET ORAL DAILY
Status: DISCONTINUED | OUTPATIENT
Start: 2021-10-30 | End: 2021-10-31 | Stop reason: HOSPADM

## 2021-10-29 RX ORDER — ACETAMINOPHEN 325 MG/1
650 TABLET ORAL EVERY 4 HOURS PRN
Status: DISCONTINUED | OUTPATIENT
Start: 2021-10-29 | End: 2021-10-30

## 2021-10-29 RX ORDER — DIPHENOXYLATE HYDROCHLORIDE AND ATROPINE SULFATE 2.5; .025 MG/1; MG/1
1 TABLET ORAL
Status: DISCONTINUED | OUTPATIENT
Start: 2021-10-29 | End: 2021-10-31 | Stop reason: HOSPADM

## 2021-10-29 RX ORDER — ONDANSETRON 2 MG/ML
4 INJECTION INTRAMUSCULAR; INTRAVENOUS EVERY 6 HOURS PRN
Status: DISCONTINUED | OUTPATIENT
Start: 2021-10-29 | End: 2021-10-31 | Stop reason: HOSPADM

## 2021-10-29 RX ORDER — POLYETHYLENE GLYCOL 3350 17 G/17G
17 POWDER, FOR SOLUTION ORAL DAILY
Qty: 30 PACKET | Refills: 3 | Status: ON HOLD | OUTPATIENT
Start: 2021-10-29 | End: 2021-10-31 | Stop reason: SDUPTHER

## 2021-10-29 RX ORDER — ACETAMINOPHEN 650 MG/1
650 SUPPOSITORY RECTAL EVERY 4 HOURS PRN
Status: DISCONTINUED | OUTPATIENT
Start: 2021-10-29 | End: 2021-10-31 | Stop reason: HOSPADM

## 2021-10-29 RX ADMIN — CLONIDINE HYDROCHLORIDE 0.2 MG: 0.1 TABLET ORAL at 20:52

## 2021-10-29 RX ADMIN — Medication 2000 MCG: at 20:52

## 2021-10-29 RX ADMIN — MORPHINE SULFATE 5 MG: 100 SOLUTION ORAL at 18:12

## 2021-10-29 RX ADMIN — MORPHINE SULFATE 10 MG: 100 SOLUTION ORAL at 22:50

## 2021-10-29 RX ADMIN — CETIRIZINE HYDROCHLORIDE 5 MG: 10 TABLET ORAL at 20:52

## 2021-10-29 RX ADMIN — FLUTICASONE PROPIONATE 2 SPRAY: 50 SPRAY, METERED NASAL at 20:52

## 2021-10-29 RX ADMIN — POLYETHYLENE GLYCOL 3350 17 G: 17 POWDER, FOR SOLUTION ORAL at 20:59

## 2021-10-29 NOTE — PROGRESS NOTES
Subjective     REASON FOR FOLLOW-UP:   1. Locally advanced right breast cancer ER IA positive HER-2 positive  · Initiated Arimidex 9/11/2019  · Radiation therapy added, completing a total of 25 fractions as of 4/27/2020    2. Remote history of left breast cancer 1978 treated with mastectomy alone                               REQUESTING PHYSICIAN: MD Vladimir Cowart    History of Present Illness Ms. Burgos is a 81 y.o. female with above medical history who returns today on single agent Faslodex. After unexpected and unacceptable toxicity with Ibrance.  She is with her daughter who gives most of the history. She declined any other CDK 4/6 inhibitors    She was started on Faslodex and we attempted to give her very low-dose Ibrance 75 mg 2 weeks on and 2 weeks off and despite this she had tremendous toxicity.  She became profoundly anemic, and thrombocytopenic and her creatinine went up to 2.3  and she was also leukopenic    She is finally recovered from that toxicity but in the last week she has become more weak and tired and had a hard time getting up and out of bed today. She has not had a bowel movement for about 5 days and she has worsening right lower quadrant discomfort. She has small bowel movement this morning    She is short of breath with minimal exertion and the lesions on her chest wall are becoming bigger    At this time she has declined any further treatment and wants to get hospice care but is so uncomfortable today that I do not think she can go home and we will admitted to the palliative care unit trying to optimize her pain control and give her some enemas to clear her constipation and then possibly send her home with her daughter who is with her today although she lives with another daughter who is apparently not a very good caregiver    I again discussed her DNR status and she is a DNR    Past Medical History:   Diagnosis Date  "  • Anemia     required prior blood transfusions   • Arthritis    • C. difficile colitis     APPROXIMATELY 5 YEARS AGO; TREATED IN HOSPITAL   • Cancer (HCC)     right breast cancer  ( 40 years ago left breast cancer)   • Clostridium difficile colitis    • COVID-19 vaccine series completed     PFIZER   • Diabetes mellitus (HCC)    • Difficulty walking    • Diverticulitis    • Environmental allergies    • History of malignant neoplasm of breast 2013    denies any chemo or radiation   • History of malignant neoplasm of skin 2013    possibly basal   • History of tobacco use 2013   • History of transfusion    • HL (hearing loss)    • Hyperlipidemia    • Hypertension    • Kidney disease    • Neuropathy in diabetes (HCC)    • Peripheral neuropathy    • Pleural effusion on right     TUBE IN PLACE   • Shingles    • Skin cancer    • Stroke (HCC) 2019   • Thrombophlebitis leg superficial     LEFT LEG \"AT LEAST 1O YEARS AGO'   • Vision loss    • Wears partial dentures     UPPER      Patient is  3 para 3 with 2 miscarriages menarche was at age 9 menopause at age 49st childbirth was at age 24 she breast-fed for at least 6 months she did not take hormone replacement therapy because at age 38 she developed breast cancer in the left breast was treated at the [facility] with a modified radical mastectomy in  no radiation or hormonal blockade was given in details of the scans were not available at this time.    Family history is positive for father dying at age 84 with disseminated abdominal cancer of unknown type mother  at 77 of natural causes she has a brother with kidney cancer at age 65 who  of this and a sister with melanoma at age 30    Patient currently lives with her daughter does not smoke currently but smoked for 70 years 1 pack of cigarettes is not a drinker.  She uses a cane to walk because her balance is poor she has issues with swallowing from her stroke      Past " "Surgical History:   Procedure Laterality Date   • CATARACT EXTRACTION, BILATERAL     • COLONOSCOPY     • EAR TUBES     • EAR TUBES Right     'PARK EAR TUBES\"   • MASTECTOMY Left 1978    radical   • PLEURAL CATHETER INSERTION Right 6/7/2021    Procedure: PLEURX CATHETER INSERTION;  Surgeon: Yusef Larson III, MD;  Location: [facility] OR;  Service: Thoracic;  Laterality: Right;   • PLEURX CATHETER REMOVAL Right 8/23/2021    Procedure: RIGHT PLEURX CATHETER REMOVAL;  Surgeon: Yusef Larson III, MD;  Location: [facility] OR;  Service: Thoracic;  Laterality: Right;   • TUMOR REMOVAL Left     large lipoma removed from hip      HEME/ONC HISTORY:  patient is an 79-year-old female with multiple medical issues including hypertension diabetes hypercholesterolemia with a recent stroke in February of this year which is resulted in left-sided weakness and difficulty swallowing.  The patient noted changes in her breast over the last year and the last few months changes to the nipple specifically with developing lump in both the right breast and under the right arm.  She brought to the attention of her family physician who sent her for imaging and biopsy of obvious tumor.  Mammogram revealed a large irregular mass measuring 6 cm with skin retraction nipple retraction and skin thickening.  At the 10 o'clock position there appeared to be some skin involvement.    She was also noted clinically to have large axillary lymph nodes.  Ultrasound was obtained revealing an irregular solid mass with indistinct margins measuring approximately 64 x 51 mm.  On ultrasound there were also some enlarged axillary lymph nodes measuring about 30 mm in greatest size.     A biopsy was recommended and has revealed a grade 2 invasive ductal cancer that is ER positive at 98%, OK positive at 82%, and HER-2 equivocal.  The Ki-67 is 20.81%.     Patient was then referred to Dr. Gonzalez who examined her and did not feel surgical excision was " possible because there would be a large defect from her tumor and was wanting to consider neoadjuvant therapy in an effort to shrink the tumor.  The patient was presented at the multidisciplinary conference and because of her comorbidities her recent stroke and her overall general condition we did not think she was a good candidate for neoadjuvant chemotherapy and she was referred to us to discuss endocrine therapy.    In the interim additional testing on her tumor was done at [facility] because of the indeterminate HER-2 FISH and this revealed that there was 3+ HER-2 activity by IHC and therefore this is considered to be HER-2 positive  The patient had staging work-up at the recommendation of the multidisciplinary clinic And CT of the chest showed calcified granulomatous disease with mild AP window adenopathy which is increased from previous CAT scan in 2016 precarinal adenopathy also increased.  It showed the breast mass as well as right axillary adenopathy.  In the abdomen there was a left adrenal nodule which is increased in size from 1.4 to 1.8 cm felt to be an adenoma in addition there was a small sclerotic lesion in the left iliac crest and metastasis could not be completely excluded although the bone scan showed no uptake in this area.  The bone scan did show a fracture and uptake in the left greater trochanter and uptake of the left superior pubic ramus that represents a superior ramus fracture of with no other signs of metastatic disease-patient did fall in July of this year but no obvious fractures were seen on x-ray.    Pt did not want chemotherapy-Patient initiating Arimidex therapy in mid September 2019.     the patient was hospitalized in late September, presenting with garbled speech and elevated blood pressure, felt to ultimately have had a TIA related to hypertensive urgency and small vessel disease.  Neurology recommended dual antiplatelet therapy for 3 months followed by Plavix alone.  Her  antihypertensives were adjusted.  At that time she was also found to be B12 deficient with a level of 158 and iron deficient with an iron percent of 7%.  She was started on oral B12 and iron.    She states that the iron does cause some constipation but it is manageable with stool softeners at this time.  There was some discussion in the hospital record that if she could not tolerate oral iron IV iron should be considered.  Hemoglobin is improved up to 9.7 from discharge hemoglobin of 9.0.    2/20  he has not had a DEXA scan in many years and we repeated in February and it shows moderate osteopenia in the spine osteoporosis and left hip and severe osteopenia in the right hip.  I am not inclined to switch her to tamoxifen at this point and leave her room at acceptable see her back in 3 months and decide whether to add Prolia    2/21  Progressive disease with malignant pleural effusion ER positive still decision made to try Faslodex and low-dose Ibrance 75 mg 2 weeks on 2 weeks off    5/21  She was here 2 weeks ago and found to have elevated creatinine of 2.2, BUN 49. She received IV fluids. She was seen last week during her off week of Ibrance and was noting new right upper quadrant abdominal pain reportedly x2 weeks. Very tender on exam. He was also noting some increase shortness of breath. She was sent for CT of the abdomen/pelvis and ultrasound of the gallbladder. Imaging showed small stones in the gallbladder and/or sludge with pericholecystic fluid. Diverticulosis without evidence of diverticulitis. Patient was referred to Dr. Dolores Solis, surgery, for evaluation of possible cholecystitis.    Patient's hemoglobin declined further to 6.8 last week requiring transfusion of 2 units PRBCs given 5/15/2021.  She also received additional IV fluids the day prior for continued elevation of BUN at 39, creatinine 2.0.    The patient is reviewed back now, accompanied by her daughter, with hemoglobin improved to 10.2.   Patient is currently finishing up her second off week of Ibrance and WBC count has improved to 3.8, platelet count normalized to 250,000.  The patient is tentatively due to restart Ibrance tomorrow, after discussion with Dr. Marcus, it is not felt the patient can tolerate Ibrance considering how poorly she did was a 75 mg dose.  We discussed that this point keeping her off Ibrance and allowing her to get stronger.      Her BUN remains elevated at 43 today, creatinine 1.8.  Her baseline creatinine is more around 1.4-1.6.  Patient did see nephrology, Dr. Stanley, yesterday.  We will request records.  Patient's daughter states it was felt the patient was acutely dehydrated due to poor tolerance with Ibrance, specifically with nausea and poor oral intake.  We will give her additional IV fluids in the office today.        Current Outpatient Medications on File Prior to Visit   Medication Sig Dispense Refill   • acetaminophen (TYLENOL) 500 MG tablet Take 500 mg by mouth Every 6 (Six) Hours As Needed for Mild Pain .     • amLODIPine (NORVASC) 5 MG tablet Take 5 mg by mouth Daily.     • cetirizine (zyrTEC) 10 MG tablet Take 10 mg by mouth Every Night.     • cloNIDine (CATAPRES) 0.2 MG tablet TAKE 1 TABLET BY MOUTH EVERY 12 HOURS (Patient taking differently: Take 0.2 mg by mouth 2 (Two) Times a Day. Caution: Look alike/sound alike drug alert. Please read the label.) 180 tablet 3   • clopidogrel (PLAVIX) 75 MG tablet TAKE 1 TABLET BY MOUTH DAILY (Patient taking differently: Take 75 mg by mouth Daily. HOLD 5 DAYS PRIOR TO SURGERY PER DR WILLSON) 90 tablet 3   • ferrous sulfate (IRON SUPPLEMENT) 325 (65 FE) MG tablet Take 1 tablet by mouth 2 (Two) Times a Day Before Meals. (Patient taking differently: Take 325 mg by mouth Daily.) 60 tablet 0   • fluticasone (FLONASE) 50 MCG/ACT nasal spray 2 sprays into the nostril(s) as directed by provider Every Night.     • Fulvestrant (FASLODEX IM) Inject  into the appropriate muscle as  directed by prescriber Every 30 (Thirty) Days.     • guaiFENesin (MUCINEX) 600 MG 12 hr tablet Take 600 mg by mouth 2 (Two) Times a Day As Needed for Cough.     • hydroCHLOROthiazide (MICROZIDE) 12.5 MG capsule Take 1 capsule by mouth Daily. 90 capsule 3   • loperamide (IMODIUM) 2 MG capsule Take 2 mg by mouth 4 (Four) Times a Day As Needed.     • metFORMIN (GLUCOPHAGE) 1000 MG tablet TAKE 1 TABLET BY MOUTH TWICE DAILY 180 tablet 1   • metoprolol succinate XL (TOPROL-XL) 50 MG 24 hr tablet Take 1 tablet by mouth Daily. (Patient taking differently: Take 50 mg by mouth Daily.) 90 tablet 3   • pravastatin (PRAVACHOL) 20 MG tablet Take 1 tablet by mouth Every Night. 90 tablet 3   • vitamin B-12 (VITAMIN B-12) 2000 MCG tablet Take 1 tablet by mouth Daily. (Patient taking differently: Take 2,000 mcg by mouth Daily. HOLD FOR SURGERY) 30 tablet 0   • [DISCONTINUED] amLODIPine (NORVASC) 10 MG tablet Take 1 tablet by mouth Daily. (Patient taking differently: Take 5 mg by mouth Daily.) 90 tablet 3     No current facility-administered medications on file prior to visit.        ALLERGIES:    Allergies   Allergen Reactions   • Lisinopril Angioedema   • Lipitor [Atorvastatin] Unknown - High Severity     Leg weakness         Social History     Socioeconomic History   • Marital status:    • Number of children: 3   • Years of education: High school   Tobacco Use   • Smoking status: Former Smoker     Packs/day: 0.25     Years: 10.00     Pack years: 2.50     Types: Cigarettes     Quit date: 2019     Years since quittin.6   • Smokeless tobacco: Former User   • Tobacco comment: she quit for 8 years and started back about a year ago then quit again   Vaping Use   • Vaping Use: Never used   Substance and Sexual Activity   • Alcohol use: No   • Drug use: No   • Sexual activity: Defer        Family History   Problem Relation Age of Onset   • Stroke Mother 76   • Diabetes Mother    • Hypertension Mother    • Arthritis Mother   "  • Cancer Father         metastatic unknown cause   • Heart attack Father 70   • Colon cancer Father    • Lung cancer Father    • Cancer Brother         bladder   • Arthritis Sister    • Melanoma Sister    • Hypertension Daughter    • Diabetes Daughter    • Malig Hyperthermia Neg Hx       Review of Systems   Constitutional: Positive for fatigue. Negative for appetite change, chills, diaphoresis, fever and unexpected weight change.   HENT: Positive for hearing loss (same ). Negative for sore throat and trouble swallowing.    Respiratory: Negative.  Negative for cough, chest tightness, shortness of breath and wheezing.    Cardiovascular: Negative.  Negative for chest pain, palpitations and leg swelling.   Gastrointestinal: Negative for abdominal distention, abdominal pain (improved), blood in stool, constipation, diarrhea, nausea and vomiting.   Genitourinary: Negative.  Negative for dysuria, frequency, hematuria and urgency.   Musculoskeletal: Positive for arthralgias (Hip pain) and gait problem (Hip pain). Negative for joint swelling.        No muscle weakness.   Skin: Negative for rash and wound.   Neurological: Positive for weakness (left side). Negative for seizures, syncope, speech difficulty, numbness and headaches.   Hematological: Negative.  Negative for adenopathy. Does not bruise/bleed easily.   Psychiatric/Behavioral: Negative.  Negative for behavioral problems, confusion and suicidal ideas.   All other systems reviewed and are negative.      Objective     Vitals:    10/29/21 1213   BP: 145/68   Pulse: 76   Resp: 18   Temp: 98 °F (36.7 °C)   TempSrc: Temporal   SpO2: 94%   Weight: 47.1 kg (103 lb 12.8 oz)   Height: 162.6 cm (64.02\")   PainSc:   4   PainLoc: Back  Comment: Lt side around to mid back     Current Status 10/29/2021   ECOG score 1     Physical Exam   Pulmonary/Chest:           GENERAL:  Well-developed, well-nourished in no acute distress.   SKIN:  Warm, dry without rashes, purpura or " petechiae.  EYES:  Pupils equal, round and reactive to light.  EOMs intact.  Left eye proptosis.  EARS:  Hearing intact.  NOSE:  Septum midline.  No excoriations or nasal discharge.  MOUTH:  Tongue is well-papillated; no stomatitis or ulcers.  Lips normal.  THROAT:  Oropharynx without lesions or exudates.  NECK:  Supple with good range of motion; no thyromegaly or masses, no JVD.  LYMPHATICS:  No cervical, supraclavicular adenopathy.  CHEST:  Lungs clear to auscultation but poor air movement in the right middle and lower lobes.    BREASTS: Left chest wall is benign; the right breast has a mobile soft tissue mass roughly 4 cm transverse x 5 cm and 2 subcutaneous nodules noted see picture above -No palpable adenopathy in the right axilla, neck or supraclavicular region.  Nodules appear slightly larger see photograph   CARDIAC:  Regular rate and rhythm without murmurs, rubs or gallops. Normal S1,S2.  ABDOMEN: Bowel sounds active.  Soft, tender right lower quadrant .  EXTREMITIES:  No clubbing, cyanosis 2+lymphedema right arm and 1+ in both legs  NEUROLOGICAL: Left-sided weakness.  PSYCHIATRIC:  Normal affect and mood.      I have reexamined the patient and the results are consistent with the previously documented exam. Noble Marcus MD       RECENT LABS:  Results from last 7 days   Lab Units 10/29/21  1212   WBC 10*3/mm3 6.29   NEUTROS ABS 10*3/mm3 4.49   HEMOGLOBIN g/dL 8.7*   HEMATOCRIT % 28.6*   PLATELETS 10*3/mm3 334                             MRI BRAIN     IMPRESSION:  Old right basal ganglia infarct. No acute abnormality  identified. No metastatic disease is identified at the brain or the  head.    Bone scan  IMPRESSION:  Abnormal uptake left greater trochanter where there is a  fracture demonstrated with CT (patient has a history of a fall and there  is no CT evidence that this is a pathologic fracture). There is also  abnormal uptake at the left superior pubic ramus that most likely  represents a  superior ramus fracture though a fracture is not evident  with CT. This uptake left superior pubic ramus is technically  indeterminate though there is overall no convincing evidence for bony  metastatic disease. Follow-up CT could be obtained.     This report was finalized on 8/23/2019 1:    CT chest abdomen pelvis  IMPRESSION CHEST CT:    1. A 2.2 cm right breast mass laterally presumably related to the  patient's neoplasm.  2. Right axillary mediastinal adenopathy and metastatic disease cannot  be excluded. PET/CT may be helpful.     IMPRESSION ABDOMEN & PELVIS CT:    1. Low-density renal lesions as discussed, favor cysts.  2. Enlarging left adrenal nodule, likely adenoma.  3. Extensive diverticulosis.  4. A 5 mm sclerotic left iliac lesion as discussed.    BONE MINERAL DENSITOMETRY  IMPRESSION:  Osteoporosis at the left hip. Interval decrease in bone  density.     This report was finalized on 2/14/2020 12:10 PM by Dr. Osmin Dodd M.D.        Assessment/Plan   1.  Locally advanced right breast cancer strongly ER AL positive with indeterminate HER-2 with repeat testing showing 3+ IHC consistent with positive result  · Staging work-up probably negative although indeterminate bone lesions without obvious fractures on bone scan  · Abnormal mediastinal adenopathy which is been present since 2016 likely granulomatous disease  · Enlarging left adrenal mass felt to be an adenoma present since 2015  · History of left breast cancer 1978 at age 38 treated with modified radical mastectomy alone? Pathology  · Mcgregor to be a poor candidate for surgery or chemotherapy.PT DECLINED CHEMO  · Initiated Arimidex around 9/11/2019.  Radiation may be a possibility for additional therapy if she does not get a good response from Arimidex  · Radiation added in 3/20  · Patient is reviewed back today, 11/17/2020, tolerating Arimidex well.  She does feel that the right breast mass is slightly larger though still overall improved compared  to when she initially presented.    · Patient seen on 2/18/2021 with new cutaneous nodules restaging and switch to Faslodex planned  · Right pleural effusion positive cytology for metastatic breast cancer ER/IN positive HER-2 FISH negative we will add Ibrance to the Faslodex 75 mg a day 2 weeks on 2 weeks off and escalate as tolerated.  · Patient did have thoracentesis on 5/4/2021 with removal of 1800 mL of fluid on the right side.  She has had improvement in her shortness of breath since that time.  · Patient seen for day 15 of her first cycle of Ibrance 75 mg daily for 2 weeks on, 2 weeks off.  Also given Faslodex that day.  She did have nausea with the first week and decreased appetite.  Her nausea was improved with ondansetron.  After about 5 days or so her appetite improved and she started eating more and having some strange cravings.  Patient also more short of breath, undergoing thoracentesis 5/4/2021.    · Patient with acute on chronic kidney disease in relation to nausea from Ibrance and decreased oral intake.  Per review 5/20/2021 in discussion with Dr. Marcus, we will hold further Ibrance at this time.  · Decision made to place her Pleurx catheter and continue with single agent Faslodex for now  · 7/2/2021, the patient returns today for Faslodex.  She had a Pleurx catheter placed on 6/7/2021.  · Pleurx catheter removed in August 2021  · Bone scan negative in 8/21 Faslodex continued    2.  Granulomatous mediastinal lymph nodes chronic    3.  History of stroke in 2/19 with left hemiparesis and dysphagia  · Patient hospitalized again 9/26 and 9/29/2019 presenting with garbled speech and elevated blood pressure, felt to have had a TIA.  Neurology recommended dual antiplatelet therapy for 3 months and then Plavix alone.    4.  Hypertension diabetes and hypercholesterolemia.  Patient currently off lisinopril per primary care due to angioedema.    5.  Anemia, multifactorial:  · Patient has stage III  CKD.  · Patient also found during recent hospitalization to have low B12 and low iron percent.  Though she had previously received B12 injections she was started on oral B12.     · Patient continues on oral iron at this time with some constipation though tolerable with stool softeners.  Plan to continue to monitor her blood work and if develops intolerance to oral iron consider IV iron.  · Was recent iron studies 11/20/2019 include a ferritin of 41, iron saturation 12%.   · 5/7/2021 ferritin and iron panel obtained with ferritin 156, iron saturation 50%, TIBC 288.  B12 502 with patient continuing oral B12.  · Hemoglobin dropped to 6.8 as of 5/14/2021.  Patient set up for transfusion.    · Hemoglobin improved to 10.1 as of 5/20/2021.    · 7/2/2021, hemoglobin declined to 8.9.  She is asymptomatic.    6.  Lymphedema right arm better after radiation    7.  Osteoporosis left hip -osteopenia spine observe for now    8.  Neutropenia secondary to Ibrance.  Count improving off treatment.    9.   DNR confirmed on 4/9/2021 with her daughter in attendance    10.  Chronic kidney disease.    · Patient with acute on chronic kidney injury when seen 5/7/2021 creatinine at 2.21, previously 1.43.  BUN elevated to 49.  Patient will receive 1 L normal saline today.  · Creatinine worsened to 2.31.  Patient given IV fluids.   · Referred to nephrology.    · 5/20/2021: Patient reports seeing Dr. Tapia, nephrology, on 5/19/2021.    · Creatinine improved to 1.2162 21  · 7/2/2021, creatinine 1.36.    11.  Nausea and poor appetite.  · Was noted during week 1 of Ibrance.  This is discussed to follow-up today.  The patient did not call in at that time reporting the symptoms.  Her appetite improved the weekend following and she is since been eating and drinking well she reports.  Her weight is down 3 pounds total.  I have encouraged her to use boost supplements during times of poor appetite.  She does use ondansetron to control nausea.  We will  continue to monitor this.  · Patient returns on 5/11/2021 with further weight loss.  She is also reporting right upper quadrant pain which will be discussed below.  · Appetite improved after thoracentesis    12.  Right upper quadrant pain x2 weeks reported 5/11/2021.    · We did proceed with a stat CT abdomen pelvis as well as gallbladder ultrasound showing .  Imaging noting small stones and/or sludge in the gallbladder with pericholecystic fluid.  There is diverticulosis without evidence of diverticulitis.  Patient covered with Levaquin and also referred to Dr. Dolores Solis, surgeon.   · Patient's daughter reports that they are in the process of making an appointment.  Patient does note almost complete resolution abdominal pain at this point however.    · Abdominal pain much improved as of 6/3/2021  · 7/2/2021, no complaints of abdominal pain today.    13.  Malignant right pleural effusion.  Patient required in and thoracentesis.  Patient's daughter requesting at least a referral to thoracic surgery to discuss possible Pleurx catheter.  · 6/7/2020, Pleurx catheter placed.  · Pleurx removed    14.  Patient is a DNR    PLAN:  1. Admit for pain control and constipation relief  2. Hospice consultation although if she deteriorates significantly she may be a candidate for inpatient hospice [place] [address]  She is a DNR    Noble Marcus MD  07/02/2021

## 2021-10-30 PROCEDURE — 25010000002 ONDANSETRON PER 1 MG: Performed by: INTERNAL MEDICINE

## 2021-10-30 RX ADMIN — Medication 2000 MCG: at 08:53

## 2021-10-30 RX ADMIN — CLOPIDOGREL 75 MG: 75 TABLET, FILM COATED ORAL at 08:53

## 2021-10-30 RX ADMIN — MORPHINE SULFATE 10 MG: 100 SOLUTION ORAL at 23:39

## 2021-10-30 RX ADMIN — CETIRIZINE HYDROCHLORIDE 5 MG: 10 TABLET ORAL at 20:47

## 2021-10-30 RX ADMIN — MORPHINE SULFATE 5 MG: 100 SOLUTION ORAL at 08:42

## 2021-10-30 RX ADMIN — AMLODIPINE BESYLATE 5 MG: 5 TABLET ORAL at 08:53

## 2021-10-30 RX ADMIN — CLONIDINE HYDROCHLORIDE 0.2 MG: 0.1 TABLET ORAL at 08:53

## 2021-10-30 RX ADMIN — CLONIDINE HYDROCHLORIDE 0.2 MG: 0.1 TABLET ORAL at 20:46

## 2021-10-30 RX ADMIN — MORPHINE SULFATE 10 MG: 100 SOLUTION ORAL at 15:33

## 2021-10-30 RX ADMIN — METOPROLOL SUCCINATE 50 MG: 50 TABLET, EXTENDED RELEASE ORAL at 08:53

## 2021-10-30 RX ADMIN — POLYETHYLENE GLYCOL 3350 17 G: 17 POWDER, FOR SOLUTION ORAL at 08:42

## 2021-10-30 RX ADMIN — FLUTICASONE PROPIONATE 2 SPRAY: 50 SPRAY, METERED NASAL at 20:47

## 2021-10-30 RX ADMIN — ONDANSETRON 4 MG: 2 INJECTION INTRAMUSCULAR; INTRAVENOUS at 17:53

## 2021-10-31 VITALS
HEART RATE: 64 BPM | OXYGEN SATURATION: 96 % | DIASTOLIC BLOOD PRESSURE: 80 MMHG | TEMPERATURE: 101.5 F | SYSTOLIC BLOOD PRESSURE: 156 MMHG | RESPIRATION RATE: 16 BRPM

## 2021-10-31 RX ORDER — POLYETHYLENE GLYCOL 3350 17 G/17G
17 POWDER, FOR SOLUTION ORAL 2 TIMES DAILY
Qty: 30 PACKET | Refills: 3
Start: 2021-10-31

## 2021-10-31 RX ORDER — OXYCODONE HYDROCHLORIDE AND ACETAMINOPHEN 5; 325 MG/1; MG/1
1 TABLET ORAL EVERY 6 HOURS PRN
Qty: 10 TABLET | Refills: 0 | Status: SHIPPED | OUTPATIENT
Start: 2021-10-31

## 2021-10-31 RX ADMIN — MORPHINE SULFATE 10 MG: 100 SOLUTION ORAL at 10:36

## 2021-10-31 RX ADMIN — METOPROLOL SUCCINATE 50 MG: 50 TABLET, EXTENDED RELEASE ORAL at 10:36

## 2021-10-31 RX ADMIN — Medication 2000 MCG: at 10:35

## 2021-10-31 RX ADMIN — CLOPIDOGREL 75 MG: 75 TABLET, FILM COATED ORAL at 10:36

## 2021-10-31 RX ADMIN — CLONIDINE HYDROCHLORIDE 0.2 MG: 0.1 TABLET ORAL at 10:36

## 2021-10-31 RX ADMIN — AMLODIPINE BESYLATE 5 MG: 5 TABLET ORAL at 10:36

## 2021-10-31 RX ADMIN — MORPHINE SULFATE 10 MG: 100 SOLUTION ORAL at 13:29

## 2021-10-31 RX ADMIN — MORPHINE SULFATE 10 MG: 100 SOLUTION ORAL at 18:18

## 2021-11-01 NOTE — PROGRESS NOTES
Case Management Discharge Note      Final Note: Discharged to home and [facility] will come out on 11/1/21 to evaluate and admit if appropriate. RICHARD Huerta RN, CCP         Selected Continued Care - Discharged on 10/31/2021 Admission date: 10/29/2021 - Discharge disposition: Hospice/Home    Destination    No services have been selected for the patient.              Durable Medical Equipment    No services have been selected for the patient.              Dialysis/Infusion    No services have been selected for the patient.              Home Medical Care    No services have been selected for the patient.              Therapy    No services have been selected for the patient.              Community Resources    No services have been selected for the patient.              Community & DME    No services have been selected for the patient.                       Final Discharge Disposition Code: 01 - home or self-care

## 2021-11-01 NOTE — PROGRESS NOTES
Discharge Planning Assessment  [facility]     Patient Name: Mar Burgos  MRN: [id number]  Today's Date: 11/1/2021    Admit Date: 10/29/2021     Discharge Needs Assessment    No documentation.                Discharge Plan     Row Name 11/01/21 1426       Plan    Plan Comments The patient transferred to [facility] as a direct admit. The patient is palliative. RICHARD Huerta RN, CCP    Final Discharge Disposition Code 01 - home or self-care    Final Note Discharged to home and [facility] will come out on 11/1/21 to evaluate and admit if appropriate. RICHARD Huerta RN, CCP              Continued Care and Services - Discharged on 10/31/2021 Admission date: 10/29/2021 - Discharge disposition: Hospice/Home   Coordination has not been started for this encounter.       Expected Discharge Date and Time     Expected Discharge Date Expected Discharge Time    Oct 31, 2021          Demographic Summary    No documentation.                Functional Status    No documentation.                Psychosocial    No documentation.                Abuse/Neglect    No documentation.                Legal    No documentation.                Substance Abuse    No documentation.                Patient Forms    No documentation.                   Yanet Huerta RN

## 2021-12-02 ENCOUNTER — OFFICE VISIT (OUTPATIENT)
Dept: ONCOLOGY | Facility: CLINIC | Age: 81
End: 2021-12-02

## 2021-12-02 DIAGNOSIS — C50.811 MALIGNANT NEOPLASM OF OVERLAPPING SITES OF RIGHT BREAST IN FEMALE, ESTROGEN RECEPTOR POSITIVE (HCC): ICD-10-CM

## 2021-12-02 DIAGNOSIS — Z17.0 MALIGNANT NEOPLASM OF NIPPLE OF RIGHT BREAST IN FEMALE, ESTROGEN RECEPTOR POSITIVE (HCC): Primary | ICD-10-CM

## 2021-12-02 DIAGNOSIS — C50.011 MALIGNANT NEOPLASM OF NIPPLE OF RIGHT BREAST IN FEMALE, ESTROGEN RECEPTOR POSITIVE (HCC): Primary | ICD-10-CM

## 2021-12-02 DIAGNOSIS — Z17.0 MALIGNANT NEOPLASM OF OVERLAPPING SITES OF RIGHT BREAST IN FEMALE, ESTROGEN RECEPTOR POSITIVE (HCC): ICD-10-CM

## 2021-12-02 PROCEDURE — 99213 OFFICE O/P EST LOW 20 MIN: CPT | Performed by: INTERNAL MEDICINE

## 2021-12-02 NOTE — PROGRESS NOTES
Subjective     REASON FOR FOLLOW-UP:   1. Locally advanced right breast cancer ER [place] positive HER-2 positive  · Initiated Arimidex 9/11/2019  · Radiation therapy added, completing a total of 25 fractions as of 4/27/2020    2. Remote history of left breast cancer 1978 treated with mastectomy alone                               REQUESTING PHYSICIAN: MD Vladimir Cowart    History of Present Illness Ms. Burgos is a 81 y.o. female with above medical history who returns today on single agent Faslodex. After unexpected and unacceptable toxicity with Ibrance.  She is with her daughter who gives most of the history. She declined any other CDK 4/6 inhibitors    She was started on Faslodex and we attempted to give her very low-dose Ibrance 75 mg 2 weeks on and 2 weeks off and despite this she had tremendous toxicity.  She became profoundly anemic, and thrombocytopenic and her creatinine went up to 2.3  and she was also leukopenic    She is finally recovered from that toxicity but in the last week she has become more weak and tired and had a hard time getting up and out of bed today. She has not had a bowel movement for about 5 days and she has worsening right lower quadrant discomfort. She has small bowel movement this morning    She is short of breath with minimal exertion and the lesions on her chest wall are becoming bigger    At this time she has declined any further treatment and wants to get hospice care but is so uncomfortable today that I do not think she can go home and we will admitted to the palliative care unit trying to optimize her pain control and give her some enemas to clear her constipation and then possibly send her home with her daughter who is with her today although she lives with another daughter who is apparently not a very good caregiver    I again discussed her DNR status and she is a DNR    Past Medical History:   Diagnosis Date  "  • Anemia     required prior blood transfusions   • Arthritis    • C. difficile colitis     APPROXIMATELY 5 YEARS AGO; TREATED IN HOSPITAL   • Cancer (HCC)     right breast cancer  ( 40 years ago left breast cancer)   • Clostridium difficile colitis    • COVID-19 vaccine series completed     PFIZER   • Diabetes mellitus (HCC)    • Difficulty walking    • Diverticulitis    • Environmental allergies    • History of malignant neoplasm of breast 2013    denies any chemo or radiation   • History of malignant neoplasm of skin 2013    possibly basal   • History of tobacco use 2013   • History of transfusion    • HL (hearing loss)    • Hyperlipidemia    • Hypertension    • Kidney disease    • Neuropathy in diabetes (HCC)    • Peripheral neuropathy    • Pleural effusion on right     TUBE IN PLACE   • Shingles    • Skin cancer    • Stroke (HCC) 2019   • Thrombophlebitis leg superficial     LEFT LEG \"AT LEAST 1O YEARS AGO'   • Vision loss    • Wears partial dentures     UPPER      Patient is  3 para 3 with 2 miscarriages menarche was at age 9 menopause at age 49st childbirth was at age 24 she breast-fed for at least 6 months she did not take hormone replacement therapy because at age 38 she developed breast cancer in the left breast was treated at the [facility], [place] with a modified radical mastectomy in  no radiation or hormonal blockade was given in details of the scans were not available at this time.    Family history is positive for father dying at age 84 with disseminated abdominal cancer of unknown type mother  at 77 of natural causes she has a brother with kidney cancer at age 65 who  of this and a sister with melanoma at age 30    Patient currently lives with her daughter does not smoke currently but smoked for 70 years 1 pack of cigarettes is not a drinker.  She uses a cane to walk because her balance is poor she has issues with swallowing from her stroke      Past " "Surgical History:   Procedure Laterality Date   • CATARACT EXTRACTION, BILATERAL     • COLONOSCOPY     • EAR TUBES     • EAR TUBES Right     'PARK EAR TUBES\"   • MASTECTOMY Left 1978    radical   • PLEURAL CATHETER INSERTION Right 6/7/2021    Procedure: PLEURX CATHETER INSERTION;  Surgeon: Yusef Larson III, MD;  Location: [facility] OR;  Service: Thoracic;  Laterality: Right;   • PLEURX CATHETER REMOVAL Right 8/23/2021    Procedure: RIGHT PLEURX CATHETER REMOVAL;  Surgeon: Yusef Larson III, MD;  Location: [facility] OR;  Service: Thoracic;  Laterality: Right;   • TUMOR REMOVAL Left     large lipoma removed from hip      HEME/ONC HISTORY:  patient is an 79-year-old female with multiple medical issues including hypertension diabetes hypercholesterolemia with a recent stroke in February of this year which is resulted in left-sided weakness and difficulty swallowing.  The patient noted changes in her breast over the last year and the last few months changes to the nipple specifically with developing lump in both the right breast and under the right arm.  She brought to the attention of her family physician who sent her for imaging and biopsy of obvious tumor.  Mammogram revealed a large irregular mass measuring 6 cm with skin retraction nipple retraction and skin thickening.  At the 10 o'clock position there appeared to be some skin involvement.    She was also noted clinically to have large axillary lymph nodes.  Ultrasound was obtained revealing an irregular solid mass with indistinct margins measuring approximately 64 x 51 mm.  On ultrasound there were also some enlarged axillary lymph nodes measuring about 30 mm in greatest size.     A biopsy was recommended and has revealed a grade 2 invasive ductal cancer that is ER positive at 98%, TN positive at 82%, and HER-2 equivocal.  The Ki-67 is 20.81%.     Patient was then referred to Dr. Gonzalez who examined her and did not feel surgical excision was " possible because there would be a large defect from her tumor and was wanting to consider neoadjuvant therapy in an effort to shrink the tumor.  The patient was presented at the multidisciplinary conference and because of her comorbidities her recent stroke and her overall general condition we did not think she was a good candidate for neoadjuvant chemotherapy and she was referred to us to discuss endocrine therapy.    In the interim additional testing on her tumor was done at [facility] because of the indeterminate HER-2 FISH and this revealed that there was 3+ HER-2 activity by IHC and therefore this is considered to be HER-2 positive  The patient had staging work-up at the recommendation of the multidisciplinary clinic And CT of the chest showed calcified granulomatous disease with mild AP window adenopathy which is increased from previous CAT scan in 2016 precarinal adenopathy also increased.  It showed the breast mass as well as right axillary adenopathy.  In the abdomen there was a left adrenal nodule which is increased in size from 1.4 to 1.8 cm felt to be an adenoma in addition there was a small sclerotic lesion in the left iliac crest and metastasis could not be completely excluded although the bone scan showed no uptake in this area.  The bone scan did show a fracture and uptake in the left greater trochanter and uptake of the left superior pubic ramus that represents a superior ramus fracture of with no other signs of metastatic disease-patient did fall in July of this year but no obvious fractures were seen on x-ray.    Pt did not want chemotherapy-Patient initiating Arimidex therapy in mid September 2019.     the patient was hospitalized in late September, presenting with garbled speech and elevated blood pressure, felt to ultimately have had a TIA related to hypertensive urgency and small vessel disease.  Neurology recommended dual antiplatelet therapy for 3 months followed by Plavix alone.  Her  antihypertensives were adjusted.  At that time she was also found to be B12 deficient with a level of 158 and iron deficient with an iron percent of 7%.  She was started on oral B12 and iron.    She states that the iron does cause some constipation but it is manageable with stool softeners at this time.  There was some discussion in the hospital record that if she could not tolerate oral iron IV iron should be considered.  Hemoglobin is improved up to 9.7 from discharge hemoglobin of 9.0.    2/20  he has not had a DEXA scan in many years and we repeated in February and it shows moderate osteopenia in the spine osteoporosis and left hip and severe osteopenia in the right hip.  I am not inclined to switch her to tamoxifen at this point and leave her room at acceptable see her back in 3 months and decide whether to add Prolia    2/21  Progressive disease with malignant pleural effusion ER positive still decision made to try Faslodex and low-dose Ibrance 75 mg 2 weeks on 2 weeks off    5/21  She was here 2 weeks ago and found to have elevated creatinine of 2.2, BUN 49. She received IV fluids. She was seen last week during her off week of Ibrance and was noting new right upper quadrant abdominal pain reportedly x2 weeks. Very tender on exam. He was also noting some increase shortness of breath. She was sent for CT of the abdomen/pelvis and ultrasound of the gallbladder. Imaging showed small stones in the gallbladder and/or sludge with pericholecystic fluid. Diverticulosis without evidence of diverticulitis. Patient was referred to Dr. Dolores Solis, surgery, for evaluation of possible cholecystitis.    Patient's hemoglobin declined further to 6.8 last week requiring transfusion of 2 units PRBCs given 5/15/2021.  She also received additional IV fluids the day prior for continued elevation of BUN at 39, creatinine 2.0.    The patient is reviewed back now, accompanied by her daughter, with hemoglobin improved to 10.2.   Patient is currently finishing up her second off week of Ibrance and WBC count has improved to 3.8, platelet count normalized to 250,000.  The patient is tentatively due to restart Ibrance tomorrow, after discussion with Dr. Marcus, it is not felt the patient can tolerate Ibrance considering how poorly she did was a 75 mg dose.  We discussed that this point keeping her off Ibrance and allowing her to get stronger.      Her BUN remains elevated at 43 today, creatinine 1.8.  Her baseline creatinine is more around 1.4-1.6.  Patient did see nephrology, Dr. Stanley, yesterday.  We will request records.  Patient's daughter states it was felt the patient was acutely dehydrated due to poor tolerance with Ibrance, specifically with nausea and poor oral intake.  We will give her additional IV fluids in the office today.        Current Outpatient Medications on File Prior to Visit   Medication Sig Dispense Refill   • acetaminophen (TYLENOL) 500 MG tablet Take 500 mg by mouth Every 6 (Six) Hours As Needed for Mild Pain .     • amLODIPine (NORVASC) 5 MG tablet Take 5 mg by mouth Daily.     • cetirizine (zyrTEC) 10 MG tablet Take 10 mg by mouth Every Night.     • cloNIDine (CATAPRES) 0.2 MG tablet TAKE 1 TABLET BY MOUTH EVERY 12 HOURS (Patient taking differently: Take 0.2 mg by mouth 2 (Two) Times a Day. Caution: Look alike/sound alike drug alert. Please read the label.) 180 tablet 3   • clopidogrel (PLAVIX) 75 MG tablet TAKE 1 TABLET BY MOUTH DAILY (Patient taking differently: Take 75 mg by mouth Daily. HOLD 5 DAYS PRIOR TO SURGERY PER DR WILLSON) 90 tablet 3   • fluticasone (FLONASE) 50 MCG/ACT nasal spray 2 sprays into the nostril(s) as directed by provider Every Night.     • Fulvestrant (FASLODEX IM) Inject  into the appropriate muscle as directed by prescriber Every 30 (Thirty) Days.     • guaiFENesin (MUCINEX) 600 MG 12 hr tablet Take 600 mg by mouth 2 (Two) Times a Day As Needed for Cough.     • metoprolol succinate XL  (TOPROL-XL) 50 MG 24 hr tablet Take 1 tablet by mouth Daily. (Patient taking differently: Take 50 mg by mouth Daily.) 90 tablet 3   • oxyCODONE-acetaminophen (PERCOCET) 5-325 MG per tablet Take 1 tablet by mouth Every 6 (Six) Hours As Needed for Moderate Pain . 10 tablet 0   • polyethylene glycol (MIRALAX) 17 g packet Take 17 g by mouth 2 (Two) Times a Day. 30 packet 3   • vitamin B-12 (VITAMIN B-12) 2000 MCG tablet Take 1 tablet by mouth Daily. (Patient taking differently: Take 2,000 mcg by mouth Daily. HOLD FOR SURGERY) 30 tablet 0     No current facility-administered medications on file prior to visit.        ALLERGIES:    Allergies   Allergen Reactions   • Lisinopril Angioedema   • Lipitor [Atorvastatin] Unknown - High Severity     Leg weakness         Social History     Socioeconomic History   • Marital status:    • Number of children: 3   • Years of education: High school   Tobacco Use   • Smoking status: Former Smoker     Packs/day: 0.25     Years: 10.00     Pack years: 2.50     Types: Cigarettes     Quit date: 2019     Years since quittin.7   • Smokeless tobacco: Former User   • Tobacco comment: she quit for 8 years and started back about a year ago then quit again   Vaping Use   • Vaping Use: Never used   Substance and Sexual Activity   • Alcohol use: No   • Drug use: No   • Sexual activity: Defer        Family History   Problem Relation Age of Onset   • Stroke Mother 76   • Diabetes Mother    • Hypertension Mother    • Arthritis Mother    • Cancer Father         metastatic unknown cause   • Heart attack Father 70   • Colon cancer Father    • Lung cancer Father    • Cancer Brother         bladder   • Arthritis Sister    • Melanoma Sister    • Hypertension Daughter    • Diabetes Daughter    • Malig Hyperthermia Neg Hx       Review of Systems   Constitutional: Positive for fatigue. Negative for appetite change, chills, diaphoresis, fever and unexpected weight change.   HENT: Positive for  hearing loss (same ). Negative for sore throat and trouble swallowing.    Respiratory: Negative.  Negative for cough, chest tightness, shortness of breath and wheezing.    Cardiovascular: Negative.  Negative for chest pain, palpitations and leg swelling.   Gastrointestinal: Negative for abdominal distention, abdominal pain (improved), blood in stool, constipation, diarrhea, nausea and vomiting.   Genitourinary: Negative.  Negative for dysuria, frequency, hematuria and urgency.   Musculoskeletal: Positive for arthralgias (Hip pain) and gait problem (Hip pain). Negative for joint swelling.        No muscle weakness.   Skin: Negative for rash and wound.   Neurological: Positive for weakness (left side). Negative for seizures, syncope, speech difficulty, numbness and headaches.   Hematological: Negative.  Negative for adenopathy. Does not bruise/bleed easily.   Psychiatric/Behavioral: Negative.  Negative for behavioral problems, confusion and suicidal ideas.   All other systems reviewed and are negative.      Objective     There were no vitals filed for this visit.  Current Status 10/29/2021   ECOG score 1     Physical Exam   Pulmonary/Chest:           GENERAL:  Well-developed, well-nourished in no acute distress.   SKIN:  Warm, dry without rashes, purpura or petechiae.  EYES:  Pupils equal, round and reactive to light.  EOMs intact.  Left eye proptosis.  EARS:  Hearing intact.  NOSE:  Septum midline.  No excoriations or nasal discharge.  MOUTH:  Tongue is well-papillated; no stomatitis or ulcers.  Lips normal.  THROAT:  Oropharynx without lesions or exudates.  NECK:  Supple with good range of motion; no thyromegaly or masses, no JVD.  LYMPHATICS:  No cervical, supraclavicular adenopathy.  CHEST:  Lungs clear to auscultation but poor air movement in the right middle and lower lobes.    BREASTS: Left chest wall is benign; the right breast has a mobile soft tissue mass roughly 4 cm transverse x 5 cm and 2 subcutaneous  nodules noted see picture above -No palpable adenopathy in the right axilla, neck or supraclavicular region.  Nodules appear slightly larger see photograph   CARDIAC:  Regular rate and rhythm without murmurs, rubs or gallops. Normal S1,S2.  ABDOMEN: Bowel sounds active.  Soft, tender right lower quadrant .  EXTREMITIES:  No clubbing, cyanosis 2+lymphedema right arm and 1+ in both legs  NEUROLOGICAL: Left-sided weakness.  PSYCHIATRIC:  Normal affect and mood.      I have reexamined the patient and the results are consistent with the previously documented exam. Noble Marcus MD     Telephone visit    RECENT LABS:                              MRI BRAIN     IMPRESSION:  Old right basal ganglia infarct. No acute abnormality  identified. No metastatic disease is identified at the brain or the  head.    Bone scan  IMPRESSION:  Abnormal uptake left greater trochanter where there is a  fracture demonstrated with CT (patient has a history of a fall and there  is no CT evidence that this is a pathologic fracture). There is also  abnormal uptake at the left superior pubic ramus that most likely  represents a superior ramus fracture though a fracture is not evident  with CT. This uptake left superior pubic ramus is technically  indeterminate though there is overall no convincing evidence for bony  metastatic disease. Follow-up CT could be obtained.     This report was finalized on 8/23/2019 1:    CT chest abdomen pelvis  IMPRESSION CHEST CT:    1. A 2.2 cm right breast mass laterally presumably related to the  patient's neoplasm.  2. Right axillary mediastinal adenopathy and metastatic disease cannot  be excluded. PET/CT may be helpful.     IMPRESSION ABDOMEN & PELVIS CT:    1. Low-density renal lesions as discussed, favor cysts.  2. Enlarging left adrenal nodule, likely adenoma.  3. Extensive diverticulosis.  4. A 5 mm sclerotic left iliac lesion as discussed.    BONE MINERAL DENSITOMETRY  IMPRESSION:  Osteoporosis at  the left hip. Interval decrease in bone  density.     This report was finalized on 2/14/2020 12:10 PM by Dr. Osmin Dodd M.D.        Assessment/Plan   1.  Locally advanced right breast cancer strongly ER [place] positive with indeterminate HER-2 with repeat testing showing 3+ IHC consistent with positive result  · Staging work-up probably negative although indeterminate bone lesions without obvious fractures on bone scan  · Abnormal mediastinal adenopathy which is been present since 2016 likely granulomatous disease  · Enlarging left adrenal mass felt to be an adenoma present since 2015  · History of left breast cancer 1978 at age 38 treated with modified radical mastectomy alone? Pathology  · [place] to be a poor candidate for surgery or chemotherapy.PT DECLINED CHEMO  · Initiated Arimidex around 9/11/2019.  Radiation may be a possibility for additional therapy if she does not get a good response from Arimidex  · Radiation added in 3/20  · Patient is reviewed back today, 11/17/2020, tolerating Arimidex well.  She does feel that the right breast mass is slightly larger though still overall improved compared to when she initially presented.    · Patient seen on 2/18/2021 with new cutaneous nodules restaging and switch to Faslodex planned  · Right pleural effusion positive cytology for metastatic breast cancer ER/[facility] positive HER-2 FISH negative we will add Ibrance to the Faslodex 75 mg a day 2 weeks on 2 weeks off and escalate as tolerated.  · Patient did have thoracentesis on 5/4/2021 with removal of 1800 mL of fluid on the right side.  She has had improvement in her shortness of breath since that time.  · Patient seen for day 15 of her first cycle of Ibrance 75 mg daily for 2 weeks on, 2 weeks off.  Also given Faslodex that day.  She did have nausea with the first week and decreased appetite.  Her nausea was improved with ondansetron.  After about 5 days or so her appetite improved and she started eating more and  having some strange cravings.  Patient also more short of breath, undergoing thoracentesis 5/4/2021.    · Patient with acute on chronic kidney disease in relation to nausea from Ibrance and decreased oral intake.  Per review 5/20/2021 in discussion with Dr. Marcus, we will hold further Ibrance at this time.  · Decision made to place her Pleurx catheter and continue with single agent Faslodex for now  · 7/2/2021, the patient returns today for Faslodex.  She had a Pleurx catheter placed on 6/7/2021.  · Pleurx catheter removed in August 2021  · Bone scan negative in 8/21 Faslodex continued  · Clinical deterioration admitted to hospice in 11/21    2.  Granulomatous mediastinal lymph nodes chronic    3.  History of stroke in 2/19 with left hemiparesis and dysphagia  · Patient hospitalized again 9/26 and 9/29/2019 presenting with garbled speech and elevated blood pressure, felt to have had a TIA.  Neurology recommended dual antiplatelet therapy for 3 months and then Plavix alone.    4.  Hypertension diabetes and hypercholesterolemia.  Patient currently off lisinopril per primary care due to angioedema.    5.  Anemia, multifactorial:  · Patient has stage III CKD.  · Patient also found during recent hospitalization to have low B12 and low iron percent.  Though she had previously received B12 injections she was started on oral B12.     · Patient continues on oral iron at this time with some constipation though tolerable with stool softeners.  Plan to continue to monitor her blood work and if develops intolerance to oral iron consider IV iron.  · Was recent iron studies 11/20/2019 include a ferritin of 41, iron saturation 12%.   · 5/7/2021 ferritin and iron panel obtained with ferritin 156, iron saturation 50%, TIBC 288.  B12 502 with patient continuing oral B12.  · Hemoglobin dropped to 6.8 as of 5/14/2021.  Patient set up for transfusion.    · Hemoglobin improved to 10.1 as of 5/20/2021.    · 7/2/2021, hemoglobin declined  to 8.9.  She is asymptomatic.    6.  Lymphedema right arm better after radiation    7.  Osteoporosis left hip -osteopenia spine observe for now    8.  Neutropenia secondary to Ibrance.  Count improving off treatment.    9.   DNR confirmed on 4/9/2021 with her daughter in attendance    10.  Chronic kidney disease.    · Patient with acute on chronic kidney injury when seen 5/7/2021 creatinine at 2.21, previously 1.43.  BUN elevated to 49.  Patient will receive 1 L normal saline today.  · Creatinine worsened to 2.31.  Patient given IV fluids.   · Referred to nephrology.    · 5/20/2021: Patient reports seeing Dr. Tapia, nephrology, on 5/19/2021.    · Creatinine improved to 1.2162 21  · 7/2/2021, creatinine 1.36.    11.  Nausea and poor appetite.  · Was noted during week 1 of Ibrance.  This is discussed to follow-up today.  The patient did not call in at that time reporting the symptoms.  Her appetite improved the weekend following and she is since been eating and drinking well she reports.  Her weight is down 3 pounds total.  I have encouraged her to use boost supplements during times of poor appetite.  She does use ondansetron to control nausea.  We will continue to monitor this.  · Patient returns on 5/11/2021 with further weight loss.  She is also reporting right upper quadrant pain which will be discussed below.  · Appetite improved after thoracentesis    12.  Right upper quadrant pain x2 weeks reported 5/11/2021.    · We did proceed with a stat CT abdomen pelvis as well as gallbladder ultrasound showing .  Imaging noting small stones and/or sludge in the gallbladder with pericholecystic fluid.  There is diverticulosis without evidence of diverticulitis.  Patient covered with Levaquin and also referred to Dr. Dolores Solis, surgeon.   · Patient's daughter reports that they are in the process of making an appointment.  Patient does note almost complete resolution abdominal pain at this point however.    · Abdominal  pain much improved as of 6/3/2021  · 7/2/2021, no complaints of abdominal pain today.    13.  Malignant right pleural effusion.  Patient required in and thoracentesis.  Patient's daughter requesting at least a referral to thoracic surgery to discuss possible Pleurx catheter.  · 6/7/2020, Pleurx catheter placed.  · Pleurx removed    14.  Patient is a DNR    PLAN:  1. Hospice continue   2. Telephone visit in 4 to 5 weeks on Wednesday at noon  Noble Marcus MD  07/02/2021

## 2021-12-29 ENCOUNTER — OFFICE VISIT (OUTPATIENT)
Dept: ONCOLOGY | Facility: CLINIC | Age: 81
End: 2021-12-29

## 2021-12-29 DIAGNOSIS — C50.811 MALIGNANT NEOPLASM OF OVERLAPPING SITES OF RIGHT BREAST IN FEMALE, ESTROGEN RECEPTOR POSITIVE (HCC): ICD-10-CM

## 2021-12-29 DIAGNOSIS — Z17.0 MALIGNANT NEOPLASM OF NIPPLE OF RIGHT BREAST IN FEMALE, ESTROGEN RECEPTOR POSITIVE (HCC): Primary | ICD-10-CM

## 2021-12-29 DIAGNOSIS — C50.011 MALIGNANT NEOPLASM OF NIPPLE OF RIGHT BREAST IN FEMALE, ESTROGEN RECEPTOR POSITIVE (HCC): Primary | ICD-10-CM

## 2021-12-29 DIAGNOSIS — Z17.0 MALIGNANT NEOPLASM OF OVERLAPPING SITES OF RIGHT BREAST IN FEMALE, ESTROGEN RECEPTOR POSITIVE (HCC): ICD-10-CM

## 2021-12-29 PROCEDURE — 99442 PR PHYS/QHP TELEPHONE EVALUATION 11-20 MIN: CPT | Performed by: INTERNAL MEDICINE

## 2021-12-29 NOTE — PROGRESS NOTES
"  Subjective     REASON FOR FOLLOW-UP:   1. Locally advanced right breast cancer ER NM positive HER-2 positive  · Initiated Arimidex 9/11/2019  · Radiation therapy added, completing a total of 25 fractions as of 4/27/2020    2. Remote history of left breast cancer 1978 treated with mastectomy alone                               REQUESTING PHYSICIAN: MD Vladimir Cowart    History of Present Illness Ms. Burgos is a 81 y.o. female with metastatic progressive breast cancer declined further treatment and is under the care of hospice.  We are doing a telephone visit test sure that she is comfortable and indeed she sounds very comfortable.  Her daughter states she is on a Duragesic patch 12 mcg plus breakthrough hydrocodone and her pain is controlled.  Appetite is fair.  Her legs are starting to swell and she is slowly declining but is overall doing as well as could be expected    Past Medical History:   Diagnosis Date   • Anemia     required prior blood transfusions   • Arthritis    • C. difficile colitis     APPROXIMATELY 5 YEARS AGO; TREATED IN HOSPITAL   • Cancer (HCC)     right breast cancer  ( 40 years ago left breast cancer)   • Clostridium difficile colitis    • COVID-19 vaccine series completed 2021    PFIZER   • Diabetes mellitus ([facility])    • Difficulty walking    • Diverticulitis    • Environmental allergies    • History of malignant neoplasm of breast 07/01/2013    denies any chemo or radiation   • History of malignant neoplasm of skin 07/01/2013    possibly basal   • History of tobacco use 7/1/2013   • History of transfusion    • HL (hearing loss)    • Hyperlipidemia    • Hypertension    • Kidney disease    • Neuropathy in diabetes (HCC)    • Peripheral neuropathy    • Pleural effusion on right     TUBE IN PLACE   • Shingles    • Skin cancer    • Stroke ([facility]) 2019   • Thrombophlebitis leg superficial     LEFT LEG \"AT LEAST 1O YEARS AGO'   • " "Vision loss    • Wears partial dentures     UPPER      Patient is  3 para 3 with 2 miscarriages menarche was at age 9 menopause at age 49st childbirth was at age 24 she breast-fed for at least 6 months she did not take hormone replacement therapy because at age 38 she developed breast cancer in the left breast was treated at the [facility] with a modified radical mastectomy in  no radiation or hormonal blockade was given in details of the scans were not available at this time.    Family history is positive for father dying at age 84 with disseminated abdominal cancer of unknown type mother  at 77 of natural causes she has a brother with kidney cancer at age 65 who  of this and a sister with melanoma at age 30    Patient currently lives with her daughter does not smoke currently but smoked for 70 years 1 pack of cigarettes is not a drinker.  She uses a cane to walk because her balance is poor she has issues with swallowing from her stroke      Past Surgical History:   Procedure Laterality Date   • CATARACT EXTRACTION, BILATERAL     • COLONOSCOPY     • EAR TUBES     • EAR TUBES Right     'PARK EAR TUBES\"   • MASTECTOMY Left     radical   • PLEURAL CATHETER INSERTION Right 2021    Procedure: PLEURX CATHETER INSERTION;  Surgeon: Yusef Larson III, MD;  Location: [facility];  Service: Thoracic;  Laterality: Right;   • PLEURX CATHETER REMOVAL Right 2021    Procedure: RIGHT PLEURX CATHETER REMOVAL;  Surgeon: Yusef Larson III, MD;  Location: [facility] OR;  Service: Thoracic;  Laterality: Right;   • TUMOR REMOVAL Left     large lipoma removed from hip      HEME/ONC HISTORY:  patient is an 79-year-old female with multiple medical issues including hypertension diabetes hypercholesterolemia with a recent stroke in February of this year which is resulted in left-sided weakness and difficulty swallowing.  The patient noted changes in her breast over the last year and " the last few months changes to the nipple specifically with developing lump in both the right breast and under the right arm.  She brought to the attention of her family physician who sent her for imaging and biopsy of obvious tumor.  Mammogram revealed a large irregular mass measuring 6 cm with skin retraction nipple retraction and skin thickening.  At the 10 o'clock position there appeared to be some skin involvement.    She was also noted clinically to have large axillary lymph nodes.  Ultrasound was obtained revealing an irregular solid mass with indistinct margins measuring approximately 64 x 51 mm.  On ultrasound there were also some enlarged axillary lymph nodes measuring about 30 mm in greatest size.     A biopsy was recommended and has revealed a grade 2 invasive ductal cancer that is ER positive at 98%, SD positive at 82%, and HER-2 equivocal.  The Ki-67 is 20.81%.     Patient was then referred to Dr. Gonzalez who examined her and did not feel surgical excision was possible because there would be a large defect from her tumor and was wanting to consider neoadjuvant therapy in an effort to shrink the tumor.  The patient was presented at the multidisciplinary conference and because of her comorbidities her recent stroke and her overall general condition we did not think she was a good candidate for neoadjuvant chemotherapy and she was referred to us to discuss endocrine therapy.    In the interim additional testing on her tumor was done at [facility] because of the indeterminate HER-2 FISH and this revealed that there was 3+ HER-2 activity by IHC and therefore this is considered to be HER-2 positive  The patient had staging work-up at the recommendation of the multidisciplinary clinic And CT of the chest showed calcified granulomatous disease with mild AP window adenopathy which is increased from previous CAT scan in 2016 precarinal adenopathy also increased.  It showed the breast mass as well as right  axillary adenopathy.  In the abdomen there was a left adrenal nodule which is increased in size from 1.4 to 1.8 cm felt to be an adenoma in addition there was a small sclerotic lesion in the left iliac crest and metastasis could not be completely excluded although the bone scan showed no uptake in this area.  The bone scan did show a fracture and uptake in the left greater trochanter and uptake of the left superior pubic ramus that represents a superior ramus fracture of with no other signs of metastatic disease-patient did fall in July of this year but no obvious fractures were seen on x-ray.    Pt did not want chemotherapy-Patient initiating Arimidex therapy in mid September 2019.     the patient was hospitalized in late September, presenting with garbled speech and elevated blood pressure, felt to ultimately have had a TIA related to hypertensive urgency and small vessel disease.  Neurology recommended dual antiplatelet therapy for 3 months followed by Plavix alone.  Her antihypertensives were adjusted.  At that time she was also found to be B12 deficient with a level of 158 and iron deficient with an iron percent of 7%.  She was started on oral B12 and iron.    She states that the iron does cause some constipation but it is manageable with stool softeners at this time.  There was some discussion in the hospital record that if she could not tolerate oral iron IV iron should be considered.  Hemoglobin is improved up to 9.7 from discharge hemoglobin of 9.0.    2/20  he has not had a DEXA scan in many years and we repeated in February and it shows moderate osteopenia in the spine osteoporosis and left hip and severe osteopenia in the right hip.  I am not inclined to switch her to tamoxifen at this point and leave her room at acceptable see her back in 3 months and decide whether to add Prolia    2/21  Progressive disease with malignant pleural effusion ER positive still decision made to try Faslodex and low-dose  Ibrance 75 mg 2 weeks on 2 weeks off    5/21  She was here 2 weeks ago and found to have elevated creatinine of 2.2, BUN 49. She received IV fluids. She was seen last week during her off week of Ibrance and was noting new right upper quadrant abdominal pain reportedly x2 weeks. Very tender on exam. He was also noting some increase shortness of breath. She was sent for CT of the abdomen/pelvis and ultrasound of the gallbladder. Imaging showed small stones in the gallbladder and/or sludge with pericholecystic fluid. Diverticulosis without evidence of diverticulitis. Patient was referred to Dr. Dolores Solis, surgery, for evaluation of possible cholecystitis.    Patient's hemoglobin declined further to 6.8 last week requiring transfusion of 2 units PRBCs given 5/15/2021.  She also received additional IV fluids the day prior for continued elevation of BUN at 39, creatinine 2.0.    The patient is reviewed back now, accompanied by her daughter, with hemoglobin improved to 10.2.  Patient is currently finishing up her second off week of Ibrance and WBC count has improved to 3.8, platelet count normalized to 250,000.  The patient is tentatively due to restart Ibrance tomorrow, after discussion with Dr. Marcus, it is not felt the patient can tolerate Ibrance considering how poorly she did was a 75 mg dose.  We discussed that this point keeping her off Ibrance and allowing her to get stronger.      Her BUN remains elevated at 43 today, creatinine 1.8.  Her baseline creatinine is more around 1.4-1.6.  Patient did see nephrology, Dr. Stanley, yesterday.  We will request records.  Patient's daughter states it was felt the patient was acutely dehydrated due to poor tolerance with Ibrance, specifically with nausea and poor oral intake.  We will give her additional IV fluids in the office today.        Current Outpatient Medications on File Prior to Visit   Medication Sig Dispense Refill   • acetaminophen (TYLENOL) 500 MG tablet Take  500 mg by mouth Every 6 (Six) Hours As Needed for Mild Pain .     • amLODIPine (NORVASC) 5 MG tablet Take 5 mg by mouth Daily.     • cetirizine (zyrTEC) 10 MG tablet Take 10 mg by mouth Every Night.     • cloNIDine (CATAPRES) 0.2 MG tablet TAKE 1 TABLET BY MOUTH EVERY 12 HOURS (Patient taking differently: Take 0.2 mg by mouth 2 (Two) Times a Day. Caution: Look alike/sound alike drug alert. Please read the label.) 180 tablet 3   • clopidogrel (PLAVIX) 75 MG tablet TAKE 1 TABLET BY MOUTH DAILY (Patient taking differently: Take 75 mg by mouth Daily. HOLD 5 DAYS PRIOR TO SURGERY PER DR DEMETRIS) 90 tablet 3   • fluticasone (FLONASE) 50 MCG/ACT nasal spray 2 sprays into the nostril(s) as directed by provider Every Night.     • Fulvestrant (FASLODEX IM) Inject  into the appropriate muscle as directed by prescriber Every 30 (Thirty) Days.     • guaiFENesin (MUCINEX) 600 MG 12 hr tablet Take 600 mg by mouth 2 (Two) Times a Day As Needed for Cough.     • metoprolol succinate XL (TOPROL-XL) 50 MG 24 hr tablet Take 1 tablet by mouth Daily. (Patient taking differently: Take 50 mg by mouth Daily.) 90 tablet 3   • oxyCODONE-acetaminophen (PERCOCET) 5-325 MG per tablet Take 1 tablet by mouth Every 6 (Six) Hours As Needed for Moderate Pain . 10 tablet 0   • polyethylene glycol (MIRALAX) 17 g packet Take 17 g by mouth 2 (Two) Times a Day. 30 packet 3   • vitamin B-12 (VITAMIN B-12) 2000 MCG tablet Take 1 tablet by mouth Daily. (Patient taking differently: Take 2,000 mcg by mouth Daily. HOLD FOR SURGERY) 30 tablet 0     No current facility-administered medications on file prior to visit.        ALLERGIES:    Allergies   Allergen Reactions   • Lisinopril Angioedema   • Lipitor [Atorvastatin] Unknown - High Severity     Leg weakness         Social History     Socioeconomic History   • Marital status:    • Number of children: 3   • Years of education: High school   Tobacco Use   • Smoking status: Former Smoker     Packs/day: 0.25      Years: 10.00     Pack years: 2.50     Types: Cigarettes     Quit date: 2019     Years since quittin.8   • Smokeless tobacco: Former User   • Tobacco comment: she quit for 8 years and started back about a year ago then quit again   Vaping Use   • Vaping Use: Never used   Substance and Sexual Activity   • Alcohol use: No   • Drug use: No   • Sexual activity: Defer        Family History   Problem Relation Age of Onset   • Stroke Mother 76   • Diabetes Mother    • Hypertension Mother    • Arthritis Mother    • Cancer Father         metastatic unknown cause   • Heart attack Father 70   • Colon cancer Father    • Lung cancer Father    • Cancer Brother         bladder   • Arthritis Sister    • Melanoma Sister    • Hypertension Daughter    • Diabetes Daughter    • Malig Hyperthermia Neg Hx       Review of Systems   Constitutional: Positive for fatigue. Negative for appetite change, chills, diaphoresis, fever and unexpected weight change.   HENT: Positive for hearing loss (same ). Negative for sore throat and trouble swallowing.    Respiratory: Negative.  Negative for cough, chest tightness, shortness of breath and wheezing.    Cardiovascular: Negative.  Negative for chest pain, palpitations and leg swelling.   Gastrointestinal: Negative for abdominal distention, abdominal pain (improved), blood in stool, constipation, diarrhea, nausea and vomiting.   Genitourinary: Negative.  Negative for dysuria, frequency, hematuria and urgency.   Musculoskeletal: Positive for arthralgias (Hip pain) and gait problem (Hip pain). Negative for joint swelling.        No muscle weakness.   Skin: Negative for rash and wound.   Neurological: Positive for weakness (left side). Negative for seizures, syncope, speech difficulty, numbness and headaches.   Hematological: Negative.  Negative for adenopathy. Does not bruise/bleed easily.   Psychiatric/Behavioral: Negative.  Negative for behavioral problems, confusion and suicidal ideas.    All other systems reviewed and are negative.      Objective     There were no vitals filed for this visit.  Current Status 10/29/2021   ECOG score 1     Physical Exam   Pulmonary/Chest:           GENERAL:  Well-developed, well-nourished in no acute distress.   SKIN:  Warm, dry without rashes, purpura or petechiae.  EYES:  Pupils equal, round and reactive to light.  EOMs intact.  Left eye proptosis.  EARS:  Hearing intact.  NOSE:  Septum midline.  No excoriations or nasal discharge.  MOUTH:  Tongue is well-papillated; no stomatitis or ulcers.  Lips normal.  THROAT:  Oropharynx without lesions or exudates.  NECK:  Supple with good range of motion; no thyromegaly or masses, no JVD.  LYMPHATICS:  No cervical, supraclavicular adenopathy.  CHEST:  Lungs clear to auscultation but poor air movement in the right middle and lower lobes.    BREASTS: Left chest wall is benign; the right breast has a mobile soft tissue mass roughly 4 cm transverse x 5 cm and 2 subcutaneous nodules noted see picture above -No palpable adenopathy in the right axilla, neck or supraclavicular region.  Nodules appear slightly larger see photograph   CARDIAC:  Regular rate and rhythm without murmurs, rubs or gallops. Normal S1,S2.  ABDOMEN: Bowel sounds active.  Soft, tender right lower quadrant .  EXTREMITIES:  No clubbing, cyanosis 2+lymphedema right arm and 1+ in both legs  NEUROLOGICAL: Left-sided weakness.  PSYCHIATRIC:  Normal affect and mood.     Telephone visit    RECENT LABS:                              MRI BRAIN     IMPRESSION:  Old right basal ganglia infarct. No acute abnormality  identified. No metastatic disease is identified at the brain or the  head.    Bone scan  IMPRESSION:  Abnormal uptake left greater trochanter where there is a  fracture demonstrated with CT (patient has a history of a fall and there  is no CT evidence that this is a pathologic fracture). There is also  abnormal uptake at the left superior pubic ramus that most  likely  represents a superior ramus fracture though a fracture is not evident  with CT. This uptake left superior pubic ramus is technically  indeterminate though there is overall no convincing evidence for bony  metastatic disease. Follow-up CT could be obtained.     This report was finalized on 8/23/2019 1:    CT chest abdomen pelvis  IMPRESSION CHEST CT:    1. A 2.2 cm right breast mass laterally presumably related to the  patient's neoplasm.  2. Right axillary mediastinal adenopathy and metastatic disease cannot  be excluded. PET/CT may be helpful.     IMPRESSION ABDOMEN & PELVIS CT:    1. Low-density renal lesions as discussed, favor cysts.  2. Enlarging left adrenal nodule, likely adenoma.  3. Extensive diverticulosis.  4. A 5 mm sclerotic left iliac lesion as discussed.    BONE MINERAL DENSITOMETRY  IMPRESSION:  Osteoporosis at the left hip. Interval decrease in bone  density.     This report was finalized on 2/14/2020 12:10 PM by Dr. Osmin Dodd M.D.        Assessment/Plan   1.  Locally advanced right breast cancer strongly ER VT positive with indeterminate HER-2 with repeat testing showing 3+ IHC consistent with positive result  · Staging work-up probably negative although indeterminate bone lesions without obvious fractures on bone scan  · Abnormal mediastinal adenopathy which is been present since 2016 likely granulomatous disease  · Enlarging left adrenal mass felt to be an adenoma present since 2015  · History of left breast cancer 1978 at age 38 treated with modified radical mastectomy alone? Pathology  · McDonald to be a poor candidate for surgery or chemotherapy.PT DECLINED CHEMO  · Initiated Arimidex around 9/11/2019.  Radiation may be a possibility for additional therapy if she does not get a good response from Arimidex  · Radiation added in 3/20  · Patient is reviewed back today, 11/17/2020, tolerating Arimidex well.  She does feel that the right breast mass is slightly larger though still  overall improved compared to when she initially presented.    · Patient seen on 2/18/2021 with new cutaneous nodules restaging and switch to Faslodex planned  · Right pleural effusion positive cytology for metastatic breast cancer ER/CA positive HER-2 FISH negative we will add Ibrance to the Faslodex 75 mg a day 2 weeks on 2 weeks off and escalate as tolerated.  · Patient did have thoracentesis on 5/4/2021 with removal of 1800 mL of fluid on the right side.  She has had improvement in her shortness of breath since that time.  · Patient seen for day 15 of her first cycle of Ibrance 75 mg daily for 2 weeks on, 2 weeks off.  Also given Faslodex that day.  She did have nausea with the first week and decreased appetite.  Her nausea was improved with ondansetron.  After about 5 days or so her appetite improved and she started eating more and having some strange cravings.  Patient also more short of breath, undergoing thoracentesis 5/4/2021.    · Patient with acute on chronic kidney disease in relation to nausea from Ibrance and decreased oral intake.  Per review 5/20/2021 in discussion with Dr. Marcus, we will hold further Ibrance at this time.  · Decision made to place her Pleurx catheter and continue with single agent Faslodex for now  · 7/2/2021, the patient returns today for Faslodex.  She had a Pleurx catheter placed on 6/7/2021.  · Pleurx catheter removed in August 2021  · Bone scan negative in 8/21 Faslodex continued  · Clinical deterioration admitted to hospice in 11/21    2.  Granulomatous mediastinal lymph nodes chronic    3.  History of stroke in 2/19 with left hemiparesis and dysphagia  · Patient hospitalized again 9/26 and 9/29/2019 presenting with garbled speech and elevated blood pressure, felt to have had a TIA.  Neurology recommended dual antiplatelet therapy for 3 months and then Plavix alone.    4.  Hypertension diabetes and hypercholesterolemia.  Patient currently off lisinopril per primary care due to  angioedema.    5.  Anemia, multifactorial:  · Patient has stage III CKD.  · Patient also found during recent hospitalization to have low B12 and low iron percent.  Though she had previously received B12 injections she was started on oral B12.     · Patient continues on oral iron at this time with some constipation though tolerable with stool softeners.  Plan to continue to monitor her blood work and if develops intolerance to oral iron consider IV iron.  · Was recent iron studies 11/20/2019 include a ferritin of 41, iron saturation 12%.   · 5/7/2021 ferritin and iron panel obtained with ferritin 156, iron saturation 50%, TIBC 288.  B12 502 with patient continuing oral B12.  · Hemoglobin dropped to 6.8 as of 5/14/2021.  Patient set up for transfusion.    · Hemoglobin improved to 10.1 as of 5/20/2021.    · 7/2/2021, hemoglobin declined to 8.9.  She is asymptomatic.    6.  Lymphedema right arm better after radiation    7.  Osteoporosis left hip -osteopenia spine observe for now    8.  Neutropenia secondary to Ibrance.  Count improving off treatment.    9.   DNR confirmed on 4/9/2021 with her daughter in attendance    10.  Chronic kidney disease.    · Patient with acute on chronic kidney injury when seen 5/7/2021 creatinine at 2.21, previously 1.43.  BUN elevated to 49.  Patient will receive 1 L normal saline today.  · Creatinine worsened to 2.31.  Patient given IV fluids.   · Referred to nephrology.    · 5/20/2021: Patient reports seeing Dr. Tapia, nephrology, on 5/19/2021.    · Creatinine improved to 1.2162 21  · 7/2/2021, creatinine 1.36.    11.  Nausea and poor appetite.  · Was noted during week 1 of Ibrance.  This is discussed to follow-up today.  The patient did not call in at that time reporting the symptoms.  Her appetite improved the weekend following and she is since been eating and drinking well she reports.  Her weight is down 3 pounds total.  I have encouraged her to use boost supplements during times of  poor appetite.  She does use ondansetron to control nausea.  We will continue to monitor this.  · Patient returns on 5/11/2021 with further weight loss.  She is also reporting right upper quadrant pain which will be discussed below.  · Appetite improved after thoracentesis    12.  Right upper quadrant pain x2 weeks reported 5/11/2021.    · We did proceed with a stat CT abdomen pelvis as well as gallbladder ultrasound showing .  Imaging noting small stones and/or sludge in the gallbladder with pericholecystic fluid.  There is diverticulosis without evidence of diverticulitis.  Patient covered with Levaquin and also referred to Dr. Dolores Solis, surgeon.   · Patient's daughter reports that they are in the process of making an appointment.  Patient does note almost complete resolution abdominal pain at this point however.    · Abdominal pain much improved as of 6/3/2021  · 7/2/2021, no complaints of abdominal pain today.    13.  Malignant right pleural effusion.  Patient required in and thoracentesis.  Patient's daughter requesting at least a referral to thoracic surgery to discuss possible Pleurx catheter.  · 6/7/2020, Pleurx catheter placed.  · Pleurx removed    14.  Patient is a DNR    PLAN:  1. Hospice to continue -no further follow-up planned  You have chosen to receive care through a telephone visit. Do you consent to use a telephone visit for your medical care today? Yes  Telephone visit 15 min- talked with daughter also

## 2023-02-01 NOTE — PROGRESS NOTES
Subjective     REASON FOR CONSULTATION:   1.Locally advanced right breast cancer ER ID positive HER-2 positive  Patient declines chemotherapy on Arimidex since 11/19  2. Remote history of left breast cancer 1978 treated with mastectomy alone                               REQUESTING PHYSICIAN: MD Vladimir Cowart    History of Present Illness Ms. Burgos returns to the office today accompanied by her daughter for  review     She is still on Arimidex therapy and completed radiation in April because she was having a slow response in the chest wall area and there was a lot of tumor involving the skin and also developing lymphedema in the right arm-Doppler was negative for DVT    .  Thankfully she is tolerating the medication well and the radiation causes significant improvement in the mass and the skin also    .  She denies any significant arthralgias.  She continues with normal appetite and stable weight.  She reports normal bowel bladder function.      She remains anemic despite being on iron B12 and I suspect much of this is related to renal failure -ferritin is 41 and she is not a candidate for Procrit unless she drops below 10 g/dl        Past Medical History:   Diagnosis Date   • Anemia     required prior blood transfusions   • Arthritis    • Cancer (CMS/HCC)     right breast cancer    • Clostridium difficile colitis    • Diabetes mellitus (CMS/HCC)    • Difficulty walking    • Diverticulitis    • Environmental allergies    • History of malignant neoplasm of breast 07/01/2013    denies any chemo or radiation   • History of malignant neoplasm of skin 07/01/2013    possibly basal   • History of tobacco use 7/1/2013   • HL (hearing loss)    • Hyperlipidemia    • Hypertension    • Kidney disease    • Neuropathy in diabetes (CMS/HCC)    • Peripheral neuropathy    • Shingles    • Skin cancer    • Stroke (CMS/HCC) 2019   • Vision loss       Patient is   3 para 3 with 2 miscarriages menarche was at age 9 menopause at age 49st childbirth was at age 24 she breast-fed for at least 6 months she did not take hormone replacement therapy because at age 38 she developed breast cancer in the left breast was treated at the [facility] cancer [place] with a modified radical mastectomy in  no radiation or hormonal blockade was given in details of the scans were not available at this time.    Family history is positive for father dying at age 84 with disseminated abdominal cancer of unknown type mother  at 77 of natural causes she has a brother with kidney cancer at age 65 who  of this and a sister with melanoma at age 30    Patient currently lives with her daughter does not smoke currently but smoked for 70 years 1 pack of cigarettes is not a drinker.  She uses a cane to walk because her balance is poor she has issues with swallowing from her stroke      Past Surgical History:   Procedure Laterality Date   • CATARACT EXTRACTION, BILATERAL     • EAR TUBES     • MASTECTOMY Bilateral     radical   • TUMOR REMOVAL Left     large lipoma removed from hip      HEME/ONC HISTORY:  patient is an 79-year-old female with multiple medical issues including hypertension diabetes hypercholesterolemia with a recent stroke in February of this year which is resulted in left-sided weakness and difficulty swallowing.  The patient noted changes in her breast over the last year and the last few months changes to the nipple specifically with developing lump in both the right breast and under the right arm.  She brought to the attention of her family physician who sent her for imaging and biopsy of obvious tumor.  Mammogram revealed a large irregular mass measuring 6 cm with skin retraction nipple retraction and skin thickening.  At the 10 o'clock position there appeared to be some skin involvement.    She was also noted clinically to have large axillary lymph nodes.  Ultrasound was  obtained revealing an irregular solid mass with indistinct margins measuring approximately 64 x 51 mm.  On ultrasound there were also some enlarged axillary lymph nodes measuring about 30 mm in greatest size.     A biopsy was recommended and has revealed a grade 2 invasive ductal cancer that is ER positive at 98%, MT positive at 82%, and HER-2 equivocal.  The Ki-67 is 20.81%.     Patient was then referred to Dr. Gonzalez who examined her and did not feel surgical excision was possible because there would be a large defect from her tumor and was wanting to consider neoadjuvant therapy in an effort to shrink the tumor.  The patient was presented at the multidisciplinary conference and because of her comorbidities her recent stroke and her overall general condition we did not think she was a good candidate for neoadjuvant chemotherapy and she was referred to us to discuss endocrine therapy.    In the interim additional testing on her tumor was done at [facility] because of the indeterminate HER-2 FISH and this revealed that there was 3+ HER-2 activity by IHC and therefore this is considered to be HER-2 positive  The patient had staging work-up at the recommendation of the multidisciplinary clinic And CT of the chest showed calcified granulomatous disease with mild AP window adenopathy which is increased from previous CAT scan in 2016 precarinal adenopathy also increased.  It showed the breast mass as well as right axillary adenopathy.  In the abdomen there was a left adrenal nodule which is increased in size from 1.4 to 1.8 cm felt to be an adenoma in addition there was a small sclerotic lesion in the left iliac crest and metastasis could not be completely excluded although the bone scan showed no uptake in this area.  The bone scan did show a fracture and uptake in the left greater trochanter and uptake of the left superior pubic ramus that represents a superior ramus fracture of with no other signs of metastatic  disease-patient did fall in July of this year but no obvious fractures were seen on x-ray.    Pt did not want chemotherapy-Patient initiating Arimidex therapy in mid September 2019.     the patient was hospitalized in late September, presenting with garbled speech and elevated blood pressure, felt to ultimately have had a TIA related to hypertensive urgency and small vessel disease.  Neurology recommended dual antiplatelet therapy for 3 months followed by Plavix alone.  Her antihypertensives were adjusted.  At that time she was also found to be B12 deficient with a level of 158 and iron deficient with an iron percent of 7%.  She was started on oral B12 and iron.    She states that the iron does cause some constipation but it is manageable with stool softeners at this time.  There was some discussion in the hospital record that if she could not tolerate oral iron IV iron should be considered.  Hemoglobin is improved up to 9.7 from discharge hemoglobin of 9.0.    2/20  he has not had a DEXA scan in many years and we repeated in February and it shows moderate osteopenia in the spine osteoporosis and left hip and severe osteopenia in the right hip.  I am not inclined to switch her to tamoxifen at this point and leave her room at acceptable see her back in 3 months and decide whether to add Prolia    Current Outpatient Medications on File Prior to Visit   Medication Sig Dispense Refill   • acetaminophen (TYLENOL) 500 MG tablet Take 500 mg by mouth Every 6 (Six) Hours As Needed for Mild Pain .     • amLODIPine (NORVASC) 10 MG tablet TAKE 1 TABLET BY MOUTH DAILY 90 tablet 3   • anastrozole (ARIMIDEX) 1 MG tablet Take 1 mg by mouth Daily.     • cetirizine (zyrTEC) 10 MG tablet Take 10 mg by mouth Daily.     • cloNIDine (CATAPRES) 0.2 MG tablet TAKE 1 TABLET BY MOUTH EVERY 12 HOURS 180 tablet 0   • clopidogrel (PLAVIX) 75 MG tablet TAKE 1 TABLET BY MOUTH DAILY 30 tablet 6   • ferrous sulfate (IRON SUPPLEMENT) 325 (65 FE) MG  tablet Take 1 tablet by mouth 2 (Two) Times a Day Before Meals. 60 tablet 0   • fluticasone (FLONASE) 50 MCG/ACT nasal spray 2 sprays into the nostril(s) as directed by provider Every Night.     • guaiFENesin (MUCINEX) 600 MG 12 hr tablet Take 600 mg by mouth 2 (Two) Times a Day As Needed for Cough.     • hydroCHLOROthiazide (MICROZIDE) 12.5 MG capsule TAKE 1 CAPSULE BY MOUTH EVERY DAY 90 capsule 3   • lisinopril (PRINIVIL,ZESTRIL) 40 MG tablet TAKE 1 TABLET BY MOUTH DAILY 90 tablet 0   • metFORMIN (GLUCOPHAGE) 1000 MG tablet TAKE 1 TABLET BY MOUTH TWICE DAILY WITH MEALS 180 tablet 0   • metoprolol succinate XL (TOPROL-XL) 50 MG 24 hr tablet Take 1 tablet by mouth Daily. 90 tablet 3   • pravastatin (PRAVACHOL) 20 MG tablet TAKE 1 TABLET BY MOUTH EVERY NIGHT 30 tablet 11   • senna (SENOKOT) 8.6 MG tablet tablet TK 1 T PO QD  0   • vitamin B-12 (VITAMIN B-12) 2000 MCG tablet Take 1 tablet by mouth Daily. 30 tablet 0     No current facility-administered medications on file prior to visit.         ALLERGIES:    Allergies   Allergen Reactions   • Lipitor [Atorvastatin] Unknown - High Severity     Leg weakness         Social History     Socioeconomic History   • Marital status:      Spouse name: Not on file   • Number of children: 3   • Years of education: High school   • Highest education level: Not on file   Occupational History     Employer: RETIRED   Tobacco Use   • Smoking status: Former Smoker     Packs/day: 0.25     Years: 10.00     Pack years: 2.50     Types: Cigarettes     Last attempt to quit: 2019     Years since quittin.3   • Smokeless tobacco: Former User   • Tobacco comment: she quit for 8 years and started back about a year ago then quit again   Substance and Sexual Activity   • Alcohol use: No   • Drug use: No   • Sexual activity: Never        Family History   Problem Relation Age of Onset   • Stroke Mother 76   • Diabetes Mother    • Hypertension Mother    • Arthritis Mother    • Cancer  "Father         metastatic unknown cause   • Heart attack Father 70   • Colon cancer Father    • Lung cancer Father    • Cancer Brother         bladder   • Arthritis Sister    • Melanoma Sister    • Hypertension Daughter    • Diabetes Daughter         Review of Systems   Constitutional: Positive for activity change (same 4/17/2020).   HENT: Positive for hearing loss (same 4/17/2020), sinus pain (same 4/17/2020) and trouble swallowing (same 4/17/2020).    Respiratory: Negative.    Cardiovascular: Negative.    Gastrointestinal: Negative for abdominal pain (stable 4/17/2020).   Genitourinary: Negative.    Musculoskeletal: Positive for gait problem (left side weakness same 4/17/2020) and joint swelling (right arm elbow swelling same 4/17/2020).   Neurological: Positive for dizziness (same 4/17/2020), weakness (Left-sided same 4/17/2020) and light-headedness (after radiation 4/17/2020).   Hematological: Negative.    Psychiatric/Behavioral: Negative.    All other systems reviewed and are negative.     All of the above stable to improved as of  10/10/19    Objective     Vitals:    07/10/20 1513   BP: 177/80   Pulse: 88   Resp: 18   Temp: 97.8 °F (36.6 °C)   TempSrc: Temporal   SpO2: 97%   Weight: 57.2 kg (126 lb)   Height: 164 cm (64.57\")   PainSc: 0-No pain     Current Status 7/10/2020   ECOG score 2       Physical Exam   Pulmonary/Chest:           GENERAL:  Well-developed, well-nourished in no acute distress.   SKIN:  Warm, dry without rashes, purpura or petechiae.  EYES:  Pupils equal, round and reactive to light.  EOMs intact.  Conjunctivae normal.  EARS:  Hearing intact.  NOSE:  Septum midline.  No excoriations or nasal discharge.  MOUTH:  Tongue is well-papillated; no stomatitis or ulcers.  Lips normal.  THROAT:  Oropharynx without lesions or exudates.  NECK:  Supple with good range of motion; no thyromegaly or masses, no JVD.  LYMPHATICS:  No cervical, supraclavicular, axillary or inguinal adenopathy.  CHEST:  Lungs " clear to auscultation. Good airflow.  BREASTS: Left chest wall is benign; the right breast shows a freely mobile soft mass involving the nipple areole complex in the upper outer quadrant with no cutaneous nodules and disappearance of the palpable adenopathy in the right axilla  CARDIAC:  Regular rate and rhythm without murmurs, rubs or gallops. Normal S1,S2.  ABDOMEN:  Soft, nontender with no hepatosplenomegaly or masses.-Prominent bony nodule on the left greater trochanter  EXTREMITIES:  No clubbing, cyanosis 2+lymphedema right arm  NEUROLOGICAL:  Cranial Nerves II-XII grossly intact.  Mild left-sided weakness with a hemiparetic gait   pSYCHIATRIC:  Normal affect and mood.        RECENT LABS:  Lab Results   Component Value Date    WBC 4.71 07/10/2020    HGB 10.4 (L) 07/10/2020    HCT 32.8 (L) 07/10/2020    MCV 90.6 07/10/2020     07/10/2020       Results from last 7 days   Lab Units 07/10/20  1436   WBC 10*3/mm3 4.71   NEUTROS ABS 10*3/mm3 2.86   HEMOGLOBIN g/dL 10.4*   HEMATOCRIT % 32.8*   PLATELETS 10*3/mm3 249     Results from last 7 days   Lab Units 07/10/20  1436   SODIUM mmol/L 143   POTASSIUM mmol/L 4.3   CHLORIDE mmol/L 109*   CO2 mmol/L 18.9*   BUN mg/dL 31*   CREATININE mg/dL 1.46*   CALCIUM mg/dL 9.7   ALBUMIN g/dL 4.40   BILIRUBIN mg/dL <0.2*   ALK PHOS U/L 88   ALT (SGPT) U/L 11   AST (SGOT) U/L 17   GLUCOSE mg/dL 104                               MRI BRAIN     IMPRESSION:  Old right basal ganglia infarct. No acute abnormality  identified. No metastatic disease is identified at the brain or the  head.    Bone scan  IMPRESSION:  Abnormal uptake left greater trochanter where there is a  fracture demonstrated with CT (patient has a history of a fall and there  is no CT evidence that this is a pathologic fracture). There is also  abnormal uptake at the left superior pubic ramus that most likely  represents a superior ramus fracture though a fracture is not evident  with CT. This uptake left superior  pubic ramus is technically  indeterminate though there is overall no convincing evidence for bony  metastatic disease. Follow-up CT could be obtained.     This report was finalized on 8/23/2019 1:    CT chest abdomen pelvis  IMPRESSION CHEST CT:    1. A 2.2 cm right breast mass laterally presumably related to the  patient's neoplasm.  2. Right axillary mediastinal adenopathy and metastatic disease cannot  be excluded. PET/CT may be helpful.     IMPRESSION ABDOMEN & PELVIS CT:    1. Low-density renal lesions as discussed, favor cysts.  2. Enlarging left adrenal nodule, likely adenoma.  3. Extensive diverticulosis.  4. A 5 mm sclerotic left iliac lesion as discussed.    BONE MINERAL DENSITOMETRY  IMPRESSION:  Osteoporosis at the left hip. Interval decrease in bone  density.     This report was finalized on 2/14/2020 12:10 PM by Dr. Osmin Dodd M.D.                          Assessment/Plan   1.  Locally advanced right breast cancer strongly ER [place] positive with indeterminate HER-2 with repeat testing showing 3+ IHC consistent with positive result  · Staging work-up probably negative although indeterminate bone lesions without obvious fractures on bone scan  · Abnormal mediastinal adenopathy which is been present since 2016 likely granulomatous disease  · Enlarging left adrenal mass felt to be an adenoma present since 2015  · History of left breast cancer 1978 at age 38 treated with modified radical mastectomy alone? Pathology  · [place] to be a poor candidate for surgery or chemotherapy.PT DECLINED CHEMO  · Initiated Arimidex around 9/11/2019.  Radiation may be a possibility for additional therapy if she does not get a good response from Arimidex  · Radiation added in 3/20      2.  Granulomatous mediastinal lymph nodes chronic    3.  Recent stroke in 2/19 with left hemiparesis and dysphagia  · Patient hospitalized again 9/26 and 9/29/2019 presenting with garbled speech and elevated blood pressure, felt to have had a  TIA.  Neurology recommended dual antiplatelet therapy for 3 months and then Plavix alone.    4.  Hypertension diabetes and hypercholesterolemia    5.  Anemia, multifactorial:  · Patient has stage III CKD.  · Patient also found during recent hospitalization to have low B12 and low iron percent.  Though she had previously received B12 injections she was started on oral B12.  She was also started on oral iron.    · Patient continues on oral iron at this time with some constipation though tolerable with stool softeners.  Plan to continue to monitor her blood work and if develops intolerance to oral iron consider IV iron.  Plan to recheck iron studies ferritin 41    6.  Lymphedema right arm better after radiation    7.  Osteoporosis left hip -osteopenia spine observe for now    PLAN:  1. Continue Arimidex. For now  2.  Return in 4 mo-   patient again declined chemotherapy of any kind     Standard precautions discussed regarding the Novel Coronavirus (COVID-19)  including patient to limit contact with others outside of home, frequent handwashing and using alcohol gel when unable to use soap and water, as well to monitoring for symptoms and notifying our office via telephone for any symptoms or exposures.         Type Of Destruction Used: Curettage

## 2023-02-22 NOTE — OUTREACH NOTE
Stroke Week 3 Survey      Responses   Facility patient discharged from?  [facility]   Does the patient have one of the following disease processes/diagnoses(primary or secondary)?  Stroke (TIA)   Week 3 attempt successful?  No   Unsuccessful attempts  Attempt 2          Joanie Cat RN   Talked to pt regarding blood work needed prior to f/u appt On 2/27/23. Pt stated that she will go tomorrow.

## (undated) DEVICE — GLV SURG PREMIERPRO ORTHO LTX PF SZ8 BRN

## (undated) DEVICE — SPNG GZ WOVN 4X4IN 12PLY 10/BX STRL

## (undated) DEVICE — PENCL E/S ULTRAVAC TELESCP NOSE HOLSTR 10FT

## (undated) DEVICE — ANTIBACTERIAL UNDYED BRAIDED (POLYGLACTIN 910), SYNTHETIC ABSORBABLE SUTURE: Brand: COATED VICRYL

## (undated) DEVICE — NDL HYPO PRECISIONGLIDE REG 25G 1 1/2

## (undated) DEVICE — DRSNG TELFA PAD NONADH STR 1S 3X4IN

## (undated) DEVICE — 3M™ IOBAN™ 2 ANTIMICROBIAL INCISE DRAPE 6640EZ: Brand: IOBAN™ 2

## (undated) DEVICE — PATIENT RETURN ELECTRODE, SINGLE-USE, CONTACT QUALITY MONITORING, ADULT, WITH 9FT CORD, FOR PATIENTS WEIGING OVER 33LBS. (15KG): Brand: MEGADYNE

## (undated) DEVICE — LOU MINOR PROCEDURE: Brand: MEDLINE INDUSTRIES, INC.

## (undated) DEVICE — DRAPE,CHEST,FENES,15X10,STERIL: Brand: MEDLINE

## (undated) DEVICE — DRP C/ARM 41X74IN

## (undated) DEVICE — KT CATH DRN PLEURL PLEURX/SAFE/T SIL 15.5F 66CM 4BT/1000ML

## (undated) DEVICE — APPL DURAPREP IODOPHOR APL 26ML

## (undated) DEVICE — TRAP FLD MINIVAC MEGADYNE 100ML

## (undated) DEVICE — DRSNG SURESITE WNDW 4X4.5